# Patient Record
Sex: FEMALE | Race: BLACK OR AFRICAN AMERICAN | Employment: UNEMPLOYED | URBAN - METROPOLITAN AREA
[De-identification: names, ages, dates, MRNs, and addresses within clinical notes are randomized per-mention and may not be internally consistent; named-entity substitution may affect disease eponyms.]

---

## 2017-03-30 ENCOUNTER — PAT TELEPHONE (OUTPATIENT)
Dept: PREADMISSION TESTING | Age: 42
End: 2017-03-30

## 2017-03-30 VITALS — BODY MASS INDEX: 20.46 KG/M2 | WEIGHT: 135 LBS | HEIGHT: 68 IN

## 2017-03-30 RX ORDER — HYDRALAZINE HYDROCHLORIDE 25 MG/1
75 TABLET, FILM COATED ORAL 3 TIMES DAILY
Status: ON HOLD | COMMUNITY
End: 2018-01-20 | Stop reason: HOSPADM

## 2017-03-30 RX ORDER — LUBIPROSTONE 24 UG/1
24 CAPSULE, GELATIN COATED ORAL 2 TIMES DAILY WITH MEALS
Status: ON HOLD | COMMUNITY
End: 2022-05-17 | Stop reason: CLARIF

## 2017-03-30 ASSESSMENT — PAIN SCALES - GENERAL: PAINLEVEL_OUTOF10: 8

## 2017-03-30 ASSESSMENT — PAIN DESCRIPTION - LOCATION: LOCATION: ABDOMEN

## 2017-03-30 ASSESSMENT — PAIN DESCRIPTION - PAIN TYPE: TYPE: CHRONIC PAIN

## 2017-03-31 ENCOUNTER — HOSPITAL ENCOUNTER (OUTPATIENT)
Dept: ENDOSCOPY | Age: 42
Discharge: HOME | End: 2017-03-31
Attending: INTERNAL MEDICINE | Admitting: INTERNAL MEDICINE

## 2017-03-31 VITALS
BODY MASS INDEX: 21.48 KG/M2 | RESPIRATION RATE: 16 BRPM | SYSTOLIC BLOOD PRESSURE: 141 MMHG | TEMPERATURE: 98.5 F | HEART RATE: 91 BPM | WEIGHT: 139.22 LBS | DIASTOLIC BLOOD PRESSURE: 75 MMHG | OXYGEN SATURATION: 96 %

## 2017-03-31 LAB
GLUCOSE BLD-MCNC: 98 MG/DL (ref 70–99)
PERFORMED ON: NORMAL

## 2017-03-31 RX ORDER — MEPERIDINE HYDROCHLORIDE 25 MG/ML
12.5 INJECTION INTRAMUSCULAR; INTRAVENOUS; SUBCUTANEOUS EVERY 5 MIN PRN
Status: DISCONTINUED | OUTPATIENT
Start: 2017-03-31 | End: 2017-04-01 | Stop reason: HOSPADM

## 2017-03-31 RX ORDER — OXYCODONE HYDROCHLORIDE 5 MG/1
10 TABLET ORAL PRN
Status: ACTIVE | OUTPATIENT
Start: 2017-03-31 | End: 2017-03-31

## 2017-03-31 RX ORDER — HYDRALAZINE HYDROCHLORIDE 20 MG/ML
5 INJECTION INTRAMUSCULAR; INTRAVENOUS EVERY 10 MIN PRN
Status: DISCONTINUED | OUTPATIENT
Start: 2017-03-31 | End: 2017-04-01 | Stop reason: HOSPADM

## 2017-03-31 RX ORDER — LABETALOL HYDROCHLORIDE 5 MG/ML
5 INJECTION, SOLUTION INTRAVENOUS EVERY 10 MIN PRN
Status: DISCONTINUED | OUTPATIENT
Start: 2017-03-31 | End: 2017-04-01 | Stop reason: HOSPADM

## 2017-03-31 RX ORDER — SODIUM CHLORIDE 0.9 % (FLUSH) 0.9 %
10 SYRINGE (ML) INJECTION EVERY 12 HOURS SCHEDULED
Status: DISCONTINUED | OUTPATIENT
Start: 2017-03-31 | End: 2017-04-01 | Stop reason: HOSPADM

## 2017-03-31 RX ORDER — SODIUM CHLORIDE 9 MG/ML
INJECTION, SOLUTION INTRAVENOUS CONTINUOUS
Status: DISCONTINUED | OUTPATIENT
Start: 2017-03-31 | End: 2017-04-01 | Stop reason: HOSPADM

## 2017-03-31 RX ORDER — MORPHINE SULFATE 2 MG/ML
1 INJECTION, SOLUTION INTRAMUSCULAR; INTRAVENOUS EVERY 5 MIN PRN
Status: DISCONTINUED | OUTPATIENT
Start: 2017-03-31 | End: 2017-04-01 | Stop reason: HOSPADM

## 2017-03-31 RX ORDER — OXYCODONE HYDROCHLORIDE 5 MG/1
5 TABLET ORAL PRN
Status: ACTIVE | OUTPATIENT
Start: 2017-03-31 | End: 2017-03-31

## 2017-03-31 RX ORDER — SODIUM CHLORIDE 0.9 % (FLUSH) 0.9 %
10 SYRINGE (ML) INJECTION PRN
Status: DISCONTINUED | OUTPATIENT
Start: 2017-03-31 | End: 2017-04-01 | Stop reason: HOSPADM

## 2017-03-31 RX ORDER — METOCLOPRAMIDE HYDROCHLORIDE 5 MG/ML
10 INJECTION INTRAMUSCULAR; INTRAVENOUS
Status: ACTIVE | OUTPATIENT
Start: 2017-03-31 | End: 2017-03-31

## 2017-03-31 RX ORDER — DIPHENHYDRAMINE HYDROCHLORIDE 50 MG/ML
12.5 INJECTION INTRAMUSCULAR; INTRAVENOUS
Status: ACTIVE | OUTPATIENT
Start: 2017-03-31 | End: 2017-03-31

## 2017-03-31 RX ORDER — PROMETHAZINE HYDROCHLORIDE 25 MG/ML
6.25 INJECTION, SOLUTION INTRAMUSCULAR; INTRAVENOUS
Status: ACTIVE | OUTPATIENT
Start: 2017-03-31 | End: 2017-03-31

## 2017-03-31 ASSESSMENT — PAIN - FUNCTIONAL ASSESSMENT: PAIN_FUNCTIONAL_ASSESSMENT: 0-10

## 2017-03-31 ASSESSMENT — PAIN SCALES - GENERAL
PAINLEVEL_OUTOF10: 7
PAINLEVEL_OUTOF10: 0
PAINLEVEL_OUTOF10: 0

## 2017-07-04 PROBLEM — R62.7 FAILURE TO THRIVE IN ADULT: Status: ACTIVE | Noted: 2017-07-04

## 2017-07-04 PROBLEM — R07.9 CHEST PAIN: Status: ACTIVE | Noted: 2017-07-04

## 2017-07-12 ENCOUNTER — TELEPHONE (OUTPATIENT)
Dept: PSYCHIATRY | Age: 42
End: 2017-07-12

## 2017-07-12 LAB
COCAINE GC/MS CONF: >1000 NG/ML
GLUCOSE BLD-MCNC: 171 MG/DL (ref 70–99)
PERFORMED ON: ABNORMAL

## 2017-07-13 LAB
GLUCOSE BLD-MCNC: 122 MG/DL (ref 70–99)
GLUCOSE BLD-MCNC: 154 MG/DL (ref 70–99)
GLUCOSE BLD-MCNC: 162 MG/DL (ref 70–99)
GLUCOSE BLD-MCNC: 170 MG/DL (ref 70–99)
PERFORMED ON: ABNORMAL

## 2017-07-14 LAB
GLUCOSE BLD-MCNC: 107 MG/DL (ref 70–99)
GLUCOSE BLD-MCNC: 149 MG/DL (ref 70–99)
GLUCOSE BLD-MCNC: 164 MG/DL (ref 70–99)
GLUCOSE BLD-MCNC: 180 MG/DL (ref 70–99)
PERFORMED ON: ABNORMAL

## 2017-07-15 LAB
GLUCOSE BLD-MCNC: 102 MG/DL (ref 70–99)
GLUCOSE BLD-MCNC: 124 MG/DL (ref 70–99)
GLUCOSE BLD-MCNC: 148 MG/DL (ref 70–99)
GLUCOSE BLD-MCNC: 158 MG/DL (ref 70–99)
PERFORMED ON: ABNORMAL

## 2017-07-16 LAB
GLUCOSE BLD-MCNC: 132 MG/DL (ref 70–99)
GLUCOSE BLD-MCNC: 162 MG/DL (ref 70–99)
GLUCOSE BLD-MCNC: 187 MG/DL (ref 70–99)
GLUCOSE BLD-MCNC: 249 MG/DL (ref 70–99)
PERFORMED ON: ABNORMAL

## 2017-07-17 PROBLEM — B37.31 VAGINAL CANDIDIASIS: Status: ACTIVE | Noted: 2017-07-17

## 2017-07-17 LAB
GLUCOSE BLD-MCNC: 103 MG/DL (ref 70–99)
GLUCOSE BLD-MCNC: 122 MG/DL (ref 70–99)
GLUCOSE BLD-MCNC: 170 MG/DL (ref 70–99)
GLUCOSE BLD-MCNC: 271 MG/DL (ref 70–99)
GLUCOSE BLD-MCNC: 54 MG/DL (ref 70–99)
GLUCOSE BLD-MCNC: 89 MG/DL (ref 70–99)
PERFORMED ON: ABNORMAL
PERFORMED ON: NORMAL

## 2017-07-18 LAB
GLUCOSE BLD-MCNC: 125 MG/DL (ref 70–99)
GLUCOSE BLD-MCNC: 134 MG/DL (ref 70–99)
GLUCOSE BLD-MCNC: 137 MG/DL (ref 70–99)
GLUCOSE BLD-MCNC: 141 MG/DL (ref 70–99)
GLUCOSE BLD-MCNC: 172 MG/DL (ref 70–99)
GLUCOSE BLD-MCNC: 198 MG/DL (ref 70–99)
PERFORMED ON: ABNORMAL

## 2017-07-19 LAB
GLUCOSE BLD-MCNC: 108 MG/DL (ref 70–99)
GLUCOSE BLD-MCNC: 132 MG/DL (ref 70–99)
PERFORMED ON: ABNORMAL
PERFORMED ON: ABNORMAL

## 2017-07-27 PROBLEM — N93.9 VAGINAL BLEEDING: Status: ACTIVE | Noted: 2017-07-27

## 2017-07-31 ENCOUNTER — TELEPHONE (OUTPATIENT)
Dept: GYNECOLOGY | Age: 42
End: 2017-07-31

## 2017-07-31 DIAGNOSIS — B96.89 BV (BACTERIAL VAGINOSIS): Primary | ICD-10-CM

## 2017-07-31 DIAGNOSIS — N76.0 BV (BACTERIAL VAGINOSIS): Primary | ICD-10-CM

## 2017-07-31 RX ORDER — CLINDAMYCIN PHOSPHATE 20 MG/G
1 CREAM VAGINAL NIGHTLY
Qty: 1 TUBE | Refills: 0 | Status: SHIPPED | OUTPATIENT
Start: 2017-07-31 | End: 2017-08-07

## 2018-06-27 PROBLEM — F19.90 ILLICIT DRUG USE: Status: ACTIVE | Noted: 2018-06-27

## 2018-06-27 PROBLEM — D64.9 ANEMIA: Status: ACTIVE | Noted: 2018-06-27

## 2018-07-01 PROBLEM — E87.5 HYPERKALEMIA: Status: ACTIVE | Noted: 2018-07-01

## 2018-07-02 ENCOUNTER — TELEPHONE (OUTPATIENT)
Dept: INTERNAL MEDICINE CLINIC | Age: 43
End: 2018-07-02

## 2018-11-10 ENCOUNTER — HOSPITAL ENCOUNTER (EMERGENCY)
Age: 43
Discharge: HOME | End: 2018-11-10
Payer: MEDICAID

## 2018-11-10 VITALS
TEMPERATURE: 98.7 F | DIASTOLIC BLOOD PRESSURE: 75 MMHG | HEIGHT: 68 IN | OXYGEN SATURATION: 98 % | HEART RATE: 103 BPM | WEIGHT: 133.82 LBS | SYSTOLIC BLOOD PRESSURE: 149 MMHG | BODY MASS INDEX: 20.28 KG/M2 | RESPIRATION RATE: 22 BRPM

## 2018-11-10 DIAGNOSIS — D36.7 CYST, DERMOID, ARM, RIGHT: Primary | ICD-10-CM

## 2018-11-10 PROCEDURE — 99282 EMERGENCY DEPT VISIT SF MDM: CPT

## 2018-11-10 ASSESSMENT — ENCOUNTER SYMPTOMS
CHOKING: 0
SORE THROAT: 0
ABDOMINAL PAIN: 0
APNEA: 0
FACIAL SWELLING: 0
SHORTNESS OF BREATH: 0
BACK PAIN: 0
NAUSEA: 0
EYE DISCHARGE: 0
VOMITING: 0
EYE REDNESS: 0

## 2019-01-02 ENCOUNTER — HOSPITAL ENCOUNTER (INPATIENT)
Age: 44
LOS: 2 days | Discharge: HOME | DRG: 425 | End: 2019-01-04
Attending: EMERGENCY MEDICINE | Admitting: INTERNAL MEDICINE
Payer: MEDICAID

## 2019-01-02 ENCOUNTER — APPOINTMENT (OUTPATIENT)
Dept: CT IMAGING | Age: 44
DRG: 425 | End: 2019-01-02
Payer: MEDICAID

## 2019-01-02 DIAGNOSIS — Z99.2 ESRD ON PERITONEAL DIALYSIS (HCC): ICD-10-CM

## 2019-01-02 DIAGNOSIS — E87.5 HYPERKALEMIA: Primary | ICD-10-CM

## 2019-01-02 DIAGNOSIS — N18.6 ESRD ON PERITONEAL DIALYSIS (HCC): ICD-10-CM

## 2019-01-02 DIAGNOSIS — I63.9 CEREBROVASCULAR ACCIDENT (CVA), UNSPECIFIED MECHANISM (HCC): ICD-10-CM

## 2019-01-02 DIAGNOSIS — R29.898 RIGHT ARM WEAKNESS: ICD-10-CM

## 2019-01-02 LAB
A/G RATIO: 0.9 (ref 1.1–2.2)
ALBUMIN SERPL-MCNC: 3.1 G/DL (ref 3.4–5)
ALP BLD-CCNC: 324 U/L (ref 40–129)
ALT SERPL-CCNC: 6 U/L (ref 10–40)
ANION GAP SERPL CALCULATED.3IONS-SCNC: 19 MMOL/L (ref 3–16)
AST SERPL-CCNC: 14 U/L (ref 15–37)
BASOPHILS ABSOLUTE: 0.1 K/UL (ref 0–0.2)
BASOPHILS RELATIVE PERCENT: 0.9 %
BILIRUB SERPL-MCNC: 0.4 MG/DL (ref 0–1)
BUN BLDV-MCNC: 60 MG/DL (ref 7–20)
CALCIUM SERPL-MCNC: 9.6 MG/DL (ref 8.3–10.6)
CHLORIDE BLD-SCNC: 97 MMOL/L (ref 99–110)
CO2: 25 MMOL/L (ref 21–32)
CREAT SERPL-MCNC: 15.9 MG/DL (ref 0.6–1.1)
EOSINOPHILS ABSOLUTE: 0.4 K/UL (ref 0–0.6)
EOSINOPHILS RELATIVE PERCENT: 4.2 %
GFR AFRICAN AMERICAN: 3
GFR NON-AFRICAN AMERICAN: 2
GLOBULIN: 3.3 G/DL
GLUCOSE BLD-MCNC: 136 MG/DL (ref 70–99)
HCT VFR BLD CALC: 32.5 % (ref 36–48)
HEMOGLOBIN: 10.5 G/DL (ref 12–16)
LYMPHOCYTES ABSOLUTE: 1.8 K/UL (ref 1–5.1)
LYMPHOCYTES RELATIVE PERCENT: 18.9 %
MCH RBC QN AUTO: 28.3 PG (ref 26–34)
MCHC RBC AUTO-ENTMCNC: 32.2 G/DL (ref 31–36)
MCV RBC AUTO: 87.7 FL (ref 80–100)
MONOCYTES ABSOLUTE: 1 K/UL (ref 0–1.3)
MONOCYTES RELATIVE PERCENT: 10.3 %
NEUTROPHILS ABSOLUTE: 6.2 K/UL (ref 1.7–7.7)
NEUTROPHILS RELATIVE PERCENT: 65.7 %
PDW BLD-RTO: 14.6 % (ref 12.4–15.4)
PLATELET # BLD: 329 K/UL (ref 135–450)
PMV BLD AUTO: 8.9 FL (ref 5–10.5)
POTASSIUM REFLEX MAGNESIUM: 6.5 MMOL/L (ref 3.5–5.1)
RBC # BLD: 3.71 M/UL (ref 4–5.2)
SODIUM BLD-SCNC: 141 MMOL/L (ref 136–145)
TOTAL PROTEIN: 6.4 G/DL (ref 6.4–8.2)
WBC # BLD: 9.4 K/UL (ref 4–11)

## 2019-01-02 PROCEDURE — 2580000003 HC RX 258: Performed by: INTERNAL MEDICINE

## 2019-01-02 PROCEDURE — 6370000000 HC RX 637 (ALT 250 FOR IP): Performed by: EMERGENCY MEDICINE

## 2019-01-02 PROCEDURE — 85025 COMPLETE CBC W/AUTO DIFF WBC: CPT

## 2019-01-02 PROCEDURE — 36415 COLL VENOUS BLD VENIPUNCTURE: CPT

## 2019-01-02 PROCEDURE — 80053 COMPREHEN METABOLIC PANEL: CPT

## 2019-01-02 PROCEDURE — 72125 CT NECK SPINE W/O DYE: CPT

## 2019-01-02 PROCEDURE — 1200000000 HC SEMI PRIVATE

## 2019-01-02 PROCEDURE — 70450 CT HEAD/BRAIN W/O DYE: CPT

## 2019-01-02 PROCEDURE — 6360000002 HC RX W HCPCS: Performed by: INTERNAL MEDICINE

## 2019-01-02 PROCEDURE — 99285 EMERGENCY DEPT VISIT HI MDM: CPT

## 2019-01-02 PROCEDURE — 6370000000 HC RX 637 (ALT 250 FOR IP): Performed by: INTERNAL MEDICINE

## 2019-01-02 PROCEDURE — 93005 ELECTROCARDIOGRAM TRACING: CPT | Performed by: EMERGENCY MEDICINE

## 2019-01-02 RX ORDER — MIRTAZAPINE 30 MG/1
30 TABLET, FILM COATED ORAL NIGHTLY
Status: DISCONTINUED | OUTPATIENT
Start: 2019-01-02 | End: 2019-01-03

## 2019-01-02 RX ORDER — DICYCLOMINE HCL 20 MG
20 TABLET ORAL ONCE
Status: COMPLETED | OUTPATIENT
Start: 2019-01-02 | End: 2019-01-02

## 2019-01-02 RX ORDER — ATORVASTATIN CALCIUM 20 MG/1
20 TABLET, FILM COATED ORAL NIGHTLY
Status: DISCONTINUED | OUTPATIENT
Start: 2019-01-02 | End: 2019-01-03

## 2019-01-02 RX ORDER — PANTOPRAZOLE SODIUM 40 MG/1
40 TABLET, DELAYED RELEASE ORAL
Status: DISCONTINUED | OUTPATIENT
Start: 2019-01-03 | End: 2019-01-04

## 2019-01-02 RX ORDER — ISOSORBIDE MONONITRATE 60 MG/1
120 TABLET, EXTENDED RELEASE ORAL DAILY
Status: DISCONTINUED | OUTPATIENT
Start: 2019-01-03 | End: 2019-01-04 | Stop reason: HOSPADM

## 2019-01-02 RX ORDER — AMLODIPINE BESYLATE 10 MG/1
10 TABLET ORAL EVERY EVENING
Status: DISCONTINUED | OUTPATIENT
Start: 2019-01-02 | End: 2019-01-04 | Stop reason: HOSPADM

## 2019-01-02 RX ORDER — HEPARIN SODIUM 5000 [USP'U]/ML
5000 INJECTION, SOLUTION INTRAVENOUS; SUBCUTANEOUS EVERY 8 HOURS SCHEDULED
Status: DISCONTINUED | OUTPATIENT
Start: 2019-01-02 | End: 2019-01-04 | Stop reason: HOSPADM

## 2019-01-02 RX ORDER — CLONIDINE HYDROCHLORIDE 0.1 MG/1
0.3 TABLET ORAL ONCE
Status: COMPLETED | OUTPATIENT
Start: 2019-01-02 | End: 2019-01-02

## 2019-01-02 RX ORDER — HYDRALAZINE HYDROCHLORIDE 50 MG/1
50 TABLET, FILM COATED ORAL EVERY 8 HOURS
Status: DISCONTINUED | OUTPATIENT
Start: 2019-01-02 | End: 2019-01-04 | Stop reason: HOSPADM

## 2019-01-02 RX ORDER — SUCRALFATE 1 G/1
1 TABLET ORAL ONCE
Status: COMPLETED | OUTPATIENT
Start: 2019-01-02 | End: 2019-01-02

## 2019-01-02 RX ORDER — SODIUM CHLORIDE 0.9 % (FLUSH) 0.9 %
10 SYRINGE (ML) INJECTION EVERY 12 HOURS SCHEDULED
Status: DISCONTINUED | OUTPATIENT
Start: 2019-01-02 | End: 2019-01-04 | Stop reason: HOSPADM

## 2019-01-02 RX ORDER — SEVELAMER CARBONATE 800 MG/1
4800 TABLET, FILM COATED ORAL
Status: DISCONTINUED | OUTPATIENT
Start: 2019-01-03 | End: 2019-01-04 | Stop reason: HOSPADM

## 2019-01-02 RX ORDER — CLONIDINE HYDROCHLORIDE 0.1 MG/1
0.3 TABLET ORAL 3 TIMES DAILY
Status: DISCONTINUED | OUTPATIENT
Start: 2019-01-02 | End: 2019-01-04 | Stop reason: HOSPADM

## 2019-01-02 RX ORDER — SODIUM CHLORIDE 0.9 % (FLUSH) 0.9 %
10 SYRINGE (ML) INJECTION PRN
Status: DISCONTINUED | OUTPATIENT
Start: 2019-01-02 | End: 2019-01-04 | Stop reason: HOSPADM

## 2019-01-02 RX ORDER — DOCUSATE SODIUM 100 MG/1
100 CAPSULE, LIQUID FILLED ORAL 2 TIMES DAILY PRN
Status: DISCONTINUED | OUTPATIENT
Start: 2019-01-02 | End: 2019-01-04 | Stop reason: HOSPADM

## 2019-01-02 RX ORDER — LUBIPROSTONE 8 UG/1
8 CAPSULE, GELATIN COATED ORAL 2 TIMES DAILY WITH MEALS
Status: DISCONTINUED | OUTPATIENT
Start: 2019-01-03 | End: 2019-01-03

## 2019-01-02 RX ORDER — BISACODYL 10 MG
10 SUPPOSITORY, RECTAL RECTAL DAILY PRN
Status: DISCONTINUED | OUTPATIENT
Start: 2019-01-02 | End: 2019-01-04 | Stop reason: HOSPADM

## 2019-01-02 RX ORDER — HYDRALAZINE HYDROCHLORIDE 25 MG/1
75 TABLET, FILM COATED ORAL EVERY 8 HOURS SCHEDULED
Status: DISCONTINUED | OUTPATIENT
Start: 2019-01-02 | End: 2019-01-02

## 2019-01-02 RX ORDER — LOSARTAN POTASSIUM 50 MG/1
100 TABLET ORAL DAILY
Status: DISCONTINUED | OUTPATIENT
Start: 2019-01-03 | End: 2019-01-04 | Stop reason: HOSPADM

## 2019-01-02 RX ORDER — CINACALCET 30 MG/1
30 TABLET, FILM COATED ORAL DAILY
Status: DISCONTINUED | OUTPATIENT
Start: 2019-01-03 | End: 2019-01-03

## 2019-01-02 RX ORDER — CETIRIZINE HYDROCHLORIDE 10 MG/1
10 TABLET ORAL DAILY
Status: DISCONTINUED | OUTPATIENT
Start: 2019-01-03 | End: 2019-01-03

## 2019-01-02 RX ORDER — ONDANSETRON 2 MG/ML
4 INJECTION INTRAMUSCULAR; INTRAVENOUS EVERY 4 HOURS PRN
Status: DISCONTINUED | OUTPATIENT
Start: 2019-01-02 | End: 2019-01-04 | Stop reason: HOSPADM

## 2019-01-02 RX ORDER — ASPIRIN 81 MG/1
81 TABLET, CHEWABLE ORAL DAILY
Status: DISCONTINUED | OUTPATIENT
Start: 2019-01-03 | End: 2019-01-04 | Stop reason: HOSPADM

## 2019-01-02 RX ORDER — SODIUM POLYSTYRENE SULFONATE 15 G/60ML
45 SUSPENSION ORAL; RECTAL ONCE
Status: COMPLETED | OUTPATIENT
Start: 2019-01-02 | End: 2019-01-02

## 2019-01-02 RX ADMIN — Medication 10 ML: at 23:36

## 2019-01-02 RX ADMIN — AMLODIPINE BESYLATE 10 MG: 10 TABLET ORAL at 23:35

## 2019-01-02 RX ADMIN — CLONIDINE HYDROCHLORIDE 0.3 MG: 0.1 TABLET ORAL at 18:58

## 2019-01-02 RX ADMIN — SODIUM POLYSTYRENE SULFONATE 45 G: 15 SUSPENSION ORAL; RECTAL at 18:48

## 2019-01-02 RX ADMIN — SUCRALFATE 1 G: 1 TABLET ORAL at 19:38

## 2019-01-02 RX ADMIN — ATORVASTATIN CALCIUM 20 MG: 20 TABLET, FILM COATED ORAL at 23:35

## 2019-01-02 RX ADMIN — HYDRALAZINE HYDROCHLORIDE 50 MG: 50 TABLET, FILM COATED ORAL at 23:03

## 2019-01-02 RX ADMIN — MIRTAZAPINE 30 MG: 30 TABLET, FILM COATED ORAL at 23:33

## 2019-01-02 RX ADMIN — CLONIDINE HYDROCHLORIDE 0.3 MG: 0.1 TABLET ORAL at 23:34

## 2019-01-02 RX ADMIN — HYDRALAZINE HYDROCHLORIDE 75 MG: 25 TABLET, FILM COATED ORAL at 18:58

## 2019-01-02 RX ADMIN — ONDANSETRON 4 MG: 2 INJECTION INTRAMUSCULAR; INTRAVENOUS at 22:50

## 2019-01-02 RX ADMIN — DICYCLOMINE HYDROCHLORIDE 20 MG: 20 TABLET ORAL at 20:51

## 2019-01-02 ASSESSMENT — PAIN DESCRIPTION - PAIN TYPE
TYPE: ACUTE PAIN
TYPE: ACUTE PAIN

## 2019-01-02 ASSESSMENT — PAIN SCALES - GENERAL
PAINLEVEL_OUTOF10: 10
PAINLEVEL_OUTOF10: 10
PAINLEVEL_OUTOF10: 0

## 2019-01-02 ASSESSMENT — PAIN DESCRIPTION - DESCRIPTORS
DESCRIPTORS: HEADACHE
DESCRIPTORS: SHOOTING

## 2019-01-02 ASSESSMENT — PAIN DESCRIPTION - ORIENTATION: ORIENTATION: RIGHT

## 2019-01-02 ASSESSMENT — PAIN DESCRIPTION - LOCATION
LOCATION: HEAD
LOCATION: ARM

## 2019-01-03 ENCOUNTER — APPOINTMENT (OUTPATIENT)
Dept: MRI IMAGING | Age: 44
DRG: 425 | End: 2019-01-03
Payer: MEDICAID

## 2019-01-03 PROBLEM — R29.818: Status: ACTIVE | Noted: 2019-01-03

## 2019-01-03 PROBLEM — Z99.2 PERITONEAL DIALYSIS STATUS (HCC): Status: ACTIVE | Noted: 2019-01-03

## 2019-01-03 PROBLEM — E78.5 HYPERLIPIDEMIA: Status: ACTIVE | Noted: 2019-01-03

## 2019-01-03 LAB
A/G RATIO: 0.9 (ref 1.1–2.2)
ALBUMIN SERPL-MCNC: 2.9 G/DL (ref 3.4–5)
ALP BLD-CCNC: 327 U/L (ref 40–129)
ALT SERPL-CCNC: 10 U/L (ref 10–40)
ANION GAP SERPL CALCULATED.3IONS-SCNC: 23 MMOL/L (ref 3–16)
APPEARANCE FLUID: CLEAR
AST SERPL-CCNC: 47 U/L (ref 15–37)
BASOPHILS ABSOLUTE: 0.1 K/UL (ref 0–0.2)
BASOPHILS RELATIVE PERCENT: 1.2 %
BILIRUB SERPL-MCNC: 0.3 MG/DL (ref 0–1)
BUN BLDV-MCNC: 62 MG/DL (ref 7–20)
CALCIUM SERPL-MCNC: 8.7 MG/DL (ref 8.3–10.6)
CELL COUNT FLUID TYPE: NORMAL
CHLORIDE BLD-SCNC: 91 MMOL/L (ref 99–110)
CLOT EVALUATION: NORMAL
CO2: 24 MMOL/L (ref 21–32)
COLOR FLUID: COLORLESS
CREAT SERPL-MCNC: 14.9 MG/DL (ref 0.6–1.1)
EOSINOPHILS ABSOLUTE: 0.4 K/UL (ref 0–0.6)
EOSINOPHILS RELATIVE PERCENT: 4.2 %
FLUID DIFF COMMENT: NORMAL
GFR AFRICAN AMERICAN: 3
GFR NON-AFRICAN AMERICAN: 3
GLOBULIN: 3.2 G/DL
GLUCOSE BLD-MCNC: 143 MG/DL (ref 70–99)
HCT VFR BLD CALC: 31.2 % (ref 36–48)
HEMOGLOBIN: 10.1 G/DL (ref 12–16)
LYMPHOCYTES ABSOLUTE: 2.9 K/UL (ref 1–5.1)
LYMPHOCYTES RELATIVE PERCENT: 29.2 %
MCH RBC QN AUTO: 28.5 PG (ref 26–34)
MCHC RBC AUTO-ENTMCNC: 32.5 G/DL (ref 31–36)
MCV RBC AUTO: 87.7 FL (ref 80–100)
MONOCYTES ABSOLUTE: 0.9 K/UL (ref 0–1.3)
MONOCYTES RELATIVE PERCENT: 8.8 %
NEUTROPHILS ABSOLUTE: 5.6 K/UL (ref 1.7–7.7)
NEUTROPHILS RELATIVE PERCENT: 56.6 %
NUCLEATED CELLS FLUID: 10 /CUMM
NUMBER OF CELLS COUNTED FLUID: 100
PDW BLD-RTO: 14.5 % (ref 12.4–15.4)
PLATELET # BLD: 319 K/UL (ref 135–450)
PMV BLD AUTO: 8.7 FL (ref 5–10.5)
POTASSIUM REFLEX MAGNESIUM: 5 MMOL/L (ref 3.5–5.1)
POTASSIUM SERPL-SCNC: 5.4 MMOL/L (ref 3.5–5.1)
RBC # BLD: 3.56 M/UL (ref 4–5.2)
RBC FLUID: 8 /CUMM
SODIUM BLD-SCNC: 138 MMOL/L (ref 136–145)
TOTAL PROTEIN: 6.1 G/DL (ref 6.4–8.2)
WBC # BLD: 9.9 K/UL (ref 4–11)

## 2019-01-03 PROCEDURE — 6370000000 HC RX 637 (ALT 250 FOR IP): Performed by: INTERNAL MEDICINE

## 2019-01-03 PROCEDURE — A4722 DIALYS SOL FLD VOL > 1999CC: HCPCS | Performed by: INTERNAL MEDICINE

## 2019-01-03 PROCEDURE — 2580000003 HC RX 258: Performed by: INTERNAL MEDICINE

## 2019-01-03 PROCEDURE — 1200000000 HC SEMI PRIVATE

## 2019-01-03 PROCEDURE — 6360000002 HC RX W HCPCS: Performed by: INTERNAL MEDICINE

## 2019-01-03 PROCEDURE — 89051 BODY FLUID CELL COUNT: CPT

## 2019-01-03 PROCEDURE — 87015 SPECIMEN INFECT AGNT CONCNTJ: CPT

## 2019-01-03 PROCEDURE — 87070 CULTURE OTHR SPECIMN AEROBIC: CPT

## 2019-01-03 PROCEDURE — 6370000000 HC RX 637 (ALT 250 FOR IP): Performed by: NURSE PRACTITIONER

## 2019-01-03 PROCEDURE — 84132 ASSAY OF SERUM POTASSIUM: CPT

## 2019-01-03 PROCEDURE — 93010 ELECTROCARDIOGRAM REPORT: CPT | Performed by: INTERNAL MEDICINE

## 2019-01-03 PROCEDURE — 85025 COMPLETE CBC W/AUTO DIFF WBC: CPT

## 2019-01-03 PROCEDURE — 80053 COMPREHEN METABOLIC PANEL: CPT

## 2019-01-03 PROCEDURE — 36415 COLL VENOUS BLD VENIPUNCTURE: CPT

## 2019-01-03 PROCEDURE — 70551 MRI BRAIN STEM W/O DYE: CPT

## 2019-01-03 PROCEDURE — 90945 DIALYSIS ONE EVALUATION: CPT

## 2019-01-03 PROCEDURE — 87205 SMEAR GRAM STAIN: CPT

## 2019-01-03 RX ORDER — LABETALOL HYDROCHLORIDE 5 MG/ML
10 INJECTION, SOLUTION INTRAVENOUS EVERY 6 HOURS PRN
Status: DISCONTINUED | OUTPATIENT
Start: 2019-01-03 | End: 2019-01-04 | Stop reason: HOSPADM

## 2019-01-03 RX ORDER — SENNA PLUS 8.6 MG/1
2 TABLET ORAL NIGHTLY
Status: DISCONTINUED | OUTPATIENT
Start: 2019-01-03 | End: 2019-01-04 | Stop reason: HOSPADM

## 2019-01-03 RX ORDER — ATORVASTATIN CALCIUM 20 MG/1
20 TABLET, FILM COATED ORAL NIGHTLY
Status: DISCONTINUED | OUTPATIENT
Start: 2019-01-03 | End: 2019-01-04 | Stop reason: HOSPADM

## 2019-01-03 RX ORDER — POLYETHYLENE GLYCOL 3350 17 G/17G
17 POWDER, FOR SOLUTION ORAL DAILY PRN
Status: DISCONTINUED | OUTPATIENT
Start: 2019-01-03 | End: 2019-01-04 | Stop reason: HOSPADM

## 2019-01-03 RX ORDER — CINACALCET 30 MG/1
90 TABLET, FILM COATED ORAL EVERY EVENING
Status: DISCONTINUED | OUTPATIENT
Start: 2019-01-03 | End: 2019-01-04 | Stop reason: HOSPADM

## 2019-01-03 RX ORDER — SODIUM CHLORIDE, SODIUM LACTATE, CALCIUM CHLORIDE, MAGNESIUM CHLORIDE AND DEXTROSE 1.5; 538; 448; 18.3; 5.08 G/100ML; MG/100ML; MG/100ML; MG/100ML; MG/100ML
3000 INJECTION, SOLUTION INTRAPERITONEAL NIGHTLY
Status: DISCONTINUED | OUTPATIENT
Start: 2019-01-03 | End: 2019-01-03

## 2019-01-03 RX ORDER — SODIUM CHLORIDE, SODIUM LACTATE, CALCIUM CHLORIDE, MAGNESIUM CHLORIDE AND DEXTROSE 1.5; 538; 448; 18.3; 5.08 G/100ML; MG/100ML; MG/100ML; MG/100ML; MG/100ML
4000 INJECTION, SOLUTION INTRAPERITONEAL NIGHTLY
Status: DISCONTINUED | OUTPATIENT
Start: 2019-01-03 | End: 2019-01-04 | Stop reason: SDUPTHER

## 2019-01-03 RX ORDER — MIRTAZAPINE 15 MG/1
15 TABLET, FILM COATED ORAL NIGHTLY
Status: DISCONTINUED | OUTPATIENT
Start: 2019-01-03 | End: 2019-01-04 | Stop reason: HOSPADM

## 2019-01-03 RX ORDER — SODIUM CHLORIDE, SODIUM LACTATE, CALCIUM CHLORIDE, MAGNESIUM CHLORIDE AND DEXTROSE 1.5; 538; 448; 18.3; 5.08 G/100ML; MG/100ML; MG/100ML; MG/100ML; MG/100ML
2500 INJECTION, SOLUTION INTRAPERITONEAL
Status: DISCONTINUED | OUTPATIENT
Start: 2019-01-03 | End: 2019-01-03

## 2019-01-03 RX ORDER — SODIUM CHLORIDE, SODIUM LACTATE, CALCIUM CHLORIDE, MAGNESIUM CHLORIDE AND DEXTROSE 1.5; 538; 448; 18.3; 5.08 G/100ML; MG/100ML; MG/100ML; MG/100ML; MG/100ML
6000 INJECTION, SOLUTION INTRAPERITONEAL NIGHTLY
Status: DISCONTINUED | OUTPATIENT
Start: 2019-01-03 | End: 2019-01-04 | Stop reason: SDUPTHER

## 2019-01-03 RX ORDER — CALCITRIOL 0.5 UG/1
0.5 CAPSULE, LIQUID FILLED ORAL NIGHTLY
Status: ON HOLD | COMMUNITY
End: 2019-03-24 | Stop reason: ALTCHOICE

## 2019-01-03 RX ORDER — HYDRALAZINE HYDROCHLORIDE 50 MG/1
50 TABLET, FILM COATED ORAL 3 TIMES DAILY
Status: ON HOLD | COMMUNITY
End: 2019-03-24 | Stop reason: ALTCHOICE

## 2019-01-03 RX ORDER — SODIUM CHLORIDE, SODIUM LACTATE, CALCIUM CHLORIDE, MAGNESIUM CHLORIDE AND DEXTROSE 1.5; 538; 448; 18.3; 5.08 G/100ML; MG/100ML; MG/100ML; MG/100ML; MG/100ML
6000 INJECTION, SOLUTION INTRAPERITONEAL NIGHTLY
Status: DISCONTINUED | OUTPATIENT
Start: 2019-01-03 | End: 2019-01-03

## 2019-01-03 RX ORDER — ACETAMINOPHEN 500 MG
1000 TABLET ORAL EVERY 8 HOURS PRN
Status: DISCONTINUED | OUTPATIENT
Start: 2019-01-03 | End: 2019-01-04 | Stop reason: HOSPADM

## 2019-01-03 RX ORDER — GENTAMICIN SULFATE 1 MG/G
CREAM TOPICAL DAILY
Status: DISCONTINUED | OUTPATIENT
Start: 2019-01-03 | End: 2019-01-04 | Stop reason: HOSPADM

## 2019-01-03 RX ORDER — CLOTRIMAZOLE AND BETAMETHASONE DIPROPIONATE 10; .64 MG/G; MG/G
CREAM TOPICAL 2 TIMES DAILY
Status: ON HOLD | COMMUNITY
End: 2019-03-24 | Stop reason: ALTCHOICE

## 2019-01-03 RX ORDER — DOCUSATE SODIUM 100 MG/1
100 CAPSULE, LIQUID FILLED ORAL 2 TIMES DAILY
Status: DISCONTINUED | OUTPATIENT
Start: 2019-01-03 | End: 2019-01-04 | Stop reason: HOSPADM

## 2019-01-03 RX ORDER — MIRTAZAPINE 15 MG/1
15 TABLET, FILM COATED ORAL NIGHTLY
Status: ON HOLD | COMMUNITY
End: 2019-03-24 | Stop reason: ALTCHOICE

## 2019-01-03 RX ADMIN — SODIUM CHLORIDE, SODIUM LACTATE, CALCIUM CHLORIDE, MAGNESIUM CHLORIDE AND DEXTROSE 3000 ML: 1.5; 538; 448; 18.3; 5.08 INJECTION, SOLUTION INTRAPERITONEAL at 03:00

## 2019-01-03 RX ADMIN — ATORVASTATIN CALCIUM 20 MG: 20 TABLET, FILM COATED ORAL at 22:18

## 2019-01-03 RX ADMIN — SEVELAMER CARBONATE 4800 MG: 800 TABLET, FILM COATED ORAL at 08:04

## 2019-01-03 RX ADMIN — GENTAMICIN SULFATE: 1 CREAM TOPICAL at 18:21

## 2019-01-03 RX ADMIN — SENNOSIDES 17.2 MG: 8.6 TABLET, FILM COATED ORAL at 22:18

## 2019-01-03 RX ADMIN — DOCUSATE SODIUM 100 MG: 100 CAPSULE, LIQUID FILLED ORAL at 22:20

## 2019-01-03 RX ADMIN — DOCUSATE SODIUM 100 MG: 100 CAPSULE, LIQUID FILLED ORAL at 08:04

## 2019-01-03 RX ADMIN — HYDRALAZINE HYDROCHLORIDE 50 MG: 50 TABLET, FILM COATED ORAL at 22:18

## 2019-01-03 RX ADMIN — SODIUM CHLORIDE, SODIUM LACTATE, CALCIUM CHLORIDE, MAGNESIUM CHLORIDE AND DEXTROSE 6000 ML: 1.5; 538; 448; 18.3; 5.08 INJECTION, SOLUTION INTRAPERITONEAL at 22:57

## 2019-01-03 RX ADMIN — CLONIDINE HYDROCHLORIDE 0.3 MG: 0.1 TABLET ORAL at 08:04

## 2019-01-03 RX ADMIN — ACETAMINOPHEN 1000 MG: 500 TABLET ORAL at 09:10

## 2019-01-03 RX ADMIN — ACETAMINOPHEN 1000 MG: 500 TABLET ORAL at 18:23

## 2019-01-03 RX ADMIN — SEVELAMER CARBONATE 4800 MG: 800 TABLET, FILM COATED ORAL at 18:21

## 2019-01-03 RX ADMIN — LOSARTAN POTASSIUM 100 MG: 50 TABLET ORAL at 08:04

## 2019-01-03 RX ADMIN — SODIUM CHLORIDE, SODIUM LACTATE, CALCIUM CHLORIDE, MAGNESIUM CHLORIDE AND DEXTROSE 6000 ML: 1.5; 538; 448; 18.3; 5.08 INJECTION, SOLUTION INTRAPERITONEAL at 03:00

## 2019-01-03 RX ADMIN — SODIUM CHLORIDE, SODIUM LACTATE, CALCIUM CHLORIDE, MAGNESIUM CHLORIDE AND DEXTROSE 4000 ML: 1.5; 538; 448; 18.3; 5.08 INJECTION, SOLUTION INTRAPERITONEAL at 22:57

## 2019-01-03 RX ADMIN — PANTOPRAZOLE SODIUM 40 MG: 40 TABLET, DELAYED RELEASE ORAL at 06:28

## 2019-01-03 RX ADMIN — AMLODIPINE BESYLATE 10 MG: 10 TABLET ORAL at 18:21

## 2019-01-03 RX ADMIN — MIRTAZAPINE 15 MG: 15 TABLET, FILM COATED ORAL at 22:20

## 2019-01-03 RX ADMIN — DOCUSATE SODIUM 100 MG: 100 CAPSULE, LIQUID FILLED ORAL at 22:19

## 2019-01-03 RX ADMIN — CLONIDINE HYDROCHLORIDE 0.3 MG: 0.1 TABLET ORAL at 22:18

## 2019-01-03 RX ADMIN — HYDRALAZINE HYDROCHLORIDE 50 MG: 50 TABLET, FILM COATED ORAL at 16:10

## 2019-01-03 RX ADMIN — HYDRALAZINE HYDROCHLORIDE 50 MG: 50 TABLET, FILM COATED ORAL at 06:28

## 2019-01-03 RX ADMIN — CINACALCET HYDROCHLORIDE 90 MG: 30 TABLET, COATED ORAL at 22:20

## 2019-01-03 RX ADMIN — CINACALCET HYDROCHLORIDE 30 MG: 30 TABLET, COATED ORAL at 08:04

## 2019-01-03 RX ADMIN — HEPARIN SODIUM 5000 UNITS: 5000 INJECTION INTRAVENOUS; SUBCUTANEOUS at 16:10

## 2019-01-03 RX ADMIN — CETIRIZINE HYDROCHLORIDE 10 MG: 10 TABLET, FILM COATED ORAL at 08:04

## 2019-01-03 RX ADMIN — ASPIRIN 81 MG 81 MG: 81 TABLET ORAL at 08:04

## 2019-01-03 RX ADMIN — Medication 10 ML: at 22:21

## 2019-01-03 RX ADMIN — ISOSORBIDE MONONITRATE 120 MG: 60 TABLET, EXTENDED RELEASE ORAL at 08:04

## 2019-01-03 RX ADMIN — CLONIDINE HYDROCHLORIDE 0.3 MG: 0.1 TABLET ORAL at 16:10

## 2019-01-03 RX ADMIN — SEVELAMER CARBONATE 4800 MG: 800 TABLET, FILM COATED ORAL at 12:58

## 2019-01-03 RX ADMIN — HEPARIN SODIUM 5000 UNITS: 5000 INJECTION INTRAVENOUS; SUBCUTANEOUS at 22:21

## 2019-01-03 ASSESSMENT — PAIN DESCRIPTION - PAIN TYPE: TYPE: ACUTE PAIN

## 2019-01-03 ASSESSMENT — PAIN SCALES - GENERAL
PAINLEVEL_OUTOF10: 0
PAINLEVEL_OUTOF10: 5
PAINLEVEL_OUTOF10: 5
PAINLEVEL_OUTOF10: 0
PAINLEVEL_OUTOF10: 5

## 2019-01-03 ASSESSMENT — PAIN DESCRIPTION - ORIENTATION: ORIENTATION: RIGHT

## 2019-01-03 ASSESSMENT — PAIN DESCRIPTION - DESCRIPTORS: DESCRIPTORS: SORE

## 2019-01-03 ASSESSMENT — PAIN DESCRIPTION - LOCATION: LOCATION: HAND

## 2019-01-04 VITALS
BODY MASS INDEX: 20.72 KG/M2 | WEIGHT: 136.69 LBS | HEART RATE: 78 BPM | SYSTOLIC BLOOD PRESSURE: 138 MMHG | RESPIRATION RATE: 18 BRPM | HEIGHT: 68 IN | TEMPERATURE: 98.9 F | OXYGEN SATURATION: 99 % | DIASTOLIC BLOOD PRESSURE: 76 MMHG

## 2019-01-04 LAB
ALBUMIN SERPL-MCNC: 3 G/DL (ref 3.4–5)
ANION GAP SERPL CALCULATED.3IONS-SCNC: 22 MMOL/L (ref 3–16)
BASOPHILS ABSOLUTE: 0.1 K/UL (ref 0–0.2)
BASOPHILS RELATIVE PERCENT: 1.2 %
BUN BLDV-MCNC: 57 MG/DL (ref 7–20)
CALCIUM SERPL-MCNC: 8.6 MG/DL (ref 8.3–10.6)
CHLORIDE BLD-SCNC: 86 MMOL/L (ref 99–110)
CO2: 30 MMOL/L (ref 21–32)
CREAT SERPL-MCNC: 13.7 MG/DL (ref 0.6–1.1)
EOSINOPHILS ABSOLUTE: 0.3 K/UL (ref 0–0.6)
EOSINOPHILS RELATIVE PERCENT: 3.2 %
GFR AFRICAN AMERICAN: 4
GFR NON-AFRICAN AMERICAN: 3
GLUCOSE BLD-MCNC: 133 MG/DL (ref 70–99)
HCT VFR BLD CALC: 30.3 % (ref 36–48)
HEMOGLOBIN: 10 G/DL (ref 12–16)
LYMPHOCYTES ABSOLUTE: 2.6 K/UL (ref 1–5.1)
LYMPHOCYTES RELATIVE PERCENT: 27.4 %
MCH RBC QN AUTO: 28.3 PG (ref 26–34)
MCHC RBC AUTO-ENTMCNC: 32.8 G/DL (ref 31–36)
MCV RBC AUTO: 86.3 FL (ref 80–100)
MONOCYTES ABSOLUTE: 0.6 K/UL (ref 0–1.3)
MONOCYTES RELATIVE PERCENT: 6.5 %
NEUTROPHILS ABSOLUTE: 6 K/UL (ref 1.7–7.7)
NEUTROPHILS RELATIVE PERCENT: 61.7 %
PDW BLD-RTO: 14.5 % (ref 12.4–15.4)
PHOSPHORUS: 7.3 MG/DL (ref 2.5–4.9)
PLATELET # BLD: 296 K/UL (ref 135–450)
PMV BLD AUTO: 9.1 FL (ref 5–10.5)
POTASSIUM SERPL-SCNC: 4.5 MMOL/L (ref 3.5–5.1)
RBC # BLD: 3.51 M/UL (ref 4–5.2)
SODIUM BLD-SCNC: 138 MMOL/L (ref 136–145)
WBC # BLD: 9.7 K/UL (ref 4–11)

## 2019-01-04 PROCEDURE — 90945 DIALYSIS ONE EVALUATION: CPT

## 2019-01-04 PROCEDURE — 97161 PT EVAL LOW COMPLEX 20 MIN: CPT

## 2019-01-04 PROCEDURE — 2580000003 HC RX 258: Performed by: INTERNAL MEDICINE

## 2019-01-04 PROCEDURE — 6360000002 HC RX W HCPCS: Performed by: INTERNAL MEDICINE

## 2019-01-04 PROCEDURE — G8987 SELF CARE CURRENT STATUS: HCPCS

## 2019-01-04 PROCEDURE — 80069 RENAL FUNCTION PANEL: CPT

## 2019-01-04 PROCEDURE — 85025 COMPLETE CBC W/AUTO DIFF WBC: CPT

## 2019-01-04 PROCEDURE — 6370000000 HC RX 637 (ALT 250 FOR IP): Performed by: INTERNAL MEDICINE

## 2019-01-04 PROCEDURE — G8980 MOBILITY D/C STATUS: HCPCS

## 2019-01-04 PROCEDURE — G8988 SELF CARE GOAL STATUS: HCPCS

## 2019-01-04 PROCEDURE — G8989 SELF CARE D/C STATUS: HCPCS

## 2019-01-04 PROCEDURE — G8978 MOBILITY CURRENT STATUS: HCPCS

## 2019-01-04 PROCEDURE — G8979 MOBILITY GOAL STATUS: HCPCS

## 2019-01-04 PROCEDURE — 97165 OT EVAL LOW COMPLEX 30 MIN: CPT

## 2019-01-04 PROCEDURE — 6370000000 HC RX 637 (ALT 250 FOR IP): Performed by: NURSE PRACTITIONER

## 2019-01-04 RX ORDER — LACTULOSE 10 G/15ML
20 SOLUTION ORAL 3 TIMES DAILY PRN
Status: DISCONTINUED | OUTPATIENT
Start: 2019-01-04 | End: 2019-01-04 | Stop reason: HOSPADM

## 2019-01-04 RX ORDER — PANTOPRAZOLE SODIUM 40 MG/1
40 TABLET, DELAYED RELEASE ORAL
Status: DISCONTINUED | OUTPATIENT
Start: 2019-01-04 | End: 2019-01-04 | Stop reason: HOSPADM

## 2019-01-04 RX ORDER — FLUOXETINE 10 MG/1
10 CAPSULE ORAL DAILY
Qty: 30 CAPSULE | Refills: 0 | Status: ON HOLD | OUTPATIENT
Start: 2019-01-04 | End: 2019-03-24 | Stop reason: ALTCHOICE

## 2019-01-04 RX ORDER — FLUOXETINE 10 MG/1
10 CAPSULE ORAL DAILY
Status: DISCONTINUED | OUTPATIENT
Start: 2019-01-04 | End: 2019-01-04 | Stop reason: HOSPADM

## 2019-01-04 RX ORDER — ATORVASTATIN CALCIUM 20 MG/1
20 TABLET, FILM COATED ORAL NIGHTLY
Qty: 30 TABLET | Refills: 0 | Status: ON HOLD | OUTPATIENT
Start: 2019-01-04 | End: 2020-03-17 | Stop reason: HOSPADM

## 2019-01-04 RX ORDER — SODIUM CHLORIDE, SODIUM LACTATE, CALCIUM CHLORIDE, MAGNESIUM CHLORIDE AND DEXTROSE 1.5; 538; 448; 18.3; 5.08 G/100ML; MG/100ML; MG/100ML; MG/100ML; MG/100ML
3000 INJECTION, SOLUTION INTRAPERITONEAL NIGHTLY
Status: DISCONTINUED | OUTPATIENT
Start: 2019-01-04 | End: 2019-01-04 | Stop reason: HOSPADM

## 2019-01-04 RX ORDER — LACTULOSE 10 G/15ML
20 SOLUTION ORAL 3 TIMES DAILY PRN
Qty: 236 ML | Refills: 0 | Status: ON HOLD | OUTPATIENT
Start: 2019-01-04 | End: 2019-03-24 | Stop reason: ALTCHOICE

## 2019-01-04 RX ORDER — SODIUM CHLORIDE, SODIUM LACTATE, CALCIUM CHLORIDE, MAGNESIUM CHLORIDE AND DEXTROSE 1.5; 538; 448; 18.3; 5.08 G/100ML; MG/100ML; MG/100ML; MG/100ML; MG/100ML
4000 INJECTION, SOLUTION INTRAPERITONEAL NIGHTLY
Status: DISCONTINUED | OUTPATIENT
Start: 2019-01-04 | End: 2019-01-04 | Stop reason: HOSPADM

## 2019-01-04 RX ADMIN — SEVELAMER CARBONATE 4800 MG: 800 TABLET, FILM COATED ORAL at 09:58

## 2019-01-04 RX ADMIN — PANTOPRAZOLE SODIUM 40 MG: 40 TABLET, DELAYED RELEASE ORAL at 06:28

## 2019-01-04 RX ADMIN — ISOSORBIDE MONONITRATE 120 MG: 60 TABLET, EXTENDED RELEASE ORAL at 09:58

## 2019-01-04 RX ADMIN — HYDRALAZINE HYDROCHLORIDE 50 MG: 50 TABLET, FILM COATED ORAL at 06:28

## 2019-01-04 RX ADMIN — Medication 10 ML: at 10:00

## 2019-01-04 RX ADMIN — HYDRALAZINE HYDROCHLORIDE 50 MG: 50 TABLET, FILM COATED ORAL at 15:06

## 2019-01-04 RX ADMIN — SEVELAMER CARBONATE 4800 MG: 800 TABLET, FILM COATED ORAL at 17:49

## 2019-01-04 RX ADMIN — CLONIDINE HYDROCHLORIDE 0.3 MG: 0.1 TABLET ORAL at 09:58

## 2019-01-04 RX ADMIN — DOCUSATE SODIUM 100 MG: 100 CAPSULE, LIQUID FILLED ORAL at 09:58

## 2019-01-04 RX ADMIN — HEPARIN SODIUM 5000 UNITS: 5000 INJECTION INTRAVENOUS; SUBCUTANEOUS at 06:28

## 2019-01-04 RX ADMIN — LOSARTAN POTASSIUM 100 MG: 50 TABLET ORAL at 09:58

## 2019-01-04 RX ADMIN — ASPIRIN 81 MG 81 MG: 81 TABLET ORAL at 09:58

## 2019-01-04 RX ADMIN — AMLODIPINE BESYLATE 10 MG: 10 TABLET ORAL at 18:18

## 2019-01-04 RX ADMIN — POLYETHYLENE GLYCOL 3350 17 G: 17 POWDER, FOR SOLUTION ORAL at 02:58

## 2019-01-04 RX ADMIN — ONDANSETRON 4 MG: 2 INJECTION INTRAMUSCULAR; INTRAVENOUS at 10:39

## 2019-01-04 RX ADMIN — LACTULOSE 20 G: 20 SOLUTION ORAL at 12:48

## 2019-01-04 RX ADMIN — PANTOPRAZOLE SODIUM 40 MG: 40 TABLET, DELAYED RELEASE ORAL at 17:49

## 2019-01-04 RX ADMIN — SEVELAMER CARBONATE 4800 MG: 800 TABLET, FILM COATED ORAL at 11:47

## 2019-01-04 RX ADMIN — ONDANSETRON 4 MG: 2 INJECTION INTRAMUSCULAR; INTRAVENOUS at 02:45

## 2019-01-04 RX ADMIN — CLONIDINE HYDROCHLORIDE 0.3 MG: 0.1 TABLET ORAL at 15:06

## 2019-01-04 RX ADMIN — GENTAMICIN SULFATE: 1 CREAM TOPICAL at 09:59

## 2019-01-04 ASSESSMENT — PAIN SCALES - GENERAL: PAINLEVEL_OUTOF10: 0

## 2019-01-06 LAB
BODY FLUID CULTURE, STERILE: NORMAL
GRAM STAIN RESULT: NORMAL

## 2019-01-07 LAB
EKG ATRIAL RATE: 81 BPM
EKG DIAGNOSIS: NORMAL
EKG P AXIS: 21 DEGREES
EKG P-R INTERVAL: 162 MS
EKG Q-T INTERVAL: 380 MS
EKG QRS DURATION: 80 MS
EKG QTC CALCULATION (BAZETT): 441 MS
EKG R AXIS: 17 DEGREES
EKG T AXIS: 59 DEGREES
EKG VENTRICULAR RATE: 81 BPM

## 2019-01-21 ENCOUNTER — HOSPITAL ENCOUNTER (EMERGENCY)
Age: 44
Discharge: HOME | End: 2019-01-21
Attending: EMERGENCY MEDICINE
Payer: MEDICAID

## 2019-01-21 VITALS
HEART RATE: 90 BPM | RESPIRATION RATE: 18 BRPM | OXYGEN SATURATION: 96 % | SYSTOLIC BLOOD PRESSURE: 147 MMHG | WEIGHT: 141.8 LBS | BODY MASS INDEX: 21.88 KG/M2 | TEMPERATURE: 98.4 F | DIASTOLIC BLOOD PRESSURE: 72 MMHG

## 2019-01-21 DIAGNOSIS — I16.0 HYPERTENSIVE URGENCY: Primary | ICD-10-CM

## 2019-01-21 DIAGNOSIS — Z99.2 ESRD (END STAGE RENAL DISEASE) ON DIALYSIS (HCC): ICD-10-CM

## 2019-01-21 DIAGNOSIS — N18.6 ESRD (END STAGE RENAL DISEASE) ON DIALYSIS (HCC): ICD-10-CM

## 2019-01-21 DIAGNOSIS — E87.5 ACUTE HYPERKALEMIA: ICD-10-CM

## 2019-01-21 LAB
A/G RATIO: 0.8 (ref 1.1–2.2)
ALBUMIN SERPL-MCNC: 3.6 G/DL (ref 3.4–5)
ALP BLD-CCNC: 374 U/L (ref 40–129)
ALT SERPL-CCNC: 12 U/L (ref 10–40)
ANION GAP SERPL CALCULATED.3IONS-SCNC: 23 MMOL/L (ref 3–16)
AST SERPL-CCNC: 19 U/L (ref 15–37)
BASOPHILS ABSOLUTE: 0.1 K/UL (ref 0–0.2)
BASOPHILS RELATIVE PERCENT: 0.7 %
BILIRUB SERPL-MCNC: 0.4 MG/DL (ref 0–1)
BUN BLDV-MCNC: 58 MG/DL (ref 7–20)
CALCIUM SERPL-MCNC: 9.8 MG/DL (ref 8.3–10.6)
CHLORIDE BLD-SCNC: 92 MMOL/L (ref 99–110)
CO2: 24 MMOL/L (ref 21–32)
CREAT SERPL-MCNC: 15.3 MG/DL (ref 0.6–1.1)
EKG ATRIAL RATE: 113 BPM
EKG DIAGNOSIS: NORMAL
EKG P AXIS: 26 DEGREES
EKG P-R INTERVAL: 150 MS
EKG Q-T INTERVAL: 334 MS
EKG QRS DURATION: 68 MS
EKG QTC CALCULATION (BAZETT): 458 MS
EKG R AXIS: 37 DEGREES
EKG T AXIS: -4 DEGREES
EKG VENTRICULAR RATE: 113 BPM
EOSINOPHILS ABSOLUTE: 0.2 K/UL (ref 0–0.6)
EOSINOPHILS RELATIVE PERCENT: 1.5 %
GFR AFRICAN AMERICAN: 3
GFR NON-AFRICAN AMERICAN: 3
GLOBULIN: 4.5 G/DL
GLUCOSE BLD-MCNC: 211 MG/DL (ref 70–99)
HCT VFR BLD CALC: 33 % (ref 36–48)
HEMOGLOBIN: 10.5 G/DL (ref 12–16)
LYMPHOCYTES ABSOLUTE: 1.7 K/UL (ref 1–5.1)
LYMPHOCYTES RELATIVE PERCENT: 12.4 %
MCH RBC QN AUTO: 28 PG (ref 26–34)
MCHC RBC AUTO-ENTMCNC: 31.8 G/DL (ref 31–36)
MCV RBC AUTO: 87.8 FL (ref 80–100)
MONOCYTES ABSOLUTE: 0.8 K/UL (ref 0–1.3)
MONOCYTES RELATIVE PERCENT: 5.9 %
NEUTROPHILS ABSOLUTE: 10.7 K/UL (ref 1.7–7.7)
NEUTROPHILS RELATIVE PERCENT: 79.5 %
PDW BLD-RTO: 17.2 % (ref 12.4–15.4)
PLATELET # BLD: 337 K/UL (ref 135–450)
PMV BLD AUTO: 7.9 FL (ref 5–10.5)
POTASSIUM SERPL-SCNC: 5.7 MMOL/L (ref 3.5–5.1)
RBC # BLD: 3.76 M/UL (ref 4–5.2)
SODIUM BLD-SCNC: 139 MMOL/L (ref 136–145)
TOTAL PROTEIN: 8.1 G/DL (ref 6.4–8.2)
WBC # BLD: 13.4 K/UL (ref 4–11)

## 2019-01-21 PROCEDURE — 93005 ELECTROCARDIOGRAM TRACING: CPT | Performed by: EMERGENCY MEDICINE

## 2019-01-21 PROCEDURE — 80053 COMPREHEN METABOLIC PANEL: CPT

## 2019-01-21 PROCEDURE — 99284 EMERGENCY DEPT VISIT MOD MDM: CPT

## 2019-01-21 PROCEDURE — 6360000002 HC RX W HCPCS: Performed by: EMERGENCY MEDICINE

## 2019-01-21 PROCEDURE — 96374 THER/PROPH/DIAG INJ IV PUSH: CPT

## 2019-01-21 PROCEDURE — 85025 COMPLETE CBC W/AUTO DIFF WBC: CPT

## 2019-01-21 PROCEDURE — 93010 ELECTROCARDIOGRAM REPORT: CPT | Performed by: INTERNAL MEDICINE

## 2019-01-21 PROCEDURE — 6370000000 HC RX 637 (ALT 250 FOR IP): Performed by: EMERGENCY MEDICINE

## 2019-01-21 RX ORDER — CLONIDINE HYDROCHLORIDE 0.1 MG/1
0.3 TABLET ORAL ONCE
Status: COMPLETED | OUTPATIENT
Start: 2019-01-21 | End: 2019-01-21

## 2019-01-21 RX ORDER — SODIUM POLYSTYRENE SULFONATE 15 G/60ML
15 SUSPENSION ORAL; RECTAL ONCE
Status: COMPLETED | OUTPATIENT
Start: 2019-01-21 | End: 2019-01-21

## 2019-01-21 RX ORDER — ONDANSETRON 2 MG/ML
4 INJECTION INTRAMUSCULAR; INTRAVENOUS ONCE
Status: COMPLETED | OUTPATIENT
Start: 2019-01-21 | End: 2019-01-21

## 2019-01-21 RX ADMIN — ONDANSETRON 4 MG: 2 INJECTION INTRAMUSCULAR; INTRAVENOUS at 09:03

## 2019-01-21 RX ADMIN — Medication 15 G: at 12:56

## 2019-01-21 RX ADMIN — CLONIDINE HYDROCHLORIDE 0.3 MG: 0.1 TABLET ORAL at 09:03

## 2019-01-21 ASSESSMENT — PAIN SCALES - GENERAL
PAINLEVEL_OUTOF10: 6
PAINLEVEL_OUTOF10: 9

## 2019-01-21 ASSESSMENT — PAIN - FUNCTIONAL ASSESSMENT: PAIN_FUNCTIONAL_ASSESSMENT: 0-10

## 2019-01-21 ASSESSMENT — PAIN DESCRIPTION - LOCATION: LOCATION: HEAD

## 2019-01-21 ASSESSMENT — PAIN DESCRIPTION - ONSET: ONSET: GRADUAL

## 2019-01-21 ASSESSMENT — PAIN DESCRIPTION - FREQUENCY: FREQUENCY: CONTINUOUS

## 2019-01-21 ASSESSMENT — PAIN DESCRIPTION - DESCRIPTORS: DESCRIPTORS: POUNDING

## 2019-01-21 ASSESSMENT — PAIN DESCRIPTION - PAIN TYPE: TYPE: ACUTE PAIN

## 2019-01-21 ASSESSMENT — PAIN DESCRIPTION - PROGRESSION: CLINICAL_PROGRESSION: GRADUALLY WORSENING

## 2019-03-23 ENCOUNTER — HOSPITAL ENCOUNTER (INPATIENT)
Age: 44
LOS: 5 days | Discharge: NURSING, REHAB OR LONG-TERM CARE | DRG: 243 | End: 2019-03-29
Attending: FAMILY MEDICINE | Admitting: INTERNAL MEDICINE
Payer: MEDICAID

## 2019-03-23 DIAGNOSIS — R11.2 NAUSEA AND VOMITING IN ADULT: Primary | ICD-10-CM

## 2019-03-23 DIAGNOSIS — N18.6 ESRD (END STAGE RENAL DISEASE) (HCC): ICD-10-CM

## 2019-03-23 DIAGNOSIS — Z98.890 HISTORY OF PERITONEAL DIALYSIS: ICD-10-CM

## 2019-03-23 PROCEDURE — 99285 EMERGENCY DEPT VISIT HI MDM: CPT

## 2019-03-23 ASSESSMENT — PAIN DESCRIPTION - FREQUENCY: FREQUENCY: CONTINUOUS

## 2019-03-23 ASSESSMENT — PAIN DESCRIPTION - PAIN TYPE: TYPE: ACUTE PAIN

## 2019-03-23 ASSESSMENT — PAIN SCALES - GENERAL: PAINLEVEL_OUTOF10: 8

## 2019-03-23 ASSESSMENT — PAIN DESCRIPTION - LOCATION: LOCATION: ABDOMEN

## 2019-03-23 ASSESSMENT — PAIN DESCRIPTION - DESCRIPTORS: DESCRIPTORS: ACHING

## 2019-03-24 ENCOUNTER — APPOINTMENT (OUTPATIENT)
Dept: CT IMAGING | Age: 44
DRG: 243 | End: 2019-03-24
Payer: MEDICAID

## 2019-03-24 PROBLEM — R11.2 NAUSEA AND VOMITING: Status: ACTIVE | Noted: 2019-03-24

## 2019-03-24 LAB
A/G RATIO: 0.8 (ref 1.1–2.2)
ALBUMIN SERPL-MCNC: 3.8 G/DL (ref 3.4–5)
ALP BLD-CCNC: 512 U/L (ref 40–129)
ALT SERPL-CCNC: 11 U/L (ref 10–40)
AMMONIA: 40 UMOL/L (ref 11–51)
ANION GAP SERPL CALCULATED.3IONS-SCNC: 25 MMOL/L (ref 3–16)
APPEARANCE FLUID: CLEAR
AST SERPL-CCNC: 20 U/L (ref 15–37)
BASOPHILS ABSOLUTE: 0.1 K/UL (ref 0–0.2)
BASOPHILS RELATIVE PERCENT: 0.6 %
BILIRUB SERPL-MCNC: 0.4 MG/DL (ref 0–1)
BUN BLDV-MCNC: 51 MG/DL (ref 7–20)
CALCIUM SERPL-MCNC: 9.3 MG/DL (ref 8.3–10.6)
CELL COUNT FLUID TYPE: NORMAL
CHLORIDE BLD-SCNC: 77 MMOL/L (ref 99–110)
CLOT EVALUATION: NORMAL
CO2: 37 MMOL/L (ref 21–32)
COLOR FLUID: NORMAL
CREAT SERPL-MCNC: 11.3 MG/DL (ref 0.6–1.1)
EOSINOPHILS ABSOLUTE: 0.4 K/UL (ref 0–0.6)
EOSINOPHILS RELATIVE PERCENT: 2.6 %
GFR AFRICAN AMERICAN: 4
GFR NON-AFRICAN AMERICAN: 4
GLOBULIN: 4.5 G/DL
GLUCOSE BLD-MCNC: 146 MG/DL (ref 70–99)
HCT VFR BLD CALC: 40.1 % (ref 36–48)
HEMOGLOBIN: 13 G/DL (ref 12–16)
LACTIC ACID: 2.1 MMOL/L (ref 0.4–2)
LIPASE: 18 U/L (ref 13–60)
LYMPHOCYTES ABSOLUTE: 2.1 K/UL (ref 1–5.1)
LYMPHOCYTES RELATIVE PERCENT: 15 %
LYMPHOCYTES, BODY FLUID: 29 %
MCH RBC QN AUTO: 28.9 PG (ref 26–34)
MCHC RBC AUTO-ENTMCNC: 32.4 G/DL (ref 31–36)
MCV RBC AUTO: 89.1 FL (ref 80–100)
MONOCYTE, FLUID: 68 %
MONOCYTES ABSOLUTE: 1 K/UL (ref 0–1.3)
MONOCYTES RELATIVE PERCENT: 7.3 %
NEUTROPHIL, FLUID: 3 %
NEUTROPHILS ABSOLUTE: 10.4 K/UL (ref 1.7–7.7)
NEUTROPHILS RELATIVE PERCENT: 74.5 %
NUCLEATED CELLS FLUID: 12 /CUMM
NUMBER OF CELLS COUNTED FLUID: 100
PDW BLD-RTO: 15.9 % (ref 12.4–15.4)
PHOSPHORUS: 5.5 MG/DL (ref 2.5–4.9)
PLATELET # BLD: 572 K/UL (ref 135–450)
PMV BLD AUTO: 8.5 FL (ref 5–10.5)
POTASSIUM SERPL-SCNC: 3.1 MMOL/L (ref 3.5–5.1)
RAPID INFLUENZA  B AGN: NEGATIVE
RAPID INFLUENZA A AGN: NEGATIVE
RBC # BLD: 4.5 M/UL (ref 4–5.2)
RBC FLUID: 1 /CUMM
S PYO AG THROAT QL: NEGATIVE
SODIUM BLD-SCNC: 139 MMOL/L (ref 136–145)
TOTAL PROTEIN: 8.3 G/DL (ref 6.4–8.2)
TROPONIN: 0.49 NG/ML
VOLUME: 100 ML
WBC # BLD: 14 K/UL (ref 4–11)

## 2019-03-24 PROCEDURE — 80053 COMPREHEN METABOLIC PANEL: CPT

## 2019-03-24 PROCEDURE — 87880 STREP A ASSAY W/OPTIC: CPT

## 2019-03-24 PROCEDURE — 6360000002 HC RX W HCPCS: Performed by: INTERNAL MEDICINE

## 2019-03-24 PROCEDURE — 1200000000 HC SEMI PRIVATE

## 2019-03-24 PROCEDURE — 96376 TX/PRO/DX INJ SAME DRUG ADON: CPT

## 2019-03-24 PROCEDURE — 6370000000 HC RX 637 (ALT 250 FOR IP): Performed by: INTERNAL MEDICINE

## 2019-03-24 PROCEDURE — 87205 SMEAR GRAM STAIN: CPT

## 2019-03-24 PROCEDURE — 2580000003 HC RX 258: Performed by: INTERNAL MEDICINE

## 2019-03-24 PROCEDURE — 74177 CT ABD & PELVIS W/CONTRAST: CPT

## 2019-03-24 PROCEDURE — 2060000000 HC ICU INTERMEDIATE R&B

## 2019-03-24 PROCEDURE — 87070 CULTURE OTHR SPECIMN AEROBIC: CPT

## 2019-03-24 PROCEDURE — A4722 DIALYS SOL FLD VOL > 1999CC: HCPCS | Performed by: INTERNAL MEDICINE

## 2019-03-24 PROCEDURE — 89051 BODY FLUID CELL COUNT: CPT

## 2019-03-24 PROCEDURE — 85025 COMPLETE CBC W/AUTO DIFF WBC: CPT

## 2019-03-24 PROCEDURE — 84484 ASSAY OF TROPONIN QUANT: CPT

## 2019-03-24 PROCEDURE — 6370000000 HC RX 637 (ALT 250 FOR IP): Performed by: FAMILY MEDICINE

## 2019-03-24 PROCEDURE — 83690 ASSAY OF LIPASE: CPT

## 2019-03-24 PROCEDURE — 96375 TX/PRO/DX INJ NEW DRUG ADDON: CPT

## 2019-03-24 PROCEDURE — 93005 ELECTROCARDIOGRAM TRACING: CPT | Performed by: FAMILY MEDICINE

## 2019-03-24 PROCEDURE — 96361 HYDRATE IV INFUSION ADD-ON: CPT

## 2019-03-24 PROCEDURE — 87804 INFLUENZA ASSAY W/OPTIC: CPT

## 2019-03-24 PROCEDURE — 90945 DIALYSIS ONE EVALUATION: CPT

## 2019-03-24 PROCEDURE — 2580000003 HC RX 258: Performed by: FAMILY MEDICINE

## 2019-03-24 PROCEDURE — 87081 CULTURE SCREEN ONLY: CPT

## 2019-03-24 PROCEDURE — 6360000004 HC RX CONTRAST MEDICATION: Performed by: FAMILY MEDICINE

## 2019-03-24 PROCEDURE — 6370000000 HC RX 637 (ALT 250 FOR IP): Performed by: HOSPITALIST

## 2019-03-24 PROCEDURE — 82140 ASSAY OF AMMONIA: CPT

## 2019-03-24 PROCEDURE — 84100 ASSAY OF PHOSPHORUS: CPT

## 2019-03-24 PROCEDURE — G0378 HOSPITAL OBSERVATION PER HR: HCPCS

## 2019-03-24 PROCEDURE — 83605 ASSAY OF LACTIC ACID: CPT

## 2019-03-24 PROCEDURE — 87040 BLOOD CULTURE FOR BACTERIA: CPT

## 2019-03-24 PROCEDURE — 6360000002 HC RX W HCPCS: Performed by: FAMILY MEDICINE

## 2019-03-24 PROCEDURE — 96374 THER/PROPH/DIAG INJ IV PUSH: CPT

## 2019-03-24 RX ORDER — PANTOPRAZOLE SODIUM 40 MG/1
40 TABLET, DELAYED RELEASE ORAL
Status: DISCONTINUED | OUTPATIENT
Start: 2019-03-24 | End: 2019-03-25

## 2019-03-24 RX ORDER — SODIUM CHLORIDE, SODIUM LACTATE, CALCIUM CHLORIDE, MAGNESIUM CHLORIDE AND DEXTROSE 1.5; 538; 448; 18.3; 5.08 G/100ML; MG/100ML; MG/100ML; MG/100ML; MG/100ML
5000 INJECTION, SOLUTION INTRAPERITONEAL CONTINUOUS
Status: DISCONTINUED | OUTPATIENT
Start: 2019-03-24 | End: 2019-03-24 | Stop reason: DRUGHIGH

## 2019-03-24 RX ORDER — CARVEDILOL 3.12 MG/1
3.12 TABLET ORAL 2 TIMES DAILY WITH MEALS
Status: DISCONTINUED | OUTPATIENT
Start: 2019-03-24 | End: 2019-03-29 | Stop reason: HOSPADM

## 2019-03-24 RX ORDER — OXYCODONE HYDROCHLORIDE AND ACETAMINOPHEN 5; 325 MG/1; MG/1
2 TABLET ORAL EVERY 4 HOURS PRN
Status: DISCONTINUED | OUTPATIENT
Start: 2019-03-24 | End: 2019-03-29

## 2019-03-24 RX ORDER — CARVEDILOL 3.12 MG/1
3.12 TABLET ORAL DAILY
Status: ON HOLD | COMMUNITY
End: 2019-03-29 | Stop reason: SDUPTHER

## 2019-03-24 RX ORDER — LOSARTAN POTASSIUM 50 MG/1
100 TABLET ORAL DAILY
Status: DISCONTINUED | OUTPATIENT
Start: 2019-03-24 | End: 2019-03-29 | Stop reason: HOSPADM

## 2019-03-24 RX ORDER — MIRTAZAPINE 15 MG/1
15 TABLET, FILM COATED ORAL NIGHTLY
Status: DISCONTINUED | OUTPATIENT
Start: 2019-03-24 | End: 2019-03-24

## 2019-03-24 RX ORDER — ISOSORBIDE MONONITRATE 60 MG/1
120 TABLET, EXTENDED RELEASE ORAL DAILY
Status: ON HOLD | COMMUNITY
End: 2020-03-17 | Stop reason: HOSPADM

## 2019-03-24 RX ORDER — GENTAMICIN SULFATE 1 MG/G
CREAM TOPICAL DAILY
Status: DISCONTINUED | OUTPATIENT
Start: 2019-03-24 | End: 2019-03-29 | Stop reason: HOSPADM

## 2019-03-24 RX ORDER — LUBIPROSTONE 24 UG/1
24 CAPSULE, GELATIN COATED ORAL 2 TIMES DAILY
Status: DISCONTINUED | OUTPATIENT
Start: 2019-03-24 | End: 2019-03-29 | Stop reason: HOSPADM

## 2019-03-24 RX ORDER — POTASSIUM CHLORIDE 20 MEQ/1
40 TABLET, EXTENDED RELEASE ORAL ONCE
Status: COMPLETED | OUTPATIENT
Start: 2019-03-24 | End: 2019-03-24

## 2019-03-24 RX ORDER — CHOLECALCIFEROL (VITAMIN D3) 125 MCG
5 CAPSULE ORAL NIGHTLY PRN
COMMUNITY
End: 2020-09-07

## 2019-03-24 RX ORDER — METOCLOPRAMIDE HYDROCHLORIDE 5 MG/ML
10 INJECTION INTRAMUSCULAR; INTRAVENOUS ONCE
Status: COMPLETED | OUTPATIENT
Start: 2019-03-24 | End: 2019-03-24

## 2019-03-24 RX ORDER — SODIUM CHLORIDE, SODIUM LACTATE, CALCIUM CHLORIDE, MAGNESIUM CHLORIDE AND DEXTROSE 1.5; 538; 448; 18.3; 5.08 G/100ML; MG/100ML; MG/100ML; MG/100ML; MG/100ML
5000 INJECTION, SOLUTION INTRAPERITONEAL NIGHTLY
Status: DISCONTINUED | OUTPATIENT
Start: 2019-03-24 | End: 2019-03-25

## 2019-03-24 RX ORDER — SEVELAMER CARBONATE 800 MG/1
2400 TABLET, FILM COATED ORAL
Status: DISCONTINUED | OUTPATIENT
Start: 2019-03-24 | End: 2019-03-27

## 2019-03-24 RX ORDER — PROMETHAZINE HYDROCHLORIDE 25 MG/1
25 TABLET ORAL EVERY 6 HOURS PRN
Qty: 10 TABLET | Refills: 0 | Status: SHIPPED | OUTPATIENT
Start: 2019-03-24 | End: 2019-03-31

## 2019-03-24 RX ORDER — CINACALCET 30 MG/1
120 TABLET, FILM COATED ORAL DAILY
Status: DISCONTINUED | OUTPATIENT
Start: 2019-03-24 | End: 2019-03-29 | Stop reason: HOSPADM

## 2019-03-24 RX ORDER — HEPARIN SODIUM 5000 [USP'U]/ML
5000 INJECTION, SOLUTION INTRAVENOUS; SUBCUTANEOUS EVERY 8 HOURS SCHEDULED
Status: DISCONTINUED | OUTPATIENT
Start: 2019-03-24 | End: 2019-03-29 | Stop reason: HOSPADM

## 2019-03-24 RX ORDER — CINACALCET 30 MG/1
120 TABLET, FILM COATED ORAL DAILY
COMMUNITY
End: 2020-09-07

## 2019-03-24 RX ORDER — FENTANYL CITRATE 50 UG/ML
25 INJECTION, SOLUTION INTRAMUSCULAR; INTRAVENOUS ONCE
Status: COMPLETED | OUTPATIENT
Start: 2019-03-24 | End: 2019-03-24

## 2019-03-24 RX ORDER — ONDANSETRON 2 MG/ML
4 INJECTION INTRAMUSCULAR; INTRAVENOUS EVERY 4 HOURS PRN
Status: DISCONTINUED | OUTPATIENT
Start: 2019-03-24 | End: 2019-03-29 | Stop reason: HOSPADM

## 2019-03-24 RX ORDER — NIFEDIPINE 90 MG/1
90 TABLET, EXTENDED RELEASE ORAL DAILY
Status: DISCONTINUED | OUTPATIENT
Start: 2019-03-24 | End: 2019-03-29 | Stop reason: HOSPADM

## 2019-03-24 RX ORDER — PROMETHAZINE HYDROCHLORIDE 25 MG/1
25 SUPPOSITORY RECTAL EVERY 6 HOURS PRN
Qty: 10 SUPPOSITORY | Refills: 0 | Status: SHIPPED | OUTPATIENT
Start: 2019-03-24 | End: 2019-03-31

## 2019-03-24 RX ORDER — SODIUM CHLORIDE, SODIUM LACTATE, CALCIUM CHLORIDE, MAGNESIUM CHLORIDE AND DEXTROSE 2.5; 538; 448; 18.3; 5.08 G/100ML; MG/100ML; MG/100ML; MG/100ML; MG/100ML
5000 INJECTION, SOLUTION INTRAPERITONEAL CONTINUOUS
Status: DISCONTINUED | OUTPATIENT
Start: 2019-03-24 | End: 2019-03-24 | Stop reason: ALTCHOICE

## 2019-03-24 RX ORDER — PANTOPRAZOLE SODIUM 40 MG/1
40 TABLET, DELAYED RELEASE ORAL DAILY
Status: ON HOLD | COMMUNITY
End: 2019-03-29 | Stop reason: SDUPTHER

## 2019-03-24 RX ORDER — DIPHENHYDRAMINE HYDROCHLORIDE 50 MG/ML
25 INJECTION INTRAMUSCULAR; INTRAVENOUS ONCE
Status: COMPLETED | OUTPATIENT
Start: 2019-03-24 | End: 2019-03-24

## 2019-03-24 RX ORDER — PROMETHAZINE HYDROCHLORIDE 25 MG/1
25 TABLET ORAL ONCE
Status: COMPLETED | OUTPATIENT
Start: 2019-03-24 | End: 2019-03-24

## 2019-03-24 RX ORDER — AMOXICILLIN 250 MG
1 CAPSULE ORAL 2 TIMES DAILY PRN
COMMUNITY
End: 2020-09-07

## 2019-03-24 RX ORDER — OXYCODONE HYDROCHLORIDE AND ACETAMINOPHEN 5; 325 MG/1; MG/1
1 TABLET ORAL EVERY 4 HOURS PRN
Status: DISCONTINUED | OUTPATIENT
Start: 2019-03-24 | End: 2019-03-29

## 2019-03-24 RX ORDER — LORATADINE 10 MG/1
10 TABLET ORAL DAILY PRN
COMMUNITY
End: 2020-09-07

## 2019-03-24 RX ORDER — ASPIRIN 81 MG/1
81 TABLET, CHEWABLE ORAL DAILY
Status: DISCONTINUED | OUTPATIENT
Start: 2019-03-24 | End: 2019-03-26

## 2019-03-24 RX ORDER — CALCITRIOL 0.25 UG/1
0.5 CAPSULE, LIQUID FILLED ORAL NIGHTLY
Status: DISCONTINUED | OUTPATIENT
Start: 2019-03-24 | End: 2019-03-24

## 2019-03-24 RX ORDER — ATORVASTATIN CALCIUM 20 MG/1
20 TABLET, FILM COATED ORAL NIGHTLY
Status: DISCONTINUED | OUTPATIENT
Start: 2019-03-24 | End: 2019-03-29 | Stop reason: HOSPADM

## 2019-03-24 RX ORDER — SODIUM CHLORIDE 0.9 % (FLUSH) 0.9 %
10 SYRINGE (ML) INJECTION EVERY 12 HOURS SCHEDULED
Status: DISCONTINUED | OUTPATIENT
Start: 2019-03-24 | End: 2019-03-29 | Stop reason: HOSPADM

## 2019-03-24 RX ORDER — HYDRALAZINE HYDROCHLORIDE 50 MG/1
50 TABLET, FILM COATED ORAL 3 TIMES DAILY
Status: DISCONTINUED | OUTPATIENT
Start: 2019-03-24 | End: 2019-03-24

## 2019-03-24 RX ORDER — NIFEDIPINE 90 MG/1
90 TABLET, FILM COATED, EXTENDED RELEASE ORAL DAILY
Status: ON HOLD | COMMUNITY
End: 2020-03-17 | Stop reason: HOSPADM

## 2019-03-24 RX ORDER — FLUOXETINE 10 MG/1
10 CAPSULE ORAL DAILY
Status: DISCONTINUED | OUTPATIENT
Start: 2019-03-24 | End: 2019-03-24

## 2019-03-24 RX ORDER — SODIUM CHLORIDE, SODIUM LACTATE, CALCIUM CHLORIDE, MAGNESIUM CHLORIDE AND DEXTROSE 2.5; 538; 448; 18.3; 5.08 G/100ML; MG/100ML; MG/100ML; MG/100ML; MG/100ML
5000 INJECTION, SOLUTION INTRAPERITONEAL NIGHTLY
Status: DISCONTINUED | OUTPATIENT
Start: 2019-03-24 | End: 2019-03-25

## 2019-03-24 RX ORDER — CLONIDINE HYDROCHLORIDE 0.1 MG/1
0.3 TABLET ORAL 3 TIMES DAILY
Status: DISCONTINUED | OUTPATIENT
Start: 2019-03-24 | End: 2019-03-24

## 2019-03-24 RX ORDER — SODIUM CHLORIDE 0.9 % (FLUSH) 0.9 %
10 SYRINGE (ML) INJECTION PRN
Status: DISCONTINUED | OUTPATIENT
Start: 2019-03-24 | End: 2019-03-29 | Stop reason: HOSPADM

## 2019-03-24 RX ORDER — SODIUM CHLORIDE, SODIUM LACTATE, POTASSIUM CHLORIDE, CALCIUM CHLORIDE 600; 310; 30; 20 MG/100ML; MG/100ML; MG/100ML; MG/100ML
INJECTION, SOLUTION INTRAVENOUS ONCE
Status: COMPLETED | OUTPATIENT
Start: 2019-03-24 | End: 2019-03-24

## 2019-03-24 RX ORDER — ACETAMINOPHEN 650 MG/1
650 SUPPOSITORY RECTAL EVERY 4 HOURS PRN
Status: DISCONTINUED | OUTPATIENT
Start: 2019-03-24 | End: 2019-03-28

## 2019-03-24 RX ORDER — ISOSORBIDE MONONITRATE 60 MG/1
120 TABLET, EXTENDED RELEASE ORAL DAILY
Status: DISCONTINUED | OUTPATIENT
Start: 2019-03-24 | End: 2019-03-29 | Stop reason: HOSPADM

## 2019-03-24 RX ORDER — DOCUSATE SODIUM 100 MG/1
100 CAPSULE, LIQUID FILLED ORAL 2 TIMES DAILY
Status: DISCONTINUED | OUTPATIENT
Start: 2019-03-24 | End: 2019-03-29 | Stop reason: HOSPADM

## 2019-03-24 RX ORDER — ONDANSETRON 2 MG/ML
8 INJECTION INTRAMUSCULAR; INTRAVENOUS ONCE
Status: COMPLETED | OUTPATIENT
Start: 2019-03-24 | End: 2019-03-24

## 2019-03-24 RX ORDER — SEVELAMER CARBONATE 800 MG/1
3 TABLET, FILM COATED ORAL
COMMUNITY
End: 2020-09-07

## 2019-03-24 RX ORDER — CETIRIZINE HYDROCHLORIDE 10 MG/1
10 TABLET ORAL DAILY PRN
Status: DISCONTINUED | OUTPATIENT
Start: 2019-03-24 | End: 2019-03-29 | Stop reason: HOSPADM

## 2019-03-24 RX ORDER — AMLODIPINE BESYLATE 10 MG/1
10 TABLET ORAL EVERY EVENING
Status: DISCONTINUED | OUTPATIENT
Start: 2019-03-24 | End: 2019-03-24

## 2019-03-24 RX ADMIN — DIPHENHYDRAMINE HYDROCHLORIDE 25 MG: 50 INJECTION, SOLUTION INTRAMUSCULAR; INTRAVENOUS at 06:27

## 2019-03-24 RX ADMIN — ASPIRIN 81 MG 81 MG: 81 TABLET ORAL at 15:30

## 2019-03-24 RX ADMIN — Medication 10 ML: at 22:00

## 2019-03-24 RX ADMIN — ISOSORBIDE MONONITRATE 120 MG: 60 TABLET, EXTENDED RELEASE ORAL at 13:53

## 2019-03-24 RX ADMIN — PANTOPRAZOLE SODIUM 40 MG: 40 TABLET, DELAYED RELEASE ORAL at 12:56

## 2019-03-24 RX ADMIN — ONDANSETRON 8 MG: 2 INJECTION INTRAMUSCULAR; INTRAVENOUS at 00:48

## 2019-03-24 RX ADMIN — SODIUM CHLORIDE, SODIUM LACTATE, CALCIUM CHLORIDE, MAGNESIUM CHLORIDE AND DEXTROSE 5000 ML: 2.5; 538; 448; 18.3; 5.08 INJECTION, SOLUTION INTRAPERITONEAL at 22:33

## 2019-03-24 RX ADMIN — IOPAMIDOL 75 ML: 755 INJECTION, SOLUTION INTRAVENOUS at 02:52

## 2019-03-24 RX ADMIN — HEPARIN SODIUM 5000 UNITS: 5000 INJECTION INTRAVENOUS; SUBCUTANEOUS at 21:00

## 2019-03-24 RX ADMIN — FENTANYL CITRATE 25 MCG: 50 INJECTION INTRAMUSCULAR; INTRAVENOUS at 03:45

## 2019-03-24 RX ADMIN — CINACALCET HYDROCHLORIDE 120 MG: 30 TABLET, COATED ORAL at 13:52

## 2019-03-24 RX ADMIN — FENTANYL CITRATE 25 MCG: 50 INJECTION INTRAMUSCULAR; INTRAVENOUS at 00:49

## 2019-03-24 RX ADMIN — LIDOCAINE HYDROCHLORIDE: 20 SOLUTION ORAL; TOPICAL at 00:49

## 2019-03-24 RX ADMIN — NIFEDIPINE 90 MG: 90 TABLET, FILM COATED, EXTENDED RELEASE ORAL at 13:53

## 2019-03-24 RX ADMIN — PROMETHAZINE HYDROCHLORIDE 25 MG: 25 TABLET ORAL at 04:36

## 2019-03-24 RX ADMIN — POTASSIUM CHLORIDE 40 MEQ: 20 TABLET, EXTENDED RELEASE ORAL at 04:35

## 2019-03-24 RX ADMIN — SEVELAMER CARBONATE 2400 MG: 800 TABLET, FILM COATED ORAL at 17:39

## 2019-03-24 RX ADMIN — Medication 10 ML: at 12:30

## 2019-03-24 RX ADMIN — OXYCODONE HYDROCHLORIDE AND ACETAMINOPHEN 1 TABLET: 5; 325 TABLET ORAL at 17:39

## 2019-03-24 RX ADMIN — SODIUM CHLORIDE, SODIUM LACTATE, POTASSIUM CHLORIDE, AND CALCIUM CHLORIDE: .6; .31; .03; .02 INJECTION, SOLUTION INTRAVENOUS at 00:48

## 2019-03-24 RX ADMIN — SEVELAMER CARBONATE 2400 MG: 800 TABLET, FILM COATED ORAL at 13:53

## 2019-03-24 RX ADMIN — SODIUM CHLORIDE, SODIUM LACTATE, CALCIUM CHLORIDE, MAGNESIUM CHLORIDE AND DEXTROSE 5000 ML: 1.5; 538; 448; 18.3; 5.08 INJECTION, SOLUTION INTRAPERITONEAL at 22:33

## 2019-03-24 RX ADMIN — CARVEDILOL 3.12 MG: 3.12 TABLET, FILM COATED ORAL at 17:39

## 2019-03-24 RX ADMIN — LOSARTAN POTASSIUM 100 MG: 50 TABLET ORAL at 13:53

## 2019-03-24 RX ADMIN — GENTAMICIN SULFATE: 1 CREAM TOPICAL at 17:53

## 2019-03-24 RX ADMIN — DOCUSATE SODIUM 100 MG: 100 CAPSULE, LIQUID FILLED ORAL at 21:00

## 2019-03-24 RX ADMIN — METOCLOPRAMIDE 10 MG: 5 INJECTION, SOLUTION INTRAMUSCULAR; INTRAVENOUS at 06:27

## 2019-03-24 RX ADMIN — ATORVASTATIN CALCIUM 20 MG: 20 TABLET, FILM COATED ORAL at 21:00

## 2019-03-24 ASSESSMENT — PAIN DESCRIPTION - LOCATION
LOCATION: HEAD
LOCATION: ABDOMEN
LOCATION: ABDOMEN
LOCATION: LEG
LOCATION: LEG
LOCATION: HEAD

## 2019-03-24 ASSESSMENT — PAIN SCALES - GENERAL
PAINLEVEL_OUTOF10: 8
PAINLEVEL_OUTOF10: 0
PAINLEVEL_OUTOF10: 1
PAINLEVEL_OUTOF10: 4
PAINLEVEL_OUTOF10: 6
PAINLEVEL_OUTOF10: 6
PAINLEVEL_OUTOF10: 7
PAINLEVEL_OUTOF10: 8
PAINLEVEL_OUTOF10: 8
PAINLEVEL_OUTOF10: 4
PAINLEVEL_OUTOF10: 0
PAINLEVEL_OUTOF10: 4
PAINLEVEL_OUTOF10: 0

## 2019-03-24 ASSESSMENT — PAIN DESCRIPTION - PAIN TYPE
TYPE: ACUTE PAIN
TYPE: CHRONIC PAIN
TYPE: CHRONIC PAIN

## 2019-03-24 ASSESSMENT — PAIN DESCRIPTION - DESCRIPTORS
DESCRIPTORS: CRAMPING
DESCRIPTORS: SHARP
DESCRIPTORS: SHARP
DESCRIPTORS: CRAMPING

## 2019-03-24 ASSESSMENT — PAIN DESCRIPTION - FREQUENCY
FREQUENCY: CONTINUOUS
FREQUENCY: INTERMITTENT
FREQUENCY: INTERMITTENT
FREQUENCY: CONTINUOUS

## 2019-03-24 ASSESSMENT — PAIN DESCRIPTION - ORIENTATION
ORIENTATION: RIGHT;LEFT
ORIENTATION: RIGHT;LEFT

## 2019-03-24 NOTE — ED NOTES
Handoff from [facility]. Pt resting easy in bed, pain 4/10, VSS. Informed pt that we will be getting her transferred to a room shortly. Pt verbalized understanding.       Lazarus Srinivasan RN  03/24/19 6975

## 2019-03-24 NOTE — H&P
Hospitalist  History and Physical    Patient:  Oscar Galaviz  MRN: [id number]  PCP: Ilana Gunderson MD    CHIEF COMPLAINT:   Nausea and Emesis          HISTORY OF PRESENT ILLNESS:   The patient Oscar Galaviz is a 53 y. o.female with medical history significant for end-stage renal disease patient is on peritoneal dialysis, Crohn's disease, history of cerebral artery occlusion with cerebral infarction  Patient presented from the nursing home with nausea vomiting. Vomiting is unknown by less and nonbloody  Patient denies diarrhea but complained of diffuse abdominal pain. No fever or chills no urinary symptoms  Patient does not make any urine        Past Medical History:        Diagnosis Date    Cerebral artery occlusion with cerebral infarction [facility]) 2010    Chronic kidney disease     [facility]     Crohn's disease ([address]     Depression     Diabetes mellitus ([address]     ESRD (end stage renal disease) ([address]     GERD (gastroesophageal reflux disease)     Hemodialysis patient ([address] 2016    peritoneal     Hyperlipidemia     Hypertension     MI, old     Mood disorder ([address]     Pericarditis     Peritoneal dialysis catheter in place ([facility]     Peritonitis ([address]     Pneumonia     Thyroid disease        Past Surgical History:        Procedure Laterality Date    BACK SURGERY      Tumor removal    CHOLECYSTECTOMY      KIDNEY TRANSPLANT  1990    KIDNEY TRANSPLANT      LAPAROSCOPY      right ovary removed and left ovarian cyst removed for endometriosis    OTHER SURGICAL HISTORY      peritoneal dialysis catheter    PARACENTESIS      TUBAL LIGATION      UPPER GASTROINTESTINAL ENDOSCOPY  03/31/2017       Medications Prior to Admission:    Prior to Admission medications    Medication Sig Start Date End Date Taking?  Authorizing Provider   promethazine (PHENERGAN) 25 MG tablet Take 1 tablet by mouth every 6 hours as needed for Nausea 3/24/19 3/31/19 Yes Susannah Skinner MD   promethazine (PROMETHEGAN) 25 MG suppository Place 1 suppository rectally every 6 hours as needed for Nausea 3/24/19 3/31/19 Yes Manisha Cottrell MD   FLUoxetine (PROZAC) 10 MG capsule Take 1 capsule by mouth daily 1/4/19   TREY Chino NP   atorvastatin (LIPITOR) 20 MG tablet Take 1 tablet by mouth nightly 1/4/19   TREY Chino NP   lactulose (CHRONULAC) 10 GM/15ML solution Take 30 mLs by mouth 3 times daily as needed (Constipation) 1/4/19   TREY Chino NP   hydrALAZINE (APRESOLINE) 50 MG tablet Take 50 mg by mouth 3 times daily    Historical Provider, MD   clotrimazole-betamethasone (LOTRISONE) 1-0.05 % cream Apply topically 2 times daily Apply topically 2 times daily.     Historical Provider, MD   calcitRIOL (ROCALTROL) 0.5 MCG capsule Take 0.5 mcg by mouth nightly    Historical Provider, MD   cloNIDine (CATAPRES) 0.3 MG tablet Take 1 tablet by mouth 3 times daily 1/23/18   [facility] Center[facility] zay   cinacalcet (SENSIPAR) 90 MG tablet Take 90 mg by mouth nightly     Historical Provider, MD   lubiprostone (AMITIZA) 24 MCG capsule Take 24 mcg by mouth daily (with breakfast)     Historical Provider, MD   darbepoetin hina-polysorbate (ARANESP, ALBUMIN FREE,) 100 MCG/0.5ML SOSY injection Inject 200 mcg into the skin once a week Las t dose was 1 week ago    Historical Provider, MD   polyethylene glycol (GLYCOLAX) packet Take 17 g by mouth daily as needed for Constipation    Historical Provider, MD   losartan (COZAAR) 100 MG tablet Take 100 mg by mouth daily    Historical Provider, MD   amLODIPine (NORVASC) 10 MG tablet Take 10 mg by mouth every evening     Historical Provider, MD   senna (SENOKOT) 8.6 MG tablet Take 1 tablet by mouth daily    Historical Provider, MD   sevelamer (RENVELA) 800 MG tablet Take 4 tablets by mouth 3 times daily (with meals)  Patient taking differently: Take 6,400 mg by mouth 3 times daily (with meals)  4/25/16   Lora Herrera MD   isosorbide mononitrate (IMDUR) 60 MG CR tablet Take 120 mg by mouth daily    Historical Provider, MD   omeprazole (PRILOSEC) 40 MG delayed release capsule Take 40 mg by mouth 2 times daily     Historical Provider, MD   aspirin 81 MG tablet Take 81 mg by mouth daily    Historical Provider, MD       Allergies:  Ferrous sulfate; Cephalexin; Gabapentin; Dicloxacillin; Pcn [penicillins]; and Sulfa antibiotics      Social History:   TOBACCO:   reports that she has never smoked. She has never used smokeless tobacco.  ETOH:   reports that she drinks alcohol. Family History:       Problem Relation Age of Onset    Heart Attack Mother     Diabetes Mother     Hypertension Mother     Stroke Father     Diabetes Father     Hypertension Father            REVIEW OF SYSTEMS:     patients reported symptoms are in BOLD all other symptoms are negative. CONSTITUTIONAL:      fatigue, fever, chills or night sweats, recent weight gain, recent wt loss, insomnia,  General weakness, poor appetite, muscle aches and pains    HEAD: headache, dizziness    EYES:      blurriness,  double vision, dryness,  discharge, irritation,diplopia    EARS:      hearing loss, vertigo, ear discharge,  Earache. Ringing in the ears. NOSE:      Rhinorrhea, sneezing, epistaxis. Discharge, sinusitis,     MOUTH/THROAT:         sore throat, mouth ulcers, Hoarseness    RESPIRATORY:        Shortness of breath, wheezing,  cough, sputum, hemoptysis, obstructive sleep apnea,    CARDIOVASCULAR :      chest pain, palpitations, dyspnea on exercise, Lower extrimity edema (swelling),     GASTROINTESTINAL:       Dysphagia, Poor appetite,  Nausea, Vomiting, diarrhea, heartburn, abdominal pain. Blood in the stools, hematemesis. Pain with swallowing, constipation    GENITOURINARY:       Urinary frequency, hesitancy,  urgency, Dysuria, hematuria,  Urinary Incontinence. Urinary Retention.   GYNECOLOGICAL: vaginal bleeding , vaginal discharge, menopause    MUSCULOSKELETAL:       joint swelling or stiffness,  Right lower  quadrant transplant kidney also atrophic without evidence of hydronephrosis. Peritoneal dialysis catheter.  Moderate to large volume ascites.  This is  more than expected for physiologic fluid from peritoneal dialysis. Assessment / Plan     Intractable nausea vomiting  Etiology is not clear  GI is consulted  Continue with symptomatic treatment    Pain management  R52  Continue with the IV and oral pain medication    End-stage renal disease  Patient on peritoneal dialysis  Nephrology is consulted    Essential primary hypertension I10  Continue patient on home medication    DVT and GI prophylaxis      Full Sourav Chisholm M.D    This note was transcribed using [id number] [employer]. Please disregard any translational errors.

## 2019-03-24 NOTE — ED NOTES
Pt requesting IV Phenegran.  [facility].   [facility]     [facility], PRISCILLA  03/24/19 [id number]

## 2019-03-24 NOTE — ED PROVIDER NOTES
Triage Chief Complaint:   Nausea and Emesis    [place]:  Amparo Washington is a 53 y.o. female that presents chronically ill, from nursing home, end-stage renal disease, on peritoneal dialysis, history of cocaine abuse and chronic pain, history of prior CVA who presents complaining of nausea and vomiting. Nonbloody and nonbilious. No diarrhea. Diffuse abdominal pain. No fevers chills. Denies missing peritoneal dialysis. Denies peritoneal dialysis machine not working. No longer makes urine.     ROS:  General:  No fevers, no chills, no weakness  Eyes:  No recent vison changes, no discharge  ENT:  No sore throat, no nasal congestion, no hearing changes  Cardiovascular:  No chest pain, no palpitations  Respiratory:  No shortness of breath, no cough, no wheezing  Gastrointestinal:  As above  Musculoskeletal:  No muscle pain, no joint pain  Skin:  No rash, no pruritis, no easy bruising  Neurologic:  No speech problems, no headache, no extremity numbness, no extremity tingling, no extremity weakness  Psychiatric:  No anxiety, no hallucinations or delusions, no suicidal or homicidal ideation  Genitourinary:  No dysuria, no hematuria  Endocrine:  No unexpected weight gain, no unexpected weight loss  Extremities:  no edema, no pain    Past Medical History:   Diagnosis Date    Cerebral artery occlusion with cerebral infarction ([address] 2010    Chronic kidney disease     Cocaine abuse ([address]     Crohn's disease ([address]     Depression     Diabetes mellitus ([address]     ESRD (end stage renal disease) ([address]     GERD (gastroesophageal reflux disease)     Hemodialysis patient ([address] 2016    peritoneal     Hyperlipidemia     Hypertension     MI, old     Mood disorder ([address]     Pericarditis     Peritoneal dialysis catheter in place ([address]     Peritonitis ([address]     Pneumonia     Thyroid disease      Past Surgical History:   Procedure Laterality Date    BACK SURGERY      Tumor removal    CHOLECYSTECTOMY      KIDNEY TRANSPLANT 1990    KIDNEY TRANSPLANT      LAPAROSCOPY      right ovary removed and left ovarian cyst removed for endometriosis    OTHER SURGICAL HISTORY      peritoneal dialysis catheter    PARACENTESIS      TUBAL LIGATION      UPPER GASTROINTESTINAL ENDOSCOPY  03/31/2017     Family History   Problem Relation Age of Onset    Heart Attack Mother     Diabetes Mother     Hypertension Mother     Stroke Father     Diabetes Father     Hypertension Father      Social History     Socioeconomic History    Marital status:      Spouse name: Not on file    Number of children: 2    Years of education: Not on file    Highest education level: Not on file   Occupational History    Occupation: volunteer   Social Needs    Financial resource strain: Not on file    Food insecurity:     Worry: Not on file     Inability: Not on file    Transportation needs:     Medical: Not on file     Non-medical: Not on file   Tobacco Use    Smoking status: Never Smoker    Smokeless tobacco: Never Used   Substance and Sexual Activity    Alcohol use: Yes     Alcohol/week: 0.0 oz     Comment: monthly    Drug use: No    Sexual activity: Yes     Partners: Male   Lifestyle    Physical activity:     Days per week: Not on file     Minutes per session: Not on file    Stress: Not on file   Relationships    Social connections:     Talks on phone: Not on file     Gets together: Not on file     Attends Nondenominational service: Not on file     Active member of club or organization: Not on file     Attends meetings of clubs or organizations: Not on file     Relationship status: Not on file    Intimate partner violence:     Fear of current or ex partner: Not on file     Emotionally abused: Not on file     Physically abused: Not on file     Forced sexual activity: Not on file   Other Topics Concern    Not on file   Social History Narrative    Not on file     No current facility-administered medications for this encounter.       Current Outpatient Medications   Medication Sig Dispense Refill    promethazine (PHENERGAN) 25 MG tablet Take 1 tablet by mouth every 6 hours as needed for Nausea 10 tablet 0    promethazine (PROMETHEGAN) 25 MG suppository Place 1 suppository rectally every 6 hours as needed for Nausea 10 suppository 0    FLUoxetine (PROZAC) 10 MG capsule Take 1 capsule by mouth daily 30 capsule 0    atorvastatin (LIPITOR) 20 MG tablet Take 1 tablet by mouth nightly 30 tablet 0    lactulose (CHRONULAC) 10 GM/15ML solution Take 30 mLs by mouth 3 times daily as needed (Constipation) 236 mL 0    mirtazapine (REMERON) 15 MG tablet Take 15 mg by mouth nightly      hydrALAZINE (APRESOLINE) 50 MG tablet Take 50 mg by mouth 3 times daily      clotrimazole-betamethasone (LOTRISONE) 1-0.05 % cream Apply topically 2 times daily Apply topically 2 times daily.       calcitRIOL (ROCALTROL) 0.5 MCG capsule Take 0.5 mcg by mouth nightly      cloNIDine (CATAPRES) 0.3 MG tablet Take 1 tablet by mouth 3 times daily 90 tablet 3    loratadine (CLARITIN) 10 MG tablet Take 10 mg by mouth daily as needed       cinacalcet (SENSIPAR) 90 MG tablet Take 90 mg by mouth nightly       lubiprostone (AMITIZA) 24 MCG capsule Take 24 mcg by mouth daily (with breakfast)       darbepoetin hina-polysorbate (ARANESP, ALBUMIN FREE,) 100 MCG/0.5ML SOSY injection Inject 200 mcg into the skin once a week Las t dose was 1 week ago      polyethylene glycol (GLYCOLAX) packet Take 17 g by mouth daily as needed for Constipation      losartan (COZAAR) 100 MG tablet Take 100 mg by mouth daily      amLODIPine (NORVASC) 10 MG tablet Take 10 mg by mouth every evening       senna (SENOKOT) 8.6 MG tablet Take 1 tablet by mouth daily      sevelamer (RENVELA) 800 MG tablet Take 4 tablets by mouth 3 times daily (with meals) (Patient taking differently: Take 6,400 mg by mouth 3 times daily (with meals) ) 90 tablet 3    isosorbide mononitrate (IMDUR) 60 MG CR tablet Take 120 mg by mouth daily      omeprazole (PRILOSEC) 40 MG delayed release capsule Take 40 mg by mouth 2 times daily       aspirin 81 MG tablet Take 81 mg by mouth daily       Allergies   Allergen Reactions    Ferrous Sulfate Shortness Of Breath    Cephalexin Itching and Swelling    Gabapentin Other (See Comments)     Various adverse reaction    Dicloxacillin Rash    Pcn [Penicillins] Rash    Sulfa Antibiotics Rash       Nursing Notes Reviewed    Physical Exam:  ED Triage Vitals   Enc Vitals Group      BP 03/23/19 2344 (!) 103/52      Pulse 03/23/19 2344 91      Resp 03/23/19 2344 15      Temp 03/23/19 2344 97.4 °F (36.3 °C)      Temp Source 03/24/19 0627 Oral      SpO2 03/23/19 2344 95 %      Weight 03/23/19 2344 119 lb (54 kg)      Height 03/23/19 2344 5' 7\" (1.702 m)      Head Circumference --       Peak Flow --       Pain Score --       Pain Loc --       Pain Edu? --       Excl. in [address]? --        My pulse ox interpretation is - normal    General appearance:  No acute distress. Frequently asking for more narcotic pain medication  Skin:  Warm. Dry. No petechiae or purpura. Eye:  Extraocular movements intact. PERRLA  Ears, nose, mouth and throat:  Oral mucosa moist, no trismus. Tympanic membranes bilaterally normal.  Oropharynx with no exudate or erythema. Neck:  Trachea midline. Supple. No cervical lymphadenopathy  Extremity:  No swelling. Normal ROM. No calf pain or asymmetric swelling. No lower extremity edema  Heart:  Regular rate and rhythm, normal S1 & S2, no extra heart sounds. Perfusion:  Intact, capillary refill less than 2 seconds  Respiratory:  Lungs clear to auscultation bilaterally. Respirations nonlabored. Abdominal:  Normal bowel sounds. Soft. No peritoneal signs. No hepatosplenomegaly. Nondistended. Positive fluid wave. Back:  No CVA tenderness to palpation. No bruising. No CTL tenderness to palpation or step-off  Neurological:  Alert and oriented times 3. No focal neuro deficits. Cranial nerves II through XII are grossly intact.           Psychiatric:  Appropriate    I have reviewed and interpreted all of the currently available lab results from this visit (if applicable):  Results for orders placed or performed during the hospital encounter of 03/23/19   Strep Screen Group A Throat   Result Value Ref Range    Rapid Strep A Screen Negative Negative   Rapid influenza A/B antigens   Result Value Ref Range    Rapid Influenza A Ag Negative Negative    Rapid Influenza B Ag Negative Negative   CBC Auto Differential   Result Value Ref Range    WBC 14.0 (H) 4.0 - 11.0 K/uL    RBC 4.50 4.00 - 5.20 M/uL    Hemoglobin 13.0 12.0 - 16.0 g/dL    Hematocrit 40.1 36.0 - 48.0 %    MCV 89.1 80.0 - 100.0 fL    MCH 28.9 26.0 - 34.0 pg    MCHC 32.4 31.0 - 36.0 g/dL    RDW 15.9 (H) 12.4 - 15.4 %    Platelets 931 (H) 264 - 450 K/uL    MPV 8.5 5.0 - 10.5 fL    Neutrophils % 74.5 %    Lymphocytes % 15.0 %    Monocytes % 7.3 %    Eosinophils % 2.6 %    Basophils % 0.6 %    Neutrophils # 10.4 (H) 1.7 - 7.7 K/uL    Lymphocytes # 2.1 1.0 - 5.1 K/uL    Monocytes # 1.0 0.0 - 1.3 K/uL    Eosinophils # 0.4 0.0 - 0.6 K/uL    Basophils # 0.1 0.0 - 0.2 K/uL   Comprehensive Metabolic Panel   Result Value Ref Range    Sodium 139 136 - 145 mmol/L    Potassium 3.1 (L) 3.5 - 5.1 mmol/L    Chloride 77 (L) 99 - 110 mmol/L    CO2 37 (H) 21 - 32 mmol/L    Anion Gap 25 (H) 3 - 16    Glucose 146 (H) 70 - 99 mg/dL    BUN 51 (H) 7 - 20 mg/dL    CREATININE 11.3 (HH) 0.6 - 1.1 mg/dL    GFR Non-African American 4 (A) >60    GFR  4 (A) >60    Calcium 9.3 8.3 - 10.6 mg/dL    Total Protein 8.3 (H) 6.4 - 8.2 g/dL    Alb 3.8 3.4 - 5.0 g/dL    Albumin/Globulin Ratio 0.8 (L) 1.1 - 2.2    Total Bilirubin 0.4 0.0 - 1.0 mg/dL    Alkaline Phosphatase 512 (H) 40 - 129 U/L    ALT 11 10 - 40 U/L    AST 20 15 - 37 U/L    Globulin 4.5 g/dL   Lipase   Result Value Ref Range    Lipase 18.0 13.0 - 60.0 U/L   Lactic Acid, Plasma   Result Value Ref Range    Lactic Acid 2.1 (H) 0.4 - 2.0 mmol/L   Ammonia   Result Value Ref Range    Ammonia 40 11 - 51 umol/L   Phosphorus   Result Value Ref Range    Phosphorus 5.5 (H) 2.5 - 4.9 mg/dL   Body Fluid Cell Count with Differential   Result Value Ref Range    Cell Count Fluid Type Peritoneal dial     Color, Fluid Pale Yellow     Appearance, Fluid Clear     Clot Eval. see below     Nucl Cell, Fluid 12 /cumm    RBC, Fluid 1 /cumm    Neutrophil Count, Fluid 3 %    Lymphocytes, Body Fluid 29 %    Monocyte Count, Fluid 68 %    Number of Cells Counted Fluid 100     Volume 100.0 mL   Troponin   Result Value Ref Range    Troponin 0.49 (H) <0.01 ng/mL      Radiographs (if obtained):  [] The following radiograph was interpreted by myself in the absence of a radiologist:   [] Radiologist's Report Reviewed:  CT ABDOMEN PELVIS W IV CONTRAST   Preliminary Result   Atrophic bilateral native kidneys without hydronephrosis. Right lower   quadrant transplant kidney also atrophic without evidence of hydronephrosis. Peritoneal dialysis catheter. Moderate to large volume ascites. This is   more than expected for physiologic fluid from peritoneal dialysis. No bowel obstruction. Severe atherosclerotic disease. EKG (if obtained): (All EKG's are interpreted by myself in the absence of a cardiologist) EKG deficits normal sinus rhythm. Rate 83. Normal axis. Normal R-wave progression. . QRS 94. . Nonspecific T wave changes laterally. No evidence of acute ischemia or infarct. Chart review shows recent radiographs:  Ct Abdomen Pelvis W Iv Contrast    Result Date: 3/24/2019  Atrophic bilateral native kidneys without hydronephrosis. Right lower quadrant transplant kidney also atrophic without evidence of hydronephrosis. Peritoneal dialysis catheter. Moderate to large volume ascites. This is more than expected for physiologic fluid from peritoneal dialysis. No bowel obstruction.  Severe atherosclerotic disease. MDM:  68-year-old female with nausea and vomiting. She denies chest pain or shortness of breath. She states her peritoneal dialysis has been working normally. She has had no fevers chills cries or runny nose. She denies any blood or coffee-ground emesis in her vomit. Peritoneal cell count normal.  No evidence of spontaneous bacterial peritonitis. CT abdomen pelvis with no acute process. Atrophic native kidneys. Peritoneal dialysis catheter in place. Ascites consistent with peritoneal dialysis. No bowel obstruction. No colitis. No aortic dissection or aneurysm. Influenza A and B are negative. Strep is negative. Troponin is 0.49. On chart review this is where she normally lives. She is not currently complaining of chest pain. Her EKG does not show ST elevation MI. Ammonia 40. Lactic 2.1. No evidence of liver dysfunction. BUN 51 which is normal for her. Creatinine 11.3 which is normal for her. Potassium 3.1 was corrected with 40 mg of p.o. Potassium. Patient treated symptomatically with fentanyl, GI cocktail, and 500 mL of LR as well as 8 mg IV Zofran. She had no episodes of vomiting during the 4-1/2 hours she was here. Additional dose of fentanyl was given for patient's complaint of headache as well as p.o. Phenergan. Patient had been arranged to go back to the nursing facility however she was very unhappy about this and as the squad arrived she began vomiting again. This was treated with Benadryl and Reglan. Patient specifically asking for IV Phenergan as well as IV dye lot at however I can find no indication for this. Patient be admitted for intractable nausea and vomiting. Likely will require EKG and troponin trending as well. I can find no evidence of acute intra-abdominal process, or other emergent infection that requires antibiotics. She was hemodynamically stable during her entire time in the emergency department.     In total critical care time of 47 minutes with cardiac, GI, endocrine, renal, infectious disease systems all at acute risk of decompensation class are quite frequent bedside repeat evaluation. Clinical Impression:  1. Nausea and vomiting in adult    2. ESRD (end stage renal disease) ([facility]    3. History of peritoneal dialysis      Disposition referral (if applicable):  Faby Peña MD  [address] [facility]  [facility]  [address] [facility] [place]    Schedule an appointment as soon as possible for a visit       Disposition medications (if applicable):  New Prescriptions    PROMETHAZINE (PHENERGAN) 25 MG TABLET    Take 1 tablet by mouth every 6 hours as needed for Nausea    PROMETHAZINE (PROMETHEGAN) 25 MG SUPPOSITORY    Place 1 suppository rectally every 6 hours as needed for Nausea       Comment: Please note this report has been produced using speech recognition software and may contain errors related to that system including errors in grammar, punctuation, and spelling, as well as words and phrases that may be inappropriate. If there are any questions or concerns please feel free to contact the dictating provider for clarification.       Abdullahi Shipley MD  03/24/19 6483

## 2019-03-24 NOTE — PROGRESS NOTES
[facility]    Patient known to me  She has a history of recurrent N/V as well as chronic constipation  Amitiza works well for the latter problem  Unfortunately, the patient has a history of non-compliance and poor follow-up  I have attempted to get her to f/u in the office for over a year  Here, her evaluation has been unremarkable, including a CT scan  Her abdomen is soft and nontender; she appears to be in no distress    REC:  I will schedule the patient for an EGD tomorrow PM  Despite the complaints of N/V, the patient wants to eat - I will order a solid diet as tolerated

## 2019-03-24 NOTE — ED NOTES
Pt vomited. Dr Eugene Branch aware.   [facility] transport cancelled     Andrew Rajan RN  03/24/19 2597

## 2019-03-25 ENCOUNTER — ANESTHESIA (OUTPATIENT)
Dept: ENDOSCOPY | Age: 44
DRG: 243 | End: 2019-03-25
Payer: MEDICAID

## 2019-03-25 ENCOUNTER — ANESTHESIA EVENT (OUTPATIENT)
Dept: ENDOSCOPY | Age: 44
DRG: 243 | End: 2019-03-25
Payer: MEDICAID

## 2019-03-25 VITALS
OXYGEN SATURATION: 73 % | SYSTOLIC BLOOD PRESSURE: 84 MMHG | RESPIRATION RATE: 25 BRPM | DIASTOLIC BLOOD PRESSURE: 49 MMHG

## 2019-03-25 LAB
A/G RATIO: 0.9 (ref 1.1–2.2)
A/G RATIO: 1 (ref 1.1–2.2)
ALBUMIN SERPL-MCNC: 2.9 G/DL (ref 3.4–5)
ALBUMIN SERPL-MCNC: 3.2 G/DL (ref 3.4–5)
ALP BLD-CCNC: 424 U/L (ref 40–129)
ALP BLD-CCNC: 446 U/L (ref 40–129)
ALT SERPL-CCNC: 7 U/L (ref 10–40)
ALT SERPL-CCNC: 7 U/L (ref 10–40)
ANION GAP SERPL CALCULATED.3IONS-SCNC: 20 MMOL/L (ref 3–16)
ANION GAP SERPL CALCULATED.3IONS-SCNC: 22 MMOL/L (ref 3–16)
APPEARANCE FLUID: CLEAR
AST SERPL-CCNC: 12 U/L (ref 15–37)
AST SERPL-CCNC: 13 U/L (ref 15–37)
BASOPHILS ABSOLUTE: 0.1 K/UL (ref 0–0.2)
BASOPHILS RELATIVE PERCENT: 0.4 %
BILIRUB SERPL-MCNC: 0.4 MG/DL (ref 0–1)
BILIRUB SERPL-MCNC: 0.4 MG/DL (ref 0–1)
BUN BLDV-MCNC: 47 MG/DL (ref 7–20)
BUN BLDV-MCNC: 52 MG/DL (ref 7–20)
CALCIUM SERPL-MCNC: 7.8 MG/DL (ref 8.3–10.6)
CALCIUM SERPL-MCNC: 7.8 MG/DL (ref 8.3–10.6)
CELL COUNT FLUID TYPE: NORMAL
CHLORIDE BLD-SCNC: 80 MMOL/L (ref 99–110)
CHLORIDE BLD-SCNC: 82 MMOL/L (ref 99–110)
CLOT EVALUATION: NORMAL
CO2: 33 MMOL/L (ref 21–32)
CO2: 34 MMOL/L (ref 21–32)
COLOR FLUID: NORMAL
CREAT SERPL-MCNC: 10.8 MG/DL (ref 0.6–1.1)
CREAT SERPL-MCNC: 9.6 MG/DL (ref 0.6–1.1)
EKG ATRIAL RATE: 83 BPM
EKG DIAGNOSIS: NORMAL
EKG P AXIS: 59 DEGREES
EKG P-R INTERVAL: 154 MS
EKG Q-T INTERVAL: 410 MS
EKG QRS DURATION: 94 MS
EKG QTC CALCULATION (BAZETT): 481 MS
EKG R AXIS: 70 DEGREES
EKG T AXIS: -49 DEGREES
EKG VENTRICULAR RATE: 83 BPM
EOSINOPHILS ABSOLUTE: 0.8 K/UL (ref 0–0.6)
EOSINOPHILS RELATIVE PERCENT: 5.8 %
FERRITIN: 2131 NG/ML (ref 15–150)
GFR AFRICAN AMERICAN: 5
GFR AFRICAN AMERICAN: 5
GFR NON-AFRICAN AMERICAN: 4
GFR NON-AFRICAN AMERICAN: 4
GLOBULIN: 2.8 G/DL
GLOBULIN: 3.7 G/DL
GLUCOSE BLD-MCNC: 123 MG/DL (ref 70–99)
GLUCOSE BLD-MCNC: 131 MG/DL (ref 70–99)
GLUCOSE BLD-MCNC: 151 MG/DL (ref 70–99)
GLUCOSE BLD-MCNC: 169 MG/DL (ref 70–99)
GLUCOSE BLD-MCNC: 185 MG/DL (ref 70–99)
GLUCOSE BLD-MCNC: 202 MG/DL (ref 70–99)
GLUCOSE BLD-MCNC: 235 MG/DL (ref 70–99)
HCT VFR BLD CALC: 34.2 % (ref 36–48)
HEMOGLOBIN: 10.9 G/DL (ref 12–16)
IRON SATURATION: 69 % (ref 15–50)
IRON: 118 UG/DL (ref 37–145)
LYMPHOCYTES ABSOLUTE: 2.1 K/UL (ref 1–5.1)
LYMPHOCYTES RELATIVE PERCENT: 16.3 %
LYMPHOCYTES, BODY FLUID: 6 %
MAGNESIUM: 5.1 MG/DL (ref 1.8–2.4)
MAGNESIUM: 5.1 MG/DL (ref 1.8–2.4)
MCH RBC QN AUTO: 28.7 PG (ref 26–34)
MCHC RBC AUTO-ENTMCNC: 32 G/DL (ref 31–36)
MCV RBC AUTO: 89.8 FL (ref 80–100)
MONOCYTE, FLUID: 92 %
MONOCYTES ABSOLUTE: 0.9 K/UL (ref 0–1.3)
MONOCYTES RELATIVE PERCENT: 7 %
NEUTROPHIL, FLUID: 2 %
NEUTROPHILS ABSOLUTE: 9.2 K/UL (ref 1.7–7.7)
NEUTROPHILS RELATIVE PERCENT: 70.5 %
NUCLEATED CELLS FLUID: 60 /CUMM
NUMBER OF CELLS COUNTED FLUID: 100
PDW BLD-RTO: 16.2 % (ref 12.4–15.4)
PERFORMED ON: ABNORMAL
PLATELET # BLD: 450 K/UL (ref 135–450)
PMV BLD AUTO: 8.4 FL (ref 5–10.5)
POTASSIUM REFLEX MAGNESIUM: 3.1 MMOL/L (ref 3.5–5.1)
POTASSIUM REFLEX MAGNESIUM: 3.1 MMOL/L (ref 3.5–5.1)
RBC # BLD: 3.8 M/UL (ref 4–5.2)
RBC FLUID: 2 /CUMM
SODIUM BLD-SCNC: 135 MMOL/L (ref 136–145)
SODIUM BLD-SCNC: 136 MMOL/L (ref 136–145)
TOTAL IRON BINDING CAPACITY: 170 UG/DL (ref 260–445)
TOTAL PROTEIN: 5.7 G/DL (ref 6.4–8.2)
TOTAL PROTEIN: 6.9 G/DL (ref 6.4–8.2)
WBC # BLD: 13.1 K/UL (ref 4–11)

## 2019-03-25 PROCEDURE — 3700000000 HC ANESTHESIA ATTENDED CARE: Performed by: INTERNAL MEDICINE

## 2019-03-25 PROCEDURE — 83735 ASSAY OF MAGNESIUM: CPT

## 2019-03-25 PROCEDURE — 83540 ASSAY OF IRON: CPT

## 2019-03-25 PROCEDURE — 6370000000 HC RX 637 (ALT 250 FOR IP): Performed by: INTERNAL MEDICINE

## 2019-03-25 PROCEDURE — 6370000000 HC RX 637 (ALT 250 FOR IP): Performed by: HOSPITALIST

## 2019-03-25 PROCEDURE — 2580000003 HC RX 258: Performed by: INTERNAL MEDICINE

## 2019-03-25 PROCEDURE — A4722 DIALYS SOL FLD VOL > 1999CC: HCPCS | Performed by: INTERNAL MEDICINE

## 2019-03-25 PROCEDURE — 7100000000 HC PACU RECOVERY - FIRST 15 MIN: Performed by: INTERNAL MEDICINE

## 2019-03-25 PROCEDURE — 3609012400 HC EGD TRANSORAL BIOPSY SINGLE/MULTIPLE: Performed by: INTERNAL MEDICINE

## 2019-03-25 PROCEDURE — 1200000000 HC SEMI PRIVATE

## 2019-03-25 PROCEDURE — 89051 BODY FLUID CELL COUNT: CPT

## 2019-03-25 PROCEDURE — 88305 TISSUE EXAM BY PATHOLOGIST: CPT

## 2019-03-25 PROCEDURE — 36415 COLL VENOUS BLD VENIPUNCTURE: CPT

## 2019-03-25 PROCEDURE — 6360000002 HC RX W HCPCS: Performed by: INTERNAL MEDICINE

## 2019-03-25 PROCEDURE — 80053 COMPREHEN METABOLIC PANEL: CPT

## 2019-03-25 PROCEDURE — 2709999900 HC NON-CHARGEABLE SUPPLY: Performed by: INTERNAL MEDICINE

## 2019-03-25 PROCEDURE — 2500000003 HC RX 250 WO HCPCS: Performed by: NURSE ANESTHETIST, CERTIFIED REGISTERED

## 2019-03-25 PROCEDURE — 2580000003 HC RX 258: Performed by: NURSE ANESTHETIST, CERTIFIED REGISTERED

## 2019-03-25 PROCEDURE — 82728 ASSAY OF FERRITIN: CPT

## 2019-03-25 PROCEDURE — 6360000002 HC RX W HCPCS: Performed by: NURSE ANESTHETIST, CERTIFIED REGISTERED

## 2019-03-25 PROCEDURE — 83550 IRON BINDING TEST: CPT

## 2019-03-25 PROCEDURE — G0378 HOSPITAL OBSERVATION PER HR: HCPCS

## 2019-03-25 PROCEDURE — 85025 COMPLETE CBC W/AUTO DIFF WBC: CPT

## 2019-03-25 PROCEDURE — 0DB78ZX EXCISION OF STOMACH, PYLORUS, VIA NATURAL OR ARTIFICIAL OPENING ENDOSCOPIC, DIAGNOSTIC: ICD-10-PCS | Performed by: INTERNAL MEDICINE

## 2019-03-25 PROCEDURE — 7100000001 HC PACU RECOVERY - ADDTL 15 MIN: Performed by: INTERNAL MEDICINE

## 2019-03-25 PROCEDURE — 93010 ELECTROCARDIOGRAM REPORT: CPT | Performed by: INTERNAL MEDICINE

## 2019-03-25 PROCEDURE — 6370000000 HC RX 637 (ALT 250 FOR IP): Performed by: STUDENT IN AN ORGANIZED HEALTH CARE EDUCATION/TRAINING PROGRAM

## 2019-03-25 RX ORDER — POTASSIUM CHLORIDE 20 MEQ/1
40 TABLET, EXTENDED RELEASE ORAL ONCE
Status: COMPLETED | OUTPATIENT
Start: 2019-03-25 | End: 2019-03-25

## 2019-03-25 RX ORDER — SODIUM CHLORIDE, SODIUM LACTATE, CALCIUM CHLORIDE, MAGNESIUM CHLORIDE AND DEXTROSE 1.5; 538; 448; 18.3; 5.08 G/100ML; MG/100ML; MG/100ML; MG/100ML; MG/100ML
6000 INJECTION, SOLUTION INTRAPERITONEAL NIGHTLY
Status: DISCONTINUED | OUTPATIENT
Start: 2019-03-25 | End: 2019-03-26

## 2019-03-25 RX ORDER — ONDANSETRON 2 MG/ML
4 INJECTION INTRAMUSCULAR; INTRAVENOUS
Status: DISCONTINUED | OUTPATIENT
Start: 2019-03-25 | End: 2019-03-25

## 2019-03-25 RX ORDER — PROMETHAZINE HYDROCHLORIDE 25 MG/ML
6.25 INJECTION, SOLUTION INTRAMUSCULAR; INTRAVENOUS
Status: DISCONTINUED | OUTPATIENT
Start: 2019-03-25 | End: 2019-03-25

## 2019-03-25 RX ORDER — PANTOPRAZOLE SODIUM 40 MG/1
40 TABLET, DELAYED RELEASE ORAL
Status: DISCONTINUED | OUTPATIENT
Start: 2019-03-25 | End: 2019-03-29 | Stop reason: HOSPADM

## 2019-03-25 RX ORDER — LABETALOL HYDROCHLORIDE 5 MG/ML
5 INJECTION, SOLUTION INTRAVENOUS EVERY 10 MIN PRN
Status: DISCONTINUED | OUTPATIENT
Start: 2019-03-25 | End: 2019-03-25

## 2019-03-25 RX ORDER — PROPOFOL 10 MG/ML
INJECTION, EMULSION INTRAVENOUS PRN
Status: DISCONTINUED | OUTPATIENT
Start: 2019-03-25 | End: 2019-03-25 | Stop reason: SDUPTHER

## 2019-03-25 RX ORDER — DEXTROSE MONOHYDRATE 25 G/50ML
12.5 INJECTION, SOLUTION INTRAVENOUS PRN
Status: DISCONTINUED | OUTPATIENT
Start: 2019-03-25 | End: 2019-03-29 | Stop reason: HOSPADM

## 2019-03-25 RX ORDER — PROPOFOL 10 MG/ML
INJECTION, EMULSION INTRAVENOUS CONTINUOUS PRN
Status: DISCONTINUED | OUTPATIENT
Start: 2019-03-25 | End: 2019-03-25 | Stop reason: SDUPTHER

## 2019-03-25 RX ORDER — DEXTROSE MONOHYDRATE 50 MG/ML
100 INJECTION, SOLUTION INTRAVENOUS PRN
Status: DISCONTINUED | OUTPATIENT
Start: 2019-03-25 | End: 2019-03-29 | Stop reason: HOSPADM

## 2019-03-25 RX ORDER — SODIUM CHLORIDE 9 MG/ML
INJECTION, SOLUTION INTRAVENOUS CONTINUOUS PRN
Status: DISCONTINUED | OUTPATIENT
Start: 2019-03-25 | End: 2019-03-25 | Stop reason: SDUPTHER

## 2019-03-25 RX ORDER — NICOTINE POLACRILEX 4 MG
15 LOZENGE BUCCAL PRN
Status: DISCONTINUED | OUTPATIENT
Start: 2019-03-25 | End: 2019-03-29 | Stop reason: HOSPADM

## 2019-03-25 RX ORDER — SODIUM CHLORIDE, SODIUM LACTATE, CALCIUM CHLORIDE, MAGNESIUM CHLORIDE AND DEXTROSE 1.5; 538; 448; 18.3; 5.08 G/100ML; MG/100ML; MG/100ML; MG/100ML; MG/100ML
12000 INJECTION, SOLUTION INTRAPERITONEAL NIGHTLY
Status: DISCONTINUED | OUTPATIENT
Start: 2019-03-25 | End: 2019-03-25 | Stop reason: CLARIF

## 2019-03-25 RX ORDER — LIDOCAINE HYDROCHLORIDE 20 MG/ML
INJECTION, SOLUTION INFILTRATION; PERINEURAL PRN
Status: DISCONTINUED | OUTPATIENT
Start: 2019-03-25 | End: 2019-03-25 | Stop reason: SDUPTHER

## 2019-03-25 RX ADMIN — LIDOCAINE HYDROCHLORIDE 50 MG: 20 INJECTION, SOLUTION INFILTRATION; PERINEURAL at 14:59

## 2019-03-25 RX ADMIN — GENTAMICIN SULFATE: 1 CREAM TOPICAL at 08:42

## 2019-03-25 RX ADMIN — POTASSIUM CHLORIDE 40 MEQ: 1500 TABLET, EXTENDED RELEASE ORAL at 16:57

## 2019-03-25 RX ADMIN — HEPARIN SODIUM 5000 UNITS: 5000 INJECTION INTRAVENOUS; SUBCUTANEOUS at 05:28

## 2019-03-25 RX ADMIN — CINACALCET HYDROCHLORIDE 120 MG: 30 TABLET, COATED ORAL at 08:42

## 2019-03-25 RX ADMIN — SODIUM CHLORIDE: 9 INJECTION, SOLUTION INTRAVENOUS at 14:58

## 2019-03-25 RX ADMIN — ASPIRIN 81 MG 81 MG: 81 TABLET ORAL at 08:41

## 2019-03-25 RX ADMIN — OXYCODONE HYDROCHLORIDE AND ACETAMINOPHEN 1 TABLET: 5; 325 TABLET ORAL at 19:51

## 2019-03-25 RX ADMIN — NIFEDIPINE 90 MG: 90 TABLET, FILM COATED, EXTENDED RELEASE ORAL at 08:41

## 2019-03-25 RX ADMIN — PANTOPRAZOLE SODIUM 40 MG: 40 TABLET, DELAYED RELEASE ORAL at 16:57

## 2019-03-25 RX ADMIN — SEVELAMER CARBONATE 2400 MG: 800 TABLET, FILM COATED ORAL at 16:57

## 2019-03-25 RX ADMIN — PROPOFOL 160 MCG/KG/MIN: 10 INJECTION, EMULSION INTRAVENOUS at 15:01

## 2019-03-25 RX ADMIN — OXYCODONE HYDROCHLORIDE AND ACETAMINOPHEN 1 TABLET: 5; 325 TABLET ORAL at 23:44

## 2019-03-25 RX ADMIN — Medication 10 ML: at 08:42

## 2019-03-25 RX ADMIN — DOCUSATE SODIUM 100 MG: 100 CAPSULE, LIQUID FILLED ORAL at 19:51

## 2019-03-25 RX ADMIN — CARVEDILOL 3.12 MG: 3.12 TABLET, FILM COATED ORAL at 08:42

## 2019-03-25 RX ADMIN — SODIUM CHLORIDE, SODIUM LACTATE, CALCIUM CHLORIDE, MAGNESIUM CHLORIDE AND DEXTROSE 6000 ML: 1.5; 538; 448; 18.3; 5.08 INJECTION, SOLUTION INTRAPERITONEAL at 22:29

## 2019-03-25 RX ADMIN — ONDANSETRON 4 MG: 2 INJECTION INTRAMUSCULAR; INTRAVENOUS at 13:18

## 2019-03-25 RX ADMIN — DOCUSATE SODIUM 100 MG: 100 CAPSULE, LIQUID FILLED ORAL at 08:41

## 2019-03-25 RX ADMIN — PROPOFOL 125 MG: 10 INJECTION, EMULSION INTRAVENOUS at 15:01

## 2019-03-25 RX ADMIN — ISOSORBIDE MONONITRATE 120 MG: 60 TABLET, EXTENDED RELEASE ORAL at 08:42

## 2019-03-25 RX ADMIN — CARVEDILOL 3.12 MG: 3.12 TABLET, FILM COATED ORAL at 16:57

## 2019-03-25 RX ADMIN — OXYCODONE HYDROCHLORIDE AND ACETAMINOPHEN 1 TABLET: 5; 325 TABLET ORAL at 08:41

## 2019-03-25 RX ADMIN — HEPARIN SODIUM 5000 UNITS: 5000 INJECTION INTRAVENOUS; SUBCUTANEOUS at 22:15

## 2019-03-25 RX ADMIN — Medication 10 ML: at 22:32

## 2019-03-25 RX ADMIN — INSULIN LISPRO 1 UNITS: 100 INJECTION, SOLUTION INTRAVENOUS; SUBCUTANEOUS at 13:25

## 2019-03-25 RX ADMIN — LOSARTAN POTASSIUM 100 MG: 50 TABLET ORAL at 08:41

## 2019-03-25 RX ADMIN — ATORVASTATIN CALCIUM 20 MG: 20 TABLET, FILM COATED ORAL at 19:51

## 2019-03-25 RX ADMIN — INSULIN LISPRO 1 UNITS: 100 INJECTION, SOLUTION INTRAVENOUS; SUBCUTANEOUS at 22:16

## 2019-03-25 RX ADMIN — PANTOPRAZOLE SODIUM 40 MG: 40 TABLET, DELAYED RELEASE ORAL at 05:28

## 2019-03-25 ASSESSMENT — PAIN DESCRIPTION - ORIENTATION
ORIENTATION: MID
ORIENTATION: MID
ORIENTATION: RIGHT;LEFT
ORIENTATION: MID
ORIENTATION: RIGHT;LEFT

## 2019-03-25 ASSESSMENT — PULMONARY FUNCTION TESTS
PIF_VALUE: 0

## 2019-03-25 ASSESSMENT — PAIN SCALES - GENERAL
PAINLEVEL_OUTOF10: 8
PAINLEVEL_OUTOF10: 0
PAINLEVEL_OUTOF10: 6
PAINLEVEL_OUTOF10: 0
PAINLEVEL_OUTOF10: 0
PAINLEVEL_OUTOF10: 8
PAINLEVEL_OUTOF10: 0
PAINLEVEL_OUTOF10: 6
PAINLEVEL_OUTOF10: 4

## 2019-03-25 ASSESSMENT — PAIN DESCRIPTION - LOCATION
LOCATION: ABDOMEN
LOCATION: ABDOMEN
LOCATION: LEG
LOCATION: ABDOMEN
LOCATION: LEG

## 2019-03-25 ASSESSMENT — PAIN DESCRIPTION - PAIN TYPE
TYPE: ACUTE PAIN
TYPE: CHRONIC PAIN
TYPE: ACUTE PAIN
TYPE: CHRONIC PAIN
TYPE: ACUTE PAIN

## 2019-03-25 ASSESSMENT — PAIN DESCRIPTION - DESCRIPTORS
DESCRIPTORS: ACHING;DISCOMFORT
DESCRIPTORS: CRAMPING
DESCRIPTORS: ACHING;DISCOMFORT
DESCRIPTORS: ACHING;CONSTANT;SORE;DISCOMFORT
DESCRIPTORS: ACHING;DISCOMFORT
DESCRIPTORS: CRAMPING

## 2019-03-25 ASSESSMENT — PAIN DESCRIPTION - FREQUENCY
FREQUENCY: INTERMITTENT
FREQUENCY: INTERMITTENT
FREQUENCY: CONTINUOUS

## 2019-03-25 ASSESSMENT — PAIN - FUNCTIONAL ASSESSMENT
PAIN_FUNCTIONAL_ASSESSMENT: PREVENTS OR INTERFERES SOME ACTIVE ACTIVITIES AND ADLS
PAIN_FUNCTIONAL_ASSESSMENT: 0-10
PAIN_FUNCTIONAL_ASSESSMENT: PREVENTS OR INTERFERES SOME ACTIVE ACTIVITIES AND ADLS

## 2019-03-25 ASSESSMENT — PAIN DESCRIPTION - PROGRESSION
CLINICAL_PROGRESSION: NOT CHANGED

## 2019-03-25 ASSESSMENT — PAIN DESCRIPTION - ONSET
ONSET: ON-GOING

## 2019-03-25 NOTE — H&P
[facility]   Pre-operative History and Physical    Patient: Marco العراقي  : 1975  Acct#:         HISTORY OF PRESENT ILLNESS:    The patient is a 53 y.o. female  who presents with nausea, vomiting  Past Medical History:        Diagnosis Date    [facility]erebral [facility] occlusion [facility] infarction [facility]     Chronic kidney disease     Cocaine abuse ([facility]     Crohn's disease ([facility]     Depression     Diabetes mellitus ([facility]     ESRD (end stage renal disease) ([facility]     GERD (gastroesophageal reflux disease)     Hemodialysis patient ([facility] 2016    peritoneal     Hyperlipidemia     Hypertension     MI, old     Mood disorder ([facility]     Pericarditis     Peritoneal dialysis catheter in place ([facility]     Peritonitis ([facility]     Pneumonia     Thyroid disease      Past Surgical History:        Procedure Laterality Date    BACK SURGERY      Tumor removal    CHOLECYSTECTOMY      KIDNEY TRANSPLANT      KIDNEY TRANSPLANT      LAPAROSCOPY      right ovary removed and left ovarian cyst removed for endometriosis    OTHER SURGICAL HISTORY      peritoneal dialysis catheter    PARACENTESIS      TUBAL LIGATION      UPPER GASTROINTESTINAL ENDOSCOPY  2017     Medications prior to admission:   Prior to Admission medications    Medication Sig Start Date End Date Taking?  Authorizing Provider   promethazine (PHENERGAN) 25 MG tablet Take 1 tablet by mouth every 6 hours as needed for Nausea 3/24/19 3/31/19 Yes Venancio Allan MD   promethazine (PROMETHEGAN) 25 MG suppository Place 1 suppository rectally every 6 hours as needed for Nausea 3/24/19 3/31/19 Yes Venancio Allan MD   carvedilol (COREG) 3.125 MG tablet Take 3.125 mg by mouth daily   Yes Historical Provider, MD   isosorbide mononitrate (IMDUR) 60 MG extended release tablet Take 120 mg by mouth daily   Yes Historical Provider, MD   loratadine (CLARITIN) 10 MG tablet Take 10 mg by mouth daily as needed (Allergies)   Yes Historical Provider, MD   melatonin 5 MG TABS tablet Take 5 mg by mouth nightly as needed (Sleep)   Yes Historical Provider, MD   NIFEdipine (ADALAT CC) 90 MG extended release tablet Take 90 mg by mouth daily   Yes Historical Provider, MD   pantoprazole (PROTONIX) 40 MG tablet Take 40 mg by mouth daily   Yes Historical Provider, MD   sevelamer (RENVELA) 800 MG tablet Take 3 tablets by mouth 3 times daily (with meals)   Yes Historical Provider, MD   senna-docusate ([place]) 8.6-50 MG per tablet Take 1 tablet by mouth 2 times daily as needed for Constipation   Yes Historical Provider, MD   cinacalcet (SENSIPAR) 30 MG tablet Take 120 mg by mouth daily   Yes Historical Provider, MD   insulin aspart (NOVOLOG FLEXPEN) 100 UNIT/ML injection pen Inject into the skin 2 times daily (with meals) 151-200 = Give 1 unit  201-250 = Give 2 units  251-300 = Give 3 units  301-350 = Give 4 units  351-400 = Give 5 units   Yes Historical Provider, MD   atorvastatin (LIPITOR) 20 MG tablet Take 1 tablet by mouth nightly 1/4/19  Yes Bao Sol APRN - NP   lubiprostone (AMITIZA) 24 MCG capsule Take 24 mcg by mouth 2 times daily (with meals)    Yes Historical Provider, MD   losartan (COZAAR) 100 MG tablet Take 100 mg by mouth daily   Yes Historical Provider, MD   aspirin 81 MG tablet Take 81 mg by mouth daily   Yes Historical Provider, MD   darbepoetin hina-polysorbate (ARANESP, ALBUMIN FREE,) 100 MCG/0.5ML SOSY injection Inject 200 mcg into the skin once a week Las t dose was 1 week ago    Historical Provider, MD     Allergies:    Ferrous sulfate; Cephalexin; Gabapentin; Dicloxacillin; Pcn [penicillins]; and Sulfa antibiotics  Social History:   Social History     Socioeconomic History    Marital status:      Spouse name: Not on file    Number of children: 2    Years of education: Not on file    Highest education level: Not on file   Occupational History    Occupation: volunteer   Social Needs    Financial resource strain: Not on file    Food insecurity:     Worry: Not on file     Inability: Not on file    Transportation needs:     Medical: Not on file     Non-medical: Not on file   Tobacco Use    Smoking status: Never Smoker    Smokeless tobacco: Never Used   Substance and Sexual Activity    Alcohol use: Yes     Alcohol/week: 0.0 oz     Comment: monthly    Drug use: No    Sexual activity: Yes     Partners: Male   Lifestyle    Physical activity:     Days per week: Not on file     Minutes per session: Not on file    Stress: Not on file   Relationships    Social connections:     Talks on phone: Not on file     Gets together: Not on file     Attends Oriental orthodox service: Not on file     Active member of club or organization: Not on file     Attends meetings of clubs or organizations: Not on file     Relationship status: Not on file    Intimate partner violence:     Fear of current or ex partner: Not on file     Emotionally abused: Not on file     Physically abused: Not on file     Forced sexual activity: Not on file   Other Topics Concern    Not on file   Social History Narrative    Not on file           Family History:   Family History   Problem Relation Age of Onset    Heart Attack Mother     Diabetes Mother     Hypertension Mother     Stroke Father     Diabetes Father     Hypertension Father          PHYSICAL EXAM:      BP (!) 100/57   Pulse 72   Temp 96.9 °F (36.1 °C) (Temporal)   Resp 16   Ht 5' 7\" (1.702 m)   Wt 122 lb 2.2 oz (55.4 kg)   SpO2 95%   BMI 19.13 kg/m²  I        Heart: Normal    Lungs: Normal    Abdomen: Normal      ASA Grade: ASA 3 - Patient with moderate systemic disease with functional limitations    III (soft palate, base of uvula visible)  ASSESSMENT AND PLAN:    1. Patient is a 53 y.o. female here for EGD  2. Procedure options, risks and benefits reviewed with patient who expresses understanding.

## 2019-03-25 NOTE — PROGRESS NOTES
1512--pt admitted to PACU from [facility]. Monitors placed. O2 on at 3l/nc/OPA. Pt unresponsive. Abd soft. IV patent.

## 2019-03-25 NOTE — PROGRESS NOTES
Call received from Dr. Jame Guerin with Nephrology - Ordered to repeat the chem with reflex to mag. Order placed.

## 2019-03-25 NOTE — CARE COORDINATION
Discharge Planning:  INDRA met with pt to discuss d/c plans. Pt  Had just recently admitted to [facility] as a long term resident and plans to return to this facility upon d/c.    INDRA contacted Enoc at [facility] who stated that pt has [facility] which DOES NOT require a pre cert to return and pts bed will be held. Pt is on peritoneal dialysis. Plan: Return to [facility] long term care upon d/c. [phone].    Electronically signed by Mo Maza on 3/25/2019 at 9:37 AM

## 2019-03-25 NOTE — BRIEF OP NOTE
Brief Postoperative Note    Bryan Dawn  YOB: 1975  [id number]    Pre-operative Diagnosis: Nausea, vomiting    Post-operative Diagnosis: Same    Procedure: EGD    Anesthesia: MAC    Surgeons/Assistants: Miguel Cerda MD    Estimated Blood Loss: None    Complications: None    Specimens: Was Obtained: Gastric antrum    Findings: See dictated report    Electronically signed by Miguel Cerda MD on 3/25/2019 at 3:13 PM

## 2019-03-25 NOTE — CONSULTS
[address]                                  CONSULTATION    PATIENT NAME: Jeanie Wooten                    :        1975  MED REC NO:   [id number]                          ROOM:       [id number]  ACCOUNT NO:   [de-identified]                           ADMIT DATE: 2019  PROVIDER:     Trip Dozier MD    CONSULT DATE:  2019    REASON FOR ADMISSION/CHIEF COMPLAINT:  Nausea, vomiting, and  constipation which appears to be a chronic problem but has been lost to  followup with GI.    REASON FOR CONSULTATION:  Evaluation and management of end-stage renal  disease during current hospitalization. HISTORY OF PRESENT ILLNESS:  The patient is a 39-year-old female with a  past medical history significant for diabetes, hypertension, end-stage  renal disease, currently on peritoneal dialysis, follows with   Nephrology, history of FSGS which ultimately led to her end-stage renal  disease status at the age of 10 status post failed kidney transplant  after 23 years, GERD, depression, Crohn's disease, remote history of  cocaine abuse and CVA, who presented to the Emergency Department with  nausea, vomiting, and constipation which appears to have somewhat  improved. She was evaluated by GI earlier today and notes stated that  she has used Amitiza in the past and that seems to work well for her  chronic constipation. However, the patient has been lost to followup,  has had poor compliance and so the patient's symptoms do recur. She is  scheduled for EGD tomorrow morning. At the time of my evaluation, the patient was resting comfortably in  bed. She does not appear to be in any distress. Her electrolytes from  blood work drawn this morning revealed a potassium of 3.1 mEq/L which  has been replaced with 40 mEq of KCl. All other electrolytes were  within the desired range. Her hemoglobin is above 11.     REVIEW OF SYSTEMS:  Positive for nausea, vomiting, and constipation  which appears to have somewhat improved. She denies dizziness, syncope,  dysarthria, dysphagia, ear ache, sore throat, nasal discharge,  hemoptysis, abdominal pain. Her PE fluid is clear. She denies  worsening lower extremity edema, hematochezia, melena. All other review  of systems are negative. PAST MEDICAL HISTORY:  1. End-stage renal disease. 2.  FSGS. 3.  Anemia of chronic kidney disease. 4.  Renal osteodystrophy. 5.  Pneumonia. 6.  Hypertension. 7.  Diabetes. 8.  Hyperlipidemia. 9.  GERD. 10.  Depression. 11.  Crohn's disease. 12.  Cocaine abuse. 13. CVA. PAST SURGICAL HISTORY:  1. Tubal ligation. 2.  PD catheter placement. 3.  Kidney transplant in 1990.  4.  Cholecystectomy. 5.  EGD. FAMILY HISTORY:  No family history of kidney disease. SOCIAL HISTORY:  The patient never smoked. ALLERGIES:  1. IRON. 2.  CELEXA. 3.  GABAPENTIN. 4.  OXACILLIN. 5.  PENICILLIN. 6.  SULFA. CURRENT MEDICATIONS:  1. Renvela. 2.  Phenergan. 3.  Protonix. 4.  Zofran. 5.  Procardia. 6.  Reglan. 7.  Amitiza. 8.  Cozaar. 9.  Imdur. 10.  Colace. 11.  Benadryl. 12.  Sensipar. 13.  Coreg. 14.  Lipitor. 15.  Aspirin. PHYSICAL EXAMINATION:  VITAL SIGNS:  Blood pressure is 122/68 with a pulse of 91, temperature  97.7, oxygen saturation is 94% on room air. GENERAL:  In no apparent distress, conversant, clinically appears to be  euvolemic. HEENT:  Ears and nose appear externally without deformity. Mucous  membranes are moist.  Normocephalic, atraumatic. Eyes:  Anicteric  sclera. Moist conjunctiva. No lid lag. Pupils are equal and reactive  to light. NECK:  Free range of motion. Supple. No thyromegaly. CHEST:  Clear to auscultation bilaterally with no intercostal  retractions. CARDIOVASCULAR:  Regular rate and rhythm. No rubs. ABDOMEN:  Soft, nontender. Bowel sounds present. No organomegaly.   EXTREMITIES: Lower extremities:  No lower extremity edema. No  extremity lymphadenopathy. PSYCHIATRIC:  Appropriate affect. Alert and oriented to time, place,  and person. NEUROLOGIC:  No asterixis. No focal motor neurological deficits. SKIN:  Normal texture and turgor. No rash. LABORATORY DATA:  Sodium 139, potassium 3.1, chloride 77, bicarb 37, BUN  51, creatinine 11.3. Hemoglobin 13, hematocrit 40. IMPRESSION:  1. End-stage renal disease. We will continue peritoneal dialysis using  her home PD prescription which includes 11 hours on the cycler, five  exchanges, 2.5% Dianeal alternating with 1.5% Dianeal with no last bag  fill. Total exchange volume is 10 L. Exchange volume is 2 L.  2.  Anemia of chronic kidney disease. The patient's hemoglobin is above  11.  Epogen is not indicated at this point in time. 3.  Hypokalemia was replaced. 4.  Renal osteodystrophy. Continue to monitor phosphorus which is  currently at target. 5.  Hypertension, appears adequately controlled. Continue current  antihypertensive medication. RECOMMENDATIONS:  1. To resume PD at night using her home PD prescription. 2.  No indication for Epogen. 3.  Continue phosphate binder. Continue to monitor phosphorus. 4.  Potassium was appropriately replaced. Thank you for the consultation.         Taty Selby MD    D: 03/24/2019 15:24:26       T: 03/24/2019 21:29:20     [email or web address]  Job#: [id number]     Doc#: [id number]    CC:  <>

## 2019-03-25 NOTE — PLAN OF CARE
No new evidence of skin breakdown. Will continue to monitor closely. Patient has been free from falls. Room is free from clutter. Non-skid footwear on while out of bed. Call light within reach. Calls for assistance appropriately. Pain is being well-controlled by ordered pain medication. Instructed on repositioning and stretching for leg cramps.

## 2019-03-25 NOTE — PROGRESS NOTES
[facility]  Nephrology Progress Note      SUBJECTIVE:   Reason for consultation: ESRD mgmt  Interval Hx: Planned for endoscopy later today. Mg noted to be critically high ~5. PD in progress. ROS: Notes upper abdominal pain. No fever. SH: no family present updated pt on nephrology plan of care  Medications:  Reviewed    OBJECTIVE:    Physical Exam:  TEMPERATURE:  Current - Temp: 98.5 °F (36.9 °C); Max - Temp  Av °F (36.7 °C)  Min: 97.4 °F (36.3 °C)  Max: 98.5 °F (36.9 °C)  RESPIRATIONS RANGE: Resp  Av  Min: 16  Max: 18  PULSE RANGE: Pulse  Av.6  Min: 74  Max: 85  BLOOD PRESSURE RANGE:  Systolic (00DTF), COA:546 , Min:96 , PNC:251   ; Diastolic (81MPZ), XJZ:79, Min:55, Max:72    PULSE OXIMETRY RANGE: SpO2  Av.4 %  Min: 93 %  Max: 100 %  24HR INTAKE/OUTPUT:      Intake/Output Summary (Last 24 hours) at 3/25/2019 1431  Last data filed at 3/25/2019 0600  Gross per 24 hour   Intake 240 ml   Output 1646 ml   Net -1406 ml       GENERAL:  In no apparent distress, conversant, clinically appears to be euvolemic. HEENT:  Ears and nose appear externally without deformity. Mucous membranes are moist.  Normocephalic, atraumatic. Eyes:  Anicteric sclera. Moist conjunctiva. NECK:  Free range of motion. Supple. CHEST:  Clear to auscultation bilaterally with no intercostal retractions. CARDIOVASCULAR:  Regular rate and rhythm. No rubs. ABDOMEN:  Soft, nontender. Bowel sounds present. No organomegaly. +PD catheter in RLQ. Multiple painless SQ nodules below umbilicus. EXTREMITIES:  Lower extremities:  No lower extremity edema. Painless nodule noted on lateral aspect of L thigh. Multiple raised dark lesions on b/l shins.          Data:    Lab Results   Component Value Date    CREATININE 9.6 (HH) 2019    BUN 47 (H) 2019     2019    K 3.1 (L) 2019    CL 80 (L) 2019    CO2 34 (H) 2019     Albumin:    Lab Results   Component Value Date LABALBU 3.2 03/25/2019     Phosphorus:    Lab Results   Component Value Date    PHOS 5.5 03/24/2019     Lab Results   Component Value Date    WBC 13.1 (H) 03/25/2019    HGB 10.9 (L) 03/25/2019    HCT 34.2 (L) 03/25/2019    MCV 89.8 03/25/2019     03/25/2019       ASSESSMENT/PLAN:    #ESRD   - cont APD  - given mg level will increase APD prescription  - increase TT to 12H from 11H exchanges from 5 to 6 cont 2L FVs   - reduce all 1.5% solution appears well compensated and npo today  - no prophylaxis for egd would need gent and po flagyl periprocedurally if colonoscopy planned    #Hypermagnesemia  - pt seems relatively asymptomatic   - should symptoms arise may need short-term HD  - K actually low  - increase APD prescription as above  - avoid mg containing supplements    #Hypokalemia  - give kcl 40 meq po x 1  - trend     #Anemia of CKD  - at goal 10.9   - trend   - monitor need for aranesp and iron   - check iron stores - pending     #Renal OD  - planned for parathyroidectomy  - on sensipar   - monitor ca; cCa okay   - on non ca binder renvenla   - trend po4  - nodules on thigh and abd concerning for early calcyphyalxis; however they are painless    - discussed adherence to strict low po4 diet     #Malignant htn  - on losartan, nifedipine, imdur, coreg   - at IP goal   - monitor for low bp     #Leukocytosis  - source unclear   - nucleated cells ~50 in PD fluid only 2%  - no clinical evidence of peritonitis     Nadir Arrieta MD  3/25/2019  2:31 PM  [phone]

## 2019-03-25 NOTE — PROGRESS NOTES
PGY-3 Resident Progress Note    Admit Date: 3/23/2019         Overnight Events: No acute events overnight    Hospital Course: Pt with no complaints this morning. She was able to eat two meals yesterday and had no episodes of emesis. Denies fever/chills, nausea, vomiting, chest pain, abdominal pain. No acute events overnight. Scheduled Medications:    sevelamer  2,400 mg Oral TID WC    pantoprazole  40 mg Oral QAM AC    lubiprostone  24 mcg Oral BID    losartan  100 mg Oral Daily    isosorbide mononitrate  120 mg Oral Daily    cinacalcet  120 mg Oral Daily    atorvastatin  20 mg Oral Nightly    aspirin  81 mg Oral Daily    sodium chloride flush  10 mL Intravenous 2 times per day    heparin (porcine)  5,000 Units Subcutaneous 3 times per day    NIFEdipine  90 mg Oral Daily    carvedilol  3.125 mg Oral BID WC    docusate sodium  100 mg Oral BID    gentamicin   Topical Daily    dianeal lo-felicita 1.5%  5,000 mL Intraperitoneal Nightly    And    dianeal lo-felicita 2.5%  5,000 mL Intraperitoneal Nightly      PRN Medications: cetirizine, sodium chloride flush, ondansetron, acetaminophen, oxyCODONE-acetaminophen **OR** oxyCODONE-acetaminophen, lidocaine viscous  Diet: Diet NPO Time Specified    Continuous Infusions:    PHYSICAL EXAM:  /70   Pulse 74   Temp 98.5 °F (36.9 °C) (Oral)   Resp 16   Ht 5' 7\" (1.702 m)   Wt 122 lb 2.2 oz (55.4 kg)   SpO2 97%   BMI 19.13 kg/m²   No results for input(s): POCGLU in the last 72 hours. Intake/Output Summary (Last 24 hours) at 3/25/2019 0857  Last data filed at 3/25/2019 0600  Gross per 24 hour   Intake 240 ml   Output --   Net 240 ml       Physical Exam   Constitutional: She is oriented to person, place, and time. She appears well-developed and well-nourished. No distress. HENT:   Head: Normocephalic and atraumatic. Eyes: Right eye exhibits no discharge. Left eye exhibits no discharge. No scleral icterus. Neck: Neck supple.    Cardiovascular: Normal rate, regular rhythm and normal heart sounds. Exam reveals no gallop and no friction rub. No murmur heard. Pulmonary/Chest: Effort normal and breath sounds normal. No stridor. No respiratory distress. She has no wheezes. She has no rales. She exhibits no tenderness. Abdominal: Soft. Bowel sounds are normal. She exhibits no distension. There is no tenderness. PD catheter in place   Musculoskeletal: Normal range of motion. She exhibits no edema. Neurological: She is oriented to person, place, and time. Skin: Skin is warm and dry. She is not diaphoretic. Psychiatric: She has a normal mood and affect. LABS:  Recent Labs     03/24/19 0048   WBC 14.0*   HGB 13.0   HCT 40.1   *                                                                    Recent Labs     03/24/19 0048      K 3.1*   CL 77*   CO2 37*   BUN 51*   CREATININE 11.3*   GLUCOSE 146*     Recent Labs     03/24/19 0048   AST 20   ALT 11   BILITOT 0.4   ALKPHOS 512*     Recent Labs     03/24/19 0048   TROPONINI 0.49*     No results for input(s): BNP in the last 72 hours. No results for input(s): CHOL, HDL in the last 72 hours. Invalid input(s): LDLCALCU  No results for input(s): INR in the last 72 hours. Assessment & Plan:    The patient Bob Gallagher is a 53 y. o.female with medical history significant for end-stage renal disease patient is on peritoneal dialysis, Crohn's disease, history of cerebral artery occlusion with cerebral infarction  Patient presented from the nursing home with nausea vomiting. Nausea/Vomiting - pt endorses severe nausea/vomiting for past week. Unable to say if there was blood in emesis. - GI consulted - scheduled for EGD today  - no episodes of emesis yesterday, able to eat two meals    Constipation - patient had started eating yesterday, abdominal exam benign, will monitor to see if she has BM soon.    - cont Colace 100mg PO BID  - cont Amitiza 24mcg PO BID    ESRD on PD 2/2 FSGS  - Nephrology consulted    Essential HTN  - cont Coreg 3.125mg PO BID  - cont Procardia XL 90mg PO qd  - cont Cozaar 100mg PO qd  - cont Imdur 120mg PO qd    HLD  - cont Lipitor 20mg PO QHS    FEN: NPO for EGD today    VTE Prophylaxis: Heparin    Disposition: Floors, patient with no episodes of nausea/vomiting today and able to tolerate PO yesterday. She is NPO today for EGD by GI in afternoon. Full Code    I will discuss the patient with Dr. Pearl Rojas M.D. Internal Medicine Resident, PGY-3  3/25/2019, 8:57 AM         Addendum to Resident H&P/Progress note:  Pt seen,examined and evaluated. I have reviewed the current history, physical findings, labs and assessment and plan and agree with note as documented by resident MD      ESRD on PD. Presented with nausea and vomiting. Planned for EGD today. ?compliance with dialysis. May need to consider HD moving forward. Peritoneal fluid without signs of infection.        Sal Rosario

## 2019-03-25 NOTE — DISCHARGE INSTR - COC
Continuity of Care Form    Patient Name: Jose Elias Paula   :  1975  MRN:  [id number]    Admit date:  3/23/2019  Discharge date:  3/29/29    Code Status Order: Full Code   Advance Directives:   Advance Care Flowsheet Documentation     Date/Time Healthcare Directive Type of Healthcare Directive Copy in [address] Agent's Name Healthcare Agent's Phone Number    [phone]  No, patient does not have an advance directive for healthcare treatment -- -- -- -- --          Admitting Physician:  Solomon Cronin MD  PCP: Hosea Rojas MD    Discharging Nurse: Vivi Kwan RN  [address] Unit/Room#: O9I-1682/5764-02  Discharging Unit Phone Number: [phone]    Emergency Contact:   Extended Emergency Contact Information  Primary Emergency Contact: Ronny West [address] Phone: [phone]  Relation: Brother/Sister  Secondary Emergency Contact: [address] [facility], [address] [facility] Phone: [phone]  Relation: Brother/Sister    Past Surgical History:  Past Surgical History:   Procedure Laterality Date    BACK SURGERY      Tumor removal    CHOLECYSTECTOMY      KIDNEY TRANSPLANT      KIDNEY TRANSPLANT      LAPAROSCOPY      right ovary removed and left ovarian cyst removed for endometriosis    OTHER SURGICAL HISTORY      peritoneal dialysis catheter    PARACENTESIS      TUBAL LIGATION      UPPER GASTROINTESTINAL ENDOSCOPY  2017       Immunization History: There is no immunization history on file for this patient.     Active Problems:  Patient Active Problem List   Diagnosis Code    Depression F32.9    ESRD on hemodialysis ([facility] [facility] N18.6, Z99.2    Hyperkalemia L12.6    Metabolic acidosis Y18.8    Essential hypertension I10    DMII (diabetes mellitus, type 2) ([facility]) E11.9    Cellulitis L03.90    Cellulitis of lower extremity L03.119    Erythema nodosum L52    Chest pain R07.9    Failure to thrive in adult R62.7    Abnormal stress test R94.39    ESRD (end stage renal disease) on dialysis ([facility] N18.6, Z99.2    Suicidal ideation R45.851    NSTEMI (non-ST elevated myocardial infarction) ([facility]) I21.4    Vaginal candidiasis B37.3    Vaginal bleeding N93.9    Vaginal discharge N89.8    Atypical chest pain R07.89    Severe episode of recurrent major depressive disorder, without psychotic features ([facility] F33.2    Anxiety disorder F41.9    Cocaine abuse ([facility] F14.10    Anemia V32.3    Illicit drug use M94.53    Hyperkalemia E87.5    Peritoneal dialysis status ([facility]) Z99.2    Hyperlipidemia E78.5    Chronic focal neurological deficit R29.818    Nausea and vomiting R11.2       Isolation/Infection:   Isolation          No Isolation            Nurse Assessment:  Last Vital Signs: /70   Pulse 74   Temp 98.5 °F (36.9 °C) (Oral)   Resp 16   Ht 5' 7\" (1.702 m)   Wt 122 lb 2.2 oz (55.4 kg)   SpO2 97%   BMI 19.13 kg/m²     Last documented pain score (0-10 scale): Pain Level: 0  Last Weight:   Wt Readings from Last 1 Encounters:   03/24/19 122 lb 2.2 oz (55.4 kg)     Mental Status:  oriented, alert and coherent    IV Access:  - None    Nursing Mobility/ADLs:  Walking   Assisted  Transfer  Assisted  Bathing  Assisted  Dressing  Assisted  Toileting  Assisted  Feeding  Assisted  Med Admin  Assisted  Med Delivery   whole    Wound Care Documentation and Therapy:        Elimination:  Continence:   · Bowel: Yes  · Bladder: No  Urinary Catheter: None   Colostomy/Ileostomy/Ileal Conduit: No       Date of Last BM: 3/29/19    Intake/Output Summary (Last 24 hours) at 3/25/2019 0937  Last data filed at 3/25/2019 0600  Gross per 24 hour   Intake 240 ml   Output --   Net 240 ml     I/O last 3 completed shifts:   In: 240 [P.O.:240]  Out: -     Safety Concerns:     None    Impairments/Disabilities:      None    Nutrition Therapy:  Current Nutrition Therapy:   - Oral Diet:  [address] LY Oral SXNW:[id number]}    Routes of Feeding: Oral  Liquids: No Restrictions  Daily Fluid Restriction: no  Last Modified Barium Swallow with Video (Video Swallowing Test): not done    Treatments at the Time of Hospital Discharge:   Respiratory Treatments: ***  Oxygen Therapy:  is not on home oxygen therapy. Ventilator:    - No ventilator support    Rehab Therapies: Physical Therapy and Occupational Therapy  Weight Bearing Status/Restrictions: No weight bearing restirctions  Other Medical Equipment (for information only, NOT a DME order):  {EQUIPMENT:[id number]}  Other Treatments: ***    Patient's personal belongings (please select all that are sent with patient):  None    RN SIGNATURE:  Electronically signed by Antolin Villegas RN on 3/29/19 at 3:53 PM    CASE MANAGEMENT/SOCIAL WORK SECTION    Inpatient Status Date: 3/24/2019    Readmission Risk Assessment Score:  Readmission Risk              Risk of Unplanned Readmission:        33           Discharging to Facility/ Agency   · Name: [facility]  · Address: [address] [place] [address]  · Phone:[phone]  · RDC:[phone]    Dialysis Facility (if applicable)   Peritoneal Dialysis completed at the facility  Orders as of 3/28/19  Total Volume: 10 Liters  Therapy Time (Duration): 11 hours  Exchange Volume: 2 Liters  Number of Exchanges: 5  Last Fill Volume: No LBF    / signature:Electronically signed by Estelita Catalan on 3/25/2019 at 9:37 AM      PHYSICIAN SECTION    Prognosis: Fair    Condition at Discharge: Stable    Rehab Potential (if transferring to Rehab): Guarded    Recommended Labs or Other Treatments After Discharge: Resume long term care    Physician Certification: I certify the above information and transfer of Alfred Joyce  is necessary for the continuing treatment of the diagnosis listed and that she requires [facility] for greater 30 days.      Update Admission H&P: No change in H&P    PHYSICIAN SIGNATURE:  Electronically signed by Bindu Tadeo Angie Green MD on 3/29/19 at 1:53 PM

## 2019-03-26 LAB
A/G RATIO: 0.8 (ref 1.1–2.2)
ALBUMIN SERPL-MCNC: 3 G/DL (ref 3.4–5)
ALP BLD-CCNC: 406 U/L (ref 40–129)
ALT SERPL-CCNC: 6 U/L (ref 10–40)
ANION GAP SERPL CALCULATED.3IONS-SCNC: 20 MMOL/L (ref 3–16)
APPEARANCE FLUID: CLEAR
AST SERPL-CCNC: 11 U/L (ref 15–37)
BASOPHILS ABSOLUTE: 0.1 K/UL (ref 0–0.2)
BASOPHILS RELATIVE PERCENT: 0.7 %
BILIRUB SERPL-MCNC: 0.4 MG/DL (ref 0–1)
BUN BLDV-MCNC: 33 MG/DL (ref 7–20)
CALCIUM SERPL-MCNC: 7.2 MG/DL (ref 8.3–10.6)
CELL COUNT FLUID TYPE: NORMAL
CHLORIDE BLD-SCNC: 85 MMOL/L (ref 99–110)
CLOT EVALUATION: NORMAL
CO2: 32 MMOL/L (ref 21–32)
COLOR FLUID: COLORLESS
CREAT SERPL-MCNC: 10.7 MG/DL (ref 0.6–1.1)
EOSINOPHILS ABSOLUTE: 0.7 K/UL (ref 0–0.6)
EOSINOPHILS RELATIVE PERCENT: 5.6 %
FLUID DIFF COMMENT: NORMAL
GFR AFRICAN AMERICAN: 5
GFR NON-AFRICAN AMERICAN: 4
GLOBULIN: 3.6 G/DL
GLUCOSE BLD-MCNC: 182 MG/DL (ref 70–99)
GLUCOSE BLD-MCNC: 182 MG/DL (ref 70–99)
HCT VFR BLD CALC: 32.3 % (ref 36–48)
HEMOGLOBIN: 10.4 G/DL (ref 12–16)
LYMPHOCYTES ABSOLUTE: 2.1 K/UL (ref 1–5.1)
LYMPHOCYTES RELATIVE PERCENT: 17.8 %
MAGNESIUM: 5 MG/DL (ref 1.8–2.4)
MCH RBC QN AUTO: 28.5 PG (ref 26–34)
MCHC RBC AUTO-ENTMCNC: 32.2 G/DL (ref 31–36)
MCV RBC AUTO: 88.6 FL (ref 80–100)
MONOCYTES ABSOLUTE: 0.8 K/UL (ref 0–1.3)
MONOCYTES RELATIVE PERCENT: 6.4 %
NEUTROPHILS ABSOLUTE: 8.3 K/UL (ref 1.7–7.7)
NEUTROPHILS RELATIVE PERCENT: 69.5 %
NUCLEATED CELLS FLUID: 4 /CUMM
NUMBER OF CELLS COUNTED FLUID: 100
PDW BLD-RTO: 15.8 % (ref 12.4–15.4)
PERFORMED ON: ABNORMAL
PHOSPHORUS: 4.1 MG/DL (ref 2.5–4.9)
PLATELET # BLD: 393 K/UL (ref 135–450)
PMV BLD AUTO: 8.1 FL (ref 5–10.5)
POTASSIUM REFLEX MAGNESIUM: 3.2 MMOL/L (ref 3.5–5.1)
RBC # BLD: 3.64 M/UL (ref 4–5.2)
RBC FLUID: 0 /CUMM
S PYO THROAT QL CULT: NORMAL
SODIUM BLD-SCNC: 137 MMOL/L (ref 136–145)
TOTAL PROTEIN: 6.6 G/DL (ref 6.4–8.2)
WBC # BLD: 11.9 K/UL (ref 4–11)

## 2019-03-26 PROCEDURE — A4722 DIALYS SOL FLD VOL > 1999CC: HCPCS | Performed by: INTERNAL MEDICINE

## 2019-03-26 PROCEDURE — 6370000000 HC RX 637 (ALT 250 FOR IP): Performed by: INTERNAL MEDICINE

## 2019-03-26 PROCEDURE — 6360000002 HC RX W HCPCS: Performed by: INTERNAL MEDICINE

## 2019-03-26 PROCEDURE — 1200000000 HC SEMI PRIVATE

## 2019-03-26 PROCEDURE — 85025 COMPLETE CBC W/AUTO DIFF WBC: CPT

## 2019-03-26 PROCEDURE — 36415 COLL VENOUS BLD VENIPUNCTURE: CPT

## 2019-03-26 PROCEDURE — 2580000003 HC RX 258: Performed by: INTERNAL MEDICINE

## 2019-03-26 PROCEDURE — 84100 ASSAY OF PHOSPHORUS: CPT

## 2019-03-26 PROCEDURE — 83735 ASSAY OF MAGNESIUM: CPT

## 2019-03-26 PROCEDURE — 94760 N-INVAS EAR/PLS OXIMETRY 1: CPT

## 2019-03-26 PROCEDURE — G0378 HOSPITAL OBSERVATION PER HR: HCPCS

## 2019-03-26 PROCEDURE — 90945 DIALYSIS ONE EVALUATION: CPT

## 2019-03-26 PROCEDURE — 80053 COMPREHEN METABOLIC PANEL: CPT

## 2019-03-26 RX ORDER — SODIUM CHLORIDE, SODIUM LACTATE, CALCIUM CHLORIDE, MAGNESIUM CHLORIDE AND DEXTROSE 1.5; 538; 448; 18.3; 5.08 G/100ML; MG/100ML; MG/100ML; MG/100ML; MG/100ML
3000 INJECTION, SOLUTION INTRAPERITONEAL NIGHTLY
Status: DISCONTINUED | OUTPATIENT
Start: 2019-03-26 | End: 2019-03-26

## 2019-03-26 RX ORDER — SODIUM CHLORIDE, SODIUM LACTATE, CALCIUM CHLORIDE, MAGNESIUM CHLORIDE AND DEXTROSE 1.5; 538; 448; 18.3; 5.08 G/100ML; MG/100ML; MG/100ML; MG/100ML; MG/100ML
3000 INJECTION, SOLUTION INTRAPERITONEAL NIGHTLY
Status: DISCONTINUED | OUTPATIENT
Start: 2019-03-26 | End: 2019-03-27

## 2019-03-26 RX ORDER — SODIUM CHLORIDE, SODIUM LACTATE, CALCIUM CHLORIDE, MAGNESIUM CHLORIDE AND DEXTROSE 1.5; 538; 448; 18.3; 5.08 G/100ML; MG/100ML; MG/100ML; MG/100ML; MG/100ML
8000 INJECTION, SOLUTION INTRAPERITONEAL NIGHTLY
Status: DISCONTINUED | OUTPATIENT
Start: 2019-03-26 | End: 2019-03-26 | Stop reason: SDUPTHER

## 2019-03-26 RX ORDER — POTASSIUM CHLORIDE 20 MEQ/1
40 TABLET, EXTENDED RELEASE ORAL ONCE
Status: COMPLETED | OUTPATIENT
Start: 2019-03-26 | End: 2019-03-26

## 2019-03-26 RX ORDER — CALCIUM POLYCARBOPHIL 625 MG 625 MG/1
625 TABLET ORAL DAILY
Status: DISCONTINUED | OUTPATIENT
Start: 2019-03-26 | End: 2019-03-28

## 2019-03-26 RX ORDER — SODIUM CHLORIDE, SODIUM LACTATE, CALCIUM CHLORIDE, MAGNESIUM CHLORIDE AND DEXTROSE 1.5; 538; 448; 18.3; 5.08 G/100ML; MG/100ML; MG/100ML; MG/100ML; MG/100ML
6000 INJECTION, SOLUTION INTRAPERITONEAL NIGHTLY
Status: DISCONTINUED | OUTPATIENT
Start: 2019-03-26 | End: 2019-03-26 | Stop reason: CLARIF

## 2019-03-26 RX ORDER — SODIUM CHLORIDE, SODIUM LACTATE, CALCIUM CHLORIDE, MAGNESIUM CHLORIDE AND DEXTROSE 1.5; 538; 448; 18.3; 5.08 G/100ML; MG/100ML; MG/100ML; MG/100ML; MG/100ML
6000 INJECTION, SOLUTION INTRAPERITONEAL NIGHTLY
Status: DISCONTINUED | OUTPATIENT
Start: 2019-03-26 | End: 2019-03-26 | Stop reason: SDUPTHER

## 2019-03-26 RX ORDER — SUCRALFATE 1 G/1
1 TABLET ORAL EVERY 6 HOURS SCHEDULED
Status: DISCONTINUED | OUTPATIENT
Start: 2019-03-26 | End: 2019-03-29 | Stop reason: HOSPADM

## 2019-03-26 RX ORDER — SODIUM CHLORIDE, SODIUM LACTATE, CALCIUM CHLORIDE, MAGNESIUM CHLORIDE AND DEXTROSE 1.5; 538; 448; 18.3; 5.08 G/100ML; MG/100ML; MG/100ML; MG/100ML; MG/100ML
2000 INJECTION, SOLUTION INTRAPERITONEAL NIGHTLY
Status: DISCONTINUED | OUTPATIENT
Start: 2019-03-26 | End: 2019-03-26

## 2019-03-26 RX ORDER — SODIUM CHLORIDE, SODIUM LACTATE, CALCIUM CHLORIDE, MAGNESIUM CHLORIDE AND DEXTROSE 1.5; 538; 448; 18.3; 5.08 G/100ML; MG/100ML; MG/100ML; MG/100ML; MG/100ML
2000 INJECTION, SOLUTION INTRAPERITONEAL NIGHTLY
Status: DISCONTINUED | OUTPATIENT
Start: 2019-03-26 | End: 2019-03-26 | Stop reason: CLARIF

## 2019-03-26 RX ADMIN — PANTOPRAZOLE SODIUM 40 MG: 40 TABLET, DELAYED RELEASE ORAL at 05:53

## 2019-03-26 RX ADMIN — INSULIN LISPRO 1 UNITS: 100 INJECTION, SOLUTION INTRAVENOUS; SUBCUTANEOUS at 21:10

## 2019-03-26 RX ADMIN — OXYCODONE HYDROCHLORIDE AND ACETAMINOPHEN 2 TABLET: 5; 325 TABLET ORAL at 14:47

## 2019-03-26 RX ADMIN — NIFEDIPINE 90 MG: 90 TABLET, FILM COATED, EXTENDED RELEASE ORAL at 09:14

## 2019-03-26 RX ADMIN — SUCRALFATE 1 G: 1 TABLET ORAL at 17:35

## 2019-03-26 RX ADMIN — HEPARIN SODIUM 5000 UNITS: 5000 INJECTION INTRAVENOUS; SUBCUTANEOUS at 21:10

## 2019-03-26 RX ADMIN — DOCUSATE SODIUM 100 MG: 100 CAPSULE, LIQUID FILLED ORAL at 09:14

## 2019-03-26 RX ADMIN — GENTAMICIN SULFATE: 1 CREAM TOPICAL at 11:00

## 2019-03-26 RX ADMIN — PANTOPRAZOLE SODIUM 40 MG: 40 TABLET, DELAYED RELEASE ORAL at 17:35

## 2019-03-26 RX ADMIN — ISOSORBIDE MONONITRATE 120 MG: 60 TABLET, EXTENDED RELEASE ORAL at 09:13

## 2019-03-26 RX ADMIN — POTASSIUM CHLORIDE 40 MEQ: 1500 TABLET, EXTENDED RELEASE ORAL at 12:22

## 2019-03-26 RX ADMIN — OXYCODONE HYDROCHLORIDE AND ACETAMINOPHEN 1 TABLET: 5; 325 TABLET ORAL at 05:53

## 2019-03-26 RX ADMIN — INSULIN LISPRO 1 UNITS: 100 INJECTION, SOLUTION INTRAVENOUS; SUBCUTANEOUS at 17:34

## 2019-03-26 RX ADMIN — SUCRALFATE 1 G: 1 TABLET ORAL at 22:52

## 2019-03-26 RX ADMIN — SUCRALFATE 1 G: 1 TABLET ORAL at 12:22

## 2019-03-26 RX ADMIN — SODIUM CHLORIDE, SODIUM LACTATE, CALCIUM CHLORIDE, MAGNESIUM CHLORIDE AND DEXTROSE 3000 ML: 1.5; 538; 448; 18.3; 5.08 INJECTION, SOLUTION INTRAPERITONEAL at 21:13

## 2019-03-26 RX ADMIN — SEVELAMER CARBONATE 2400 MG: 800 TABLET, FILM COATED ORAL at 17:35

## 2019-03-26 RX ADMIN — ATORVASTATIN CALCIUM 20 MG: 20 TABLET, FILM COATED ORAL at 21:10

## 2019-03-26 RX ADMIN — HEPARIN SODIUM 5000 UNITS: 5000 INJECTION INTRAVENOUS; SUBCUTANEOUS at 05:53

## 2019-03-26 RX ADMIN — DOCUSATE SODIUM 100 MG: 100 CAPSULE, LIQUID FILLED ORAL at 21:10

## 2019-03-26 RX ADMIN — CALCIUM POLYCARBOPHIL 625 MG: 625 TABLET, FILM COATED ORAL at 10:44

## 2019-03-26 RX ADMIN — CARVEDILOL 3.12 MG: 3.12 TABLET, FILM COATED ORAL at 17:35

## 2019-03-26 RX ADMIN — BISACODYL 10 MG: 5 TABLET, COATED ORAL at 17:35

## 2019-03-26 RX ADMIN — SEVELAMER CARBONATE 2400 MG: 800 TABLET, FILM COATED ORAL at 09:13

## 2019-03-26 RX ADMIN — HEPARIN SODIUM 5000 UNITS: 5000 INJECTION INTRAVENOUS; SUBCUTANEOUS at 14:47

## 2019-03-26 RX ADMIN — Medication 10 ML: at 22:20

## 2019-03-26 RX ADMIN — CINACALCET HYDROCHLORIDE 120 MG: 30 TABLET, COATED ORAL at 09:13

## 2019-03-26 RX ADMIN — OXYCODONE HYDROCHLORIDE AND ACETAMINOPHEN 2 TABLET: 5; 325 TABLET ORAL at 22:52

## 2019-03-26 RX ADMIN — LOSARTAN POTASSIUM 100 MG: 50 TABLET ORAL at 09:13

## 2019-03-26 RX ADMIN — CARVEDILOL 3.12 MG: 3.12 TABLET, FILM COATED ORAL at 09:13

## 2019-03-26 RX ADMIN — INSULIN LISPRO 1 UNITS: 100 INJECTION, SOLUTION INTRAVENOUS; SUBCUTANEOUS at 09:12

## 2019-03-26 RX ADMIN — ASPIRIN 81 MG 81 MG: 81 TABLET ORAL at 09:13

## 2019-03-26 RX ADMIN — Medication 10 ML: at 09:14

## 2019-03-26 RX ADMIN — INSULIN LISPRO 2 UNITS: 100 INJECTION, SOLUTION INTRAVENOUS; SUBCUTANEOUS at 12:22

## 2019-03-26 RX ADMIN — SEVELAMER CARBONATE 2400 MG: 800 TABLET, FILM COATED ORAL at 12:22

## 2019-03-26 ASSESSMENT — PAIN DESCRIPTION - ONSET
ONSET: ON-GOING
ONSET: GRADUAL
ONSET: ON-GOING
ONSET: ON-GOING

## 2019-03-26 ASSESSMENT — PAIN DESCRIPTION - PAIN TYPE
TYPE: ACUTE PAIN

## 2019-03-26 ASSESSMENT — PAIN DESCRIPTION - DESCRIPTORS
DESCRIPTORS: ACHING;DISCOMFORT
DESCRIPTORS: ACHING
DESCRIPTORS: ACHING;DISCOMFORT
DESCRIPTORS: ACHING;DISCOMFORT

## 2019-03-26 ASSESSMENT — PAIN DESCRIPTION - FREQUENCY
FREQUENCY: INTERMITTENT
FREQUENCY: CONTINUOUS
FREQUENCY: INTERMITTENT
FREQUENCY: INTERMITTENT

## 2019-03-26 ASSESSMENT — PAIN DESCRIPTION - ORIENTATION
ORIENTATION: MID

## 2019-03-26 ASSESSMENT — PAIN DESCRIPTION - PROGRESSION
CLINICAL_PROGRESSION: NOT CHANGED
CLINICAL_PROGRESSION: GRADUALLY IMPROVING
CLINICAL_PROGRESSION: GRADUALLY IMPROVING
CLINICAL_PROGRESSION: NOT CHANGED
CLINICAL_PROGRESSION: GRADUALLY IMPROVING

## 2019-03-26 ASSESSMENT — PAIN SCALES - GENERAL
PAINLEVEL_OUTOF10: 0
PAINLEVEL_OUTOF10: 0
PAINLEVEL_OUTOF10: 4
PAINLEVEL_OUTOF10: 0
PAINLEVEL_OUTOF10: 8
PAINLEVEL_OUTOF10: 8
PAINLEVEL_OUTOF10: 5
PAINLEVEL_OUTOF10: 2
PAINLEVEL_OUTOF10: 4
PAINLEVEL_OUTOF10: 7

## 2019-03-26 ASSESSMENT — PAIN DESCRIPTION - LOCATION
LOCATION: ABDOMEN
LOCATION: HEAD;ABDOMEN
LOCATION: ABDOMEN

## 2019-03-26 NOTE — PROGRESS NOTES
PGY-3 Resident Progress Note    Admit Date: 3/23/2019         Overnight Events: No acute events overnight     Hospital Course:   Pt with no complaints this morning. She had some nausea yesterday after eating, but able to tolerate PO. Denies any fever/chills, chest pain, SOB, abdominal pain. She has still not had a BM. Scheduled Medications:    insulin lispro  0-6 Units Subcutaneous TID WC    insulin lispro  0-3 Units Subcutaneous Nightly    dianeal lo-felicita 1.5%  6,000 mL Intraperitoneal Nightly    And    dianeal lo-felicita 1.5%  6,000 mL Intraperitoneal Nightly    pantoprazole  40 mg Oral BID AC    sevelamer  2,400 mg Oral TID WC    lubiprostone  24 mcg Oral BID    losartan  100 mg Oral Daily    isosorbide mononitrate  120 mg Oral Daily    cinacalcet  120 mg Oral Daily    atorvastatin  20 mg Oral Nightly    aspirin  81 mg Oral Daily    sodium chloride flush  10 mL Intravenous 2 times per day    heparin (porcine)  5,000 Units Subcutaneous 3 times per day    NIFEdipine  90 mg Oral Daily    carvedilol  3.125 mg Oral BID WC    docusate sodium  100 mg Oral BID    gentamicin   Topical Daily      PRN Medications: glucose, dextrose, glucagon (rDNA), dextrose, cetirizine, sodium chloride flush, ondansetron, acetaminophen, oxyCODONE-acetaminophen **OR** oxyCODONE-acetaminophen, lidocaine viscous  Diet: DIET RENAL;    Continuous Infusions:   dextrose         PHYSICAL EXAM:  BP (!) 125/54   Pulse 71   Temp 97.6 °F (36.4 °C) (Oral)   Resp 18   Ht 5' 7\" (1.702 m)   Wt 125 lb 14.1 oz (57.1 kg)   SpO2 91%   BMI 19.72 kg/m²   Recent Labs     03/25/19  1446 03/25/19  1528 03/25/19  1642 03/25/19  2205 03/26/19  0733   POCGLU 151* 131* 123* 235* 182*       Intake/Output Summary (Last 24 hours) at 3/26/2019 0857  Last data filed at 3/26/2019 0600  Gross per 24 hour   Intake 1220 ml   Output 0 ml   Net 1220 ml       Physical Exam   Constitutional: She is oriented to person, place, and time.  She appears from the nursing home with nausea vomiting.      Nausea/Vomiting - pt endorses severe nausea/vomiting for past week. Unable to say if there was blood in emesis. - GI consulted - EGD showed multiple superficial duodenal ulcers, gastric antral biopsies were done, and ulcerative reflux esophagitis  - cont Protonix BID, advance diet      Constipation - patient had started eating yesterday, abdominal exam benign, will monitor to see if she has BM soon. - cont Colace 100mg PO BID  - cont Amitiza 24mcg PO BID  - started on Fiber and sucralfate     ESRD on PD 2/2 FSGS  - Nephrology consulted    Hypermagnesemia - pt with Mg2+ of 5.00   - Nephrology consulted - will get input on dialysis      Essential HTN  - cont Coreg 3.125mg PO BID  - cont Procardia XL 90mg PO qd  - cont Cozaar 100mg PO qd  - cont Imdur 120mg PO qd     HLD  - cont Lipitor 20mg PO QHS    FEN: Renal Diet    VTE Prophylaxis: Heparin    Disposition: Floors, patient stable from GI standpoint for discharge. Magnesium has been elevated since admission, will discuss with Nephrology. Needs to have BM which will help with Mg2+, added more to BM regimen. Full Code    I will discuss the patient with Dr. Oliver Johnson M.D. Internal Medicine Resident, PGY-3  3/26/2019, 8:57 AM       Addendum to Resident H&P/Progress note:  Pt seen,examined and evaluated. I have reviewed the current history, physical findings, labs and assessment and plan and agree with note as documented by resident MD      Feels better today. EGD revealed multiple superficial duodenal ulcers. H pylori negative. ?ASA. Holding ASA. Cont PPI. Add carafate. Check gastrin level. Ongoing hypermagnesemia - add fiber and give dulcolax 10mg once tonight. Pending further nephrology recs.          Sal Rosario

## 2019-03-26 NOTE — PROGRESS NOTES
Pt's magnesium level came back at 5.0, will notify physician when morning hospitalist list comes out. This is not a new finding, pt's magnesium level was 5.1 on admit.

## 2019-03-26 NOTE — PROGRESS NOTES
We did not have any 6 liter bags of 1.5% on hand. Could only send the 3 liter bags. Total volume was 14 liters, so this required 5 x 3000 ml bags. PRISCILLA Solano informed pharmacy that they could not do this, because the system only contains 4 ports. I called to all the [facility], and no one carries the 6 liter bags. Paged nephrology and spoke to Dr. Bud Brady. I suggested decreasing volume to 12 liters or substituting 1 x 6000 ml bag of 2.5%. Dr. Bud Brady did not want to use the 2.5% as he is not familiar with the patient. He said to decrease volume to the 12 liters. The 6 liter bags are scheduled to be received at the pharmacy on Wednesday 3/27/19. Thank you.    Andria Arrieta, [facility]

## 2019-03-26 NOTE — PROGRESS NOTES
[facility]  Nephrology Progress Note      SUBJECTIVE:   Reason for consultation: ESRD mgmt  Interval Hx: PD in progress. Mg still high. EGD with PUD and esophagitis. ROS: Notes upper abdominal pain. No fever. No hyperreflexia or imbalance issues. SH: no family present updated pt on nephrology plan of care  Medications:  Reviewed    OBJECTIVE:    Physical Exam:  TEMPERATURE:  Current - Temp: 97.6 °F (36.4 °C); Max - Temp  Av.4 °F (36.3 °C)  Min: 96.9 °F (36.1 °C)  Max: 98 °F (36.7 °C)  RESPIRATIONS RANGE: Resp  Avg: 15.7  Min: 11  Max: 25  PULSE RANGE: Pulse  Av.4  Min: 67  Max: 81  BLOOD PRESSURE RANGE:  Systolic (89BXO), MUR:22 , Min:68 , PPH:291   ; Diastolic (21BCC), BERTA:63, Min:38, Max:76    PULSE OXIMETRY RANGE: SpO2  Av.4 %  Min: 60 %  Max: 100 %  24HR INTAKE/OUTPUT:      Intake/Output Summary (Last 24 hours) at 3/26/2019 1208  Last data filed at 3/26/2019 3784  Gross per 24 hour   Intake 1540 ml   Output 142 ml   Net 1398 ml       GENERAL:  In no apparent distress, conversant, clinically appears to be euvolemic. HEENT:  Ears and nose appear externally without deformity. Mucous membranes are moist.  Normocephalic, atraumatic. Eyes:  Anicteric sclera. Moist conjunctiva. NECK:  Free range of motion. Supple. CHEST:  Clear to auscultation bilaterally with no intercostal retractions. CARDIOVASCULAR:  Regular rate and rhythm. No rubs. ABDOMEN:  Soft, nontender. Bowel sounds present. No organomegaly. +PD catheter in RLQ. Multiple painless SQ nodules below umbilicus. EXTREMITIES:  Lower extremities:  No lower extremity edema. Painless nodule noted on lateral aspect of L thigh. Multiple raised dark lesions on b/l shins.          Data:    Lab Results   Component Value Date    CREATININE 10.7 (HH) 2019    BUN 33 (H) 2019     2019    K 3.2 (L) 2019    CL 85 (L) 2019    CO2 32 2019     Albumin:    Lab Results   Component Value Date    LABALBU 3.0 03/26/2019     Phosphorus:    Lab Results   Component Value Date    PHOS 4.1 03/26/2019     Lab Results   Component Value Date    WBC 11.9 (H) 03/26/2019    HGB 10.4 (L) 03/26/2019    HCT 32.3 (L) 03/26/2019    MCV 88.6 03/26/2019     03/26/2019       ASSESSMENT/PLAN:    #ESRD   - cont APD  - given mg level will increase APD prescription  - increase TT to 12H from 11H exchanges from 5 to 6 cont 2L FVs with last bag fill of 2L   - cont all 1.5% solution appears well compensated and npo today    #Hypermagnesemia  - pt seems relatively asymptomatic   - should symptoms arise may need short-term HD  - K actually low  - increase APD prescription as above  - avoid mg containing supplements    #Hypokalemia  - give kcl 40 meq po x 1  - trend     #Anemia of CKD  - at goal   - trend   - monitor need for aranesp and iron   -tsat 69% ferritin 2131   - no iron     #Renal OD  - planned for parathyroidectomy  - on sensipar   - monitor ca; cCa okay    - check pth given cCa trend  - on non ca binder renvenla   - trend po4  - nodules on thigh and abd concerning for early calcyphyalxis; however they are painless    - discussed adherence to strict low po4 diet @ goal today     #Malignant htn  - on losartan, nifedipine, imdur, coreg   - at IP goal   - monitor for low bp     #Leukocytosis  - source unclear   - nucleated cells ~50 in PD fluid only 2%  - no clinical evidence of peritonitis     Kilo Garland MD  3/26/2019  12:08 PM  [phone]

## 2019-03-27 PROBLEM — E44.0 MODERATE MALNUTRITION (HCC): Status: ACTIVE | Noted: 2019-03-27

## 2019-03-27 LAB
A/G RATIO: 0.7 (ref 1.1–2.2)
ALBUMIN SERPL-MCNC: 2.8 G/DL (ref 3.4–5)
ALP BLD-CCNC: 407 U/L (ref 40–129)
ALT SERPL-CCNC: 6 U/L (ref 10–40)
ANION GAP SERPL CALCULATED.3IONS-SCNC: 21 MMOL/L (ref 3–16)
APPEARANCE FLUID: CLEAR
AST SERPL-CCNC: 12 U/L (ref 15–37)
BASOPHILS ABSOLUTE: 0.1 K/UL (ref 0–0.2)
BASOPHILS RELATIVE PERCENT: 0.8 %
BILIRUB SERPL-MCNC: 0.4 MG/DL (ref 0–1)
BUN BLDV-MCNC: 31 MG/DL (ref 7–20)
CALCIUM SERPL-MCNC: 7.5 MG/DL (ref 8.3–10.6)
CELL COUNT FLUID TYPE: NORMAL
CHLORIDE BLD-SCNC: 83 MMOL/L (ref 99–110)
CLOT EVALUATION: NORMAL
CO2: 29 MMOL/L (ref 21–32)
COLOR FLUID: COLORLESS
CREAT SERPL-MCNC: 11.1 MG/DL (ref 0.6–1.1)
EOSINOPHILS ABSOLUTE: 0.5 K/UL (ref 0–0.6)
EOSINOPHILS RELATIVE PERCENT: 5.5 %
FLUID DIFF COMMENT: NORMAL
GFR AFRICAN AMERICAN: 4
GFR NON-AFRICAN AMERICAN: 4
GLOBULIN: 3.8 G/DL
GLUCOSE BLD-MCNC: 119 MG/DL (ref 70–99)
GLUCOSE BLD-MCNC: 149 MG/DL (ref 70–99)
GLUCOSE BLD-MCNC: 164 MG/DL (ref 70–99)
GLUCOSE BLD-MCNC: 183 MG/DL (ref 70–99)
GLUCOSE BLD-MCNC: 210 MG/DL (ref 70–99)
GLUCOSE BLD-MCNC: 218 MG/DL (ref 70–99)
GLUCOSE BLD-MCNC: 228 MG/DL (ref 70–99)
GLUCOSE BLD-MCNC: 239 MG/DL (ref 70–99)
HCT VFR BLD CALC: 33.1 % (ref 36–48)
HEMOGLOBIN: 10.6 G/DL (ref 12–16)
LYMPHOCYTES ABSOLUTE: 2 K/UL (ref 1–5.1)
LYMPHOCYTES RELATIVE PERCENT: 20.2 %
MAGNESIUM: 5.3 MG/DL (ref 1.8–2.4)
MCH RBC QN AUTO: 28.7 PG (ref 26–34)
MCHC RBC AUTO-ENTMCNC: 32 G/DL (ref 31–36)
MCV RBC AUTO: 89.7 FL (ref 80–100)
MONOCYTES ABSOLUTE: 0.7 K/UL (ref 0–1.3)
MONOCYTES RELATIVE PERCENT: 7.3 %
NEUTROPHILS ABSOLUTE: 6.4 K/UL (ref 1.7–7.7)
NEUTROPHILS RELATIVE PERCENT: 66.2 %
NUCLEATED CELLS FLUID: 4 /CUMM
NUMBER OF CELLS COUNTED FLUID: 100
PARATHYROID HORMONE INTACT: 1068 PG/ML (ref 14–72)
PDW BLD-RTO: 16.2 % (ref 12.4–15.4)
PERFORMED ON: ABNORMAL
PHOSPHORUS: 3.5 MG/DL (ref 2.5–4.9)
PLATELET # BLD: 374 K/UL (ref 135–450)
PMV BLD AUTO: 8.4 FL (ref 5–10.5)
POTASSIUM REFLEX MAGNESIUM: 3.9 MMOL/L (ref 3.5–5.1)
RBC # BLD: 3.69 M/UL (ref 4–5.2)
RBC FLUID: 1 /CUMM
SODIUM BLD-SCNC: 133 MMOL/L (ref 136–145)
TOTAL PROTEIN: 6.6 G/DL (ref 6.4–8.2)
WBC # BLD: 9.7 K/UL (ref 4–11)

## 2019-03-27 PROCEDURE — G0378 HOSPITAL OBSERVATION PER HR: HCPCS

## 2019-03-27 PROCEDURE — 80053 COMPREHEN METABOLIC PANEL: CPT

## 2019-03-27 PROCEDURE — 82941 ASSAY OF GASTRIN: CPT

## 2019-03-27 PROCEDURE — 83735 ASSAY OF MAGNESIUM: CPT

## 2019-03-27 PROCEDURE — 6370000000 HC RX 637 (ALT 250 FOR IP): Performed by: INTERNAL MEDICINE

## 2019-03-27 PROCEDURE — 6360000002 HC RX W HCPCS: Performed by: INTERNAL MEDICINE

## 2019-03-27 PROCEDURE — 1200000000 HC SEMI PRIVATE

## 2019-03-27 PROCEDURE — 2580000003 HC RX 258: Performed by: INTERNAL MEDICINE

## 2019-03-27 PROCEDURE — 6370000000 HC RX 637 (ALT 250 FOR IP): Performed by: PHYSICIAN ASSISTANT

## 2019-03-27 PROCEDURE — 36415 COLL VENOUS BLD VENIPUNCTURE: CPT

## 2019-03-27 PROCEDURE — 83970 ASSAY OF PARATHORMONE: CPT

## 2019-03-27 PROCEDURE — 90945 DIALYSIS ONE EVALUATION: CPT

## 2019-03-27 PROCEDURE — 89051 BODY FLUID CELL COUNT: CPT

## 2019-03-27 PROCEDURE — 84100 ASSAY OF PHOSPHORUS: CPT

## 2019-03-27 PROCEDURE — 85025 COMPLETE CBC W/AUTO DIFF WBC: CPT

## 2019-03-27 PROCEDURE — A4722 DIALYS SOL FLD VOL > 1999CC: HCPCS | Performed by: INTERNAL MEDICINE

## 2019-03-27 RX ORDER — SODIUM CHLORIDE, SODIUM LACTATE, CALCIUM CHLORIDE, MAGNESIUM CHLORIDE AND DEXTROSE 2.5; 538; 448; 18.3; 5.08 G/100ML; MG/100ML; MG/100ML; MG/100ML; MG/100ML
6000 INJECTION, SOLUTION INTRAPERITONEAL CONTINUOUS
Status: DISCONTINUED | OUTPATIENT
Start: 2019-03-27 | End: 2019-03-27

## 2019-03-27 RX ORDER — SODIUM CHLORIDE, SODIUM LACTATE, CALCIUM CHLORIDE, MAGNESIUM CHLORIDE AND DEXTROSE 2.5; 538; 448; 18.3; 5.08 G/100ML; MG/100ML; MG/100ML; MG/100ML; MG/100ML
6000 INJECTION, SOLUTION INTRAPERITONEAL NIGHTLY
Status: DISCONTINUED | OUTPATIENT
Start: 2019-03-27 | End: 2019-03-27

## 2019-03-27 RX ORDER — BISACODYL 10 MG
10 SUPPOSITORY, RECTAL RECTAL ONCE
Status: COMPLETED | OUTPATIENT
Start: 2019-03-27 | End: 2019-03-27

## 2019-03-27 RX ORDER — SODIUM CHLORIDE, SODIUM LACTATE, CALCIUM CHLORIDE, MAGNESIUM CHLORIDE AND DEXTROSE 1.5; 538; 448; 18.3; 5.08 G/100ML; MG/100ML; MG/100ML; MG/100ML; MG/100ML
2000 INJECTION, SOLUTION INTRAPERITONEAL EVERY 4 HOURS
Status: COMPLETED | OUTPATIENT
Start: 2019-03-27 | End: 2019-03-27

## 2019-03-27 RX ORDER — SODIUM CHLORIDE, SODIUM LACTATE, CALCIUM CHLORIDE, MAGNESIUM CHLORIDE AND DEXTROSE 1.5; 538; 448; 18.3; 5.08 G/100ML; MG/100ML; MG/100ML; MG/100ML; MG/100ML
6000 INJECTION, SOLUTION INTRAPERITONEAL NIGHTLY
Status: DISCONTINUED | OUTPATIENT
Start: 2019-03-27 | End: 2019-03-29 | Stop reason: HOSPADM

## 2019-03-27 RX ORDER — SODIUM CHLORIDE, SODIUM LACTATE, CALCIUM CHLORIDE, MAGNESIUM CHLORIDE AND DEXTROSE 1.5; 538; 448; 18.3; 5.08 G/100ML; MG/100ML; MG/100ML; MG/100ML; MG/100ML
6000 INJECTION, SOLUTION INTRAPERITONEAL NIGHTLY
Status: DISCONTINUED | OUTPATIENT
Start: 2019-03-27 | End: 2019-03-27

## 2019-03-27 RX ORDER — SODIUM CHLORIDE, SODIUM LACTATE, CALCIUM CHLORIDE, MAGNESIUM CHLORIDE AND DEXTROSE 1.5; 538; 448; 18.3; 5.08 G/100ML; MG/100ML; MG/100ML; MG/100ML; MG/100ML
1000 INJECTION, SOLUTION INTRAPERITONEAL NIGHTLY
Status: DISCONTINUED | OUTPATIENT
Start: 2019-03-27 | End: 2019-03-28

## 2019-03-27 RX ORDER — POTASSIUM CHLORIDE 20 MEQ/1
20 TABLET, EXTENDED RELEASE ORAL ONCE
Status: COMPLETED | OUTPATIENT
Start: 2019-03-27 | End: 2019-03-27

## 2019-03-27 RX ORDER — SODIUM CHLORIDE, SODIUM LACTATE, CALCIUM CHLORIDE, MAGNESIUM CHLORIDE AND DEXTROSE 1.5; 538; 448; 18.3; 5.08 G/100ML; MG/100ML; MG/100ML; MG/100ML; MG/100ML
6000 INJECTION, SOLUTION INTRAPERITONEAL CONTINUOUS
Status: DISCONTINUED | OUTPATIENT
Start: 2019-03-27 | End: 2019-03-27

## 2019-03-27 RX ORDER — DIPHENHYDRAMINE HCL 25 MG
25 TABLET ORAL EVERY 6 HOURS PRN
Status: DISCONTINUED | OUTPATIENT
Start: 2019-03-27 | End: 2019-03-29 | Stop reason: HOSPADM

## 2019-03-27 RX ORDER — SEVELAMER CARBONATE 800 MG/1
2400 TABLET, FILM COATED ORAL
Status: DISCONTINUED | OUTPATIENT
Start: 2019-03-28 | End: 2019-03-29 | Stop reason: HOSPADM

## 2019-03-27 RX ORDER — SODIUM CHLORIDE, SODIUM LACTATE, CALCIUM CHLORIDE, MAGNESIUM CHLORIDE AND DEXTROSE 2.5; 538; 448; 18.3; 5.08 G/100ML; MG/100ML; MG/100ML; MG/100ML; MG/100ML
6000 INJECTION, SOLUTION INTRAPERITONEAL NIGHTLY
Status: DISCONTINUED | OUTPATIENT
Start: 2019-03-27 | End: 2019-03-29 | Stop reason: HOSPADM

## 2019-03-27 RX ADMIN — DOCUSATE SODIUM 100 MG: 100 CAPSULE, LIQUID FILLED ORAL at 09:19

## 2019-03-27 RX ADMIN — INSULIN LISPRO 2 UNITS: 100 INJECTION, SOLUTION INTRAVENOUS; SUBCUTANEOUS at 12:44

## 2019-03-27 RX ADMIN — HEPARIN SODIUM 5000 UNITS: 5000 INJECTION INTRAVENOUS; SUBCUTANEOUS at 05:53

## 2019-03-27 RX ADMIN — LOSARTAN POTASSIUM 100 MG: 50 TABLET ORAL at 09:20

## 2019-03-27 RX ADMIN — SUCRALFATE 1 G: 1 TABLET ORAL at 23:20

## 2019-03-27 RX ADMIN — PANTOPRAZOLE SODIUM 40 MG: 40 TABLET, DELAYED RELEASE ORAL at 05:45

## 2019-03-27 RX ADMIN — CARVEDILOL 3.12 MG: 3.12 TABLET, FILM COATED ORAL at 17:50

## 2019-03-27 RX ADMIN — GENTAMICIN SULFATE: 1 CREAM TOPICAL at 09:20

## 2019-03-27 RX ADMIN — CALCIUM POLYCARBOPHIL 625 MG: 625 TABLET, FILM COATED ORAL at 09:19

## 2019-03-27 RX ADMIN — ONDANSETRON 4 MG: 2 INJECTION INTRAMUSCULAR; INTRAVENOUS at 14:07

## 2019-03-27 RX ADMIN — OXYCODONE HYDROCHLORIDE AND ACETAMINOPHEN 2 TABLET: 5; 325 TABLET ORAL at 19:44

## 2019-03-27 RX ADMIN — INSULIN LISPRO 1 UNITS: 100 INJECTION, SOLUTION INTRAVENOUS; SUBCUTANEOUS at 09:22

## 2019-03-27 RX ADMIN — PANTOPRAZOLE SODIUM 40 MG: 40 TABLET, DELAYED RELEASE ORAL at 17:50

## 2019-03-27 RX ADMIN — BISACODYL 10 MG: 10 SUPPOSITORY RECTAL at 05:46

## 2019-03-27 RX ADMIN — SODIUM CHLORIDE, SODIUM LACTATE, CALCIUM CHLORIDE, MAGNESIUM CHLORIDE AND DEXTROSE 1000 ML: 1.5; 538; 448; 18.3; 5.08 INJECTION, SOLUTION INTRAPERITONEAL at 22:08

## 2019-03-27 RX ADMIN — SODIUM CHLORIDE, SODIUM LACTATE, CALCIUM CHLORIDE, MAGNESIUM CHLORIDE AND DEXTROSE 2000 ML: 1.5; 538; 448; 18.3; 5.08 INJECTION, SOLUTION INTRAPERITONEAL at 15:13

## 2019-03-27 RX ADMIN — POTASSIUM CHLORIDE 20 MEQ: 20 TABLET, EXTENDED RELEASE ORAL at 12:44

## 2019-03-27 RX ADMIN — SEVELAMER CARBONATE 2400 MG: 800 TABLET, FILM COATED ORAL at 09:19

## 2019-03-27 RX ADMIN — DIPHENHYDRAMINE HCL 25 MG: 25 TABLET ORAL at 23:20

## 2019-03-27 RX ADMIN — SODIUM CHLORIDE, SODIUM LACTATE, CALCIUM CHLORIDE, MAGNESIUM CHLORIDE AND DEXTROSE 6000 ML: 2.5; 538; 448; 18.3; 5.08 INJECTION, SOLUTION INTRAPERITONEAL at 22:08

## 2019-03-27 RX ADMIN — CINACALCET HYDROCHLORIDE 120 MG: 30 TABLET, COATED ORAL at 09:20

## 2019-03-27 RX ADMIN — ISOSORBIDE MONONITRATE 120 MG: 60 TABLET, EXTENDED RELEASE ORAL at 09:20

## 2019-03-27 RX ADMIN — Medication 10 ML: at 21:09

## 2019-03-27 RX ADMIN — HEPARIN SODIUM 5000 UNITS: 5000 INJECTION INTRAVENOUS; SUBCUTANEOUS at 15:21

## 2019-03-27 RX ADMIN — SUCRALFATE 1 G: 1 TABLET ORAL at 17:50

## 2019-03-27 RX ADMIN — HEPARIN SODIUM 5000 UNITS: 5000 INJECTION INTRAVENOUS; SUBCUTANEOUS at 21:57

## 2019-03-27 RX ADMIN — ATORVASTATIN CALCIUM 20 MG: 20 TABLET, FILM COATED ORAL at 21:07

## 2019-03-27 RX ADMIN — SUCRALFATE 1 G: 1 TABLET ORAL at 05:45

## 2019-03-27 RX ADMIN — Medication 10 ML: at 09:21

## 2019-03-27 RX ADMIN — SODIUM CHLORIDE, SODIUM LACTATE, CALCIUM CHLORIDE, MAGNESIUM CHLORIDE AND DEXTROSE 6000 ML: 1.5; 538; 448; 18.3; 5.08 INJECTION, SOLUTION INTRAPERITONEAL at 22:07

## 2019-03-27 RX ADMIN — CARVEDILOL 3.12 MG: 3.12 TABLET, FILM COATED ORAL at 09:19

## 2019-03-27 RX ADMIN — INSULIN LISPRO 1 UNITS: 100 INJECTION, SOLUTION INTRAVENOUS; SUBCUTANEOUS at 21:57

## 2019-03-27 RX ADMIN — SODIUM CHLORIDE, SODIUM LACTATE, CALCIUM CHLORIDE, MAGNESIUM CHLORIDE AND DEXTROSE 2000 ML: 1.5; 538; 448; 18.3; 5.08 INJECTION, SOLUTION INTRAPERITONEAL at 19:34

## 2019-03-27 RX ADMIN — NIFEDIPINE 90 MG: 90 TABLET, FILM COATED, EXTENDED RELEASE ORAL at 09:19

## 2019-03-27 RX ADMIN — SUCRALFATE 1 G: 1 TABLET ORAL at 12:44

## 2019-03-27 RX ADMIN — DOCUSATE SODIUM 100 MG: 100 CAPSULE, LIQUID FILLED ORAL at 21:07

## 2019-03-27 ASSESSMENT — PAIN SCALES - GENERAL
PAINLEVEL_OUTOF10: 3
PAINLEVEL_OUTOF10: 0
PAINLEVEL_OUTOF10: 0
PAINLEVEL_OUTOF10: 8
PAINLEVEL_OUTOF10: 5

## 2019-03-27 ASSESSMENT — PAIN DESCRIPTION - PAIN TYPE
TYPE: ACUTE PAIN
TYPE: ACUTE PAIN

## 2019-03-27 ASSESSMENT — PAIN - FUNCTIONAL ASSESSMENT
PAIN_FUNCTIONAL_ASSESSMENT: PREVENTS OR INTERFERES SOME ACTIVE ACTIVITIES AND ADLS
PAIN_FUNCTIONAL_ASSESSMENT: ACTIVITIES ARE NOT PREVENTED

## 2019-03-27 ASSESSMENT — PAIN DESCRIPTION - LOCATION
LOCATION: ABDOMEN
LOCATION: SHOULDER

## 2019-03-27 ASSESSMENT — PAIN DESCRIPTION - DESCRIPTORS
DESCRIPTORS: ACHING;PRESSURE
DESCRIPTORS: PRESSURE

## 2019-03-27 ASSESSMENT — PAIN DESCRIPTION - ONSET
ONSET: OTHER (COMMENT)
ONSET: GRADUAL

## 2019-03-27 ASSESSMENT — PAIN DESCRIPTION - FREQUENCY
FREQUENCY: INTERMITTENT
FREQUENCY: INTERMITTENT

## 2019-03-27 ASSESSMENT — PAIN DESCRIPTION - ORIENTATION
ORIENTATION: LEFT
ORIENTATION: MID

## 2019-03-27 ASSESSMENT — PAIN DESCRIPTION - PROGRESSION: CLINICAL_PROGRESSION: GRADUALLY IMPROVING

## 2019-03-27 NOTE — PLAN OF CARE
Problem: Pain:  Description  Pain management should include both nonpharmacologic and pharmacologic interventions. Goal: Pain level will decrease  Description  Pain level will decrease  Outcome: Ongoing  Pain improving as patient continues to have have BMs     Problem: Falls - Risk of:  Goal: Will remain free from falls  Description  Will remain free from falls  3/27/2019 1110 by Frances Mesa RN  Outcome: Ongoing  Patient is wearing nonskid socks. Patient is up ad nguyễn. Bed is locked, and in lowest position. Room is free of clutter. Call light is within reach and used appropriately. Fall risk assessed per protocol. Will continue to monitor. Problem: SAFETY  Goal: Free from accidental physical injury  3/27/2019 1110 by Frances Mesa RN  Outcome: Ongoing       Problem: DAILY CARE  Goal: Daily care needs are met  3/27/2019 1110 by Frances Mesa RN  Outcome: Ongoing  Assistance provided as needed     Problem: SKIN INTEGRITY  Goal: Skin integrity is maintained or improved  3/27/2019 1110 by Frances Mesa RN  Outcome: Ongoing  Skin assessment completed every shift per protocol. Pt assessed for incontinence, appropriate barrier cream applied as needed. Pt encouraged to turn/rotate every 2 hours. Assistance provided if pt unable to do so themselves. Will continue to monitor.

## 2019-03-27 NOTE — PROGRESS NOTES
03/27/2019    CL 83 (L) 03/27/2019    CO2 29 03/27/2019     Albumin:    Lab Results   Component Value Date    LABALBU 2.8 03/27/2019     Phosphorus:    Lab Results   Component Value Date    PHOS 3.5 03/27/2019     Lab Results   Component Value Date    WBC 9.7 03/27/2019    HGB 10.6 (L) 03/27/2019    HCT 33.1 (L) 03/27/2019    MCV 89.7 03/27/2019     03/27/2019       ASSESSMENT/PLAN:    #ESRD   - cont APD  - given mg level will increase PD prescription  - reduce APD TT to 11H from 11H exchanges from 5 cont 2L FVs with last bag fill of 2L   - change solution to 1.5%/2.5% alternating   - add 2 midday manual exchanges today 1.5% 2L FVs    #Hypermagnesemia  - chronic > 3 week duration related to anatacid use at beginning of month earlier in month mg was 5.9 at [facility]  - pt seems relatively asymptomatic except for some imbalance issues    - should symptoms (headache, reduced DT reflexes, drowsiness) arise may need short-term HD  - increase APD prescription as above  - avoid mg containing supplements ie NO antacids  - asked pharmacy to see if Mg contained in any of her meds     #Hypokalemia  - improved give 20 meq today given increased pd script  - trend     #Anemia of CKD  - at goal   - trend   - monitor need for aranesp and iron   -tsat 69% ferritin 2131   - no iron needed    #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

## 2019-03-27 NOTE — PROGRESS NOTES
Patient had a large emesis episode. Anxious, stating that I need to call the doctor, worried this attributed to her high magnesium. Call out to Dr. Rozina Smith.

## 2019-03-27 NOTE — PROGRESS NOTES
Eleni filed at 3/27/2019 0700  Gross per 24 hour   Intake 600 ml   Output 10 ml   Net 590 ml       Physical Exam   Constitutional: She is oriented to person, place, and time. She appears well-developed and well-nourished. No distress. HENT:   Head: Normocephalic and atraumatic. Eyes: Right eye exhibits no discharge. Left eye exhibits no discharge. No scleral icterus. Neck: Neck supple. Cardiovascular: Normal rate, regular rhythm and normal heart sounds. Exam reveals no gallop and no friction rub. No murmur heard. Pulmonary/Chest: Effort normal and breath sounds normal. No stridor. No respiratory distress. She has no wheezes. She has no rales. She exhibits no tenderness. Abdominal: Soft. Bowel sounds are normal. She exhibits no distension. There is no tenderness. PD catheter in place   Musculoskeletal: Normal range of motion. She exhibits no edema. Neurological: She is alert and oriented to person, place, and time. Skin: Skin is warm and dry. She is not diaphoretic. Psychiatric: She has a normal mood and affect. LABS:  Recent Labs     03/25/19  0917 03/26/19  0601 03/27/19  0514   WBC 13.1* 11.9* 9.7   HGB 10.9* 10.4* 10.6*   HCT 34.2* 32.3* 33.1*    393 374                                                                    Recent Labs     03/25/19  1209 03/26/19  0601 03/27/19  0514    137 133*   K 3.1* 3.2* 3.9   CL 80* 85* 83*   CO2 34* 32 29   BUN 47* 33* 31*   CREATININE 9.6* 10.7* 11.1*   GLUCOSE 169* 182* 218*     Recent Labs     03/25/19  1209 03/26/19  0601 03/27/19  0514   AST 12* 11* 12*   ALT 7* 6* 6*   BILITOT 0.4 0.4 0.4   ALKPHOS 424* 406* 407*     No results for input(s): TROPONINI in the last 72 hours. No results for input(s): BNP in the last 72 hours. No results for input(s): CHOL, HDL in the last 72 hours. Invalid input(s): LDLCALCU  No results for input(s): INR in the last 72 hours.       Assessment & Plan:    The patient Miryam Dumont is a 53 y.o.female with medical history significant for end-stage renal disease patient is on peritoneal dialysis, Crohn's disease, history of cerebral artery occlusion with cerebral infarction  Patient presented from the nursing home with nausea vomiting.      PUD & Esophagitis - pt endorses severe nausea/vomiting for past week. Blood cultures and PD fluid cultures NGTD.    - GI consulted - EGD showed multiple superficial duodenal ulcers, gastric antral biopsies were done, and ulcerative reflux esophagitis  - cont Protonix BID, carafate, advance diet      Constipation, improved - patient received suppository, two bowel movements this morning.   - cont Colace 100mg PO BID  - cont Amitiza 24mcg PO BID  - started on Fiber and sucralfate     ESRD on PD 2/2 FSGS  - Nephrology consulted    Hypermagnesemia - pt with Mg2+ of 5.30, asymptomatic.  - Increased PD as per Nephrology.   - Ordered telemetry, will monitor.     Essential HTN  - cont Coreg 3.125mg PO BID  - cont Procardia XL 90mg PO qd  - cont Cozaar 100mg PO qd  - cont Imdur 120mg PO qd     HLD  - cont Lipitor 20mg PO QHS    FEN: Renal Diet    VTE Prophylaxis: Heparin    Disposition: Floors. Magnesium has been elevated since admission, increasing PD prescription as per Nephrology. Received suppository and bowel regimen, regular bowel movements should help with magnesium level. Full Code    I will discuss the patient with Dr. Remi Becker M.D. Internal Medicine Resident, PGY-2  3/27/2019, 10:06 AM           Addendum to Resident H&P/Progress note:  Pt seen,examined and evaluated. I have reviewed the current history, physical findings, labs and assessment and plan and agree with note as documented by resident MD         Feels better today - N and V resolved. EGD revealed multiple superficial duodenal ulcers. H pylori negative. ?ASA. Holding ASA. Cont PPI. carafate.    Check gastrin level.         Ongoing hypermagnesemia - add fiber and give dulcolax 10mg once tonight - responded well with 2 BM today. Check Mg level tomorrow. Add tele monitoring. Appears asymptomatic thus far.               Sal Rosario

## 2019-03-27 NOTE — PLAN OF CARE
Nutrition Problem: Inadequate oral intake  Intervention: Food and/or Nutrient Delivery: Continue current diet, Start ONS  Nutritional Goals: tolerate most appropriate form of nutrition

## 2019-03-28 LAB
A/G RATIO: 0.7 (ref 1.1–2.2)
ALBUMIN SERPL-MCNC: 2.7 G/DL (ref 3.4–5)
ALP BLD-CCNC: 412 U/L (ref 40–129)
ALT SERPL-CCNC: 7 U/L (ref 10–40)
ANION GAP SERPL CALCULATED.3IONS-SCNC: 19 MMOL/L (ref 3–16)
AST SERPL-CCNC: 20 U/L (ref 15–37)
BASOPHILS ABSOLUTE: 0.1 K/UL (ref 0–0.2)
BASOPHILS RELATIVE PERCENT: 0.9 %
BILIRUB SERPL-MCNC: 0.4 MG/DL (ref 0–1)
BODY FLUID CULTURE, STERILE: NORMAL
BUN BLDV-MCNC: 42 MG/DL (ref 7–20)
CALCIUM SERPL-MCNC: 7.5 MG/DL (ref 8.3–10.6)
CHLORIDE BLD-SCNC: 91 MMOL/L (ref 99–110)
CO2: 23 MMOL/L (ref 21–32)
CREAT SERPL-MCNC: 10 MG/DL (ref 0.6–1.1)
EOSINOPHILS ABSOLUTE: 0.5 K/UL (ref 0–0.6)
EOSINOPHILS RELATIVE PERCENT: 5.3 %
GFR AFRICAN AMERICAN: 5
GFR NON-AFRICAN AMERICAN: 4
GLOBULIN: 4 G/DL
GLUCOSE BLD-MCNC: 140 MG/DL (ref 70–99)
GLUCOSE BLD-MCNC: 148 MG/DL (ref 70–99)
GLUCOSE BLD-MCNC: 221 MG/DL (ref 70–99)
GLUCOSE BLD-MCNC: 223 MG/DL (ref 70–99)
GLUCOSE BLD-MCNC: 229 MG/DL (ref 70–99)
GRAM STAIN RESULT: NORMAL
HCT VFR BLD CALC: 37.7 % (ref 36–48)
HEMOGLOBIN: 12.1 G/DL (ref 12–16)
LYMPHOCYTES ABSOLUTE: 2.4 K/UL (ref 1–5.1)
LYMPHOCYTES RELATIVE PERCENT: 23.2 %
MAGNESIUM: 4.6 MG/DL (ref 1.8–2.4)
MCH RBC QN AUTO: 29.4 PG (ref 26–34)
MCHC RBC AUTO-ENTMCNC: 32.3 G/DL (ref 31–36)
MCV RBC AUTO: 91 FL (ref 80–100)
MONOCYTES ABSOLUTE: 0.9 K/UL (ref 0–1.3)
MONOCYTES RELATIVE PERCENT: 8.4 %
NEUTROPHILS ABSOLUTE: 6.4 K/UL (ref 1.7–7.7)
NEUTROPHILS RELATIVE PERCENT: 62.2 %
PDW BLD-RTO: 17.1 % (ref 12.4–15.4)
PERFORMED ON: ABNORMAL
PHOSPHORUS: 3.7 MG/DL (ref 2.5–4.9)
PLATELET # BLD: 395 K/UL (ref 135–450)
PMV BLD AUTO: 8.4 FL (ref 5–10.5)
POTASSIUM REFLEX MAGNESIUM: 3.5 MMOL/L (ref 3.5–5.1)
RBC # BLD: 4.14 M/UL (ref 4–5.2)
REASON FOR REJECTION: NORMAL
REJECTED TEST: NORMAL
SODIUM BLD-SCNC: 133 MMOL/L (ref 136–145)
TOTAL PROTEIN: 6.7 G/DL (ref 6.4–8.2)
WBC # BLD: 10.3 K/UL (ref 4–11)

## 2019-03-28 PROCEDURE — 85025 COMPLETE CBC W/AUTO DIFF WBC: CPT

## 2019-03-28 PROCEDURE — 6370000000 HC RX 637 (ALT 250 FOR IP): Performed by: INTERNAL MEDICINE

## 2019-03-28 PROCEDURE — 80053 COMPREHEN METABOLIC PANEL: CPT

## 2019-03-28 PROCEDURE — 36415 COLL VENOUS BLD VENIPUNCTURE: CPT

## 2019-03-28 PROCEDURE — 90945 DIALYSIS ONE EVALUATION: CPT

## 2019-03-28 PROCEDURE — 83735 ASSAY OF MAGNESIUM: CPT

## 2019-03-28 PROCEDURE — G0378 HOSPITAL OBSERVATION PER HR: HCPCS

## 2019-03-28 PROCEDURE — 1200000000 HC SEMI PRIVATE

## 2019-03-28 PROCEDURE — 2580000003 HC RX 258: Performed by: INTERNAL MEDICINE

## 2019-03-28 PROCEDURE — 6370000000 HC RX 637 (ALT 250 FOR IP): Performed by: STUDENT IN AN ORGANIZED HEALTH CARE EDUCATION/TRAINING PROGRAM

## 2019-03-28 PROCEDURE — 6360000002 HC RX W HCPCS: Performed by: INTERNAL MEDICINE

## 2019-03-28 PROCEDURE — 84100 ASSAY OF PHOSPHORUS: CPT

## 2019-03-28 PROCEDURE — A4722 DIALYS SOL FLD VOL > 1999CC: HCPCS | Performed by: INTERNAL MEDICINE

## 2019-03-28 RX ORDER — LACTULOSE 10 G/15ML
30 SOLUTION ORAL 3 TIMES DAILY
Status: DISCONTINUED | OUTPATIENT
Start: 2019-03-28 | End: 2019-03-29 | Stop reason: HOSPADM

## 2019-03-28 RX ORDER — SODIUM CHLORIDE, SODIUM LACTATE, CALCIUM CHLORIDE, MAGNESIUM CHLORIDE AND DEXTROSE 1.5; 538; 448; 18.3; 5.08 G/100ML; MG/100ML; MG/100ML; MG/100ML; MG/100ML
2000 INJECTION, SOLUTION INTRAPERITONEAL EVERY 4 HOURS
Status: DISCONTINUED | OUTPATIENT
Start: 2019-03-28 | End: 2019-03-28

## 2019-03-28 RX ORDER — ACETAMINOPHEN 325 MG/1
650 TABLET ORAL EVERY 4 HOURS PRN
Status: DISCONTINUED | OUTPATIENT
Start: 2019-03-28 | End: 2019-03-29 | Stop reason: HOSPADM

## 2019-03-28 RX ORDER — POTASSIUM CHLORIDE 20 MEQ/1
20 TABLET, EXTENDED RELEASE ORAL ONCE
Status: COMPLETED | OUTPATIENT
Start: 2019-03-28 | End: 2019-03-28

## 2019-03-28 RX ORDER — SENNA PLUS 8.6 MG/1
1 TABLET ORAL NIGHTLY
Status: DISCONTINUED | OUTPATIENT
Start: 2019-03-28 | End: 2019-03-29 | Stop reason: HOSPADM

## 2019-03-28 RX ADMIN — Medication 10 ML: at 20:43

## 2019-03-28 RX ADMIN — LACTULOSE 30 G: 20 SOLUTION ORAL at 20:42

## 2019-03-28 RX ADMIN — OXYCODONE HYDROCHLORIDE AND ACETAMINOPHEN 2 TABLET: 5; 325 TABLET ORAL at 09:54

## 2019-03-28 RX ADMIN — LACTULOSE 30 G: 20 SOLUTION ORAL at 09:54

## 2019-03-28 RX ADMIN — ACETAMINOPHEN 650 MG: 325 TABLET, FILM COATED ORAL at 04:52

## 2019-03-28 RX ADMIN — SUCRALFATE 1 G: 1 TABLET ORAL at 06:48

## 2019-03-28 RX ADMIN — PANTOPRAZOLE SODIUM 40 MG: 40 TABLET, DELAYED RELEASE ORAL at 06:48

## 2019-03-28 RX ADMIN — SODIUM CHLORIDE, SODIUM LACTATE, CALCIUM CHLORIDE, MAGNESIUM CHLORIDE AND DEXTROSE 6000 ML: 2.5; 538; 448; 18.3; 5.08 INJECTION, SOLUTION INTRAPERITONEAL at 22:17

## 2019-03-28 RX ADMIN — LOSARTAN POTASSIUM 100 MG: 50 TABLET ORAL at 09:34

## 2019-03-28 RX ADMIN — CINACALCET HYDROCHLORIDE 120 MG: 30 TABLET, COATED ORAL at 09:34

## 2019-03-28 RX ADMIN — SENNOSIDES 8.6 MG: 8.6 TABLET ORAL at 20:42

## 2019-03-28 RX ADMIN — DOCUSATE SODIUM 100 MG: 100 CAPSULE, LIQUID FILLED ORAL at 09:34

## 2019-03-28 RX ADMIN — CALCIUM POLYCARBOPHIL 625 MG: 625 TABLET, FILM COATED ORAL at 09:34

## 2019-03-28 RX ADMIN — NIFEDIPINE 90 MG: 90 TABLET, FILM COATED, EXTENDED RELEASE ORAL at 09:34

## 2019-03-28 RX ADMIN — GENTAMICIN SULFATE: 1 CREAM TOPICAL at 09:35

## 2019-03-28 RX ADMIN — ONDANSETRON 4 MG: 2 INJECTION INTRAMUSCULAR; INTRAVENOUS at 13:48

## 2019-03-28 RX ADMIN — INSULIN LISPRO 2 UNITS: 100 INJECTION, SOLUTION INTRAVENOUS; SUBCUTANEOUS at 09:37

## 2019-03-28 RX ADMIN — SUCRALFATE 1 G: 1 TABLET ORAL at 18:17

## 2019-03-28 RX ADMIN — INSULIN LISPRO 1 UNITS: 100 INJECTION, SOLUTION INTRAVENOUS; SUBCUTANEOUS at 12:53

## 2019-03-28 RX ADMIN — INSULIN LISPRO 1 UNITS: 100 INJECTION, SOLUTION INTRAVENOUS; SUBCUTANEOUS at 21:41

## 2019-03-28 RX ADMIN — POTASSIUM CHLORIDE 20 MEQ: 20 TABLET, EXTENDED RELEASE ORAL at 15:05

## 2019-03-28 RX ADMIN — LACTULOSE 30 G: 20 SOLUTION ORAL at 15:05

## 2019-03-28 RX ADMIN — CARVEDILOL 3.12 MG: 3.12 TABLET, FILM COATED ORAL at 18:17

## 2019-03-28 RX ADMIN — SEVELAMER CARBONATE 2400 MG: 800 TABLET, FILM COATED ORAL at 18:17

## 2019-03-28 RX ADMIN — ATORVASTATIN CALCIUM 20 MG: 20 TABLET, FILM COATED ORAL at 20:42

## 2019-03-28 RX ADMIN — CARVEDILOL 3.12 MG: 3.12 TABLET, FILM COATED ORAL at 09:34

## 2019-03-28 RX ADMIN — OXYCODONE HYDROCHLORIDE AND ACETAMINOPHEN 2 TABLET: 5; 325 TABLET ORAL at 18:46

## 2019-03-28 RX ADMIN — Medication 10 ML: at 09:35

## 2019-03-28 RX ADMIN — PANTOPRAZOLE SODIUM 40 MG: 40 TABLET, DELAYED RELEASE ORAL at 15:05

## 2019-03-28 RX ADMIN — SEVELAMER CARBONATE 2400 MG: 800 TABLET, FILM COATED ORAL at 09:34

## 2019-03-28 RX ADMIN — DOCUSATE SODIUM 100 MG: 100 CAPSULE, LIQUID FILLED ORAL at 20:42

## 2019-03-28 RX ADMIN — HEPARIN SODIUM 5000 UNITS: 5000 INJECTION INTRAVENOUS; SUBCUTANEOUS at 06:47

## 2019-03-28 RX ADMIN — ISOSORBIDE MONONITRATE 120 MG: 60 TABLET, EXTENDED RELEASE ORAL at 09:34

## 2019-03-28 RX ADMIN — SODIUM CHLORIDE, SODIUM LACTATE, CALCIUM CHLORIDE, MAGNESIUM CHLORIDE AND DEXTROSE 6000 ML: 1.5; 538; 448; 18.3; 5.08 INJECTION, SOLUTION INTRAPERITONEAL at 22:17

## 2019-03-28 ASSESSMENT — PAIN DESCRIPTION - DESCRIPTORS
DESCRIPTORS: DULL;SHARP;ACHING
DESCRIPTORS: HEADACHE
DESCRIPTORS: ACHING;PRESSURE

## 2019-03-28 ASSESSMENT — PAIN DESCRIPTION - ONSET
ONSET: AWAKENED FROM SLEEP
ONSET: OTHER (COMMENT)
ONSET: SUDDEN

## 2019-03-28 ASSESSMENT — PAIN SCALES - GENERAL
PAINLEVEL_OUTOF10: 0
PAINLEVEL_OUTOF10: 0
PAINLEVEL_OUTOF10: 6
PAINLEVEL_OUTOF10: 8
PAINLEVEL_OUTOF10: 7
PAINLEVEL_OUTOF10: 0

## 2019-03-28 ASSESSMENT — PAIN DESCRIPTION - ORIENTATION
ORIENTATION: RIGHT
ORIENTATION: RIGHT;LEFT

## 2019-03-28 ASSESSMENT — PAIN DESCRIPTION - PAIN TYPE
TYPE: ACUTE PAIN

## 2019-03-28 ASSESSMENT — PAIN DESCRIPTION - LOCATION
LOCATION: SHOULDER
LOCATION: SHOULDER
LOCATION: HEAD

## 2019-03-28 ASSESSMENT — PAIN DESCRIPTION - PROGRESSION
CLINICAL_PROGRESSION: GRADUALLY IMPROVING
CLINICAL_PROGRESSION: GRADUALLY WORSENING
CLINICAL_PROGRESSION: GRADUALLY IMPROVING

## 2019-03-28 ASSESSMENT — PAIN DESCRIPTION - FREQUENCY
FREQUENCY: CONTINUOUS

## 2019-03-28 ASSESSMENT — PAIN - FUNCTIONAL ASSESSMENT
PAIN_FUNCTIONAL_ASSESSMENT: PREVENTS OR INTERFERES SOME ACTIVE ACTIVITIES AND ADLS

## 2019-03-28 NOTE — PROGRESS NOTES
Patient asked if she was going to have to do the additional fills today. I replied it looked like she would. Patient immediately stared sobbing stating that she cant do it, her stomach hurts, she is miserable. Will talk to Dr. Ronna Landeros when he rounds. Also encouraged patient to express her concerns.

## 2019-03-28 NOTE — PLAN OF CARE
Acute pain in shoulder this shift resolved with PRN pain medication and rest. Patient hooked up to cycler PD overnight. Up ad nguyễn, no falls. Performed own personal hygiene independently. Patient expresses no further needs.      Electronically signed by Monica Cotto RN on 3/28/19 at 2:28 AM

## 2019-03-28 NOTE — PROGRESS NOTES
(L) 03/28/2019    CO2 23 03/28/2019     Albumin:    Lab Results   Component Value Date    LABALBU 2.7 03/28/2019     Phosphorus:    Lab Results   Component Value Date    PHOS 3.7 03/28/2019     Lab Results   Component Value Date    WBC 10.3 03/28/2019    HGB 12.1 03/28/2019    HCT 37.7 03/28/2019    MCV 91.0 03/28/2019     03/28/2019       ASSESSMENT/PLAN:    #ESRD   - cont APD  - given mg level favor cont increased PD prescription  - reduce APD TT to 11H exchanges x5 cont 2L FVs STOP last bag fill of 2L   - changed solution to 1.5%/2.5% alternating   - no midday exchange dc lbf per pt preference     #Hypermagnesemia  - chronic > 3 week duration related to anatacid use at beginning of month earlier in month mg was 5.9 at [facility]  - pt seems relatively asymptomatic except for some imbalance issues (chronic)   - should symptoms (headache, reduced DT reflexes, drowsiness) arise may need short-term HD  - increase bowel regimen as pt unwilling to do increased CAPD  - avoid mg containing supplements ie NO antacids  - not on any mg containing meds  - add lactulose tid daily to ensure regular bm    #Hypokalemia  - give extra 20 meq today x1  - trend     #Anemia of CKD  - at goal   - trend   - monitor need for aranesp and iron   -tsat 69% ferritin 2131   - no iron needed    #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

## 2019-03-28 NOTE — PROGRESS NOTES
Patient also refused to do her last fill on the cycler. I asked her to wait till I spoke to Dr. Nayana Guzman. Continuing to cry uncontrollably, kept repeating no no no, I cant do this. Patient turned the machine off herself. Call out to Dr. Nayana Guzman.

## 2019-03-28 NOTE — PROGRESS NOTES
PGY-2 Resident Progress Note    Admit Date: 3/23/2019         Interval History:  Magnesium improved from 5.3 to 4.60. Episode of emesis yesterday throwing up her lunch; improved with zofran. Last bowel movement yesterday. Hospital Course:   Pt refusing mid-day PD exchange today, says it causes abdominal discomfort. Denies fever, chills, nausea. Tolerating PO and no further episodes of emesis since lunch yesterday. Last bowel movement was yesterday.     Scheduled Medications:    lactulose  30 g Oral TID    sevelamer  2,400 mg Oral TID WC    dianeal lo-felicita 1.5%  6,000 mL Intraperitoneal Nightly    And    dianeal lo-felicita 2.5%  6,000 mL Intraperitoneal Nightly    polycarbophil  625 mg Oral Daily    sucralfate  1 g Oral 4 times per day    insulin lispro  0-6 Units Subcutaneous TID WC    insulin lispro  0-3 Units Subcutaneous Nightly    pantoprazole  40 mg Oral BID AC    lubiprostone  24 mcg Oral BID    losartan  100 mg Oral Daily    isosorbide mononitrate  120 mg Oral Daily    cinacalcet  120 mg Oral Daily    atorvastatin  20 mg Oral Nightly    sodium chloride flush  10 mL Intravenous 2 times per day    heparin (porcine)  5,000 Units Subcutaneous 3 times per day    NIFEdipine  90 mg Oral Daily    carvedilol  3.125 mg Oral BID WC    docusate sodium  100 mg Oral BID    gentamicin   Topical Daily      PRN Medications: acetaminophen, diphenhydrAMINE, glucose, dextrose, glucagon (rDNA), dextrose, cetirizine, sodium chloride flush, ondansetron, oxyCODONE-acetaminophen **OR** oxyCODONE-acetaminophen, lidocaine viscous  Diet: DIET RENAL;  Dietary Nutrition Supplements: Renal Oral Supplement    Continuous Infusions:   dextrose         PHYSICAL EXAM:  /68   Pulse 74   Temp 98.4 °F (36.9 °C) (Oral)   Resp 18   Ht 5' 7\" (1.702 m)   Wt 124 lb 1.9 oz (56.3 kg)   SpO2 96%   BMI 19.44 kg/m²   Recent Labs     03/27/19  0730 03/27/19  1127 03/27/19  1651 03/27/19  2120 03/28/19  0734   POCGLU 183* 210* 119* 228* 221*       Intake/Output Summary (Last 24 hours) at 3/28/2019 1012  Last data filed at 3/27/2019 2330  Gross per 24 hour   Intake 4720 ml   Output 1802 ml   Net 2918 ml       Physical Exam   Constitutional: She is oriented to person, place, and time. She appears well-developed and well-nourished. No distress. HENT:   Head: Normocephalic and atraumatic. Eyes: Right eye exhibits no discharge. Left eye exhibits no discharge. No scleral icterus. Neck: Neck supple. Cardiovascular: Normal rate, regular rhythm and normal heart sounds. Exam reveals no gallop and no friction rub. No murmur heard. Pulmonary/Chest: Effort normal and breath sounds normal. No stridor. No respiratory distress. She has no wheezes. She has no rales. She exhibits no tenderness. Abdominal: Soft. Bowel sounds are normal. She exhibits no distension. There is no tenderness. PD catheter in place   Musculoskeletal: Normal range of motion. She exhibits no edema. Neurological: She is alert and oriented to person, place, and time. Skin: Skin is warm and dry. She is not diaphoretic. Psychiatric: She has a normal mood and affect. LABS:  Recent Labs     03/26/19  0601 03/27/19  0514   WBC 11.9* 9.7   HGB 10.4* 10.6*   HCT 32.3* 33.1*    374                                                                    Recent Labs     03/26/19  0601 03/27/19  0514 03/28/19  0536    133* 133*   K 3.2* 3.9 3.5   CL 85* 83* 91*   CO2 32 29 23   BUN 33* 31* 42*   CREATININE 10.7* 11.1* 10.0*   GLUCOSE 182* 218* 223*     Recent Labs     03/26/19  0601 03/27/19  0514 03/28/19  0536   AST 11* 12* 20   ALT 6* 6* 7*   BILITOT 0.4 0.4 0.4   ALKPHOS 406* 407* 412*     No results for input(s): TROPONINI in the last 72 hours. No results for input(s): BNP in the last 72 hours. No results for input(s): CHOL, HDL in the last 72 hours. Invalid input(s): LDLCALCU  No results for input(s): INR in the last 72 hours.       Assessment & Plan:    The patient Miryam Dumont is a 53 y. o.female with medical history significant for end-stage renal disease patient is on peritoneal dialysis, Crohn's disease, history of cerebral artery occlusion with cerebral infarction  Patient presented from the nursing home with nausea vomiting.      PUD & Esophagitis - pt endorses severe nausea/vomiting for past week. Blood cultures and PD fluid cultures NGTD.    - GI consulted - EGD showed multiple superficial duodenal ulcers, gastric antral biopsies were done, and ulcerative reflux esophagitis  - cont Protonix BID, carafate, advance diet      Constipation - patient had two bowel movements on 3/27, suppository helped. - cont Colace 100mg PO BID  - started on Fiber and sucralfate  - pt resumed on home senna, lactulose     Hypermagnesemia - asymptomatic, Mg level improving.   - Increased PD as per Nephrology. - Bowel regimen   - Avoid Mg containing supplements. 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 Magnesium improving. Will continue with PD as per Nephrology and optimize bowel regimen. Full Code    I will discuss the patient with Dr. Susu Christiansen M.D. Internal Medicine Resident, PGY-2  3/28/2019, 10:12 AM         Addendum to Resident H&P/Progress note:  Pt seen,examined and evaluated. I have reviewed the current history, physical findings, labs and assessment and plan and agree with note as documented by resident MD     Recurrent nausea vomiting. Pt unhappy about need to increase PD frequency.      Mg better today. Cont bowel regimen. Attempt increased frequency PD. If stable, can discharge tomorrow. If refuses PD can also discharge tomorrow.  Will need to see primary nephrology at [facility] to reevaluate if HD will be a better option long term due to repeat reports of compliance issues.         [address]  Hospitalist Service

## 2019-03-28 NOTE — PLAN OF CARE
Problem: Pain:  Description  Pain management should include both nonpharmacologic and pharmacologic interventions. Goal: Pain level will decrease  Description  Pain level will decrease  3/28/2019 1001 by Rhonda Rojas RN  Outcome: Ongoing   Acute pain being managed with PO medication and change to PD treatment as ordered by MD     Problem: Falls - Risk of:  Goal: Will remain free from falls  Description  Will remain free from falls  3/28/2019 1001 by Rhonda Rojas RN  Outcome: Ongoing   Patient is wearing nonskid socks. Patient is up ad nguyễn. Bed is locked, and in lowest position. Room is free of clutter. Call light is within reach and used appropriately. Fall risk assessed per protocol. Will continue to monitor. Problem: SAFETY  Goal: Free from accidental physical injury  3/28/2019 1001 by Rhonda Rojas RN  Outcome: Ongoing   Patient is alert and oriented. ID band in place. Up ad nguyễn. Problem: DAILY CARE  Goal: Daily care needs are met  3/28/2019 1001 by Rhonda Rojas RN  Outcome: Ongoing  Assistance provided as needed.

## 2019-03-29 VITALS
RESPIRATION RATE: 16 BRPM | DIASTOLIC BLOOD PRESSURE: 81 MMHG | OXYGEN SATURATION: 96 % | SYSTOLIC BLOOD PRESSURE: 142 MMHG | TEMPERATURE: 97.3 F | WEIGHT: 123.46 LBS | HEART RATE: 73 BPM | BODY MASS INDEX: 19.38 KG/M2 | HEIGHT: 67 IN

## 2019-03-29 LAB
A/G RATIO: 0.7 (ref 1.1–2.2)
ALBUMIN SERPL-MCNC: 2.8 G/DL (ref 3.4–5)
ALP BLD-CCNC: 415 U/L (ref 40–129)
ALT SERPL-CCNC: 9 U/L (ref 10–40)
ANION GAP SERPL CALCULATED.3IONS-SCNC: 18 MMOL/L (ref 3–16)
AST SERPL-CCNC: 18 U/L (ref 15–37)
BASOPHILS ABSOLUTE: 0.1 K/UL (ref 0–0.2)
BASOPHILS RELATIVE PERCENT: 0.8 %
BILIRUB SERPL-MCNC: 0.3 MG/DL (ref 0–1)
BLOOD CULTURE, ROUTINE: NORMAL
BUN BLDV-MCNC: 40 MG/DL (ref 7–20)
CALCIUM SERPL-MCNC: 7.8 MG/DL (ref 8.3–10.6)
CHLORIDE BLD-SCNC: 86 MMOL/L (ref 99–110)
CO2: 31 MMOL/L (ref 21–32)
CREAT SERPL-MCNC: 9.6 MG/DL (ref 0.6–1.1)
CULTURE, BLOOD 2: NORMAL
EOSINOPHILS ABSOLUTE: 0.5 K/UL (ref 0–0.6)
EOSINOPHILS RELATIVE PERCENT: 5 %
GASTRIN: 68 PG/ML (ref 0–100)
GFR AFRICAN AMERICAN: 5
GFR NON-AFRICAN AMERICAN: 4
GLOBULIN: 4.1 G/DL
GLUCOSE BLD-MCNC: 184 MG/DL (ref 70–99)
GLUCOSE BLD-MCNC: 184 MG/DL (ref 70–99)
GLUCOSE BLD-MCNC: 191 MG/DL (ref 70–99)
GLUCOSE BLD-MCNC: 245 MG/DL (ref 70–99)
HCT VFR BLD CALC: 35.3 % (ref 36–48)
HEMOGLOBIN: 11.4 G/DL (ref 12–16)
LYMPHOCYTES ABSOLUTE: 2.5 K/UL (ref 1–5.1)
LYMPHOCYTES RELATIVE PERCENT: 24.6 %
MAGNESIUM: 4.3 MG/DL (ref 1.8–2.4)
MCH RBC QN AUTO: 29.3 PG (ref 26–34)
MCHC RBC AUTO-ENTMCNC: 32.3 G/DL (ref 31–36)
MCV RBC AUTO: 90.6 FL (ref 80–100)
MONOCYTES ABSOLUTE: 0.9 K/UL (ref 0–1.3)
MONOCYTES RELATIVE PERCENT: 9.3 %
NEUTROPHILS ABSOLUTE: 6 K/UL (ref 1.7–7.7)
NEUTROPHILS RELATIVE PERCENT: 60.3 %
PDW BLD-RTO: 18 % (ref 12.4–15.4)
PERFORMED ON: ABNORMAL
PHOSPHORUS: 3.9 MG/DL (ref 2.5–4.9)
PLATELET # BLD: 370 K/UL (ref 135–450)
PMV BLD AUTO: 8.3 FL (ref 5–10.5)
POTASSIUM REFLEX MAGNESIUM: 3.7 MMOL/L (ref 3.5–5.1)
RBC # BLD: 3.9 M/UL (ref 4–5.2)
SODIUM BLD-SCNC: 135 MMOL/L (ref 136–145)
TOTAL PROTEIN: 6.9 G/DL (ref 6.4–8.2)
WBC # BLD: 10 K/UL (ref 4–11)

## 2019-03-29 PROCEDURE — 90945 DIALYSIS ONE EVALUATION: CPT

## 2019-03-29 PROCEDURE — 6360000002 HC RX W HCPCS: Performed by: INTERNAL MEDICINE

## 2019-03-29 PROCEDURE — 36415 COLL VENOUS BLD VENIPUNCTURE: CPT

## 2019-03-29 PROCEDURE — 6370000000 HC RX 637 (ALT 250 FOR IP): Performed by: INTERNAL MEDICINE

## 2019-03-29 PROCEDURE — 80053 COMPREHEN METABOLIC PANEL: CPT

## 2019-03-29 PROCEDURE — 85025 COMPLETE CBC W/AUTO DIFF WBC: CPT

## 2019-03-29 PROCEDURE — 83735 ASSAY OF MAGNESIUM: CPT

## 2019-03-29 PROCEDURE — 84100 ASSAY OF PHOSPHORUS: CPT

## 2019-03-29 PROCEDURE — G0378 HOSPITAL OBSERVATION PER HR: HCPCS

## 2019-03-29 RX ORDER — PANTOPRAZOLE SODIUM 40 MG/1
40 TABLET, DELAYED RELEASE ORAL
Qty: 90 TABLET | Refills: 0 | OUTPATIENT
Start: 2019-03-29 | End: 2019-05-13

## 2019-03-29 RX ORDER — GENTAMICIN SULFATE 1 MG/G
CREAM TOPICAL
Qty: 1 TUBE | Refills: 0 | DISCHARGE
Start: 2019-03-30 | End: 2020-09-07

## 2019-03-29 RX ORDER — CARVEDILOL 3.12 MG/1
3.12 TABLET ORAL 2 TIMES DAILY WITH MEALS
Qty: 90 TABLET | Refills: 0 | OUTPATIENT
Start: 2019-03-29

## 2019-03-29 RX ORDER — POTASSIUM CHLORIDE 20 MEQ/1
20 TABLET, EXTENDED RELEASE ORAL ONCE
Status: DISCONTINUED | OUTPATIENT
Start: 2019-03-29 | End: 2019-03-29 | Stop reason: HOSPADM

## 2019-03-29 RX ORDER — SUCRALFATE ORAL 1 G/10ML
1 SUSPENSION ORAL 2 TIMES DAILY
Qty: 50 ML | Refills: 1 | DISCHARGE
Start: 2019-03-29 | End: 2020-09-07

## 2019-03-29 RX ORDER — LACTULOSE 10 G/15ML
30 SOLUTION ORAL 2 TIMES DAILY PRN
Qty: 1 BOTTLE | Refills: 1 | DISCHARGE
Start: 2019-03-29 | End: 2020-09-07

## 2019-03-29 RX ORDER — CARVEDILOL 3.12 MG/1
3.12 TABLET ORAL 2 TIMES DAILY WITH MEALS
Qty: 60 TABLET | Refills: 3 | Status: ON HOLD | DISCHARGE
Start: 2019-03-29 | End: 2021-01-11

## 2019-03-29 RX ORDER — SUCRALFATE 1 G/1
1 TABLET ORAL 4 TIMES DAILY
Qty: 240 TABLET | Refills: 0 | OUTPATIENT
Start: 2019-03-29 | End: 2019-05-28

## 2019-03-29 RX ORDER — PANTOPRAZOLE SODIUM 40 MG/1
40 TABLET, DELAYED RELEASE ORAL 2 TIMES DAILY
Qty: 60 TABLET | Refills: 1 | Status: ON HOLD | DISCHARGE
Start: 2019-03-29 | End: 2021-01-11

## 2019-03-29 RX ADMIN — SUCRALFATE 1 G: 1 TABLET ORAL at 12:42

## 2019-03-29 RX ADMIN — LACTULOSE 30 G: 20 SOLUTION ORAL at 10:23

## 2019-03-29 RX ADMIN — ONDANSETRON 4 MG: 2 INJECTION INTRAMUSCULAR; INTRAVENOUS at 10:23

## 2019-03-29 RX ADMIN — LOSARTAN POTASSIUM 100 MG: 50 TABLET ORAL at 10:22

## 2019-03-29 RX ADMIN — ONDANSETRON 4 MG: 2 INJECTION INTRAMUSCULAR; INTRAVENOUS at 05:02

## 2019-03-29 RX ADMIN — GENTAMICIN SULFATE: 1 CREAM TOPICAL at 10:33

## 2019-03-29 RX ADMIN — INSULIN LISPRO 1 UNITS: 100 INJECTION, SOLUTION INTRAVENOUS; SUBCUTANEOUS at 13:50

## 2019-03-29 RX ADMIN — OXYCODONE HYDROCHLORIDE AND ACETAMINOPHEN 2 TABLET: 5; 325 TABLET ORAL at 05:02

## 2019-03-29 RX ADMIN — OXYCODONE HYDROCHLORIDE AND ACETAMINOPHEN 2 TABLET: 5; 325 TABLET ORAL at 00:03

## 2019-03-29 RX ADMIN — DOCUSATE SODIUM 100 MG: 100 CAPSULE, LIQUID FILLED ORAL at 10:23

## 2019-03-29 RX ADMIN — SEVELAMER CARBONATE 2400 MG: 800 TABLET, FILM COATED ORAL at 10:21

## 2019-03-29 RX ADMIN — ISOSORBIDE MONONITRATE 120 MG: 60 TABLET, EXTENDED RELEASE ORAL at 10:21

## 2019-03-29 RX ADMIN — ACETAMINOPHEN 650 MG: 325 TABLET, FILM COATED ORAL at 12:42

## 2019-03-29 RX ADMIN — SUCRALFATE 1 G: 1 TABLET ORAL at 00:03

## 2019-03-29 RX ADMIN — CARVEDILOL 3.12 MG: 3.12 TABLET, FILM COATED ORAL at 10:22

## 2019-03-29 RX ADMIN — CINACALCET HYDROCHLORIDE 120 MG: 30 TABLET, COATED ORAL at 10:22

## 2019-03-29 RX ADMIN — SUCRALFATE 1 G: 1 TABLET ORAL at 05:01

## 2019-03-29 RX ADMIN — PANTOPRAZOLE SODIUM 40 MG: 40 TABLET, DELAYED RELEASE ORAL at 05:02

## 2019-03-29 RX ADMIN — NIFEDIPINE 90 MG: 90 TABLET, FILM COATED, EXTENDED RELEASE ORAL at 10:22

## 2019-03-29 RX ADMIN — INSULIN LISPRO 1 UNITS: 100 INJECTION, SOLUTION INTRAVENOUS; SUBCUTANEOUS at 11:14

## 2019-03-29 ASSESSMENT — PAIN DESCRIPTION - PROGRESSION
CLINICAL_PROGRESSION: GRADUALLY WORSENING
CLINICAL_PROGRESSION: GRADUALLY WORSENING

## 2019-03-29 ASSESSMENT — PAIN DESCRIPTION - ORIENTATION
ORIENTATION: RIGHT;LEFT
ORIENTATION: OTHER (COMMENT)

## 2019-03-29 ASSESSMENT — PAIN DESCRIPTION - FREQUENCY
FREQUENCY: CONTINUOUS
FREQUENCY: CONTINUOUS

## 2019-03-29 ASSESSMENT — PAIN SCALES - GENERAL
PAINLEVEL_OUTOF10: 8
PAINLEVEL_OUTOF10: 8
PAINLEVEL_OUTOF10: 5
PAINLEVEL_OUTOF10: 2
PAINLEVEL_OUTOF10: 5

## 2019-03-29 ASSESSMENT — PAIN DESCRIPTION - ONSET
ONSET: GRADUAL
ONSET: GRADUAL

## 2019-03-29 ASSESSMENT — PAIN DESCRIPTION - DESCRIPTORS
DESCRIPTORS: ACHING;CONSTANT
DESCRIPTORS: ACHING;CONSTANT

## 2019-03-29 ASSESSMENT — PAIN DESCRIPTION - LOCATION
LOCATION: SHOULDER
LOCATION: ABDOMEN;GENERALIZED

## 2019-03-29 ASSESSMENT — PAIN DESCRIPTION - PAIN TYPE
TYPE: ACUTE PAIN
TYPE: ACUTE PAIN;CHRONIC PAIN

## 2019-03-29 ASSESSMENT — PAIN - FUNCTIONAL ASSESSMENT
PAIN_FUNCTIONAL_ASSESSMENT: PREVENTS OR INTERFERES SOME ACTIVE ACTIVITIES AND ADLS
PAIN_FUNCTIONAL_ASSESSMENT: PREVENTS OR INTERFERES WITH MANY ACTIVE NOT PASSIVE ACTIVITIES

## 2019-03-29 NOTE — PROGRESS NOTES
[facility]  Nephrology Progress Note      SUBJECTIVE:   Reason for consultation: ESRD mgmt  Interval Hx: PD in progress. Mg improving near 4. Notes vomiting everyday at noon. Notes she was taking copious antacids at beginning of month where she was noted to have an elevated mg level. ROS: Notes ongoing imbalance issues. No drowsiness, current nausea, flushing, or palpitations. SH: no family present updated pt on nephrology plan of care  Medications:  Reviewed    OBJECTIVE:    Physical Exam:  TEMPERATURE:  Current - Temp: 97.3 °F (36.3 °C); Max - Temp  Av °F (36.7 °C)  Min: 97.3 °F (36.3 °C)  Max: 98.8 °F (37.1 °C)  RESPIRATIONS RANGE: Resp  Av.5  Min: 15  Max: 18  PULSE RANGE: Pulse  Av.8  Min: 71  Max: 83  BLOOD PRESSURE RANGE:  Systolic (35RTE), GSI:017 , Min:95 , YWK:327   ; Diastolic (41KYB), ANL:59, Min:57, Max:81    PULSE OXIMETRY RANGE: SpO2  Av.5 %  Min: 94 %  Max: 98 %  24HR INTAKE/OUTPUT:      Intake/Output Summary (Last 24 hours) at 3/29/2019 1218  Last data filed at 3/29/2019 1005  Gross per 24 hour   Intake 480 ml   Output 2357 ml   Net -1877 ml       GENERAL:  In no apparent distress, conversant, clinically appears to be euvolemic. HEENT:  Ears and nose appear externally without deformity. Mucous membranes are moist.  Normocephalic, atraumatic. Eyes:  Anicteric sclera. Moist conjunctiva. NECK:  Free range of motion. Supple. CHEST:  Clear to auscultation bilaterally with no intercostal retractions. CARDIOVASCULAR:  Regular rate and rhythm. No rubs. ABDOMEN:  Soft, nontender. Bowel sounds present. No organomegaly. +PD catheter in RLQ. Multiple painless SQ nodules below umbilicus. EXTREMITIES:  Lower extremities:  No lower extremity edema. Painless nodule noted on lateral aspect of L thigh. Multiple raised dark lesions on b/l shins.          Data:    Lab Results   Component Value Date    CREATININE 9.6 (HH) 2019    BUN 40 (H) 2019  (L) 03/29/2019    K 3.7 03/29/2019    CL 86 (L) 03/29/2019    CO2 31 03/29/2019     Albumin:    Lab Results   Component Value Date    LABALBU 2.8 03/29/2019     Phosphorus:    Lab Results   Component Value Date    PHOS 3.9 03/29/2019     Lab Results   Component Value Date    WBC 10.0 03/29/2019    HGB 11.4 (L) 03/29/2019    HCT 35.3 (L) 03/29/2019    MCV 90.6 03/29/2019     03/29/2019       ASSESSMENT/PLAN:    #ESRD   - cont APD outpt prescription TT 11H exchanges from 5 cont 2L FVs    - changed solution to 1.5%/2.5% alternating    #Hypermagnesemia  - chronic > 3 week duration related to anatacid use at beginning of month earlier in month mg was 5.9 at [facility]  - improving with PD and BMs  - pt seems relatively asymptomatic except for some imbalance issues and midday vomiting   - should symptoms (headache, reduced DT reflexes, drowsiness, worsening nausea) arise may need short-term HD  - s/p intensified pd regimen as above  - avoid mg containing supplements ie NO antacids  - treat constipation aggressively having bms with lactulose     #Hypokalemia  - give additional 20 meq x1  - trend no need for k replacement as outpt  - no k restricted diet needed    #Anemia of CKD  - at goal   - trend   - monitor need for aranesp and iron   -tsat 69% ferritin 2131   - no iron needed    #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

## 2019-03-29 NOTE — DISCHARGE SUMMARY
INTERNAL MEDICINE DEPARTMENT  DISCHARGE SUMMARY    Patient ID: Cary Dyer                                             Discharge Date: 3/29/2019   Patient's PCP: Clint Rizvi MD                                          Discharge Physician: Myranda Desai MD  Admit Date: 3/23/2019   Admitting Physician: Kati Olmos MD    PROBLEMS DURING HOSPITALIZATION:  Present on Admission:   Nausea and vomiting   Moderate malnutrition (HCC)  Chronic constipation  Hypermagnesemia  ESRD on PD    DISCHARGE DIAGNOSES:  Nausea and vomiting, stable/improved  Chronic constipation, resolved  Superficial duodenal ulcers, stable/treating   Reflux esophagitis, stable/treating  Hypermagnesemia, improved  ESRD on PD    HPI: (as per admitting physician)  \"The patient Cary Dyer is a 53 y. o.female with medical history significant for end-stage renal disease patient is on peritoneal dialysis, Crohn's disease, history of cerebral artery occlusion with cerebral infarction  Patient presented from the nursing home with nausea vomiting. Vomiting is unknown by less and nonbloody  Patient denies diarrhea but complained of diffuse abdominal pain. No fever or chills no urinary symptoms  Patient does not make any urine. \"      The following issues were addressed during hospitalization:  Recurrent nausea & vomiting  Blood cultures and PD fluid cultures NGTD. Peritoneal fluid without signs of infection. GI performed an EGD showing superficial duodenal ulcers and ulcerative reflux esophagitis. NSAID's possibly contributing. Patient started on protonix PO BID and carafate. On discharge continue oral protonix 40 BID, hold NSAID's including ASA, and follow up with GI outpatient. Carafate will be continued for 5 days.     Chronic constipation   Pt had bowel movement with suppository. Also had multiple bowel movements once on lactulose which seems to work well for patient.  On discharge will continue senna-docusate, fiber, and lactulose, and resume amitza.       Hypermagnesemia   Elevated magnesium level going back more than three weeks. Possibly secondary to heavy use of magnesium-containing antacids. Pt appeared asymptomatic and telemetry was monitored. Patient received increased PD exchanges as per Nephrology. Also added to bowel regimen to help lower Mg levels with bowel movements. Magnesium improved to 4.3 at time of discharge. ESRD on PD 2/2 FSGS  Patient received PD as per Nephrology, with increased exchanges in order to help lower magnesium levels. Physical Exam:  BP (!) 142/81   Pulse 73   Temp 97.3 °F (36.3 °C) (Oral)   Resp 16   Ht 5' 7\" (1.702 m)   Wt 123 lb 7.3 oz (56 kg)   SpO2 96%   BMI 19.34 kg/m²   Constitutional: She is oriented to person, place, and time. She appears well-developed and well-nourished. No distress. HENT:   Head: Normocephalic and atraumatic. Eyes: Right eye exhibits no discharge. Left eye exhibits no discharge. No scleral icterus. Neck: Neck supple. Cardiovascular: Normal rate, regular rhythm and normal heart sounds. Exam reveals no gallop and no friction rub. No murmur heard. Pulmonary/Chest: Effort normal and breath sounds normal. No stridor. No respiratory distress. She has no wheezes. She has no rales. She exhibits no tenderness. Abdominal: Soft. Bowel sounds are normal. She exhibits no distension. There is no tenderness. PD catheter in place   Musculoskeletal: Normal range of motion. She exhibits no edema. Neurological: She is alert and oriented to person, place, and time. Skin: Skin is warm and dry. She is not diaphoretic. Psychiatric: She has a normal mood and affect.      Consults: nephrology, GI  Significant Diagnostic Studies:    Paracentesis - peritoneal fluid without signs of infection  EGD - PUD, esophagitis  Treatments: protonix BID, carafate; hold NSAIDs including ASA; zofran; senna, colace, lactulose  Disposition: SNF  Discharged Condition: Stable  Follow Up: Primary Care Physician in one week, Nephrology in 1-2 weeks, GI in 1-2 weeks. DISCHARGE MEDICATION:     Medication List      START taking these medications    gentamicin 0.1 % cream  Commonly known as:  GARAMYCIN  Apply to PD dialysis catheter when accessing the site. Apply topically 3 times daily. Start taking on:  3/30/2019     * promethazine 25 MG tablet  Commonly known as:  PHENERGAN  Take 1 tablet by mouth every 6 hours as needed for Nausea     * promethazine 25 MG suppository  Commonly known as:  PROMETHEGAN  Place 1 suppository rectally every 6 hours as needed for Nausea     sucralfate 1 GM/10ML suspension  Commonly known as:  CARAFATE  Take 10 mLs by mouth 2 times daily for 5 days         * This list has 2 medication(s) that are the same as other medications prescribed for you. Read the directions carefully, and ask your doctor or other care provider to review them with you. CHANGE how you take these medications    carvedilol 3.125 MG tablet  Commonly known as:  COREG  Take 1 tablet by mouth 2 times daily (with meals)  What changed:  when to take this     cinacalcet 30 MG tablet  Commonly known as:  SENSIPAR  What changed:  Another medication with the same name was removed. Continue taking this medication, and follow the directions you see here. isosorbide mononitrate 60 MG extended release tablet  Commonly known as:  IMDUR  What changed:  Another medication with the same name was removed. Continue taking this medication, and follow the directions you see here. lactulose 10 GM/15ML solution  Commonly known as:  CHRONULAC  Take 45 mLs by mouth 2 times daily as needed (Constipation resistant to other methods.)  What changed:    · how much to take  · when to take this  · reasons to take this     loratadine 10 MG tablet  Commonly known as:  CLARITIN  What changed:  Another medication with the same name was removed. Continue taking this medication, and follow the directions you see here. pantoprazole 40 MG tablet  Commonly known as:  PROTONIX  Take 1 tablet by mouth 2 times daily  What changed:  when to take this     sevelamer 800 MG tablet  Commonly known as:  RENVELA  What changed:  Another medication with the same name was removed. Continue taking this medication, and follow the directions you see here.         CONTINUE taking these medications    AMITIZA 24 MCG capsule  Generic drug:  lubiprostone     ARANESP (ALBUMIN FREE) 100 MCG/0.5ML Sosy injection  Generic drug:  darbepoetin hina-polysorbate     atorvastatin 20 MG tablet  Commonly known as:  LIPITOR  Take 1 tablet by mouth nightly     losartan 100 MG tablet  Commonly known as:  COZAAR     melatonin 5 MG Tabs tablet     NIFEdipine 90 MG extended release tablet  Commonly known as:  ADALAT CC     NOVOLOG FLEXPEN 100 UNIT/ML injection pen  Generic drug:  insulin aspart     senna-docusate 8.6-50 MG per tablet  Commonly known as:  PERICOLACE        STOP taking these medications    amLODIPine 10 MG tablet  Commonly known as:  NORVASC     aspirin 81 MG tablet     calcitRIOL 0.5 MCG capsule  Commonly known as:  ROCALTROL     cloNIDine 0.3 MG tablet  Commonly known as:  CATAPRES     clotrimazole-betamethasone 1-0.05 % cream  Commonly known as:  LOTRISONE     FLUoxetine 10 MG capsule  Commonly known as:  PROZAC     hydrALAZINE 50 MG tablet  Commonly known as:  APRESOLINE     mirtazapine 15 MG tablet  Commonly known as:  REMERON     omeprazole 40 MG delayed release capsule  Commonly known as:  PRILOSEC     ondansetron 4 MG disintegrating tablet  Commonly known as:  ZOFRAN ODT     polyethylene glycol packet  Commonly known as:  GLYCOLAX     senna 8.6 MG tablet  Commonly known as:  SENOKOT           Where to Get Your Medications      You can get these medications from any pharmacy    Bring a paper prescription for each of these medications  · promethazine 25 MG suppository  · promethazine 25 MG tablet     Information about where to get these medications

## 2019-03-29 NOTE — PROGRESS NOTES
PGY-2 Resident Progress Note    Admit Date: 3/23/2019         Interval History:    Magnesium level improved to 4.30. Refused mid-day PD exchange yesterday but did do her overnight exchange. Hospital Course:   Patient only complaint is nausea which improved with zofran. Tolerating PO. Bowel movements overnight. Denies fever, chills, chest pain, SOB, abdominal pain.     Scheduled Medications:    lactulose  30 g Oral TID    senna  1 tablet Oral Nightly    sevelamer  2,400 mg Oral TID WC    dianeal lo-felicita 1.5%  6,000 mL Intraperitoneal Nightly    And    dianeal lo-felicita 2.5%  6,000 mL Intraperitoneal Nightly    sucralfate  1 g Oral 4 times per day    insulin lispro  0-6 Units Subcutaneous TID WC    insulin lispro  0-3 Units Subcutaneous Nightly    pantoprazole  40 mg Oral BID AC    lubiprostone  24 mcg Oral BID    losartan  100 mg Oral Daily    isosorbide mononitrate  120 mg Oral Daily    cinacalcet  120 mg Oral Daily    atorvastatin  20 mg Oral Nightly    sodium chloride flush  10 mL Intravenous 2 times per day    heparin (porcine)  5,000 Units Subcutaneous 3 times per day    NIFEdipine  90 mg Oral Daily    carvedilol  3.125 mg Oral BID WC    docusate sodium  100 mg Oral BID    gentamicin   Topical Daily      PRN Medications: acetaminophen, diphenhydrAMINE, glucose, dextrose, glucagon (rDNA), dextrose, cetirizine, sodium chloride flush, ondansetron, oxyCODONE-acetaminophen **OR** oxyCODONE-acetaminophen, lidocaine viscous  Diet: DIET RENAL;  Dietary Nutrition Supplements: Renal Oral Supplement    Continuous Infusions:   dextrose         PHYSICAL EXAM:  /64   Pulse 71   Temp 98 °F (36.7 °C) (Axillary)   Resp 18   Ht 5' 7\" (1.702 m)   Wt 123 lb 7.3 oz (56 kg)   SpO2 97%   BMI 19.34 kg/m²   Recent Labs     03/28/19  0734 03/28/19  1139 03/28/19  1644 03/28/19  2049 03/29/19  0731   POCGLU 221* 148* 140* 229* 245*       Intake/Output Summary (Last 24 hours) at 3/29/2019 0920  Last data medical history significant for end-stage renal disease patient is on peritoneal dialysis, Crohn's disease, history of cerebral artery occlusion with cerebral infarction  Patient presented from the nursing home with nausea vomiting. Recurrent nausea & vomiting  GI c/s, EGD showed superficial duodenal ulcers and ulcerative reflux esophagitis. Blood cultures and PD fluid cultures NGTD. Peritonitis unlikely. - cont protonix PO BID  - cont carafate  - advance diet      Constipation - patient had two bowel movements on 3/27, suppository helped. - cont Colace 100mg PO BID  - started on Fiber and sucralfate  - cont home senna  - on lactulose.      Hypermagnesemia - asymptomatic, Mg level improving.   - Increased PD as per Nephrology. - Bowel regimen   - Avoid Mg containing supplements. 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 Magnesium improving, hopefully will discharge today if OK from Nephrology standpoint. Full Code    I will discuss the patient with Dr. Farideh Christopher M.D.    Internal Medicine Resident, PGY-2  3/29/2019, 9:20 AM

## 2019-03-29 NOTE — CARE COORDINATION
Discharge Plan: return long term care  Patient discharging to: [facility]   Emma will get d/c paperwork from [employer]. RN can call report to: [address] transportation arranged for 1-3 hours from now. Reference number, [id number]  . Thank You.    Electronically signed by Jacki Harada, MSW, LSW on 3/29/2019 at 2:32 PM

## 2019-04-20 NOTE — OP NOTE
[address] [place] [address]                                OPERATIVE REPORT    PATIENT NAME: Kenneth Moura                    :        1975  MED REC NO:   [id number]                          ROOM:       9666  ACCOUNT NO:   [de-identified]                           ADMIT DATE: 2019  PROVIDER:     Anshu Serrano MD      EGD    DATE OF PROCEDURE:  2019    REFERRING PROVIDERS:  Soha Martell MD and Dr. Madelaine Aguilar. INSTRUMENT USED:  Olympus GIF-Q180. ANESTHESIA:  The patient was premedicated with Diprivan intravenously as  administered by the anesthesiology service. INDICATIONS:  The patient has presented with recurrent nausea and  vomiting. PROCEDURE:  The endoscope was inserted into the esophagus without  difficulty. There were changes of distal ulcerative reflux esophagitis. The stomach was normal.  There were multiple superficial ulcers  scattered throughout the duodenal bulb and second portion of the  duodenum. Upon withdrawal of the endoscope, gastric antral biopsies  were obtained to evaluate for Helicobacter. IMPRESSION:  1. Distal ulcerative reflux esophagitis. 2.  Multiple superficial duodenal ulcers. 3.  Gastric antral biopsies obtained. PLAN:  The patient will be placed on pantoprazole, 40 b.i.d. The  question is whether the patient has been compliant with acid suppression  at home. Her diet will be advanced, and she is cleared for discharge  from my standpoint. The plan would be to contact the patient with  biopsy results if positive for Helicobacter and then have her follow up  in the office.         Yelitza Combs MD    D: 2019 12:02:17       T: 2019 12:50:49     [id number]  Job#: [id number]     Doc#: [id number]    CC:  MD Anshu Mahoney MD Donnia Him

## 2019-05-03 ENCOUNTER — APPOINTMENT (OUTPATIENT)
Dept: GENERAL RADIOLOGY | Age: 44
DRG: 254 | End: 2019-05-03
Payer: MEDICAID

## 2019-05-03 ENCOUNTER — HOSPITAL ENCOUNTER (INPATIENT)
Age: 44
LOS: 4 days | Discharge: NURSING, REHAB OR LONG-TERM CARE | DRG: 254 | End: 2019-05-07
Attending: EMERGENCY MEDICINE | Admitting: HOSPITALIST
Payer: MEDICAID

## 2019-05-03 DIAGNOSIS — N18.6 END STAGE RENAL DISEASE (HCC): ICD-10-CM

## 2019-05-03 DIAGNOSIS — K62.5 RECTAL BLEEDING: ICD-10-CM

## 2019-05-03 DIAGNOSIS — G62.9 NEUROPATHY: ICD-10-CM

## 2019-05-03 DIAGNOSIS — R11.2 NON-INTRACTABLE VOMITING WITH NAUSEA, UNSPECIFIED VOMITING TYPE: Primary | ICD-10-CM

## 2019-05-03 DIAGNOSIS — R45.851 DEPRESSION WITH SUICIDAL IDEATION: ICD-10-CM

## 2019-05-03 DIAGNOSIS — F32.A DEPRESSION WITH SUICIDAL IDEATION: ICD-10-CM

## 2019-05-03 PROBLEM — K92.2 GI BLEED: Status: ACTIVE | Noted: 2019-05-03

## 2019-05-03 LAB
A/G RATIO: 0.8 (ref 1.1–2.2)
ALBUMIN SERPL-MCNC: 3.6 G/DL (ref 3.4–5)
ALP BLD-CCNC: 505 U/L (ref 40–129)
ALT SERPL-CCNC: 39 U/L (ref 10–40)
ANION GAP SERPL CALCULATED.3IONS-SCNC: 27 MMOL/L (ref 3–16)
AST SERPL-CCNC: 40 U/L (ref 15–37)
BASOPHILS ABSOLUTE: 0 K/UL (ref 0–0.2)
BASOPHILS RELATIVE PERCENT: 0 %
BILIRUB SERPL-MCNC: 0.4 MG/DL (ref 0–1)
BUN BLDV-MCNC: 70 MG/DL (ref 7–20)
CALCIUM SERPL-MCNC: 9.7 MG/DL (ref 8.3–10.6)
CHLORIDE BLD-SCNC: 94 MMOL/L (ref 99–110)
CO2: 18 MMOL/L (ref 21–32)
CREAT SERPL-MCNC: 14.7 MG/DL (ref 0.6–1.1)
EKG ATRIAL RATE: 92 BPM
EKG DIAGNOSIS: NORMAL
EKG P AXIS: 46 DEGREES
EKG P-R INTERVAL: 156 MS
EKG Q-T INTERVAL: 396 MS
EKG QRS DURATION: 86 MS
EKG QTC CALCULATION (BAZETT): 489 MS
EKG R AXIS: 5 DEGREES
EKG T AXIS: 79 DEGREES
EKG VENTRICULAR RATE: 92 BPM
EOSINOPHILS ABSOLUTE: 0 K/UL (ref 0–0.6)
EOSINOPHILS RELATIVE PERCENT: 0 %
GFR AFRICAN AMERICAN: 3
GFR NON-AFRICAN AMERICAN: 3
GLOBULIN: 4.3 G/DL
GLUCOSE BLD-MCNC: 140 MG/DL (ref 70–99)
GLUCOSE BLD-MCNC: 159 MG/DL (ref 70–99)
GLUCOSE BLD-MCNC: 235 MG/DL (ref 70–99)
GLUCOSE BLD-MCNC: 260 MG/DL (ref 70–99)
HCT VFR BLD CALC: 37.9 % (ref 36–48)
HEMOGLOBIN: 11.2 G/DL (ref 12–16)
HEMOGLOBIN: 12.1 G/DL (ref 12–16)
LIPASE: 30 U/L (ref 13–60)
LYMPHOCYTES ABSOLUTE: 5 K/UL (ref 1–5.1)
LYMPHOCYTES RELATIVE PERCENT: 33 %
MAGNESIUM: 3.4 MG/DL (ref 1.8–2.4)
MCH RBC QN AUTO: 30.1 PG (ref 26–34)
MCHC RBC AUTO-ENTMCNC: 31.8 G/DL (ref 31–36)
MCV RBC AUTO: 94.6 FL (ref 80–100)
MONOCYTES ABSOLUTE: 0.8 K/UL (ref 0–1.3)
MONOCYTES RELATIVE PERCENT: 5 %
NEUTROPHILS ABSOLUTE: 9.4 K/UL (ref 1.7–7.7)
NEUTROPHILS RELATIVE PERCENT: 62 %
OCCULT BLOOD DIAGNOSTIC: NORMAL
PDW BLD-RTO: 20.2 % (ref 12.4–15.4)
PERFORMED ON: ABNORMAL
PLATELET # BLD: 345 K/UL (ref 135–450)
PLATELET SLIDE REVIEW: ADEQUATE
PMV BLD AUTO: 8.6 FL (ref 5–10.5)
POTASSIUM REFLEX MAGNESIUM: 4.7 MMOL/L (ref 3.5–5.1)
RBC # BLD: 4.01 M/UL (ref 4–5.2)
SLIDE REVIEW: ABNORMAL
SODIUM BLD-SCNC: 139 MMOL/L (ref 136–145)
TOTAL PROTEIN: 7.9 G/DL (ref 6.4–8.2)
WBC # BLD: 15.2 K/UL (ref 4–11)

## 2019-05-03 PROCEDURE — 83690 ASSAY OF LIPASE: CPT

## 2019-05-03 PROCEDURE — 94760 N-INVAS EAR/PLS OXIMETRY 1: CPT

## 2019-05-03 PROCEDURE — 6370000000 HC RX 637 (ALT 250 FOR IP)

## 2019-05-03 PROCEDURE — 85018 HEMOGLOBIN: CPT

## 2019-05-03 PROCEDURE — 90945 DIALYSIS ONE EVALUATION: CPT

## 2019-05-03 PROCEDURE — G0328 FECAL BLOOD SCRN IMMUNOASSAY: HCPCS

## 2019-05-03 PROCEDURE — 93010 ELECTROCARDIOGRAM REPORT: CPT | Performed by: INTERNAL MEDICINE

## 2019-05-03 PROCEDURE — 1200000000 HC SEMI PRIVATE

## 2019-05-03 PROCEDURE — G0378 HOSPITAL OBSERVATION PER HR: HCPCS

## 2019-05-03 PROCEDURE — 36415 COLL VENOUS BLD VENIPUNCTURE: CPT

## 2019-05-03 PROCEDURE — 2060000000 HC ICU INTERMEDIATE R&B

## 2019-05-03 PROCEDURE — 99284 EMERGENCY DEPT VISIT MOD MDM: CPT

## 2019-05-03 PROCEDURE — 93005 ELECTROCARDIOGRAM TRACING: CPT | Performed by: EMERGENCY MEDICINE

## 2019-05-03 PROCEDURE — 6360000002 HC RX W HCPCS: Performed by: HOSPITALIST

## 2019-05-03 PROCEDURE — 2580000003 HC RX 258: Performed by: HOSPITALIST

## 2019-05-03 PROCEDURE — 71045 X-RAY EXAM CHEST 1 VIEW: CPT

## 2019-05-03 PROCEDURE — 80053 COMPREHEN METABOLIC PANEL: CPT

## 2019-05-03 PROCEDURE — 85025 COMPLETE CBC W/AUTO DIFF WBC: CPT

## 2019-05-03 PROCEDURE — 6370000000 HC RX 637 (ALT 250 FOR IP): Performed by: INTERNAL MEDICINE

## 2019-05-03 PROCEDURE — 6370000000 HC RX 637 (ALT 250 FOR IP): Performed by: HOSPITALIST

## 2019-05-03 PROCEDURE — 83735 ASSAY OF MAGNESIUM: CPT

## 2019-05-03 PROCEDURE — 6370000000 HC RX 637 (ALT 250 FOR IP): Performed by: EMERGENCY MEDICINE

## 2019-05-03 RX ORDER — CARVEDILOL 3.12 MG/1
3.12 TABLET ORAL 2 TIMES DAILY WITH MEALS
Status: DISCONTINUED | OUTPATIENT
Start: 2019-05-03 | End: 2019-05-07 | Stop reason: HOSPADM

## 2019-05-03 RX ORDER — PANTOPRAZOLE SODIUM 40 MG/1
40 TABLET, DELAYED RELEASE ORAL ONCE
Status: COMPLETED | OUTPATIENT
Start: 2019-05-03 | End: 2019-05-03

## 2019-05-03 RX ORDER — SEVELAMER CARBONATE 800 MG/1
2400 TABLET, FILM COATED ORAL
Status: DISCONTINUED | OUTPATIENT
Start: 2019-05-03 | End: 2019-05-07 | Stop reason: HOSPADM

## 2019-05-03 RX ORDER — SODIUM CHLORIDE 0.9 % (FLUSH) 0.9 %
10 SYRINGE (ML) INJECTION EVERY 12 HOURS SCHEDULED
Status: DISCONTINUED | OUTPATIENT
Start: 2019-05-03 | End: 2019-05-07 | Stop reason: HOSPADM

## 2019-05-03 RX ORDER — LORAZEPAM 2 MG/ML
1 INJECTION INTRAMUSCULAR ONCE
Status: DISCONTINUED | OUTPATIENT
Start: 2019-05-03 | End: 2019-05-03

## 2019-05-03 RX ORDER — LUBIPROSTONE 24 UG/1
24 CAPSULE, GELATIN COATED ORAL 2 TIMES DAILY WITH MEALS
Status: DISCONTINUED | OUTPATIENT
Start: 2019-05-03 | End: 2019-05-03 | Stop reason: RX

## 2019-05-03 RX ORDER — ACETAMINOPHEN 500 MG
TABLET ORAL
Status: COMPLETED
Start: 2019-05-03 | End: 2019-05-03

## 2019-05-03 RX ORDER — IBUPROFEN 400 MG/1
TABLET ORAL
Status: DISPENSED
Start: 2019-05-03 | End: 2019-05-04

## 2019-05-03 RX ORDER — NICOTINE POLACRILEX 4 MG
15 LOZENGE BUCCAL PRN
Status: DISCONTINUED | OUTPATIENT
Start: 2019-05-03 | End: 2019-05-07 | Stop reason: HOSPADM

## 2019-05-03 RX ORDER — DOCUSATE SODIUM 100 MG/1
100 CAPSULE, LIQUID FILLED ORAL 2 TIMES DAILY
Status: DISCONTINUED | OUTPATIENT
Start: 2019-05-03 | End: 2019-05-07 | Stop reason: HOSPADM

## 2019-05-03 RX ORDER — ISOSORBIDE MONONITRATE 60 MG/1
120 TABLET, EXTENDED RELEASE ORAL DAILY
Status: DISCONTINUED | OUTPATIENT
Start: 2019-05-03 | End: 2019-05-07 | Stop reason: HOSPADM

## 2019-05-03 RX ORDER — ATORVASTATIN CALCIUM 20 MG/1
20 TABLET, FILM COATED ORAL NIGHTLY
Status: DISCONTINUED | OUTPATIENT
Start: 2019-05-03 | End: 2019-05-07 | Stop reason: HOSPADM

## 2019-05-03 RX ORDER — ACETAMINOPHEN 500 MG
1000 TABLET ORAL ONCE
Status: COMPLETED | OUTPATIENT
Start: 2019-05-03 | End: 2019-05-03

## 2019-05-03 RX ORDER — CETIRIZINE HYDROCHLORIDE 10 MG/1
5 TABLET ORAL DAILY
Status: DISCONTINUED | OUTPATIENT
Start: 2019-05-03 | End: 2019-05-07 | Stop reason: HOSPADM

## 2019-05-03 RX ORDER — SODIUM CHLORIDE, SODIUM LACTATE, CALCIUM CHLORIDE, MAGNESIUM CHLORIDE AND DEXTROSE 1.5; 538; 448; 18.3; 5.08 G/100ML; MG/100ML; MG/100ML; MG/100ML; MG/100ML
5000 INJECTION, SOLUTION INTRAPERITONEAL NIGHTLY
Status: DISCONTINUED | OUTPATIENT
Start: 2019-05-03 | End: 2019-05-05

## 2019-05-03 RX ORDER — SODIUM CHLORIDE, SODIUM LACTATE, CALCIUM CHLORIDE, MAGNESIUM CHLORIDE AND DEXTROSE 2.5; 538; 448; 18.3; 5.08 G/100ML; MG/100ML; MG/100ML; MG/100ML; MG/100ML
5000 INJECTION, SOLUTION INTRAPERITONEAL NIGHTLY
Status: DISCONTINUED | OUTPATIENT
Start: 2019-05-03 | End: 2019-05-05

## 2019-05-03 RX ORDER — SODIUM CHLORIDE, SODIUM LACTATE, CALCIUM CHLORIDE, MAGNESIUM CHLORIDE AND DEXTROSE 1.5; 538; 448; 18.3; 5.08 G/100ML; MG/100ML; MG/100ML; MG/100ML; MG/100ML
6000 INJECTION, SOLUTION INTRAPERITONEAL NIGHTLY
Status: DISCONTINUED | OUTPATIENT
Start: 2019-05-03 | End: 2019-05-03 | Stop reason: CLARIF

## 2019-05-03 RX ORDER — DEXTROSE MONOHYDRATE 25 G/50ML
12.5 INJECTION, SOLUTION INTRAVENOUS PRN
Status: DISCONTINUED | OUTPATIENT
Start: 2019-05-03 | End: 2019-05-07 | Stop reason: HOSPADM

## 2019-05-03 RX ORDER — SODIUM CHLORIDE 9 MG/ML
INJECTION, SOLUTION INTRAVENOUS CONTINUOUS
Status: DISCONTINUED | OUTPATIENT
Start: 2019-05-03 | End: 2019-05-03

## 2019-05-03 RX ORDER — LACTULOSE 10 G/15ML
30 SOLUTION ORAL 2 TIMES DAILY PRN
Status: DISCONTINUED | OUTPATIENT
Start: 2019-05-03 | End: 2019-05-06

## 2019-05-03 RX ORDER — PANTOPRAZOLE SODIUM 40 MG/1
40 TABLET, DELAYED RELEASE ORAL
Status: DISCONTINUED | OUTPATIENT
Start: 2019-05-03 | End: 2019-05-07 | Stop reason: HOSPADM

## 2019-05-03 RX ORDER — ONDANSETRON 2 MG/ML
4 INJECTION INTRAMUSCULAR; INTRAVENOUS EVERY 6 HOURS PRN
Status: DISCONTINUED | OUTPATIENT
Start: 2019-05-03 | End: 2019-05-07 | Stop reason: HOSPADM

## 2019-05-03 RX ORDER — LANOLIN ALCOHOL/MO/W.PET/CERES
4.5 CREAM (GRAM) TOPICAL NIGHTLY PRN
Status: DISCONTINUED | OUTPATIENT
Start: 2019-05-03 | End: 2019-05-07 | Stop reason: HOSPADM

## 2019-05-03 RX ORDER — ONDANSETRON 2 MG/ML
4 INJECTION INTRAMUSCULAR; INTRAVENOUS ONCE
Status: DISCONTINUED | OUTPATIENT
Start: 2019-05-03 | End: 2019-05-03 | Stop reason: SDUPTHER

## 2019-05-03 RX ORDER — CINACALCET 30 MG/1
120 TABLET, FILM COATED ORAL DAILY
Status: DISCONTINUED | OUTPATIENT
Start: 2019-05-03 | End: 2019-05-07 | Stop reason: HOSPADM

## 2019-05-03 RX ORDER — LOPERAMIDE HYDROCHLORIDE 2 MG/1
2 CAPSULE ORAL 4 TIMES DAILY PRN
Status: DISCONTINUED | OUTPATIENT
Start: 2019-05-03 | End: 2019-05-07 | Stop reason: HOSPADM

## 2019-05-03 RX ORDER — SODIUM CHLORIDE, SODIUM LACTATE, CALCIUM CHLORIDE, MAGNESIUM CHLORIDE AND DEXTROSE 2.5; 538; 448; 18.3; 5.08 G/100ML; MG/100ML; MG/100ML; MG/100ML; MG/100ML
6000 INJECTION, SOLUTION INTRAPERITONEAL NIGHTLY
Status: DISCONTINUED | OUTPATIENT
Start: 2019-05-03 | End: 2019-05-03 | Stop reason: CLARIF

## 2019-05-03 RX ORDER — SODIUM CHLORIDE 0.9 % (FLUSH) 0.9 %
10 SYRINGE (ML) INJECTION PRN
Status: DISCONTINUED | OUTPATIENT
Start: 2019-05-03 | End: 2019-05-07 | Stop reason: HOSPADM

## 2019-05-03 RX ORDER — LORAZEPAM 1 MG/1
1 TABLET ORAL ONCE
Status: COMPLETED | OUTPATIENT
Start: 2019-05-03 | End: 2019-05-03

## 2019-05-03 RX ORDER — PANTOPRAZOLE SODIUM 40 MG/10ML
80 INJECTION, POWDER, LYOPHILIZED, FOR SOLUTION INTRAVENOUS ONCE
Status: DISCONTINUED | OUTPATIENT
Start: 2019-05-03 | End: 2019-05-03

## 2019-05-03 RX ORDER — ACETAMINOPHEN 325 MG/1
650 TABLET ORAL EVERY 4 HOURS PRN
Status: DISCONTINUED | OUTPATIENT
Start: 2019-05-03 | End: 2019-05-07 | Stop reason: HOSPADM

## 2019-05-03 RX ORDER — DEXTROSE MONOHYDRATE 50 MG/ML
100 INJECTION, SOLUTION INTRAVENOUS PRN
Status: DISCONTINUED | OUTPATIENT
Start: 2019-05-03 | End: 2019-05-07 | Stop reason: HOSPADM

## 2019-05-03 RX ORDER — CHOLECALCIFEROL (VITAMIN D3) 125 MCG
5 CAPSULE ORAL NIGHTLY PRN
Status: DISCONTINUED | OUTPATIENT
Start: 2019-05-03 | End: 2019-05-03

## 2019-05-03 RX ADMIN — CARVEDILOL 3.12 MG: 3.12 TABLET, FILM COATED ORAL at 17:19

## 2019-05-03 RX ADMIN — Medication 1000 MG: at 10:24

## 2019-05-03 RX ADMIN — Medication 10 ML: at 20:45

## 2019-05-03 RX ADMIN — ACETAMINOPHEN 650 MG: 325 TABLET ORAL at 21:10

## 2019-05-03 RX ADMIN — ISOSORBIDE MONONITRATE 120 MG: 60 TABLET, EXTENDED RELEASE ORAL at 15:35

## 2019-05-03 RX ADMIN — LORAZEPAM 1 MG: 1 TABLET ORAL at 11:31

## 2019-05-03 RX ADMIN — ATORVASTATIN CALCIUM 20 MG: 20 TABLET, FILM COATED ORAL at 20:45

## 2019-05-03 RX ADMIN — ONDANSETRON 4 MG: 2 INJECTION INTRAMUSCULAR; INTRAVENOUS at 20:45

## 2019-05-03 RX ADMIN — LOPERAMIDE HYDROCHLORIDE 2 MG: 2 CAPSULE ORAL at 20:09

## 2019-05-03 RX ADMIN — CINACALCET HYDROCHLORIDE 120 MG: 30 TABLET, COATED ORAL at 15:29

## 2019-05-03 RX ADMIN — SEVELAMER CARBONATE 2400 MG: 800 TABLET, FILM COATED ORAL at 15:29

## 2019-05-03 RX ADMIN — PANTOPRAZOLE SODIUM 40 MG: 40 TABLET, DELAYED RELEASE ORAL at 11:30

## 2019-05-03 RX ADMIN — ACETAMINOPHEN 1000 MG: 500 TABLET ORAL at 10:24

## 2019-05-03 RX ADMIN — CETIRIZINE HYDROCHLORIDE 5 MG: 10 TABLET, FILM COATED ORAL at 15:29

## 2019-05-03 RX ADMIN — PANTOPRAZOLE SODIUM 40 MG: 40 TABLET, DELAYED RELEASE ORAL at 15:29

## 2019-05-03 ASSESSMENT — PAIN SCALES - GENERAL
PAINLEVEL_OUTOF10: 7
PAINLEVEL_OUTOF10: 7
PAINLEVEL_OUTOF10: 6
PAINLEVEL_OUTOF10: 7
PAINLEVEL_OUTOF10: 3
PAINLEVEL_OUTOF10: 7

## 2019-05-03 ASSESSMENT — PAIN DESCRIPTION - DESCRIPTORS
DESCRIPTORS: THROBBING
DESCRIPTORS: HEADACHE
DESCRIPTORS: HEADACHE

## 2019-05-03 ASSESSMENT — PAIN DESCRIPTION - PROGRESSION: CLINICAL_PROGRESSION: NOT CHANGED

## 2019-05-03 ASSESSMENT — PAIN DESCRIPTION - LOCATION
LOCATION: HEAD

## 2019-05-03 ASSESSMENT — PAIN DESCRIPTION - ORIENTATION: ORIENTATION: LEFT

## 2019-05-03 ASSESSMENT — PAIN DESCRIPTION - ONSET
ONSET: PROGRESSIVE
ONSET: ON-GOING

## 2019-05-03 ASSESSMENT — PAIN DESCRIPTION - PAIN TYPE
TYPE: ACUTE PAIN
TYPE: ACUTE PAIN

## 2019-05-03 ASSESSMENT — PAIN DESCRIPTION - FREQUENCY
FREQUENCY: CONTINUOUS
FREQUENCY: CONTINUOUS

## 2019-05-03 ASSESSMENT — PAIN - FUNCTIONAL ASSESSMENT: PAIN_FUNCTIONAL_ASSESSMENT: PREVENTS OR INTERFERES WITH MANY ACTIVE NOT PASSIVE ACTIVITIES

## 2019-05-03 NOTE — ED NOTES
Pt lying in bed awake. Pt calm and talking to RN about things that have happened in her life.       Oleg Antoine RN  05/03/19 8264

## 2019-05-03 NOTE — ED NOTES
Pt asking for medication tylenol for headache. Dr. Javy Rock notified and gave verbal order to give 1 gram tylenol.      Xiang Contreras RN  05/03/19 8809

## 2019-05-03 NOTE — ED NOTES
RN present in room while Dr. Jona Winters perform rectum examine.       Johnny Byrd RN  05/03/19 9773
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
## 2019-05-03 NOTE — CARE COORDINATION
Chart reviewed. Patient is in suicide precautions as she informed ED RN that she has thought about taking a lot of pills. She states that she is depressed and has not been getting her depression medications at [place] [address]. She signed out of [place] [address] Wednesday and has been staying at her apt. She informed ED RN that she keeps falling down stairs and states that she can not live alone. Sw met with patient briefly to complete assessment. Patient did confirm that she has been living at [place] [address] and went [facility] a few days ago. She stated that she is not sure what her d/c plan will be(return back to [place] [address] or find another place to stay). She verbalized to ED RN that she wanted to stay with her sister and couldn't live alone. She stated that her PD supplies are at [facility], could not tell this SW if she was completing PD at home that past 2 days. Patient was very drowsy and kept dosing off during conversation. Will follow up at later time. Psych consult pending, will follow recommendations. Per chart, patient had to stay at [facility] in the past when she needed inpt psych as no inpt psych was able to accept d/t her being on PD. Zenobia spoke to SHIRLEY([phone]) ANTHONY at [place] [address]. She stated that patient signed out without her knowing on Wednesday. She stated that she is not sure if patient has any PD supplies at home. She reviewed patient's medication list and patient was never on any depression medications when she was first admitted there so she confirmed that they had not been providing her with any. She stated that since patient has traditional Sam Silver, she may need pre-cert to return if she agrees on that. Hernandez Barraza will reach out to Sam Cuenca to determine if patient can return directly back or not.      Teddy Yang, [address] [facility]  [phone]  5/3/19 at 3:40 PM

## 2019-05-03 NOTE — ED NOTES
Dr. Rogelio Tang was in room and spoke with pt. Pt agreeable to put on gown and her belongings being placed into personal belongings bags. Helped pt put on gown and belongings into bags. Non skid socks applied.        Dorita Garcia RN  05/03/19 [address] Macias RN  05/03/19 5886

## 2019-05-03 NOTE — ED NOTES
Pt says she has thought about taking a lot of pills. Pt says about a year ago she gathered up all of her pill bottle and was going to take a lot of pills and just go to sleep. Pt says she thinks she is just depressed. Pt stays in a nursing home and says that since being there she has not been getting her depression medications for the last 2 months. Pt says she has an apartment and has been there the past 2 days. Pt says she has had 2 strokes and can not live there anymore because of the stairs. Pt says she keeps falling down the stairs and actually fell down the stairs last night and hit her lip. Pt says she wants to live with her sister and does not want to live in a nursing home but says she can not live on her own. Dr. Garcia Been notified.       Heber Serrano, RN  05/03/19 [address] [facility], RN  05/03/19 6486

## 2019-05-03 NOTE — PROCEDURES
Patient seen on PD.   Using outpatient orders with cycler 10 hours with 5 exchanges of 2 liters with half 1.5% and half 2.5% dianeal.

## 2019-05-03 NOTE — ED PROVIDER NOTES
[address] [facility]  eMERGENCY dEPARTMENT eNCOUnter      Pt Name: Ioana Martell  MRN: [id number]  [place] 1975  Date of evaluation: 5/3/2019  Provider: Demetrius Macias, [address] [place] [address]       Chief Complaint   Patient presents with    Nausea     pt says she does not feel good and thinks it is because she has not had peritoneal dialysis in 2 days.  Rectal Bleeding     2 episodes of bright red blood with bowel movement this morning.  Headache         HISTORY OF PRESENT ILLNESS   (Location/Symptom, Timing/Onset, Context/Setting, Quality, Duration, Modifying Factors, Severity)  Note limiting factors. Ioana Martell is a 53 y.o. female who presents to the emergency department with a complaint of waking today at 0500 hrs. with dry heaves and a small amount of emesis. She reports that she vomited 5 times. She reports loose stool but no xander diarrhea. She also reports that today she noticed some bright red blood described as \"stringy\"  mixed in with the stool. She denies any melena. No hematemesis. She denies any associated abdominal pain dizziness or lightheadedness. No syncope. She denies any use of nonsteroidal anti-inflammatory medications. She does not take any anticoagulants. She denies any chest pain or shortness of breath. The patient has a history of end-stage renal disease. She was on hemodialysis for 6 years before being switched to peritoneal dialysis for years ago. She also has a failed kidney transplant from [facility]. She states that she has been unable to take care of herself at home and required significant assistance because of prior stroke and other medical problems as well as peritoneal dialysis. As result of this she has been living at a nursing home. She has been under a lot of stress recently. She has a son who is graduating from college. She states \"I don't want to live in a nursing home, I just don't want to be like this\".  She reports that she left the nursing home 2 days ago to go and check on her apartment and check on her family. As a result that she has not had her peritoneal dialysis in 2 days and has not taken her medications. She does have a history of depression and anxiety. She states that they have not been giving her the depression medication for the last 2 months. She does admit to suicidal thoughts and has thought of taking pills in a suicide attempt but denies ingestion of any medications, calls were substances. She denies any actual suicide attempt. She denies any auditory or visual hallucinations. She denies any homicidal ideations. The patient was admitted to the hospital on March 24, 2019 and discharged on March 29. She was admitted for nausea and vomiting and underwent EGD which revealed multiple superficial dog no ulcers but no active bleeding. She was also noted to have ulcerative reflux esophagitis. She was advised to take Protonix twice daily. HPI    Nursing Notes were reviewed. REVIEW OF SYSTEMS    (2-9 systems for level 4, 10 or more for level 5)       Constitutional: Negative for fever or chills. HENT: Negative for rhinorrhea and sore throat. Eyes: Negative for redness or drainage. Respiratory: Negative for shortness of breath or dyspnea on exertion. Cardiovascular: Negative for chest pain. Gastrointestinal: Negative for abdominal pain. Genitourinary: Negative for flank pain. Negative for dysuria. Negative for hematuria. Neurological: Negative for headache. Musculoskeletal:  Negative edema. Psychiatric/Behavioral: Negative for confusion. All systems are reviewed and are negative except for those listed above in the history of present illness and ROS.         PAST MEDICAL HISTORY     Past Medical History:   Diagnosis Date    Cerebral artery occlusion with cerebral infarction [facility]) 2010    Chronic kidney disease     Cocaine abuse ([facility]     Crohn's disease ([facility]     Depression     [facility]     ESRD (end Refills: 1      sucralfate (CARAFATE) 1 GM/10ML suspension Take 10 mLs by mouth 2 times daily for 5 days  Qty: 50 mL, Refills: 1      isosorbide mononitrate (IMDUR) 60 MG extended release tablet Take 120 mg by mouth daily      melatonin 5 MG TABS tablet Take 5 mg by mouth nightly as needed (Sleep)      NIFEdipine (ADALAT CC) 90 MG extended release tablet Take 90 mg by mouth daily      sevelamer (RENVELA) 800 MG tablet Take 3 tablets by mouth 3 times daily (with meals)      senna-docusate (PERICOLACE) 8.6-50 MG per tablet Take 1 tablet by mouth 2 times daily as needed for Constipation      cinacalcet (SENSIPAR) 30 MG tablet Take 120 mg by mouth daily      lubiprostone (AMITIZA) 24 MCG capsule Take 24 mcg by mouth 2 times daily (with meals)       darbepoetin hina-polysorbate (ARANESP, ALBUMIN FREE,) 100 MCG/0.5ML SOSY injection Inject 200 mcg into the skin once a week Las t dose was 1 week ago      losartan (COZAAR) 100 MG tablet Take 100 mg by mouth daily             ALLERGIES     Ferrous sulfate; Cephalexin; Gabapentin; Dicloxacillin; Pcn [penicillins]; and Sulfa antibiotics    FAMILY HISTORY       Family History   Problem Relation Age of Onset    Heart Attack Mother     Diabetes Mother     Hypertension Mother     Stroke Father     Diabetes Father     Hypertension Father           SOCIAL HISTORY       Social History     Socioeconomic History    Marital status:      Spouse name: None    Number of children: 2    Years of education: None    Highest education level: None   Occupational History    Occupation: volunteer   Social Needs    Financial resource strain: None    Food insecurity:     Worry: None     Inability: None    Transportation needs:     Medical: None     Non-medical: None   Tobacco Use    Smoking status: Never Smoker    Smokeless tobacco: Never Used   Substance and Sexual Activity    Alcohol use: Not Currently     Alcohol/week: 0.0 oz     Comment: monthly    Drug use: No    Sexual activity: Yes     Partners: Male   Lifestyle    Physical activity:     Days per week: None     Minutes per session: None    Stress: None   Relationships    Social connections:     Talks on phone: None     Gets together: None     Attends Oriental orthodox service: None     Active member of club or organization: None     Attends meetings of clubs or organizations: None     Relationship status: None    Intimate partner violence:     Fear of current or ex partner: None     Emotionally abused: None     Physically abused: None     Forced sexual activity: None   Other Topics Concern    None   Social History Narrative    None       SCREENINGS             PHYSICAL EXAM    (up to 7 for level 4, 8 or more for level 5)     ED Triage Vitals [05/03/19 0811]   BP Temp Temp Source Pulse Resp SpO2 Height Weight   138/63 98.6 °F (37 °C) Oral 96 11 99 % -- 121 lb 14.6 oz (55.3 kg)       Physical Exam   Constitutional: Awake and alert and oriented to person place and time. No apparent distress. Head: No visible evidence of trauma. Normocephalic. Eyes: Pupils equal and reactive. No photophobia. Conjunctiva normal.    HENT: Oral mucosa moist.  Airway patent. Neck:  Soft and supple. No JVD. Heart:  Regular rate and rhythm. No murmur. Lungs:  Clear to auscultation. No wheezes, rales, or ronchi. No conversational dyspnea or accessory muscle use. Abdomen:  Soft, nondistended, bowel sounds present. Nontender. No guarding rigidity or rebound. No masses. Dialysis catheter was in place. No erythema or induration. Nontender. Musculoskeletal: Extremities non-tender with full range of motion. Radial and dorsalis pedis pulses were equal bilaterally. No calf tenderness erythema or edema. Neurological: Alert and oriented x 3. Speech clear. Cranial nerves II-XII intact. No facial droop. No acute focal motor or sensory deficits. Skin: Skin is warm and dry. No rash. Lymphatic:  No lympadenopathy.   Psychiatric: Mood was depressed. Affect is flat. The patient's speech was soft spoken. She was tearful when discussing her circumstances. DIAGNOSTIC RESULTS     EKG: All EKG's are interpreted by the Emergency Department Physician who either signs or Co-signs this chart in the absence of a cardiologist.    Normal sinus rhythm. Rate 92. FL interval 156 ms. QRS duration 86 ms.  ms. R axis 5°. No ST elevation. No acute change. No visible evidence of hyperkalemia. RADIOLOGY:   Non-plain film images such as CT, Ultrasound and MRI are read by the radiologist. Plain radiographic images are visualized and preliminarily interpreted by the emergency physician with the below findings:        Interpretation per the Radiologist below, if available at the time of this note:    XR CHEST PORTABLE   Final Result   No acute process.                ED BEDSIDE ULTRASOUND:   Performed by ED Physician - none    LABS:  Labs Reviewed   CBC WITH AUTO DIFFERENTIAL - Abnormal; Notable for the following components:       Result Value    WBC 15.2 (*)     RDW 20.2 (*)     Neutrophils # 9.4 (*)     All other components within normal limits    Narrative:     Performed at:  [facility]  [address] [facility]   Phone [phone]   COMPREHENSIVE METABOLIC PANEL W/ REFLEX TO MG FOR LOW K - Abnormal; Notable for the following components:    Chloride 94 (*)     CO2 18 (*)     Anion Gap 27 (*)     Glucose 159 (*)     BUN 70 (*)     CREATININE 14.7 (*)     GFR Non- 3 (*)     GFR African American 3 (*)     Albumin/Globulin Ratio 0.8 (*)     Alkaline Phosphatase 505 (*)     AST 40 (*)     All other components within normal limits    Narrative:     CALL  [facility] [phone],  Chemistry results called to and read back by Tin Shaw, 05/03/2019 10:20,  by JANE  Performed at:  [address] [place] [address] Laboratory  [address] [facility] [address]   BP:  (!) 141/83 132/70 138/80   Pulse: 93 87 93 81   Resp:  12 18 18   Temp:  98.3 °F (36.8 °C) 98.3 °F (36.8 °C) 97.8 °F (36.6 °C)   TempSrc:   Oral Oral   SpO2: 98% 100% 96% 96%   Weight:           The patient presented with multiple episodes of dry heaves and loose stool this morning as well as a report of some bright red blood in her stool. Her abdomen is nontender. She is hemodynamically stable. Records were reviewed as well as previous EGD report from March 25. The patient has been noncompliant with her medications and peritoneal dialysis for the last 48 hours. She was given Zofran 4 mg IV as well as Protonix 80 mg IV. MDM      REASSESSMENT          At approximately 8:39 AM the patient became agitated, yelling, screaming, and throwing items in the room at the nurse. She became upset because she was asked to get undressed after reporting that she was suicidal.  She was given Ativan 1 mg by mouth. Laboratory studies were reviewed. Hemoglobin is stable at 12.1. White blood cell count is mildly elevated at 15.2 but she is afebrile. No clinical evidence of infection at this time. Bicarb is 18. Chloride is 94. BUN 70. Creatinine 4.7. Potassium is 4.7. Rectal exam was completed with the nurse present. Rectal exam was nontender. No gross blood. Stool was brown in appearance. No melena. No hemorrhoids. The patient does have a history of recurrent nausea and vomiting. There is concern that she may have had some bleeding from her recent documented to walk no ulcers. No current abdominal pain or tenderness. In addition, she may be feeling poorly because she has not had dialysis for 2 days. Given the patient's report of suicidal thoughts and ideations with a definitive plan, she will require psychiatric evaluation. However, I am unable to medically clear the patient at this time because she is in need of her peritoneal dialysis.  She will also need a serial hemoglobin to document no evidence of any [address] [facility] [place] [address]            DISCHARGE MEDICATIONS:  Current Discharge Medication List        Controlled Substances Monitoring:     No flowsheet data found. (Please note that portions of this note were completed with a voice recognition program.  Efforts were made to edit the dictations but occasionally words are mis-transcribed. )    [address] Bryan , DO (electronically signed)  Attending Emergency Physician          Brianna Gregory, DO  05/03/19 [address] RUSTY Drake, DO  05/03/19 Lanette Delcid

## 2019-05-03 NOTE — PROGRESS NOTES
Pharmacy does not stock Amitiza. Please resume upon discharge.  Thank you  Sis Forman RPH,5/3/2019,2:10 PM

## 2019-05-03 NOTE — ED NOTES
Pt agreeable to having EKG. Pt now says that we can place IV and draw blood. Dr. Ramón Amaral aware.      Ervin Starkey, RN  05/03/19 [address], RN  05/03/19 0978

## 2019-05-03 NOTE — PROGRESS NOTES
[facility]    I was just called for a consult for rectal bleeding  I reviewed the ER notes  The patient stated that she had two episodes of BRBPR earlier; rectal exam revealed brown, guaiac negative stool  The Hgb was 12.1, increased from 11.4 at her discharge in late 03/19  She does have documented reflux esophagitis and duodenal ulcers; biopsies negative for H pylori  She was to be taking pantoprazole, 40 mg bid but she has a long history of non-compliance and poor follow-up  This episode sounds most like perianal bleeding  At some point, she should have an elective colonoscopy (if she consents), but she has several other issues that must be addressed first  I will see her tomorrow

## 2019-05-03 NOTE — ED NOTES
Pt says she does not make urine.  [facility] aware.       Deirdre Mukherjee, RN  05/03/19 [address] Best RN  05/03/19 1919

## 2019-05-03 NOTE — H&P
Hospitalist  History and Physical    Patient:  Osborne Galeazzi  MRN: [id number]  PCP: David Kilpatrick MD    CHIEF COMPLAINT: Nausea, Rectal Bleeding, Headache      HISTORY OF PRESENT ILLNESS:   The patient Osborne Galeazzi is a 53 y. o.female with medical history significant for Crohn's disease, cocaine abuse, end-stage renal disease and GERD hypertension and mood disorder  Patient presented to the emergency room complaining of blood in stools patient reports bright red blood. Patient also reports frequent loose stools. Patient reports that she woke up this morning around 5 AM had some nausea and vomiting. Patient then noticed loose stools with diarrhea and bright red blood. Patient has history of end-stage renal disease. Patient is currently on peritoneal dialysis. Patient is a nursing home resident 2 days ago she went home and has not been able to continue her hemodialysis  Patient has history of anxiety depression and mood disorder and patient expressed suicidal ideation in the emergency room.       Past Medical History:        Diagnosis Date    Cerebral artery occlusion with cerebral infarction [facility]) 2010    Chronic kidney disease     Cocaine abuse ([address]     Crohn's disease ([address]     Depression     Diabetes mellitus ([address]     ESRD (end stage renal disease) ([address]     GERD (gastroesophageal reflux disease)     Hemodialysis patient ([address] 2016    peritoneal     Hyperlipidemia     Hypertension     MI, old     Mood disorder ([address]     Pericarditis     Peritoneal dialysis catheter in place ([address]     Peritonitis ([address]     Pneumonia     Thyroid disease        Past Surgical History:        Procedure Laterality Date    BACK SURGERY      Tumor removal    CHOLECYSTECTOMY      KIDNEY TRANSPLANT  1990    KIDNEY TRANSPLANT      LAPAROSCOPY      right ovary removed and left ovarian cyst removed for endometriosis    OTHER SURGICAL HISTORY      peritoneal dialysis catheter    PARACENTESIS      TUBAL LIGATION      UPPER GASTROINTESTINAL ENDOSCOPY  03/31/2017    UPPER GASTROINTESTINAL ENDOSCOPY N/A 3/25/2019    EGD BIOPSY performed by Cassandra Plata MD at [facility]       Medications Prior to Admission:    Prior to Admission medications    Medication Sig Start Date End Date Taking? Authorizing Provider   carvedilol (COREG) 3.125 MG tablet Take 1 tablet by mouth 2 times daily (with meals) 3/29/19   Allen Gonsales MD   gentamicin (GARAMYCIN) 0.1 % cream Apply to PD dialysis catheter when accessing the site. Apply topically 3 times daily.  3/30/19   Allen Gonsales MD   lactulose (CHRONULAC) 10 GM/15ML solution Take 45 mLs by mouth 2 times daily as needed (Constipation resistant to other methods.) 3/29/19   Allen Gonsales MD   pantoprazole (PROTONIX) 40 MG tablet Take 1 tablet by mouth 2 times daily 3/29/19   Allen Gonsales MD   sucralfate (CARAFATE) 1 GM/10ML suspension Take 10 mLs by mouth 2 times daily for 5 days 3/29/19 4/3/19  Allen Gonsales MD   isosorbide mononitrate (IMDUR) 60 MG extended release tablet Take 120 mg by mouth daily    Historical Provider, MD   loratadine (CLARITIN) 10 MG tablet Take 10 mg by mouth daily as needed (Allergies)    Historical Provider, MD   melatonin 5 MG TABS tablet Take 5 mg by mouth nightly as needed (Sleep)    Historical Provider, MD   NIFEdipine (ADALAT CC) 90 MG extended release tablet Take 90 mg by mouth daily    Historical Provider, MD   sevelamer (RENVELA) 800 MG tablet Take 3 tablets by mouth 3 times daily (with meals)    Historical Provider, MD   senna-docusate (PERICOLACE) 8.6-50 MG per tablet Take 1 tablet by mouth 2 times daily as needed for Constipation    Historical Provider, MD   cinacalcet (SENSIPAR) 30 MG tablet Take 120 mg by mouth daily    Historical Provider, MD   insulin aspart (NOVOLOG FLEXPEN) 100 UNIT/ML injection pen Inject into the skin 2 times daily (with meals) 151-200 = Give 1 unit  201-250 = Give 2 units  251-300 = Give 3 units  301-350 = Give 4 units  351-400 = Give 5 units    Historical Provider, MD   atorvastatin (LIPITOR) 20 MG tablet Take 1 tablet by mouth nightly 1/4/19   Ilia Davis, APRN - NP   lubiprostone (AMITIZA) 24 MCG capsule Take 24 mcg by mouth 2 times daily (with meals)     Historical Provider, MD   darbepoetin hina-polysorbate (ARANESP, ALBUMIN FREE,) 100 MCG/0.5ML SOSY injection Inject 200 mcg into the skin once a week Las t dose was 1 week ago    Historical Provider, MD   losartan (COZAAR) 100 MG tablet Take 100 mg by mouth daily    Historical Provider, MD       Allergies:  Ferrous sulfate; Cephalexin; Gabapentin; Dicloxacillin; Pcn [penicillins]; and Sulfa antibiotics      Social History:   TOBACCO:   reports that she has never smoked. She has never used smokeless tobacco.  ETOH:   reports that she drank alcohol. Family History:       Problem Relation Age of Onset    Heart Attack Mother     Diabetes Mother     Hypertension Mother     Stroke Father     Diabetes Father     Hypertension Father            REVIEW OF SYSTEMS:     patients reported symptoms are in BOLD all other symptoms are negative. CONSTITUTIONAL:      fatigue, fever, chills or night sweats, recent weight gain, recent wt loss, insomnia,  General weakness, poor appetite, muscle aches and pains    HEAD: headache, dizziness    EYES:      blurriness,  double vision, dryness,  discharge, irritation,diplopia    EARS:      hearing loss, vertigo, ear discharge,  Earache. Ringing in the ears. NOSE:      Rhinorrhea, sneezing, epistaxis.  Discharge, sinusitis,     MOUTH/THROAT:         sore throat, mouth ulcers, Hoarseness    RESPIRATORY:        Shortness of breath, wheezing,  cough, sputum, hemoptysis, obstructive sleep apnea,    CARDIOVASCULAR :      chest pain, palpitations, dyspnea on exercise, Lower extrimity edema (swelling),     GASTROINTESTINAL:       Dysphagia, Poor appetite,  Nausea, Vomiting, diarrhea, heartburn, abdominal pain. Blood in the stools, hematemesis. Pain with swallowing, constipation    GENITOURINARY:       Urinary frequency, hesitancy,  urgency, Dysuria, hematuria,  Urinary Incontinence. Urinary Retention. GYNECOLOGICAL: vaginal bleeding , vaginal discharge, menopause    MUSCULOSKELETAL:       joint swelling or stiffness, joint pain, muscle pain, balance problems, low back pain. NEUROLOGICAL:      Gait problems. Tremor. Dizziness. Pain and paresthesias, weakness in extremities. Seizures, memory loss    PSYCHLOGICAL:        Anxiety, depression    SKIN :      Rashes ulcers, skin color changes, easy bruisability, lymphadenopathy      Physical Exam:      Vitals: /63   Pulse 96   Temp 98.6 °F (37 °C) (Oral)   Resp 11   Wt 121 lb 14.6 oz (55.3 kg)   SpO2 99%   BMI 19.09 kg/m²     Gen:          Alert and oriented x 3  Eyes: PERRL. No sclera icterus. No conjunctival injection. ENT: No discharge. Pharynx clear. External appearance of ears and nose normal.  Neck: Trachea midline. No obvious mass. Resp: No accessory muscle use. No crackles. No wheezes. No rhonchi. CV: Regular rate. Regular rhythm. No murmur or rub. No edema. GI: Non-tender. Non-distended. No hernia. Skin: Warm, dry, normal texture and turgor. Lymph: No cervical LAD. No supraclavicular LAD. M/S: / Ext. No cyanosis. No clubbing. No joint deformity. Neuro: Moves all four extremities. CN 2-12 tested, no deficits noted. Peripheral pulses and capillary refill is intact. CBC:   Recent Labs     05/03/19  0935   WBC 15.2*   HGB 12.1        BMP:    Recent Labs     05/03/19  0935      K 4.7   CL 94*   CO2 18*   BUN 70*   CREATININE 14.7*   GLUCOSE 159*     Hepatic:   Recent Labs     05/03/19  0935   AST 40*   ALT 39   BILITOT 0.4   ALKPHOS 505*     Troponin: No results for input(s): TROPONINI in the last 72 hours. BNP: No results for input(s): BNP in the last 72 hours.   INR: No results for input(s): INR in the last 72 hours. Lab Results   Component Value Date    LABA1C 6.4 07/05/2017           No results for input(s): CKTOTAL in the last 72 hours. -----------------------------------------------------------------    XR CHEST PORTABLE   No acute process. EKG  Normal sinus rhythm  Prolonged QT      Assessment / Plan     GI bleed  Hemoglobin is stable  Protonix twice a day  Check hemoglobin every 8 hours    Suicidal ideation  Patient has been put on hold  Consult psychiatry    End-stage renal disease  Patient on peritoneal dialysis  Consults nephrology    Type 2 diabetes mellitus E11.9  Insulin sliding scale            DVT and GI prophylaxis      Prior      Briseida Robles M.D    This note was transcribed using [id number]. Please disregard any translational errors.

## 2019-05-03 NOTE — ED NOTES
Dr. Marcos Kaur stated she could eat some soup, crackers with peanut butter and have a drink     Chiqui Browning, RN  05/03/19 7927

## 2019-05-03 NOTE — ED NOTES
Pt refusing to have IV placed and blood drawn. Pt refusing to change into gown. Dr. Askew Manual aware.       Trey Jon, RN  05/03/19 [address] [place] [address], RN  05/03/19 1335

## 2019-05-03 NOTE — PROGRESS NOTES
4 Eyes Skin Assessment     The patient is being assess for  Admission    I agree that 2 RN's have performed a thorough Head to Toe Skin Assessment on the patient. ALL assessment sites listed below have been assessed. Areas assessed by both nurses: Luis M Lorenza /   [x]   Head, Face, and Ears   [x]   Shoulders, Back, and Chest  [x]   Arms, Elbows, and Hands   [x]   Coccyx, Sacrum, and IschIum  [x]   Legs, Feet, and Heels        Does the Patient have Skin Breakdown?   No         [place] Prevention initiated:  Yes   Wound Care Orders initiated:  NA      [facility] nurse consulted for Pressure Injury (Stage 3,4, Unstageable, DTI, NWPT, and Complex wounds), New and Established Ostomies:  NA      Nurse 1 eSignature: Electronically signed by Hughes Ahumada, RN on 5/3/19 at 2:45 PM    **SHARE this note so that the co-signing nurse is able to place an eSignature**    Nurse 2 eSignature: Electronically signed by Franchesca Ignacio RN on 5/6/19 at 6:43 AM

## 2019-05-04 LAB
ALBUMIN SERPL-MCNC: 3.1 G/DL (ref 3.4–5)
ALP BLD-CCNC: 409 U/L (ref 40–129)
ALT SERPL-CCNC: 24 U/L (ref 10–40)
AST SERPL-CCNC: 19 U/L (ref 15–37)
BILIRUB SERPL-MCNC: <0.2 MG/DL (ref 0–1)
BILIRUBIN DIRECT: <0.2 MG/DL (ref 0–0.3)
BILIRUBIN, INDIRECT: ABNORMAL MG/DL (ref 0–1)
GLUCOSE BLD-MCNC: 123 MG/DL (ref 70–99)
GLUCOSE BLD-MCNC: 165 MG/DL (ref 70–99)
GLUCOSE BLD-MCNC: 203 MG/DL (ref 70–99)
GLUCOSE BLD-MCNC: 208 MG/DL (ref 70–99)
HCT VFR BLD CALC: 30.5 % (ref 36–48)
HEMOGLOBIN: 10 G/DL (ref 12–16)
HEMOGLOBIN: 10.1 G/DL (ref 12–16)
MCH RBC QN AUTO: 31.4 PG (ref 26–34)
MCHC RBC AUTO-ENTMCNC: 33 G/DL (ref 31–36)
MCV RBC AUTO: 95.1 FL (ref 80–100)
PDW BLD-RTO: 19.8 % (ref 12.4–15.4)
PERFORMED ON: ABNORMAL
PLATELET # BLD: 285 K/UL (ref 135–450)
PMV BLD AUTO: 8 FL (ref 5–10.5)
RBC # BLD: 3.21 M/UL (ref 4–5.2)
TOTAL PROTEIN: 6.3 G/DL (ref 6.4–8.2)
WBC # BLD: 7.7 K/UL (ref 4–11)

## 2019-05-04 PROCEDURE — 1200000000 HC SEMI PRIVATE

## 2019-05-04 PROCEDURE — 2580000003 HC RX 258: Performed by: HOSPITALIST

## 2019-05-04 PROCEDURE — 36415 COLL VENOUS BLD VENIPUNCTURE: CPT

## 2019-05-04 PROCEDURE — 6370000000 HC RX 637 (ALT 250 FOR IP): Performed by: HOSPITALIST

## 2019-05-04 PROCEDURE — 90945 DIALYSIS ONE EVALUATION: CPT

## 2019-05-04 PROCEDURE — 99253 IP/OBS CNSLTJ NEW/EST LOW 45: CPT | Performed by: PSYCHIATRY & NEUROLOGY

## 2019-05-04 PROCEDURE — 6370000000 HC RX 637 (ALT 250 FOR IP): Performed by: INTERNAL MEDICINE

## 2019-05-04 PROCEDURE — G0378 HOSPITAL OBSERVATION PER HR: HCPCS

## 2019-05-04 PROCEDURE — 85018 HEMOGLOBIN: CPT

## 2019-05-04 PROCEDURE — 3E1M39Z IRRIGATION OF PERITONEAL CAVITY USING DIALYSATE, PERCUTANEOUS APPROACH: ICD-10-PCS | Performed by: INTERNAL MEDICINE

## 2019-05-04 PROCEDURE — 6370000000 HC RX 637 (ALT 250 FOR IP): Performed by: PSYCHIATRY & NEUROLOGY

## 2019-05-04 PROCEDURE — 94760 N-INVAS EAR/PLS OXIMETRY 1: CPT

## 2019-05-04 PROCEDURE — A4722 DIALYS SOL FLD VOL > 1999CC: HCPCS | Performed by: INTERNAL MEDICINE

## 2019-05-04 PROCEDURE — 2060000000 HC ICU INTERMEDIATE R&B

## 2019-05-04 PROCEDURE — 85027 COMPLETE CBC AUTOMATED: CPT

## 2019-05-04 PROCEDURE — 80076 HEPATIC FUNCTION PANEL: CPT

## 2019-05-04 PROCEDURE — 6360000002 HC RX W HCPCS: Performed by: NURSE PRACTITIONER

## 2019-05-04 PROCEDURE — 2580000003 HC RX 258: Performed by: INTERNAL MEDICINE

## 2019-05-04 RX ORDER — PREGABALIN 75 MG/1
75 CAPSULE ORAL 2 TIMES DAILY
Status: DISCONTINUED | OUTPATIENT
Start: 2019-05-04 | End: 2019-05-05

## 2019-05-04 RX ORDER — DICYCLOMINE HYDROCHLORIDE 10 MG/ML
20 INJECTION INTRAMUSCULAR 4 TIMES DAILY PRN
Status: DISCONTINUED | OUTPATIENT
Start: 2019-05-04 | End: 2019-05-07 | Stop reason: HOSPADM

## 2019-05-04 RX ORDER — LUBIPROSTONE 8 UG/1
24 CAPSULE, GELATIN COATED ORAL 2 TIMES DAILY WITH MEALS
Status: DISCONTINUED | OUTPATIENT
Start: 2019-05-04 | End: 2019-05-07 | Stop reason: HOSPADM

## 2019-05-04 RX ORDER — HYDROCORTISONE 0.5 %
CREAM (GRAM) TOPICAL 4 TIMES DAILY PRN
Status: DISCONTINUED | OUTPATIENT
Start: 2019-05-04 | End: 2019-05-07 | Stop reason: HOSPADM

## 2019-05-04 RX ORDER — MIRTAZAPINE 15 MG/1
15 TABLET, FILM COATED ORAL NIGHTLY
Status: DISCONTINUED | OUTPATIENT
Start: 2019-05-04 | End: 2019-05-07 | Stop reason: HOSPADM

## 2019-05-04 RX ADMIN — CARVEDILOL 3.12 MG: 3.12 TABLET, FILM COATED ORAL at 18:35

## 2019-05-04 RX ADMIN — CARVEDILOL 3.12 MG: 3.12 TABLET, FILM COATED ORAL at 09:46

## 2019-05-04 RX ADMIN — MIRTAZAPINE 15 MG: 15 TABLET, FILM COATED ORAL at 20:32

## 2019-05-04 RX ADMIN — DICYCLOMINE HYDROCHLORIDE 20 MG: 20 INJECTION, SOLUTION INTRAMUSCULAR at 02:45

## 2019-05-04 RX ADMIN — PREGABALIN 75 MG: 75 CAPSULE ORAL at 11:37

## 2019-05-04 RX ADMIN — SODIUM CHLORIDE, SODIUM LACTATE, CALCIUM CHLORIDE, MAGNESIUM CHLORIDE AND DEXTROSE 5000 ML: 1.5; 538; 448; 18.3; 5.08 INJECTION, SOLUTION INTRAPERITONEAL at 00:16

## 2019-05-04 RX ADMIN — Medication 10 ML: at 20:32

## 2019-05-04 RX ADMIN — CETIRIZINE HYDROCHLORIDE 5 MG: 10 TABLET, FILM COATED ORAL at 09:46

## 2019-05-04 RX ADMIN — INSULIN LISPRO 2 UNITS: 100 INJECTION, SOLUTION INTRAVENOUS; SUBCUTANEOUS at 20:51

## 2019-05-04 RX ADMIN — ISOSORBIDE MONONITRATE 120 MG: 60 TABLET, EXTENDED RELEASE ORAL at 09:46

## 2019-05-04 RX ADMIN — INSULIN LISPRO 2 UNITS: 100 INJECTION, SOLUTION INTRAVENOUS; SUBCUTANEOUS at 13:17

## 2019-05-04 RX ADMIN — SODIUM CHLORIDE, SODIUM LACTATE, CALCIUM CHLORIDE, MAGNESIUM CHLORIDE AND DEXTROSE 5000 ML: 1.5; 538; 448; 18.3; 5.08 INJECTION, SOLUTION INTRAPERITONEAL at 20:37

## 2019-05-04 RX ADMIN — SODIUM CHLORIDE, SODIUM LACTATE, CALCIUM CHLORIDE, MAGNESIUM CHLORIDE AND DEXTROSE 5000 ML: 2.5; 538; 448; 18.3; 5.08 INJECTION, SOLUTION INTRAPERITONEAL at 20:37

## 2019-05-04 RX ADMIN — PANTOPRAZOLE SODIUM 40 MG: 40 TABLET, DELAYED RELEASE ORAL at 09:45

## 2019-05-04 RX ADMIN — SEVELAMER CARBONATE 2400 MG: 800 TABLET, FILM COATED ORAL at 13:17

## 2019-05-04 RX ADMIN — INSULIN LISPRO 4 UNITS: 100 INJECTION, SOLUTION INTRAVENOUS; SUBCUTANEOUS at 09:47

## 2019-05-04 RX ADMIN — ATORVASTATIN CALCIUM 20 MG: 20 TABLET, FILM COATED ORAL at 20:31

## 2019-05-04 RX ADMIN — Medication 10 ML: at 09:45

## 2019-05-04 RX ADMIN — CINACALCET HYDROCHLORIDE 120 MG: 30 TABLET, COATED ORAL at 09:45

## 2019-05-04 RX ADMIN — ACETAMINOPHEN 650 MG: 325 TABLET ORAL at 09:46

## 2019-05-04 RX ADMIN — SODIUM CHLORIDE, SODIUM LACTATE, CALCIUM CHLORIDE, MAGNESIUM CHLORIDE AND DEXTROSE 5000 ML: 2.5; 538; 448; 18.3; 5.08 INJECTION, SOLUTION INTRAPERITONEAL at 00:15

## 2019-05-04 RX ADMIN — ACETAMINOPHEN 650 MG: 325 TABLET ORAL at 05:08

## 2019-05-04 RX ADMIN — PANTOPRAZOLE SODIUM 40 MG: 40 TABLET, DELAYED RELEASE ORAL at 18:37

## 2019-05-04 RX ADMIN — LUBIPROSTONE 24 MCG: 8 CAPSULE, GELATIN COATED ORAL at 18:35

## 2019-05-04 RX ADMIN — DOCUSATE SODIUM 100 MG: 100 CAPSULE, LIQUID FILLED ORAL at 09:46

## 2019-05-04 RX ADMIN — PREGABALIN 75 MG: 75 CAPSULE ORAL at 20:32

## 2019-05-04 RX ADMIN — SEVELAMER CARBONATE 2400 MG: 800 TABLET, FILM COATED ORAL at 09:46

## 2019-05-04 RX ADMIN — SEVELAMER CARBONATE 2400 MG: 800 TABLET, FILM COATED ORAL at 18:35

## 2019-05-04 ASSESSMENT — PAIN DESCRIPTION - ORIENTATION
ORIENTATION: RIGHT
ORIENTATION: RIGHT

## 2019-05-04 ASSESSMENT — PAIN SCALES - GENERAL
PAINLEVEL_OUTOF10: 0
PAINLEVEL_OUTOF10: 3
PAINLEVEL_OUTOF10: 8
PAINLEVEL_OUTOF10: 0

## 2019-05-04 ASSESSMENT — PAIN DESCRIPTION - LOCATION
LOCATION: FOOT
LOCATION: FOOT

## 2019-05-04 ASSESSMENT — PAIN DESCRIPTION - DESCRIPTORS
DESCRIPTORS: TINGLING;NUMBNESS
DESCRIPTORS: TINGLING;NUMBNESS;BURNING

## 2019-05-04 ASSESSMENT — PAIN - FUNCTIONAL ASSESSMENT: PAIN_FUNCTIONAL_ASSESSMENT: ACTIVITIES ARE NOT PREVENTED

## 2019-05-04 ASSESSMENT — PAIN DESCRIPTION - PAIN TYPE
TYPE: NEUROPATHIC PAIN
TYPE: NEUROPATHIC PAIN

## 2019-05-04 ASSESSMENT — PAIN DESCRIPTION - ONSET
ONSET: PROGRESSIVE
ONSET: PROGRESSIVE

## 2019-05-04 ASSESSMENT — PAIN DESCRIPTION - FREQUENCY
FREQUENCY: CONTINUOUS
FREQUENCY: CONTINUOUS

## 2019-05-04 NOTE — PLAN OF CARE
Problem: Nutrition  Goal: Optimal nutrition therapy  Outcome: Ongoing     Nutrition Problem: Unintended weight loss  Intervention: Food and/or Nutrient Delivery: Continue current diet, Start ONS  Nutritional Goals:  Tolerate diet and consume greater than 50% of meals and supplements

## 2019-05-04 NOTE — PROGRESS NOTES
[facility]    Patient resting comfortably  Her rectal bleeding is blood dripping into the commode in the absence of a BM; by definition, this is perianal bleeding, likely hemorrhoidal  She has baseline constipation and takes 24 mcg Amitiza bid; if she does not take this, she does not have BMs  She believes her last colonoscopy was in 2010  She will need a repeat colonoscopy at some point, but this would be purely elective

## 2019-05-04 NOTE — CONSULTS
PSYCHIATRY CONSULT, INITIAL EVALUATION    Referring Provider:  Gilmar Ewing MD    CC/Reason for Consult: suicidal ideation      ASSESSMENT:   54 yo F with depression, hx of cocaine abuse. She denies any SI at this time, future oriented. Gets the thoughts at times but talks herself out of them. Seems she was much more stable when regularly taking mirtazapine and has overall done well with this medication since it was initiated 2 yrs ago, although she needs to maintain regular use of it or she will be a risk for relapse. Certainly we can't rule out substance induced mood issues as this has been an issue in the past, but denies and cocaine use and does not look clinically in intox/withdrawal state. 1. Major depressive disorder, recurrent, moderate  2. Hx of cocaine abuse    RECOMMENDATIONS:   1. Will resume mirtazapine 15mg qhs  2. Counseled on risk of getting off antidepressants and need for maintenance therapy and possible dose adjustments or augmenting agents if not improved after 4-6 weeks. 3. Recommended continued f/u with  at [facility] and getting connected with a psychiatrist through [facility]. 4. D/c 1:1 sitter  5. Continue to engage with [employer] support system. Dispo: Does not require inpatient psych admission  Safety: RF include mood disorder, hx of substance abuse, hx of self harm, chronic medical issues, single. Pt is moderate risk of future dangerousness to self, however at this time she is clinically stable, sober, with reasonable follow up plan, denies SI and is future oriented. Thank you for this consult, please call the psychiatry consult line for further questions. I will sign off at this time. ____________________________________________________________________________    HPI:   context: 54 yo F with ESRD on PD, depression, hx of cocaine abuse, presented here with n/v. She had left her ECF facility for 2 days and subsequently was not getting her regular PD treatments.  She reported having suicidal ideation in the ED so psychiatry was asked to see the patient to assess safety. associated symptoms: depressed mood, anhedonia, fatigue, reduced appetite. Reports occasional thoughts about overdosing on pills when she is under stress, thoughts aren't constant, she denies intent to act on these. She wants to be around for her two sons and looks forward to continuing to be a part of her life. modifying factors: overwhelmed with medical issues - difficult in current living situation away from her home. wasn't getting remeron for the last 2 months at the [facility]. She didn't think it was a big deal, but then noticed getting progressively more depressed during that time. She feels the medication was a \"miracle drug\" - really helped her mood, appetite, sleep and found herself doing quite well on it. States she left the ECF b/c she wanted to check on her home. Hadn't been there for 2 months. Missed being there. [place] a sense of comfort there and ended up staying longer than intended. [facility] She understands she can't live at her home by herself, ok with going back to [facility] or other supportive living environment. Has good support from [employer] family. Denies any cocaine use. Sees her  at [facility] every month. Timing: acute on chronic  duration: 1-2 months  severity: moderate    ROS:   Gen: no fevers or chills  HEENT: no vision or hearing problems  CV: no cp, no palpitations  Resp: no dyspnea  : no dysuria  MSK: no muscle/joint pain  GI: +n, no v/d  Skin: no rashes  Neuro: no numbness/weakness  Endo: no tremors. +appetite loss    Past Psychiatric History:   Recommended for inpt psych in 2017 by Dr. Evan Mohan, couldn't be placed d/t being on PD. She did start to improve on the medical unit and was d/c'd home. Was started on mirtazapine and fluoxetine at that time. Has been seen a couple times on the consult service for SI 2018.    Diagnoses: depression, cocaine abuse, r/o substance induced mood disorder  Med trials: paxil, fluoxetine, duloxetine, mirtazapine, zoloft, celexa  Outpt: [facility], has not been set up with a psychiatrist there  Suicide Attempts: twice as a child    Substance Use History:  Illicits: hx of cocaine use. Previously reported roughly on a monthly basis. 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 She denies use prior to this encounter.      Past Medical History:   Past Medical History:   Diagnosis Date    Cerebral artery occlusion with cerebral infarction [facility]) 2010    Chronic kidney disease     [facility]aine abuse ([facility]     Crohn's disease ([facility]     Depression     Diabetes mellitus ([facility]     ESRD (end stage renal disease) ([facility]     GERD (gastroesophageal reflux disease)     Hemodialysis patient ([facility] 2016    peritoneal     Hyperlipidemia     Hypertension     MI, old     Mood disorder ([facility]     Pericarditis     Peritoneal dialysis catheter in place ([facility]     Peritonitis ([facility]     Pneumonia     Thyroid disease      Past Surgical History:   Procedure Laterality Date    BACK SURGERY      Tumor removal    CHOLECYSTECTOMY      KIDNEY TRANSPLANT  1990    KIDNEY TRANSPLANT      LAPAROSCOPY      right ovary removed and left ovarian cyst removed for endometriosis    OTHER SURGICAL HISTORY      peritoneal dialysis catheter    PARACENTESIS      TUBAL LIGATION      UPPER GASTROINTESTINAL ENDOSCOPY  03/31/2017    UPPER GASTROINTESTINAL ENDOSCOPY N/A 3/25/2019    EGD BIOPSY performed by Josefina Calvo MD at [address] History:   Social History     Socioeconomic History    Marital status:      Spouse name: None    Number of children: 2    Years of education: None    Highest education level: None   Occupational History    Occupation: volunteer   Social Needs    Financial resource strain: None    Food insecurity:     Worry: None     Inability: None    Transportation needs: (CLARITIN) 10 MG tablet Take 10 mg by mouth daily as needed (Allergies)      insulin aspart (NOVOLOG FLEXPEN) 100 UNIT/ML injection pen Inject into the skin 2 times daily (with meals) 151-200 = Give 1 unit  201-250 = Give 2 units  251-300 = Give 3 units  301-350 = Give 4 units  351-400 = Give 5 units      atorvastatin (LIPITOR) 20 MG tablet Take 1 tablet by mouth nightly 30 tablet 0    carvedilol (COREG) 3.125 MG tablet Take 1 tablet by mouth 2 times daily (with meals) 60 tablet 3    gentamicin (GARAMYCIN) 0.1 % cream Apply to PD dialysis catheter when accessing the site. Apply topically 3 times daily.  1 Tube 0    lactulose (CHRONULAC) 10 GM/15ML solution Take 45 mLs by mouth 2 times daily as needed (Constipation resistant to other methods.) 1 Bottle 1    pantoprazole (PROTONIX) 40 MG tablet Take 1 tablet by mouth 2 times daily 60 tablet 1    sucralfate (CARAFATE) 1 GM/10ML suspension Take 10 mLs by mouth 2 times daily for 5 days 50 mL 1    isosorbide mononitrate (IMDUR) 60 MG extended release tablet Take 120 mg by mouth daily      melatonin 5 MG TABS tablet Take 5 mg by mouth nightly as needed (Sleep)      NIFEdipine (ADALAT CC) 90 MG extended release tablet Take 90 mg by mouth daily      sevelamer (RENVELA) 800 MG tablet Take 3 tablets by mouth 3 times daily (with meals)      senna-docusate (PERICOLACE) 8.6-50 MG per tablet Take 1 tablet by mouth 2 times daily as needed for Constipation      cinacalcet (SENSIPAR) 30 MG tablet Take 120 mg by mouth daily      lubiprostone (AMITIZA) 24 MCG capsule Take 24 mcg by mouth 2 times daily (with meals)       darbepoetin hina-polysorbate (ARANESP, ALBUMIN FREE,) 100 MCG/0.5ML SOSY injection Inject 200 mcg into the skin once a week Las t dose was 1 week ago      losartan (COZAAR) 100 MG tablet Take 100 mg by mouth daily         Medications:  Scheduled Meds:   pregabalin  75 mg Oral BID    atorvastatin  20 mg Oral Nightly    carvedilol  3.125 mg Oral BID   cinacalcet  120 mg Oral Daily    insulin lispro  0-12 Units Subcutaneous TID WC    insulin lispro  0-6 Units Subcutaneous Nightly    isosorbide mononitrate  120 mg Oral Daily    cetirizine  5 mg Oral Daily    sevelamer  2,400 mg Oral TID WC    sodium chloride flush  10 mL Intravenous 2 times per day    pantoprazole  40 mg Oral BID AC    docusate sodium  100 mg Oral BID    dianeal lo-felicita 1.5%  5,000 mL Intraperitoneal Nightly    And    dianeal lo-felicita 2.5%  5,000 mL Intraperitoneal Nightly     PRN Meds:.dicyclomine, lactulose, sodium chloride flush, acetaminophen, ondansetron, melatonin, glucose, dextrose, glucagon (rDNA), dextrose, loperamide, menthol-zinc oxide    OBJECTIVE:  .  Vitals:    05/04/19 0406 05/04/19 0724 05/04/19 1035 05/04/19 1158   BP: (!) 97/54 125/66     Pulse: 88 82     Resp: 16 16  16   Temp: 98.2 °F (36.8 °C) 98 °F (36.7 °C)     TempSrc: Oral Oral     SpO2: 96% 97%  96%   Weight: 121 lb 14.6 oz (55.3 kg)      Height:   5' 7\" (1.702 m)        MSE:   Appearance    alert, cooperative  Motor: Normal strength and tone, No abnormal movements, tics or mannerisms.   Speech    spontaneous, normal rate and normal volume  Language    0 - no aphasia, normal  Mood/Affect    Depressed / full quality, good motility and range  Thought Process    linear, goal directed and coherent  Thought Content    intact , denies SI currently, future oriented, no intent or plan for self harm at this time  Associations    logical connections  Attention/Concentration    intact  Orientation    oriented to person, place, time, and general circumstances  Memory    recent and remote memory intact  Fund of Knowledge    intact  Insight/Judgement    Good / fair    Labs:   Recent Labs     05/03/19  0935 05/03/19  1634 05/04/19  0247 05/04/19  0731   WBC 15.2*  --   --  7.7   HGB 12.1 11.2* 10.0* 10.1*   HCT 37.9  --   --  30.5*   MCV 94.6  --   --  95.1     --   --  285     Recent Labs     05/03/19  0935      K

## 2019-05-04 NOTE — PLAN OF CARE
No new evidence of skin breakdown. Will continue to monitor closely. Patient has been free from falls. Room is free from clutter. Non-skid footwear on while out of bed. Call light within reach. Calls for assistance appropriately. Patient is not complaining of any pain at this time. Patient is in suicide precautions with a sitter at the bedside. The patient states she's \"just got a lot going on. \" Psychiatry consult in place. Will continue to monitor.

## 2019-05-04 NOTE — PROGRESS NOTES
Nutrition Assessment    Type and Reason for Visit: Initial, Positive Nutrition Screen    Nutrition Recommendations:   Continue diet free of therapeutic restrictions   Nepro BID  Will monitor nutritional adequacy, nutrition-related labs, weights, BMs, and clinical progress     Nutrition Assessment: Pt is nutritionally compromised r/t weight loss over last couple years, increased needs associated with PD. Further nutrition compromise possible r/t clinical status (possible GI bleed, suicide ideation- mood possibly affecting po). Does accept Nepro well usually. Malnutrition Assessment:  · Malnutrition Status: Insufficient data    Nutrition Risk Level: High    Nutrient Needs:  · Estimated Daily Total Kcal: 6516-2968 kcal (28-30 kcal/kg ABW)  · Estimated Daily Protein (g): 71-83 gm (1.3-1.5 gm/kg ABW)  · Estimated Daily Total Fluid (ml/day): per provider     Nutrition Diagnosis:   · Problem: Unintended weight loss  · Etiology: related to Insufficient energy/nutrient consumption     Signs and symptoms:  as evidenced by Weight loss greater than or equal to 7.5% in 3 months    Objective Information:  · Nutrition-Focused Physical Findings: MARA, with other staff during visit   · Wound Type: None  · Current Nutrition Therapies:  · Oral Diet Orders: General   · Oral Diet intake: %  · Oral Nutrition Supplement (ONS) Orders: None  · Anthropometric Measures:  · Ht: 5' 7\" (170.2 cm)   · Current Body Wt: 121 lb (54.9 kg)  · Usual Body Wt: 141 lb (64 kg)(recently )  · % Weight Change:  ,  -14% x 3 months   · Ideal Body Wt: 135 lb (61.2 kg),  · BMI Classification: BMI 18.5 - 24.9 Normal Weight    Nutrition Interventions:   Continue current diet, Start ONS  Continued Inpatient Monitoring    Nutrition Evaluation:   · Evaluation: Goals set   · Goals:  Tolerate diet and consume greater than 50% of meals and supplements     · Monitoring: Meal Intake, Supplement Intake, Diet Tolerance, Mental Status/Confusion, Weight, Pertinent Labs      Electronically signed by Jazzy Mazariegos RD, LD on 5/4/19 at 11:08 AM    Contact Number: [phone]

## 2019-05-04 NOTE — PROGRESS NOTES
Hospitalist Progress Note  5/4/2019 11:09 AM    PCP: Jarvis Simms MD    [id number]     Date of Admission: 5/3/2019                                                                                                                     HOSPITAL COURSE    Patient demographics:  The patient  Arti Mahoney is a 53 y.o. female     Significant past medical history:   Patient Active Problem List   Diagnosis    Depression    [facility]    Hyperkalemia    Metabolic acidosis    Essential hypertension    DMII (diabetes mellitus, type 2) ([facility]    Cellulitis    Cellulitis of lower extremity    Erythema nodosum    Chest pain    Failure to thrive in adult    Abnormal stress test    ESRD (end stage renal disease) on dialysis ([facility]    Suicidal ideation    NSTEMI (non-ST elevated myocardial infarction) ([facility]    Vaginal candidiasis    Vaginal bleeding    Vaginal discharge    Atypical chest pain    Severe episode of recurrent major depressive disorder, without psychotic features ([facility]    Anxiety disorder    Cocaine abuse ([facility]    Anemia    Illicit drug use    Hyperkalemia    Peritoneal dialysis status ([facility]    Hyperlipidemia    Chronic focal neurological deficit    Nausea and vomiting    Moderate malnutrition (HCC)    GI bleed         Presenting symptoms:  Nausea, Rectal Bleeding, Headache        Diagnostic workup:      CONSULTS DURING ADMISSION :   IP CONSULT TO GI  IP CONSULT TO NEPHROLOGY  IP CONSULT TO SPIRITUAL SERVICES  IP CONSULT TO PSYCHIATRY      Patient was diagnosed with:        Treatment while inpatient:                                                                                         ----------------------------------------------------------      SUBJECTIVE COMPLAINTS- follow-up for GI bleed    Diet: DIET GENERAL;      OBJECTIVE:   Patient Active Problem List   Diagnosis    Depression    ESRD [facility] hemodialysis ([facility]    Hyperkalemia    Metabolic acidosis    Essential hypertension    [facility] 2) [facility]    Cellulitis    Cellulitis of lower extremity    Erythema nodosum    Chest pain    Failure to thrive in adult    Abnormal stress test    ESRD (end stage renal disease) on dialysis ([facility]    Suicidal ideation    NSTEMI (non-ST elevated myocardial infarction) ([facility])    Vaginal candidiasis    Vaginal bleeding    Vaginal discharge    Atypical chest pain    [facility]    Anxiety disorder    [facility] abuse [facility]    Anemia    Illicit drug use    Hyperkalemia    Peritoneal dialysis status ([facility])    Hyperlipidemia    Chronic focal neurological deficit    Nausea and vomiting    Moderate malnutrition ([facility])    GI bleed       Allergies  Ferrous sulfate; Cephalexin; Gabapentin; Dicloxacillin; Pcn [penicillins]; and Sulfa antibiotics    Medications    Scheduled Meds:   atorvastatin  20 mg Oral Nightly    carvedilol  3.125 mg Oral BID WC    cinacalcet  120 mg Oral Daily    insulin lispro  0-12 Units Subcutaneous TID WC    insulin lispro  0-6 Units Subcutaneous Nightly    isosorbide mononitrate  120 mg Oral Daily    cetirizine  5 mg Oral Daily    sevelamer  2,400 mg Oral TID WC    sodium chloride flush  10 mL Intravenous 2 times per day    pantoprazole  40 mg Oral BID AC    docusate sodium  100 mg Oral BID    dianeal lo-felicita 1.5%  5,000 mL Intraperitoneal Nightly    And    dianeal lo-felicita 2.5%  5,000 mL Intraperitoneal Nightly     Continuous Infusions:   dextrose       PRN Meds:  dicyclomine, lactulose, sodium chloride flush, acetaminophen, ondansetron, melatonin, glucose, dextrose, glucagon (rDNA), dextrose, loperamide, menthol-zinc oxide    Vitals   Vitals /wt   Patient Vitals for the past 8 hrs:   BP Temp Temp src Pulse Resp SpO2 Height Weight   05/04/19 1035 -- -- -- -- -- -- 5' 7\" (1.702 m) --   05/04/19 0724 125/66 98 °F (36.7 °C) Oral 82 16 97 % -- -- 05/04/19 0406 (!) 97/54 98.2 °F (36.8 °C) Oral 88 16 96 % -- 121 lb 14.6 oz (55.3 kg)        72HR INTAKE/OUTPUT:      Intake/Output Summary (Last 24 hours) at 5/4/2019 1109  Last data filed at 5/4/2019 2625  Gross per 24 hour   Intake 720 ml   Output 35 ml   Net 685 ml       Exam:    Gen:   Alert and oriented ×3   Eyes: PERRL. No sclera icterus. No conjunctival injection. ENT: No discharge. Pharynx clear. External appearance of ears and nose normal.  Neck: Trachea midline. No obvious mass. Resp: No accessory muscle use. No crackles. No wheezes. No rhonchi. CV: Regular rate. Regular rhythm. No murmur or rub. No edema. GI: Non-tender. Non-distended. No hernia. Skin: Warm, dry, normal texture and turgor. Lymph: No cervical LAD. No supraclavicular LAD. M/S: / Ext. No cyanosis. No clubbing. No joint deformity. Neuro: CN 2-12 are intact,  no neurologic deficits noted. PT/INR: No results for input(s): PROTIME, INR in the last 72 hours. APTT: No results for input(s): APTT in the last 72 hours. CBC:   Recent Labs     05/03/19  0935 05/03/19  1634 05/04/19  0247 05/04/19  0731   WBC 15.2*  --   --  7.7   HGB 12.1 11.2* 10.0* 10.1*   HCT 37.9  --   --  30.5*   MCV 94.6  --   --  95.1     --   --  285       BMP:   Recent Labs     05/03/19  0935      K 4.7   CL 94*   CO2 18*   BUN 70*   CREATININE 14.7*       LIVER PROFILE:   Recent Labs     05/03/19  0935 05/04/19  0731   ALKPHOS 505* 409*   AST 40* 19   ALT 39 24   BILIDIR  --  <0.2   BILITOT 0.4 <0.2     No results for input(s): AMYLASE in the last 72 hours. Recent Labs     05/03/19  0935   LIPASE 30.0       UA:No results for input(s): NITRITE, LABCAST, WBCUA, RBCUA, MUCUS in the last 72 hours. TROPONIN: No results for input(s): Delcie Harvard in the last 72 hours. No results found for: URRFLXCULT    No results for input(s): TSHREFLEX in the last 72 hours.     No components found for: [id number]  POC GLUCOSE:    Recent Labs 05/03/19  1544 05/03/19  1846 05/03/19  2128 05/04/19  0754   POCGLU 140* 235* 260* 203*     No results for input(s): LABA1C in the last 72 hours. Lab Results   Component Value Date    LABA1C 6.4 07/05/2017         ASSESSMENT AND PLAN  GI bleed  Likely hemorrhoidal  Protonix twice a day  Discontinue checking hemoglobin every 8 hours  GI plans to do colonoscopy as an outpatient as an elective procedure     Suicidal ideation  Started on mirtazapine  Patient does not need inpatient psych admission  Patient is clinically stable      End-stage renal disease  Patient on peritoneal dialysis  Consults nephrology     Type 2 diabetes mellitus E11.9  Insulin sliding scale                       Code Status: Full Code        Dispo - continue care        The patient and / or the family were informed of the results of any tests, a time was given to answer questions, a plan was proposed and they agreed with plan. Eldridge Bumpers, MD    This note was transcribed using [id number] [employer]. Please disregard any translational errors.

## 2019-05-05 LAB
BASOPHILS ABSOLUTE: 0 K/UL (ref 0–0.2)
BASOPHILS RELATIVE PERCENT: 0.5 %
EOSINOPHILS ABSOLUTE: 0.2 K/UL (ref 0–0.6)
EOSINOPHILS RELATIVE PERCENT: 3.2 %
GLUCOSE BLD-MCNC: 144 MG/DL (ref 70–99)
GLUCOSE BLD-MCNC: 201 MG/DL (ref 70–99)
GLUCOSE BLD-MCNC: 210 MG/DL (ref 70–99)
GLUCOSE BLD-MCNC: 237 MG/DL (ref 70–99)
HCT VFR BLD CALC: 32.3 % (ref 36–48)
HEMOGLOBIN: 10.6 G/DL (ref 12–16)
LYMPHOCYTES ABSOLUTE: 2 K/UL (ref 1–5.1)
LYMPHOCYTES RELATIVE PERCENT: 26.9 %
MCH RBC QN AUTO: 30.8 PG (ref 26–34)
MCHC RBC AUTO-ENTMCNC: 32.8 G/DL (ref 31–36)
MCV RBC AUTO: 93.8 FL (ref 80–100)
MONOCYTES ABSOLUTE: 0.7 K/UL (ref 0–1.3)
MONOCYTES RELATIVE PERCENT: 9.1 %
NEUTROPHILS ABSOLUTE: 4.5 K/UL (ref 1.7–7.7)
NEUTROPHILS RELATIVE PERCENT: 60.3 %
PDW BLD-RTO: 19.7 % (ref 12.4–15.4)
PERFORMED ON: ABNORMAL
PLATELET # BLD: 314 K/UL (ref 135–450)
PMV BLD AUTO: 8.4 FL (ref 5–10.5)
RBC # BLD: 3.44 M/UL (ref 4–5.2)
WBC # BLD: 7.4 K/UL (ref 4–11)

## 2019-05-05 PROCEDURE — 94760 N-INVAS EAR/PLS OXIMETRY 1: CPT

## 2019-05-05 PROCEDURE — 90945 DIALYSIS ONE EVALUATION: CPT

## 2019-05-05 PROCEDURE — 6370000000 HC RX 637 (ALT 250 FOR IP): Performed by: NURSE PRACTITIONER

## 2019-05-05 PROCEDURE — G0378 HOSPITAL OBSERVATION PER HR: HCPCS

## 2019-05-05 PROCEDURE — 6370000000 HC RX 637 (ALT 250 FOR IP): Performed by: INTERNAL MEDICINE

## 2019-05-05 PROCEDURE — 2060000000 HC ICU INTERMEDIATE R&B

## 2019-05-05 PROCEDURE — 6370000000 HC RX 637 (ALT 250 FOR IP): Performed by: PSYCHIATRY & NEUROLOGY

## 2019-05-05 PROCEDURE — 85025 COMPLETE CBC W/AUTO DIFF WBC: CPT

## 2019-05-05 PROCEDURE — 6360000002 HC RX W HCPCS: Performed by: HOSPITALIST

## 2019-05-05 PROCEDURE — 6370000000 HC RX 637 (ALT 250 FOR IP): Performed by: HOSPITALIST

## 2019-05-05 PROCEDURE — 36415 COLL VENOUS BLD VENIPUNCTURE: CPT

## 2019-05-05 PROCEDURE — 1200000000 HC SEMI PRIVATE

## 2019-05-05 PROCEDURE — 2580000003 HC RX 258: Performed by: INTERNAL MEDICINE

## 2019-05-05 PROCEDURE — 2580000003 HC RX 258: Performed by: HOSPITALIST

## 2019-05-05 PROCEDURE — A4722 DIALYS SOL FLD VOL > 1999CC: HCPCS | Performed by: INTERNAL MEDICINE

## 2019-05-05 RX ORDER — PREGABALIN 50 MG/1
50 CAPSULE ORAL 2 TIMES DAILY
Status: DISCONTINUED | OUTPATIENT
Start: 2019-05-05 | End: 2019-05-07 | Stop reason: HOSPADM

## 2019-05-05 RX ORDER — DIAPER,BRIEF,INFANT-TODD,DISP
EACH MISCELLANEOUS 2 TIMES DAILY
Status: DISCONTINUED | OUTPATIENT
Start: 2019-05-05 | End: 2019-05-07 | Stop reason: HOSPADM

## 2019-05-05 RX ORDER — SODIUM CHLORIDE, SODIUM LACTATE, CALCIUM CHLORIDE, MAGNESIUM CHLORIDE AND DEXTROSE 1.5; 538; 448; 18.3; 5.08 G/100ML; MG/100ML; MG/100ML; MG/100ML; MG/100ML
4000 INJECTION, SOLUTION INTRAPERITONEAL NIGHTLY
Status: DISCONTINUED | OUTPATIENT
Start: 2019-05-05 | End: 2019-05-07

## 2019-05-05 RX ORDER — SODIUM CHLORIDE, SODIUM LACTATE, CALCIUM CHLORIDE, MAGNESIUM CHLORIDE AND DEXTROSE 1.5; 538; 448; 18.3; 5.08 G/100ML; MG/100ML; MG/100ML; MG/100ML; MG/100ML
6000 INJECTION, SOLUTION INTRAPERITONEAL NIGHTLY
Status: DISCONTINUED | OUTPATIENT
Start: 2019-05-05 | End: 2019-05-07 | Stop reason: HOSPADM

## 2019-05-05 RX ORDER — SODIUM CHLORIDE, SODIUM LACTATE, CALCIUM CHLORIDE, MAGNESIUM CHLORIDE AND DEXTROSE 1.5; 538; 448; 18.3; 5.08 G/100ML; MG/100ML; MG/100ML; MG/100ML; MG/100ML
12000 INJECTION, SOLUTION INTRAPERITONEAL NIGHTLY
Status: DISCONTINUED | OUTPATIENT
Start: 2019-05-05 | End: 2019-05-05

## 2019-05-05 RX ORDER — MIRTAZAPINE 15 MG/1
15 TABLET, FILM COATED ORAL NIGHTLY
Qty: 30 TABLET | Refills: 3 | Status: ON HOLD | OUTPATIENT
Start: 2019-05-05 | End: 2022-05-17 | Stop reason: CLARIF

## 2019-05-05 RX ORDER — PREGABALIN 75 MG/1
75 CAPSULE ORAL 2 TIMES DAILY
Qty: 60 CAPSULE | Refills: 0 | Status: ON HOLD | OUTPATIENT
Start: 2019-05-05 | End: 2022-05-17 | Stop reason: CLARIF

## 2019-05-05 RX ADMIN — LOPERAMIDE HYDROCHLORIDE 2 MG: 2 CAPSULE ORAL at 16:25

## 2019-05-05 RX ADMIN — Medication 10 ML: at 08:42

## 2019-05-05 RX ADMIN — ACETAMINOPHEN 650 MG: 325 TABLET ORAL at 18:50

## 2019-05-05 RX ADMIN — INSULIN LISPRO 2 UNITS: 100 INJECTION, SOLUTION INTRAVENOUS; SUBCUTANEOUS at 21:45

## 2019-05-05 RX ADMIN — PANTOPRAZOLE SODIUM 40 MG: 40 TABLET, DELAYED RELEASE ORAL at 16:25

## 2019-05-05 RX ADMIN — SEVELAMER CARBONATE 2400 MG: 800 TABLET, FILM COATED ORAL at 16:25

## 2019-05-05 RX ADMIN — CARVEDILOL 3.12 MG: 3.12 TABLET, FILM COATED ORAL at 16:25

## 2019-05-05 RX ADMIN — INSULIN LISPRO 4 UNITS: 100 INJECTION, SOLUTION INTRAVENOUS; SUBCUTANEOUS at 08:41

## 2019-05-05 RX ADMIN — SODIUM CHLORIDE, SODIUM LACTATE, CALCIUM CHLORIDE, MAGNESIUM CHLORIDE AND DEXTROSE 6000 ML: 1.5; 538; 448; 18.3; 5.08 INJECTION, SOLUTION INTRAPERITONEAL at 21:44

## 2019-05-05 RX ADMIN — INSULIN LISPRO 2 UNITS: 100 INJECTION, SOLUTION INTRAVENOUS; SUBCUTANEOUS at 17:30

## 2019-05-05 RX ADMIN — PANTOPRAZOLE SODIUM 40 MG: 40 TABLET, DELAYED RELEASE ORAL at 08:42

## 2019-05-05 RX ADMIN — PREGABALIN 75 MG: 75 CAPSULE ORAL at 08:42

## 2019-05-05 RX ADMIN — SEVELAMER CARBONATE 2400 MG: 800 TABLET, FILM COATED ORAL at 08:42

## 2019-05-05 RX ADMIN — MIRTAZAPINE 15 MG: 15 TABLET, FILM COATED ORAL at 21:44

## 2019-05-05 RX ADMIN — ONDANSETRON 4 MG: 2 INJECTION INTRAMUSCULAR; INTRAVENOUS at 18:51

## 2019-05-05 RX ADMIN — HYDROCORTISONE: 1 CREAM TOPICAL at 23:31

## 2019-05-05 RX ADMIN — LUBIPROSTONE 24 MCG: 8 CAPSULE, GELATIN COATED ORAL at 08:42

## 2019-05-05 RX ADMIN — CINACALCET HYDROCHLORIDE 120 MG: 30 TABLET, COATED ORAL at 08:41

## 2019-05-05 RX ADMIN — CARVEDILOL 3.12 MG: 3.12 TABLET, FILM COATED ORAL at 08:42

## 2019-05-05 RX ADMIN — ISOSORBIDE MONONITRATE 120 MG: 60 TABLET, EXTENDED RELEASE ORAL at 08:42

## 2019-05-05 RX ADMIN — INSULIN LISPRO 6 UNITS: 100 INJECTION, SOLUTION INTRAVENOUS; SUBCUTANEOUS at 12:16

## 2019-05-05 RX ADMIN — ATORVASTATIN CALCIUM 20 MG: 20 TABLET, FILM COATED ORAL at 21:44

## 2019-05-05 RX ADMIN — CETIRIZINE HYDROCHLORIDE 5 MG: 10 TABLET, FILM COATED ORAL at 08:42

## 2019-05-05 RX ADMIN — PREGABALIN 50 MG: 50 CAPSULE ORAL at 21:44

## 2019-05-05 RX ADMIN — SEVELAMER CARBONATE 2400 MG: 800 TABLET, FILM COATED ORAL at 12:16

## 2019-05-05 RX ADMIN — Medication 10 ML: at 21:44

## 2019-05-05 RX ADMIN — DOCUSATE SODIUM 100 MG: 100 CAPSULE, LIQUID FILLED ORAL at 08:42

## 2019-05-05 ASSESSMENT — PAIN DESCRIPTION - PAIN TYPE: TYPE: ACUTE PAIN

## 2019-05-05 ASSESSMENT — PAIN SCALES - GENERAL
PAINLEVEL_OUTOF10: 0
PAINLEVEL_OUTOF10: 3
PAINLEVEL_OUTOF10: 0

## 2019-05-05 ASSESSMENT — PAIN DESCRIPTION - DESCRIPTORS: DESCRIPTORS: ACHING

## 2019-05-05 ASSESSMENT — PAIN DESCRIPTION - LOCATION: LOCATION: HEAD

## 2019-05-05 NOTE — PLAN OF CARE
No new evidence of skin breakdown. Will continue to monitor closely. Patient is not complaining of any pain at this time. Patient has been free from falls. Room is free from clutter. Non-skid footwear on while out of bed. Call light within reach. Calls for assistance appropriately.

## 2019-05-05 NOTE — PROGRESS NOTES
Hospitalist Progress Note  5/5/2019 7:52 AM    PCP: David Kilpatrick MD    [id number]     Date of Admission: 5/3/2019                                                                                                                     HOSPITAL COURSE    Patient demographics:  The patient  Osborne Galeazzi is a 53 y.o. female     Significant past medical history:   Patient Active Problem List   Diagnosis    Depression with suicidal ideation    [facility]    Hyperkalemia    Metabolic acidosis    Essential hypertension    DMII (diabetes mellitus, type 2) ([facility]    Cellulitis    Cellulitis of lower extremity    Erythema nodosum    Chest pain    Failure to thrive in adult    Abnormal stress test    ESRD (end stage renal disease) on dialysis ([facility]    Suicidal ideation    NSTEMI (non-ST elevated myocardial infarction) ([facility]    Vaginal candidiasis    Vaginal bleeding    Vaginal discharge    Atypical chest pain    Severe episode of recurrent major depressive disorder, without psychotic features ([facility]    Anxiety disorder    Cocaine abuse ([facility]    Anemia    Illicit drug use    Hyperkalemia    Peritoneal dialysis status ([facility]    Hyperlipidemia    Chronic focal neurological deficit    Nausea and vomiting    Moderate malnutrition (HCC)    GI bleed         Presenting symptoms:  Nausea, Rectal Bleeding, Headache        Diagnostic workup:      CONSULTS DURING ADMISSION :   IP CONSULT TO GI  IP CONSULT TO NEPHROLOGY  IP CONSULT TO SPIRITUAL SERVICES  IP CONSULT TO PSYCHIATRY      Patient was diagnosed with:        Treatment while inpatient:  The patient presented to the emergency room with lower GI bleed. GI was consulted and according to their evaluation most likely it is due to hemorrhoids and constipation. Patient normally takes diabetes. Patient also reported anxiety depression and suicidal ideation. Patient was evaluated by psychiatry.   Patient's psychiatric medication was adjusted but patient does not need inpatient hospital care. Patient is on peritoneal dialysis for which nephrology followed the patient in the hospital patient is to continue her peritoneal dialysis at the extended care facility.   Patient complaint of bilateral lower extremity pain which is most likely due to peripheral neuropathy for which patient was started on Lyrica and patient reported some improvement of her pain.                                                                ----------------------------------------------------------      SUBJECTIVE COMPLAINTS- follow-up for GI bleed    Diet: DIET GENERAL;  Dietary Nutrition Supplements: Renal Oral Supplement      OBJECTIVE:   Patient Active Problem List   Diagnosis    Depression with suicidal ideation    [facility]    Hyperkalemia    Metabolic acidosis    Essential hypertension    DMII (diabetes mellitus, type 2) ([facility]    Cellulitis    Cellulitis of lower extremity    Erythema nodosum    Chest pain    Failure to thrive in adult    Abnormal stress test    ESRD (end stage renal disease) on dialysis ([facility]    Suicidal ideation    NSTEMI (non-ST elevated myocardial infarction) ([facility]    Vaginal candidiasis    Vaginal bleeding    Vaginal discharge    Atypical chest pain    Severe episode of recurrent major depressive disorder, without psychotic features ([facility]    Anxiety disorder    Cocaine abuse ([facility]    Anemia    Illicit drug use    Hyperkalemia    Peritoneal dialysis status (HCC)    Hyperlipidemia    Chronic focal neurological deficit    Nausea and vomiting    Moderate malnutrition (HCC)    GI bleed       Allergies  Ferrous sulfate; Cephalexin; Gabapentin; Dicloxacillin; Pcn [penicillins]; and Sulfa antibiotics    Medications    Scheduled Meds:   pregabalin  75 mg Oral BID    mirtazapine  15 mg Oral Nightly    lubiprostone  24 mcg Oral BID WC    atorvastatin  20 mg Oral Nightly    carvedilol Labs     05/03/19  0935 05/03/19  1634 05/04/19  0247 05/04/19  0731 05/05/19  0540   WBC 15.2*  --   --  7.7 7.4   HGB 12.1 11.2* 10.0* 10.1* 10.6*   HCT 37.9  --   --  30.5* 32.3*   MCV 94.6  --   --  95.1 93.8     --   --  285 314       BMP:   Recent Labs     05/03/19  0935      K 4.7   CL 94*   CO2 18*   BUN 70*   CREATININE 14.7*       LIVER PROFILE:   Recent Labs     05/03/19  0935 05/04/19  0731   ALKPHOS 505* 409*   AST 40* 19   ALT 39 24   BILIDIR  --  <0.2   BILITOT 0.4 <0.2     No results for input(s): AMYLASE in the last 72 hours. Recent Labs     05/03/19  0935   LIPASE 30.0       UA:No results for input(s): NITRITE, LABCAST, WBCUA, RBCUA, MUCUS in the last 72 hours. TROPONIN: No results for input(s): Sloane Jubilee in the last 72 hours. No results found for: URRFLXCULT    No results for input(s): TSHREFLEX in the last 72 hours. No components found for: PCA7080  POC GLUCOSE:    Recent Labs     05/04/19  0754 05/04/19  1129 05/04/19  1623 05/04/19  2033 05/05/19  0734   POCGLU 203* 165* 123* 208* 210*     No results for input(s): LABA1C in the last 72 hours. Lab Results   Component Value Date    LABA1C 6.4 07/05/2017         ASSESSMENT AND PLAN  GI bleed  Likely hemorrhoidal  Protonix twice a day  Discontinue checking hemoglobin every 8 hours  GI plans to do colonoscopy as an outpatient as an elective procedure     Suicidal ideation  Started on mirtazapine  Patient does not need inpatient psych admission  Patient is clinically stable      Peripheral neuropathy  Start patient on Lyrica      End-stage renal disease  Patient on peritoneal dialysis  Consults nephrology     Type 2 diabetes mellitus E11.9  Insulin sliding scale                       Code Status: Full Code        Dispo - extended CARE facility in a.m.         The patient and / or the family were informed of the results of any tests, a time was given to answer questions, a plan was proposed and they agreed with

## 2019-05-05 NOTE — FLOWSHEET NOTE
Pt expressed a positive desire to overcome her medical issues and stated, \"I know God has his hand on me. \"  Pt mentioned some goals she hopes to fulfill. 05/05/19 1159   Encounter Summary   Services provided to: Patient   Referral/Consult From: Nurse   Support System Children;Yazidi/willian community   Place of [facility]  (per pt Orthodoxy already knows)   Continue Visiting   (visit, prayer, blessing, AD doc discussion)   Complexity of Encounter Moderate   Length of Encounter 45 minutes   Spiritual/Denominational   Type Spiritual support   Assessment Calm; Approachable; Hopeful;Coping   Intervention Active listening;Explored feelings, thoughts, concerns;Prayer;Discussed relationship with God;Discussed belief system/Mandaen practices/willian;Discussed illness/injury and it's impact  (life review)   Outcome Expressed gratitude;Engaged in conversation;Coping; Hopeful   Advance Directives (For Healthcare)   Healthcare Directive No, patient does not have an advance directive for healthcare treatment   Advance Directives Documents given;Documents explained   Electronically signed by Keegan Reeves on 5/5/2019 at 12:06 PM

## 2019-05-05 NOTE — PROGRESS NOTES
Nephrology ([facility]) Progress Note    CC: ESRD management    Subjective:    HPI:  Breathing comfortably. No CP. Having diarrhea. ROS:  In bed. [address]:  medications reviewed. Awaiting precert for [facility]. Objective:  Blood pressure 124/64, pulse 91, temperature 98.7 °F (37.1 °C), temperature source Oral, resp. rate 16, height 5' 7\" (1.702 m), weight 136 lb 14.5 oz (62.1 kg), SpO2 97 %, not currently breastfeeding. Intake/Output Summary (Last 24 hours) at 5/5/2019 1709  Last data filed at 5/5/2019 1201  Gross per 24 hour   Intake 240 ml   Output 859 ml   Net -619 ml     General:  NAD, A+Ox3  Chest:  CTAB  CVS:  RRR  Abdominal:  NTND, soft, +BS  Extremities:  no edema  Skin:  no rash    Labs:  Renal panel:  Lab Results   Component Value Date/Time     05/03/2019 09:35 AM    K 4.7 05/03/2019 09:35 AM    CO2 18 (L) 05/03/2019 09:35 AM    BUN 70 (H) 05/03/2019 09:35 AM    CREATININE 14.7 (HH) 05/03/2019 09:35 AM    CALCIUM 9.7 05/03/2019 09:35 AM    PHOS 3.9 03/29/2019 05:21 AM    MG 3.40 (H) 05/03/2019 09:35 AM     CBC:  Lab Results   Component Value Date/Time    WBC 7.4 05/05/2019 05:40 AM    HGB 10.6 (L) 05/05/2019 05:40 AM    HCT 32.3 (L) 05/05/2019 05:40 AM     05/05/2019 05:40 AM       Assessment/Plan:  Reviewed old records and labs. 1) ESRD              - [facility] nephrology patient              - continue PD outpatient regimen with all 1.5% dianeal over 10 hours with 5 exchanges of 2 liters     2) GI              - perianal bleeding   - outpt elective colonoscopy     3) depression              - per psychiatry     4) non-compliance     The patient has poor insight and should not be on PD. The only way she can/should do PD is in a supervised NH setting. Otherwise, she should be forced to do HD. Personally, I do not believe the patient is capable of living independently. The patient can be discharged. I suspect patient is here for secondary gain.

## 2019-05-05 NOTE — CARE COORDINATION
ROSALINA spoke with Morgan Rai (ANTHONY at [facility]) who states she will need a precert to return to [facility] as she called Rachel Harrison to check this out. She will initiate a precert on Monday.   [place], [place]     Case Management   [phone]    5/5/2019  2:59 PM

## 2019-05-05 NOTE — CARE COORDINATION
ROSALINA met with patient to discuss DC planning. She reports she is aware of DC for today and plans to return to [facility]. SANDYW informed her that this worker has already spoken with DON at [facility], who reports they will need to have a precert in order for her to return. They cannot start this until Monday. Patient states she left the facility on Friday and went to [facility] ED. Notes in the chart state she left [facility] on Wed and stayed home for a few days before coming to ED at [facility] DR USMAN CONTRERAS. She approved of this plan.   [place], [place]     Case Management   [phone]    5/5/2019  3:45 PM

## 2019-05-05 NOTE — PLAN OF CARE
Problem: Falls - Risk of:  Goal: Will remain free from falls  Description  Will remain free from falls  Outcome: Ongoing  Goal: Absence of physical injury  Description  Absence of physical injury  Outcome: Ongoing     Problem: Pain:  Goal: Pain level will decrease  Description  Pain level will decrease  Outcome: Ongoing  Goal: Control of acute pain  Description  Control of acute pain  Outcome: Ongoing  Goal: Control of chronic pain  Description  Control of chronic pain  Outcome: Ongoing       Problem: SAFETY  Goal: Free from accidental physical injury  Outcome: Ongoing  Goal: Free from intentional harm  Outcome: Ongoing     Problem: DAILY CARE  Goal: Daily care needs are met  Outcome: Ongoing     Problem: SKIN INTEGRITY  Goal: Skin integrity is maintained or improved  Outcome: Ongoing     Problem: KNOWLEDGE DEFICIT  Goal: Patient/S.O. demonstrates understanding of disease process, treatment plan, medications, and discharge instructions.   Outcome: Ongoing     Problem: DISCHARGE BARRIERS  Goal: Patient's continuum of care needs are met  Outcome: Ongoing   Problem: SAFETY  Goal: Free from accidental physical injury  Outcome: Ongoing  Goal: Free from intentional harm  Outcome: Ongoing

## 2019-05-06 LAB
BASOPHILS ABSOLUTE: 0.1 K/UL (ref 0–0.2)
BASOPHILS RELATIVE PERCENT: 0.8 %
EOSINOPHILS ABSOLUTE: 0.2 K/UL (ref 0–0.6)
EOSINOPHILS RELATIVE PERCENT: 3.1 %
GLUCOSE BLD-MCNC: 149 MG/DL (ref 70–99)
GLUCOSE BLD-MCNC: 185 MG/DL (ref 70–99)
GLUCOSE BLD-MCNC: 214 MG/DL (ref 70–99)
GLUCOSE BLD-MCNC: 268 MG/DL (ref 70–99)
HCT VFR BLD CALC: 31.2 % (ref 36–48)
HEMOGLOBIN: 10.2 G/DL (ref 12–16)
LYMPHOCYTES ABSOLUTE: 2.6 K/UL (ref 1–5.1)
LYMPHOCYTES RELATIVE PERCENT: 35.7 %
MCH RBC QN AUTO: 30.8 PG (ref 26–34)
MCHC RBC AUTO-ENTMCNC: 32.6 G/DL (ref 31–36)
MCV RBC AUTO: 94.6 FL (ref 80–100)
MONOCYTES ABSOLUTE: 0.7 K/UL (ref 0–1.3)
MONOCYTES RELATIVE PERCENT: 9.1 %
NEUTROPHILS ABSOLUTE: 3.7 K/UL (ref 1.7–7.7)
NEUTROPHILS RELATIVE PERCENT: 51.3 %
PDW BLD-RTO: 19.4 % (ref 12.4–15.4)
PERFORMED ON: ABNORMAL
PLATELET # BLD: 289 K/UL (ref 135–450)
PMV BLD AUTO: 8.7 FL (ref 5–10.5)
RBC # BLD: 3.3 M/UL (ref 4–5.2)
WBC # BLD: 7.2 K/UL (ref 4–11)

## 2019-05-06 PROCEDURE — 6370000000 HC RX 637 (ALT 250 FOR IP): Performed by: PSYCHIATRY & NEUROLOGY

## 2019-05-06 PROCEDURE — 6370000000 HC RX 637 (ALT 250 FOR IP): Performed by: PHYSICIAN ASSISTANT

## 2019-05-06 PROCEDURE — 90945 DIALYSIS ONE EVALUATION: CPT

## 2019-05-06 PROCEDURE — 6370000000 HC RX 637 (ALT 250 FOR IP): Performed by: INTERNAL MEDICINE

## 2019-05-06 PROCEDURE — G0378 HOSPITAL OBSERVATION PER HR: HCPCS

## 2019-05-06 PROCEDURE — 2580000003 HC RX 258: Performed by: HOSPITALIST

## 2019-05-06 PROCEDURE — 36415 COLL VENOUS BLD VENIPUNCTURE: CPT

## 2019-05-06 PROCEDURE — A4722 DIALYS SOL FLD VOL > 1999CC: HCPCS | Performed by: INTERNAL MEDICINE

## 2019-05-06 PROCEDURE — 2580000003 HC RX 258: Performed by: INTERNAL MEDICINE

## 2019-05-06 PROCEDURE — 6370000000 HC RX 637 (ALT 250 FOR IP): Performed by: HOSPITALIST

## 2019-05-06 PROCEDURE — 94760 N-INVAS EAR/PLS OXIMETRY 1: CPT

## 2019-05-06 PROCEDURE — 6360000002 HC RX W HCPCS: Performed by: HOSPITALIST

## 2019-05-06 PROCEDURE — 2060000000 HC ICU INTERMEDIATE R&B

## 2019-05-06 PROCEDURE — 1200000000 HC SEMI PRIVATE

## 2019-05-06 PROCEDURE — 85025 COMPLETE CBC W/AUTO DIFF WBC: CPT

## 2019-05-06 RX ORDER — LACTULOSE 10 G/15ML
20 SOLUTION ORAL DAILY
Status: DISCONTINUED | OUTPATIENT
Start: 2019-05-06 | End: 2019-05-07 | Stop reason: HOSPADM

## 2019-05-06 RX ORDER — CALCIUM CARBONATE 200(500)MG
500 TABLET,CHEWABLE ORAL ONCE
Status: COMPLETED | OUTPATIENT
Start: 2019-05-06 | End: 2019-05-06

## 2019-05-06 RX ADMIN — INSULIN LISPRO 2 UNITS: 100 INJECTION, SOLUTION INTRAVENOUS; SUBCUTANEOUS at 17:09

## 2019-05-06 RX ADMIN — CINACALCET HYDROCHLORIDE 120 MG: 30 TABLET, COATED ORAL at 08:41

## 2019-05-06 RX ADMIN — INSULIN LISPRO 6 UNITS: 100 INJECTION, SOLUTION INTRAVENOUS; SUBCUTANEOUS at 08:42

## 2019-05-06 RX ADMIN — SODIUM CHLORIDE, SODIUM LACTATE, CALCIUM CHLORIDE, MAGNESIUM CHLORIDE AND DEXTROSE 6000 ML: 1.5; 538; 448; 18.3; 5.08 INJECTION, SOLUTION INTRAPERITONEAL at 21:37

## 2019-05-06 RX ADMIN — ACETAMINOPHEN 650 MG: 325 TABLET ORAL at 05:25

## 2019-05-06 RX ADMIN — ONDANSETRON 4 MG: 2 INJECTION INTRAMUSCULAR; INTRAVENOUS at 14:39

## 2019-05-06 RX ADMIN — DOCUSATE SODIUM 100 MG: 100 CAPSULE, LIQUID FILLED ORAL at 21:22

## 2019-05-06 RX ADMIN — PREGABALIN 50 MG: 50 CAPSULE ORAL at 08:41

## 2019-05-06 RX ADMIN — INSULIN LISPRO 4 UNITS: 100 INJECTION, SOLUTION INTRAVENOUS; SUBCUTANEOUS at 12:26

## 2019-05-06 RX ADMIN — MIRTAZAPINE 15 MG: 15 TABLET, FILM COATED ORAL at 21:22

## 2019-05-06 RX ADMIN — ISOSORBIDE MONONITRATE 120 MG: 60 TABLET, EXTENDED RELEASE ORAL at 08:41

## 2019-05-06 RX ADMIN — PANTOPRAZOLE SODIUM 40 MG: 40 TABLET, DELAYED RELEASE ORAL at 15:47

## 2019-05-06 RX ADMIN — SEVELAMER CARBONATE 2400 MG: 800 TABLET, FILM COATED ORAL at 17:09

## 2019-05-06 RX ADMIN — SEVELAMER CARBONATE 2400 MG: 800 TABLET, FILM COATED ORAL at 12:26

## 2019-05-06 RX ADMIN — ANTACID TABLETS 500 MG: 500 TABLET, CHEWABLE ORAL at 21:22

## 2019-05-06 RX ADMIN — PREGABALIN 50 MG: 50 CAPSULE ORAL at 21:22

## 2019-05-06 RX ADMIN — SEVELAMER CARBONATE 2400 MG: 800 TABLET, FILM COATED ORAL at 08:41

## 2019-05-06 RX ADMIN — INSULIN LISPRO 1 UNITS: 100 INJECTION, SOLUTION INTRAVENOUS; SUBCUTANEOUS at 21:23

## 2019-05-06 RX ADMIN — LUBIPROSTONE 24 MCG: 8 CAPSULE, GELATIN COATED ORAL at 17:09

## 2019-05-06 RX ADMIN — Medication 10 ML: at 08:42

## 2019-05-06 RX ADMIN — HYDROCORTISONE: 1 CREAM TOPICAL at 09:36

## 2019-05-06 RX ADMIN — Medication 10 ML: at 21:23

## 2019-05-06 RX ADMIN — CARVEDILOL 3.12 MG: 3.12 TABLET, FILM COATED ORAL at 08:41

## 2019-05-06 RX ADMIN — ATORVASTATIN CALCIUM 20 MG: 20 TABLET, FILM COATED ORAL at 21:22

## 2019-05-06 RX ADMIN — PANTOPRAZOLE SODIUM 40 MG: 40 TABLET, DELAYED RELEASE ORAL at 05:25

## 2019-05-06 RX ADMIN — CETIRIZINE HYDROCHLORIDE 5 MG: 10 TABLET, FILM COATED ORAL at 08:41

## 2019-05-06 RX ADMIN — LUBIPROSTONE 24 MCG: 8 CAPSULE, GELATIN COATED ORAL at 09:36

## 2019-05-06 RX ADMIN — DOCUSATE SODIUM 100 MG: 100 CAPSULE, LIQUID FILLED ORAL at 08:41

## 2019-05-06 RX ADMIN — LACTULOSE 20 G: 20 SOLUTION ORAL at 15:47

## 2019-05-06 RX ADMIN — CARVEDILOL 3.12 MG: 3.12 TABLET, FILM COATED ORAL at 17:09

## 2019-05-06 RX ADMIN — ACETAMINOPHEN 650 MG: 325 TABLET ORAL at 19:58

## 2019-05-06 ASSESSMENT — PAIN SCALES - GENERAL
PAINLEVEL_OUTOF10: 6
PAINLEVEL_OUTOF10: 0
PAINLEVEL_OUTOF10: 4
PAINLEVEL_OUTOF10: 0
PAINLEVEL_OUTOF10: 0
PAINLEVEL_OUTOF10: 7

## 2019-05-06 ASSESSMENT — PAIN - FUNCTIONAL ASSESSMENT: PAIN_FUNCTIONAL_ASSESSMENT: ACTIVITIES ARE NOT PREVENTED

## 2019-05-06 ASSESSMENT — PAIN DESCRIPTION - FREQUENCY
FREQUENCY: INTERMITTENT
FREQUENCY: INTERMITTENT

## 2019-05-06 ASSESSMENT — PAIN DESCRIPTION - DESCRIPTORS
DESCRIPTORS: HEADACHE
DESCRIPTORS: HEADACHE

## 2019-05-06 ASSESSMENT — PAIN SCALES - WONG BAKER
WONGBAKER_NUMERICALRESPONSE: 4
WONGBAKER_NUMERICALRESPONSE: 6

## 2019-05-06 ASSESSMENT — PAIN DESCRIPTION - PROGRESSION
CLINICAL_PROGRESSION: GRADUALLY WORSENING
CLINICAL_PROGRESSION: GRADUALLY WORSENING
CLINICAL_PROGRESSION: GRADUALLY IMPROVING

## 2019-05-06 ASSESSMENT — PAIN DESCRIPTION - ONSET
ONSET: AWAKENED FROM SLEEP
ONSET: GRADUAL

## 2019-05-06 ASSESSMENT — PAIN DESCRIPTION - PAIN TYPE
TYPE: ACUTE PAIN
TYPE: ACUTE PAIN

## 2019-05-06 ASSESSMENT — PAIN DESCRIPTION - LOCATION
LOCATION: HEAD
LOCATION: HEAD

## 2019-05-06 NOTE — DISCHARGE INSTR - COC
Continuity of Care Form    Patient Name: Abril Bernard   :  1975  MRN:  [id number]    Admit date:  5/3/2019  Discharge date:  2019    Code Status Order: Full Code   Advance Directives:   Advance Care Flowsheet Documentation     Date/Time Healthcare Directive Type of Healthcare Directive Copy in [address] Agent's Name Healthcare Agent's Phone Number    19 1159  No, patient does not have an advance directive for healthcare treatment -- -- -- -- --    19 1404  No, patient does not have an advance directive for healthcare treatment -- -- -- -- --          Admitting Physician:  Brynn Reno MD  PCP: Mo Colunga MD    Discharging Nurse: [facility] Unit/Room#: D7I-9187/5126-01  Discharging Unit Phone Number: [phone]    Emergency Contact:   Extended Emergency Contact Information  Primary Emergency Contact: Jena Maldonado   [address] Phone: [phone]  Relation: Brother/Sister    Past Surgical History:  Past Surgical History:   Procedure Laterality Date    BACK SURGERY      Tumor removal    CHOLECYSTECTOMY      KIDNEY TRANSPLANT      KIDNEY TRANSPLANT      LAPAROSCOPY      right ovary removed and left ovarian cyst removed for endometriosis    OTHER SURGICAL HISTORY      peritoneal dialysis catheter    PARACENTESIS      TUBAL LIGATION      UPPER GASTROINTESTINAL ENDOSCOPY  2017    UPPER GASTROINTESTINAL ENDOSCOPY N/A 3/25/2019    EGD BIOPSY performed by Harini Escoto MD at [facility] ENDOSCOPY       Immunization History: There is no immunization history on file for this patient.     Active Problems:  Patient Active Problem List   Diagnosis Code    Depression with suicidal ideation F32.9, R45.851    ESRD on hemodialysis ([facility] N18.6, Z99.2    Hyperkalemia W17.2    Metabolic acidosis D63.6    Essential hypertension I10    DMII (diabetes mellitus, type 2) ([facility] E11.9    Cellulitis L03.90    Cellulitis of lower extremity L03.119    Erythema nodosum L52    Chest pain R07.9    Failure to thrive in adult R62.7    Abnormal stress test R94.39    ESRD (end stage renal disease) on dialysis ([facility] N18.6, Z99.2    Suicidal ideation R45.851    NSTEMI (non-ST elevated myocardial infarction) ([facility]) I21.4    Vaginal candidiasis B37.3    Vaginal bleeding N93.9    Vaginal discharge N89.8    Atypical chest pain R07.89    Severe episode of recurrent major depressive disorder, without psychotic features ([facility] F33.2    Anxiety disorder F41.9    Cocaine abuse ([facility]) F14.10    Anemia O16.3    Illicit drug use S27.82    Hyperkalemia E87.5    Peritoneal dialysis status ([facility]) Z99.2    Hyperlipidemia E78.5    Chronic focal neurological deficit R29.818    Nausea and vomiting R11.2    Moderate malnutrition ([facility]) E44.0    GI bleed K92.2       Isolation/Infection:   Isolation          No Isolation            Nurse Assessment:  Last Vital Signs: /71   Pulse 81   Temp 98.7 °F (37.1 °C) (Oral)   Resp 20   Ht 5' 7\" (1.702 m)   Wt 136 lb 14.5 oz (62.1 kg)   SpO2 98%   Breastfeeding? No   BMI 21.44 kg/m²     Last documented pain score (0-10 scale): Pain Level: 0  Last Weight:   Wt Readings from Last 1 Encounters:   05/05/19 136 lb 14.5 oz (62.1 kg)     Mental Status:  oriented and alert    IV Access:  - PD catheter    Nursing Mobility/ADLs:  Walking   Independent  Transfer  Independent  Bathing  Independent  Dressing  Independent  [address] Delivery   none    Wound Care Documentation and Therapy:        Elimination:  Continence:   · Bowel: Yes  · Bladder: Yes  Urinary Catheter: None   Colostomy/Ileostomy/Ileal Conduit: No       Date of Last BM: 5/6    Intake/Output Summary (Last 24 hours) at 5/6/2019 1036  Last data filed at 5/6/2019 0842  Gross per 24 hour   Intake 720 ml   Output 107 ml   Net 613 ml     I/O last 3 completed shifts:   In: 480 [P.O.:480]  Out: -     Safety Concerns:     None    Impairments/Disabilities:      None    Nutrition Therapy:  Current Nutrition Therapy:   - Oral Diet:  Carb Control 4 carbs/meal (1800kcals/day)    Routes of Feeding: Oral  Liquids: Thin Liquids  Daily Fluid Restriction: no  Last Modified Barium Swallow with Video (Video Swallowing Test): not done    Treatments at the Time of Hospital Discharge:   Respiratory Treatments: ***  Oxygen Therapy:  is not on home oxygen therapy. Ventilator:    - No ventilator support    Rehab Therapies: none  Weight Bearing Status/Restrictions: No weight bearing restirctions  Other Medical Equipment (for information only, NOT a DME order):  {EQUIPMENT:[id number]}  Other Treatments: PD    Patient's personal belongings (please select all that are sent with patient):  John Wilburn, radha, cell phone    RN SIGNATURE:  Electronically signed by Nida Sandoval RN on 5/7/19 at 2:18 PM    CASE MANAGEMENT/SOCIAL WORK SECTION    Inpatient Status Date: 5/3/19    Readmission Risk Assessment Score:  Readmission Risk              Risk of Unplanned Readmission:        35           Discharging to Facility/ Agency   · Name: [facility]  · Address:  · Phone: [phone]  · Fax:    Dialysis Facility (if applicable)   · Name:  · Address:  · Dialysis Schedule:  · Phone:  · Fax:    / signature:  Electronically signed by SHAUNA Santiago, SANDYW on 5/7/19 at 1:21 PM      PHYSICIAN SECTION    Prognosis: Fair    Condition at Discharge: Stable    Rehab Potential (if transferring to Rehab): Good    Recommended Labs or Other Treatments After Discharge: ot/pt    Physician Certification: I certify the above information and transfer of Madhu Jerry  is necessary for the continuing treatment of the diagnosis listed and that she requires [facility] for less 30 days.      Update Admission H&P: No change in H&P    PHYSICIAN SIGNATURE:  Electronically signed by Counts include 234 beds at the [facility] Amirah Posada MD on 5/6/19 at 10:37 AM

## 2019-05-06 NOTE — PLAN OF CARE
Problem: Falls - Risk of:  Goal: Will remain free from falls  Description  Will remain free from falls  Outcome: Ongoing  Goal: Absence of physical injury  Description  Absence of physical injury  Outcome: Ongoing     Problem: Pain:  Goal: Pain level will decrease  Description  Pain level will decrease  Outcome: Ongoing  Goal: Control of acute pain  Description  Control of acute pain  Outcome: Ongoing  Goal: Control of chronic pain  Description  Control of chronic pain  Outcome: Ongoing     Problem: SAFETY  Goal: Free from accidental physical injury  Outcome: Ongoing  Goal: Free from intentional harm  Outcome: Ongoing     Problem: DAILY CARE  Goal: Daily care needs are met  Outcome: Ongoing     Problem: SKIN INTEGRITY  Goal: Skin integrity is maintained or improved  Outcome: Ongoing     Problem: KNOWLEDGE DEFICIT  Goal: Patient/S.O. demonstrates understanding of disease process, treatment plan, medications, and discharge instructions.   Outcome: Ongoing     Problem: DISCHARGE BARRIERS  Goal: Patient's continuum of care needs are met  Outcome: Ongoing

## 2019-05-06 NOTE — PROGRESS NOTES
Nephrology ([facility]) Progress Note    CC: ESRD management    Subjective:    HPI:  Breathing comfortably. No CP. Having diarrhea. ROS:  In bed. [address]:  medications reviewed. Awaiting precert for [facility]. Objective:  Blood pressure 138/71, pulse 81, temperature 98.7 °F (37.1 °C), temperature source Oral, resp. rate 20, height 5' 7\" (1.702 m), weight 136 lb 14.5 oz (62.1 kg), SpO2 98 %, not currently breastfeeding. Intake/Output Summary (Last 24 hours) at 5/6/2019 0956  Last data filed at 5/6/2019 0828  Gross per 24 hour   Intake 480 ml   Output 107 ml   Net 373 ml     General:  NAD, A+Ox3  Chest:  CTAB  CVS:  RRR  Abdominal:  NTND, soft, +BS  Extremities:  no edema  Skin:  no rash    Labs:  Renal panel:  Lab Results   Component Value Date/Time     05/03/2019 09:35 AM    K 4.7 05/03/2019 09:35 AM    CO2 18 (L) 05/03/2019 09:35 AM    BUN 70 (H) 05/03/2019 09:35 AM    CREATININE 14.7 (HH) 05/03/2019 09:35 AM    CALCIUM 9.7 05/03/2019 09:35 AM    PHOS 3.9 03/29/2019 05:21 AM    MG 3.40 (H) 05/03/2019 09:35 AM     CBC:  Lab Results   Component Value Date/Time    WBC 7.2 05/06/2019 05:12 AM    HGB 10.2 (L) 05/06/2019 05:12 AM    HCT 31.2 (L) 05/06/2019 05:12 AM     05/06/2019 05:12 AM       Assessment/Plan:  Reviewed old records and labs. 1) ESRD              - [facility] nephrology patient              - continue PD outpatient regimen with all 1.5% dianeal over 10 hours with 5 exchanges of 2 liters     2) GI              - perianal bleeding   - outpt elective colonoscopy     3) depression              - per psychiatry     4) non-compliance     The patient has poor insight and should not be on PD. The only way she can/should do PD is in a supervised NH setting. Otherwise, she should be forced to do HD. Personally, I do not believe the patient is capable of living independently. The patient can be discharged. I suspect patient is here for secondary gain.

## 2019-05-06 NOTE — CARE COORDINATION
Per Hunter Reis at [facility], pre-cert has been started. PT/OT is not needed for pre-cert. They will inform Sw once decision is known.      Be Cason, [address]  [phone]  5/6/19 at 11:28 AM

## 2019-05-06 NOTE — PROGRESS NOTES
Hospitalist Progress Note  5/6/2019 9:30 AM    PCP: Myles Menendez MD    [phone]     Date of Admission: 5/3/2019                                                                                                                     HOSPITAL COURSE    Patient demographics:  The patient  Millicent Cheema is a 53 y.o. female     Significant past medical history:   Patient Active Problem List   Diagnosis    Depression with suicidal ideation    [facility] ([facility]    Hyperkalemia    Metabolic acidosis    Essential hypertension    DMII (diabetes mellitus, type 2) ([facility]    Cellulitis    Cellulitis of lower extremity    Erythema nodosum    Chest pain    Failure to thrive in adult    Abnormal stress test    ESRD (end stage renal disease) on dialysis ([facility]    Suicidal ideation    NSTEMI (non-ST elevated myocardial infarction) ([facility]    Vaginal candidiasis    Vaginal bleeding    Vaginal discharge    Atypical chest pain    Severe episode of recurrent major depressive disorder, without psychotic features ([facility]    Anxiety disorder    Cocaine abuse ([facility]    Anemia    Illicit drug use    Hyperkalemia    Peritoneal dialysis status (HCC)    Hyperlipidemia    Chronic focal neurological deficit    Nausea and vomiting    Moderate malnutrition (HCC)    GI bleed         Presenting symptoms:  Nausea, Rectal Bleeding, Headache        Diagnostic workup:      CONSULTS DURING ADMISSION :   IP CONSULT TO GI  IP CONSULT TO NEPHROLOGY  IP CONSULT TO SPIRITUAL SERVICES  IP CONSULT TO PSYCHIATRY  IP CONSULT TO SOCIAL WORK      Patient was diagnosed with:        Treatment while inpatient:  The patient presented to the emergency room with lower GI bleed. GI was consulted and according to their evaluation most likely it is due to hemorrhoids and constipation. Patient normally takes diabetes. Patient also reported anxiety depression and suicidal ideation.   Patient was evaluated by psychiatry. Patient's psychiatric medication was adjusted but patient does not need inpatient hospital care. Patient is on peritoneal dialysis for which nephrology followed the patient in the hospital patient is to continue her peritoneal dialysis at the [facility] care facility.   Patient complaint of bilateral lower extremity pain which is most likely due to peripheral neuropathy for which patient was started on Lyrica and patient reported some improvement of her pain.                                                                ----------------------------------------------------------      SUBJECTIVE COMPLAINTS- follow-up for GI bleed    Diet: DIET GENERAL;  Dietary Nutrition Supplements: Renal Oral Supplement      OBJECTIVE:   Patient Active Problem List   Diagnosis    Depression with suicidal ideation    ESRD on hemodialysis ([address]    Hyperkalemia    Metabolic acidosis    Essential hypertension    DMII (diabetes mellitus, type 2) ([address]    Cellulitis    Cellulitis of lower extremity    Erythema nodosum    Chest pain    Failure to thrive in adult    Abnormal stress test    ESRD (end stage renal disease) on dialysis ([address]    Suicidal ideation    NSTEMI (non-ST elevated myocardial infarction) ([address]    Vaginal candidiasis    Vaginal bleeding    Vaginal discharge    Atypical chest pain    Severe episode of recurrent major depressive disorder, without psychotic features ([address]    Anxiety disorder    Cocaine abuse ([address]    Anemia    Illicit drug use    Hyperkalemia    Peritoneal dialysis status (HCC)    Hyperlipidemia    Chronic focal neurological deficit    Nausea and vomiting    Moderate malnutrition (HCC)    GI bleed       Allergies  Ferrous sulfate; Cephalexin; Gabapentin; Dicloxacillin; Pcn [penicillins]; and Sulfa antibiotics    Medications    Scheduled Meds:   pregabalin  50 mg Oral BID    dianeal lo-felicita 1.5%  6,000 mL Intraperitoneal Nightly    Or    dianeal lo-felicita 1.5%  4,000 mL Intraperitoneal Nightly    hydrocortisone   Topical BID    mirtazapine  15 mg Oral Nightly    lubiprostone  24 mcg Oral BID     atorvastatin  20 mg Oral Nightly    carvedilol  3.125 mg Oral BID     cinacalcet  120 mg Oral Daily    insulin lispro  0-12 Units Subcutaneous TID     insulin lispro  0-6 Units Subcutaneous Nightly    isosorbide mononitrate  120 mg Oral Daily    cetirizine  5 mg Oral Daily    sevelamer  2,400 mg Oral TID     sodium chloride flush  10 mL Intravenous 2 times per day    pantoprazole  40 mg Oral BID AC    docusate sodium  100 mg Oral BID     Continuous Infusions:   dextrose       PRN Meds:  dicyclomine, methyl salicylate-menthol, lactulose, sodium chloride flush, acetaminophen, ondansetron, melatonin, glucose, dextrose, glucagon (rDNA), dextrose, loperamide, menthol-zinc oxide    Vitals   Vitals /wt   Patient Vitals for the past 8 hrs:   BP Temp Temp src Pulse Resp SpO2   05/06/19 0805 138/71 98.7 °F (37.1 °C) Oral 81 20 98 %   05/06/19 0536 (!) 162/88 -- -- 81 -- --   05/06/19 0534 (!) 163/91 97.4 °F (36.3 °C) Oral 80 16 98 %        72HR INTAKE/OUTPUT:      Intake/Output Summary (Last 24 hours) at 5/6/2019 0930  Last data filed at 5/6/2019 0828  Gross per 24 hour   Intake 480 ml   Output 107 ml   Net 373 ml       Exam:    Gen:   Alert and oriented ×3   Eyes: PERRL. No sclera icterus. No conjunctival injection. ENT: No discharge. Pharynx clear. External appearance of ears and nose normal.  Neck: Trachea midline. No obvious mass. Resp: No accessory muscle use. No crackles. No wheezes. No rhonchi. CV: Regular rate. Regular rhythm. No murmur or rub. No edema. GI: Non-tender. Non-distended. No hernia. Skin: Warm, dry, normal texture and turgor. Lymph: No cervical LAD. No supraclavicular LAD. M/S: / Ext. No cyanosis. No clubbing. No joint deformity. Neuro: CN 2-12 are intact,  no neurologic deficits noted.     PT/INR: No results for input(s): PROTIME, INR in the last 72 hours. APTT: No results for input(s): APTT in the last 72 hours. CBC:   Recent Labs     05/03/19  0935  05/04/19  0247 05/04/19  0731 05/05/19  0540 05/06/19  0512   WBC 15.2*  --   --  7.7 7.4 7.2   HGB 12.1   < > 10.0* 10.1* 10.6* 10.2*   HCT 37.9  --   --  30.5* 32.3* 31.2*   MCV 94.6  --   --  95.1 93.8 94.6     --   --  285 314 289    < > = values in this interval not displayed. BMP:   Recent Labs     05/03/19  0935      K 4.7   CL 94*   CO2 18*   BUN 70*   CREATININE 14.7*       LIVER PROFILE:   Recent Labs     05/03/19  0935 05/04/19  0731   ALKPHOS 505* 409*   AST 40* 19   ALT 39 24   BILIDIR  --  <0.2   BILITOT 0.4 <0.2     No results for input(s): AMYLASE in the last 72 hours. Recent Labs     05/03/19  0935   LIPASE 30.0       UA:No results for input(s): NITRITE, LABCAST, WBCUA, RBCUA, MUCUS in the last 72 hours. TROPONIN: No results for input(s): Jesenia Dark in the last 72 hours. No results found for: URRFLXCULT    No results for input(s): TSHREFLEX in the last 72 hours. No components found for: [id number]  POC GLUCOSE:    Recent Labs     05/05/19  0734 05/05/19  1201 05/05/19  1650 05/05/19  2050 05/06/19  0740   POCGLU 210* 237* 144* 201* 268*     No results for input(s): LABA1C in the last 72 hours. Lab Results   Component Value Date    LABA1C 6.4 07/05/2017         ASSESSMENT AND PLAN  GI bleed  Likely hemorrhoidal  Discontinue Protonix  Discontinue checking hemoglobin every 8 hours  GI plans to do colonoscopy as an outpatient as an elective procedure     Suicidal ideation  Started on mirtazapine  Patient does not need inpatient psych admission  Patient is clinically stable      Peripheral neuropathy  Start patient on Lyrica      End-stage renal disease  Patient on peritoneal dialysis  Consults nephrology     Type 2 diabetes mellitus E11.9  Insulin sliding scale         Code Status: Full Code        Dispo - extended CARE facility in a.m.         The patient and / or the family were informed of the results of any tests, a time was given to answer questions, a plan was proposed and they agreed with plan. Krys Rae MD    This note was transcribed using "Shahab P. Tabatabai, Broker". Please disregard any translational errors.

## 2019-05-07 VITALS
OXYGEN SATURATION: 98 % | WEIGHT: 136.91 LBS | HEIGHT: 67 IN | BODY MASS INDEX: 21.49 KG/M2 | TEMPERATURE: 99.6 F | DIASTOLIC BLOOD PRESSURE: 80 MMHG | HEART RATE: 99 BPM | SYSTOLIC BLOOD PRESSURE: 152 MMHG | RESPIRATION RATE: 18 BRPM

## 2019-05-07 LAB
BASOPHILS ABSOLUTE: 0 K/UL (ref 0–0.2)
BASOPHILS RELATIVE PERCENT: 0.5 %
EOSINOPHILS ABSOLUTE: 0.2 K/UL (ref 0–0.6)
EOSINOPHILS RELATIVE PERCENT: 3.1 %
GLUCOSE BLD-MCNC: 218 MG/DL (ref 70–99)
GLUCOSE BLD-MCNC: 245 MG/DL (ref 70–99)
GLUCOSE BLD-MCNC: 282 MG/DL (ref 70–99)
HCT VFR BLD CALC: 30.6 % (ref 36–48)
HEMOGLOBIN: 9.9 G/DL (ref 12–16)
LYMPHOCYTES ABSOLUTE: 2 K/UL (ref 1–5.1)
LYMPHOCYTES RELATIVE PERCENT: 28.9 %
MCH RBC QN AUTO: 30.6 PG (ref 26–34)
MCHC RBC AUTO-ENTMCNC: 32.2 G/DL (ref 31–36)
MCV RBC AUTO: 94.8 FL (ref 80–100)
MONOCYTES ABSOLUTE: 0.6 K/UL (ref 0–1.3)
MONOCYTES RELATIVE PERCENT: 8.9 %
NEUTROPHILS ABSOLUTE: 4 K/UL (ref 1.7–7.7)
NEUTROPHILS RELATIVE PERCENT: 58.6 %
PDW BLD-RTO: 19.9 % (ref 12.4–15.4)
PERFORMED ON: ABNORMAL
PLATELET # BLD: 276 K/UL (ref 135–450)
PMV BLD AUTO: 8.7 FL (ref 5–10.5)
RBC # BLD: 3.22 M/UL (ref 4–5.2)
WBC # BLD: 6.8 K/UL (ref 4–11)

## 2019-05-07 PROCEDURE — 94760 N-INVAS EAR/PLS OXIMETRY 1: CPT

## 2019-05-07 PROCEDURE — 6370000000 HC RX 637 (ALT 250 FOR IP): Performed by: INTERNAL MEDICINE

## 2019-05-07 PROCEDURE — 90945 DIALYSIS ONE EVALUATION: CPT

## 2019-05-07 PROCEDURE — 85025 COMPLETE CBC W/AUTO DIFF WBC: CPT

## 2019-05-07 PROCEDURE — 2580000003 HC RX 258: Performed by: HOSPITALIST

## 2019-05-07 PROCEDURE — 6370000000 HC RX 637 (ALT 250 FOR IP): Performed by: HOSPITALIST

## 2019-05-07 PROCEDURE — 36415 COLL VENOUS BLD VENIPUNCTURE: CPT

## 2019-05-07 RX ORDER — SODIUM CHLORIDE, SODIUM LACTATE, CALCIUM CHLORIDE, MAGNESIUM CHLORIDE AND DEXTROSE 2.5; 538; 448; 18.3; 5.08 G/100ML; MG/100ML; MG/100ML; MG/100ML; MG/100ML
6000 INJECTION, SOLUTION INTRAPERITONEAL NIGHTLY
Status: DISCONTINUED | OUTPATIENT
Start: 2019-05-07 | End: 2019-05-07 | Stop reason: HOSPADM

## 2019-05-07 RX ADMIN — INSULIN LISPRO 4 UNITS: 100 INJECTION, SOLUTION INTRAVENOUS; SUBCUTANEOUS at 16:48

## 2019-05-07 RX ADMIN — SEVELAMER CARBONATE 2400 MG: 800 TABLET, FILM COATED ORAL at 07:48

## 2019-05-07 RX ADMIN — ISOSORBIDE MONONITRATE 120 MG: 60 TABLET, EXTENDED RELEASE ORAL at 07:49

## 2019-05-07 RX ADMIN — ACETAMINOPHEN 650 MG: 325 TABLET ORAL at 16:48

## 2019-05-07 RX ADMIN — CARVEDILOL 3.12 MG: 3.12 TABLET, FILM COATED ORAL at 07:49

## 2019-05-07 RX ADMIN — SEVELAMER CARBONATE 2400 MG: 800 TABLET, FILM COATED ORAL at 16:48

## 2019-05-07 RX ADMIN — DOCUSATE SODIUM 100 MG: 100 CAPSULE, LIQUID FILLED ORAL at 07:49

## 2019-05-07 RX ADMIN — LUBIPROSTONE 24 MCG: 8 CAPSULE, GELATIN COATED ORAL at 17:31

## 2019-05-07 RX ADMIN — PREGABALIN 50 MG: 50 CAPSULE ORAL at 07:49

## 2019-05-07 RX ADMIN — HYDROCORTISONE: 1 CREAM TOPICAL at 10:21

## 2019-05-07 RX ADMIN — INSULIN LISPRO 4 UNITS: 100 INJECTION, SOLUTION INTRAVENOUS; SUBCUTANEOUS at 07:48

## 2019-05-07 RX ADMIN — Medication 10 ML: at 07:50

## 2019-05-07 RX ADMIN — LUBIPROSTONE 24 MCG: 8 CAPSULE, GELATIN COATED ORAL at 07:48

## 2019-05-07 RX ADMIN — CARVEDILOL 3.12 MG: 3.12 TABLET, FILM COATED ORAL at 16:48

## 2019-05-07 RX ADMIN — INSULIN LISPRO 6 UNITS: 100 INJECTION, SOLUTION INTRAVENOUS; SUBCUTANEOUS at 11:58

## 2019-05-07 RX ADMIN — LACTULOSE 20 G: 20 SOLUTION ORAL at 07:48

## 2019-05-07 RX ADMIN — PANTOPRAZOLE SODIUM 40 MG: 40 TABLET, DELAYED RELEASE ORAL at 07:16

## 2019-05-07 RX ADMIN — CETIRIZINE HYDROCHLORIDE 5 MG: 10 TABLET, FILM COATED ORAL at 07:49

## 2019-05-07 RX ADMIN — SEVELAMER CARBONATE 2400 MG: 800 TABLET, FILM COATED ORAL at 11:58

## 2019-05-07 RX ADMIN — CINACALCET HYDROCHLORIDE 120 MG: 30 TABLET, COATED ORAL at 07:49

## 2019-05-07 ASSESSMENT — PAIN DESCRIPTION - PROGRESSION: CLINICAL_PROGRESSION: GRADUALLY WORSENING

## 2019-05-07 ASSESSMENT — PAIN DESCRIPTION - FREQUENCY: FREQUENCY: INTERMITTENT

## 2019-05-07 ASSESSMENT — PAIN SCALES - GENERAL
PAINLEVEL_OUTOF10: 0
PAINLEVEL_OUTOF10: 0
PAINLEVEL_OUTOF10: 7
PAINLEVEL_OUTOF10: 0
PAINLEVEL_OUTOF10: 3
PAINLEVEL_OUTOF10: 0
PAINLEVEL_OUTOF10: 0

## 2019-05-07 ASSESSMENT — PAIN - FUNCTIONAL ASSESSMENT: PAIN_FUNCTIONAL_ASSESSMENT: ACTIVITIES ARE NOT PREVENTED

## 2019-05-07 ASSESSMENT — PAIN DESCRIPTION - PAIN TYPE: TYPE: ACUTE PAIN

## 2019-05-07 ASSESSMENT — PAIN DESCRIPTION - ORIENTATION: ORIENTATION: ANTERIOR

## 2019-05-07 ASSESSMENT — PAIN DESCRIPTION - DESCRIPTORS: DESCRIPTORS: HEADACHE

## 2019-05-07 ASSESSMENT — PAIN DESCRIPTION - ONSET: ONSET: GRADUAL

## 2019-05-07 ASSESSMENT — PAIN DESCRIPTION - LOCATION: LOCATION: HIP

## 2019-05-07 NOTE — PLAN OF CARE
Problem: Falls - Risk of:  Goal: Will remain free from falls  Description  Will remain free from falls  Outcome: Ongoing  Goal: Absence of physical injury  Description  Absence of physical injury  Outcome: Ongoing     Problem: Pain:  Goal: Pain level will decrease  Description  Pain level will decrease  Outcome: Ongoing  Goal: Control of acute pain  Description  Control of acute pain  Outcome: Ongoing     Problem: SAFETY  Goal: Free from accidental physical injury  Outcome: Ongoing  Goal: Free from intentional harm  Outcome: Ongoing     Problem: DAILY CARE  Goal: Daily care needs are met  Outcome: Ongoing     Problem: SKIN INTEGRITY  Goal: Skin integrity is maintained or improved  Outcome: Ongoing     Problem: KNOWLEDGE DEFICIT  Goal: Patient/S.O. demonstrates understanding of disease process, treatment plan, medications, and discharge instructions.   Outcome: Ongoing     Problem: Nutrition  Goal: Optimal nutrition therapy  Outcome: Ongoing

## 2019-05-07 NOTE — CARE COORDINATION
DISCHARGE SUMMARY     DATE OF DISCHARGE: 5/7/19    DISCHARGE DESTINATION: [address]    Level of Care: Intermediate    Report Number: [phone]    TRANSPORTATION: [place] [address] Name:  [address] [place] [address] up Time: 4:30pm    Phone Number: [phone]    COMMENTS: Per Kenia Ewing at [facility], approval from Mohit Tillman has been received to return. RN aware of d/c. Patient aware.      Domenic Velasquez, [facility]  [phone]  5/7/19 at 1:23 PM

## 2019-05-07 NOTE — PROGRESS NOTES
First care transport here to  pt. Discharged with belongings to [facility].

## 2019-05-07 NOTE — PROGRESS NOTES
Nephrology ([facility]) Progress Note    CC: ESRD management    Subjective:    HPI:  Breathing comfortably. No CP.    ROS:  In bed. [address]:  medications reviewed. Awaiting precert for [facility]. Objective:  Blood pressure (!) 164/90, pulse 88, temperature 97.9 °F (36.6 °C), temperature source Oral, resp. rate 18, height 5' 7\" (1.702 m), weight 136 lb 14.5 oz (62.1 kg), SpO2 97 %, not currently breastfeeding. Intake/Output Summary (Last 24 hours) at 5/7/2019 1103  Last data filed at 5/7/2019 0755  Gross per 24 hour   Intake 720 ml   Output 674 ml   Net 46 ml     General:  NAD, A+Ox3  Chest:  CTAB  CVS:  RRR  Abdominal:  NTND, soft, +BS  Extremities:  no edema  Skin:  no rash    Labs:  Renal panel:  Lab Results   Component Value Date/Time     05/03/2019 09:35 AM    K 4.7 05/03/2019 09:35 AM    CO2 18 (L) 05/03/2019 09:35 AM    BUN 70 (H) 05/03/2019 09:35 AM    CREATININE 14.7 (HH) 05/03/2019 09:35 AM    CALCIUM 9.7 05/03/2019 09:35 AM    PHOS 3.9 03/29/2019 05:21 AM    MG 3.40 (H) 05/03/2019 09:35 AM     CBC:  Lab Results   Component Value Date/Time    WBC 6.8 05/07/2019 05:15 AM    HGB 9.9 (L) 05/07/2019 05:15 AM    HCT 30.6 (L) 05/07/2019 05:15 AM     05/07/2019 05:15 AM       Assessment/Plan:  Reviewed old records and labs. 1) ESRD              - UC nephrology patient              - continue PD outpatient regimen with half 1.5% dianeal and half 2.5% dianeal over 10 hours with 5 exchanges of 2 liters     2) GI              - perianal bleeding   - outpt elective colonoscopy     3) depression              - per psychiatry     4) non-compliance     The patient has poor insight and should not be on PD. The only way she can/should do PD is in a supervised NH setting. Otherwise, she should be forced to do HD. Personally, I do not believe the patient is capable of living independently. The patient can be discharged. I suspect patient is here for secondary gain.

## 2019-05-07 NOTE — PROGRESS NOTES
Report called to Chey Kirk at [facility]. Not further questions at this time.

## 2019-05-07 NOTE — PROGRESS NOTES
PT IV and telemetry removed. Discharge instructions reviewed. Patient is going back to [facility] via ambulance.

## 2019-05-07 NOTE — PROGRESS NOTES
Patient masked and nurse masked and then Peritoneal catheter cleansed per protocol using sterile technique. Patient catheter then attached to the Cycler per tubing and initial drain started.

## 2019-05-08 NOTE — DISCHARGE SUMMARY
Hospital Medicine Discharge Summary      Patient ID: Abril Bernard , [id number]     Patient's PCP: Mo Colunga MD    Admit Date: 5/3/2019     Discharge Date: 5/7/2019      Admitting Physician: Brynn Reno MD    Discharge Physician: Neptali Smith MD     Discharge Diagnoses: Active Hospital Problems    Diagnosis Date Noted    GI bleed [K92.2] 05/03/2019    Depression with suicidal ideation [F32.9, R45.851] 01/15/2016         The patient was seen and examined on the day of discharge and this discharge summary is in conjunction with any daily progress note from day of discharge.     Hospital Course:      Patient demographics:  The patient  Abril Bernard is a 53 y.o. female      Significant past medical history:       Patient Active Problem List   Diagnosis    Depression with suicidal ideation    ESRD on hemodialysis ([address]    Hyperkalemia    Metabolic acidosis    Essential hypertension    DMII (diabetes mellitus, type 2) ([address]    Cellulitis    Cellulitis of lower extremity    Erythema nodosum    Chest pain    Failure to thrive in adult    Abnormal stress test    ESRD (end stage renal disease) on dialysis ([address]    Suicidal ideation    NSTEMI (non-ST elevated myocardial infarction) ([address]    Vaginal candidiasis    Vaginal bleeding    Vaginal discharge    Atypical chest pain    Severe episode of recurrent major depressive disorder, without psychotic features ([address]    Anxiety disorder    Cocaine abuse ([address]    Anemia    Illicit drug use    Hyperkalemia    Peritoneal dialysis status (HCC)    Hyperlipidemia    Chronic focal neurological deficit    Nausea and vomiting    Moderate malnutrition (HCC)    GI bleed            Presenting symptoms:  Nausea, Rectal Bleeding, Headache           Diagnostic workup:        CONSULTS DURING ADMISSION :   IP CONSULT TO GI  IP CONSULT TO NEPHROLOGY  IP CONSULT TO SPIRITUAL SERVICES  IP CONSULT TO PSYCHIATRY  IP CONSULT TO SOCIAL WORK        Patient was diagnosed with:   GI bleed  Suicidal ideation  Peripheral neuropathy  End-stage renal disease    Treatment while inpatient:  The patient presented to the emergency room with lower GI bleed. GI was consulted and according to their evaluation most likely it is due to hemorrhoids and constipation. Patient normally takes diabetes. Patient also reported anxiety depression and suicidal ideation. Patient was evaluated by psychiatry. Patient's psychiatric medication was adjusted but patient does not need inpatient hospital care. Patient is on peritoneal dialysis for which nephrology followed the patient in the hospital patient is to continue her peritoneal dialysis at the [facility] care facility. Patient complaint of bilateral lower extremity pain which is most likely due to peripheral neuropathy for which patient was started on Lyrica and patient reported some improvement of her pain.         Discharge Condition:  stable:     Discharged to:  skilled nursing  facility     Activity:   as tolerated:     Follow Up: Follow-up with the nursing home [facility]:  For convenience and continuity at follow-up the following most recent labs are provided:      CBC:   Lab Results   Component Value Date    WBC 6.8 05/07/2019    HGB 9.9 05/07/2019    HCT 30.6 05/07/2019     05/07/2019       RENAL:   Lab Results   Component Value Date     05/03/2019    K 4.7 05/03/2019    CL 94 05/03/2019    CO2 18 05/03/2019    BUN 70 05/03/2019    CREATININE 14.7 05/03/2019           Discharge Medications:    Alberto Culp   [place] Medication Instructions GUN:[id number]    Printed on:05/08/19 1113   Medication Information                      atorvastatin (LIPITOR) 20 MG tablet  Take 1 tablet by mouth nightly             carvedilol (COREG) 3.125 MG tablet  Take 1 tablet by mouth 2 times daily (with meals)             cinacalcet (SENSIPAR) 30 MG tablet  Take 120 mg by mouth daily             darbepoetin hina-polysorbate (ARANESP, ALBUMIN FREE,) 100 MCG/0.5ML SOSY injection  Inject 200 mcg into the skin once a week Las t dose was 1 week ago             gentamicin (GARAMYCIN) 0.1 % cream  Apply to PD dialysis catheter when accessing the site. Apply topically 3 times daily. insulin aspart (NOVOLOG FLEXPEN) 100 UNIT/ML injection pen  Inject into the skin 2 times daily (with meals) 151-200 = Give 1 unit  201-250 = Give 2 units  251-300 = Give 3 units  301-350 = Give 4 units  351-400 = Give 5 units             isosorbide mononitrate (IMDUR) 60 MG extended release tablet  Take 120 mg by mouth daily             lactulose (CHRONULAC) 10 GM/15ML solution  Take 45 mLs by mouth 2 times daily as needed (Constipation resistant to other methods.)             loratadine (CLARITIN) 10 MG tablet  Take 10 mg by mouth daily as needed (Allergies)             losartan (COZAAR) 100 MG tablet  Take 100 mg by mouth daily             lubiprostone (AMITIZA) 24 MCG capsule  Take 24 mcg by mouth 2 times daily (with meals)              melatonin 5 MG TABS tablet  Take 5 mg by mouth nightly as needed (Sleep)             mirtazapine (REMERON) 15 MG tablet  Take 1 tablet by mouth nightly             NIFEdipine (ADALAT CC) 90 MG extended release tablet  Take 90 mg by mouth daily             pantoprazole (PROTONIX) 40 MG tablet  Take 1 tablet by mouth 2 times daily             pregabalin (LYRICA) 75 MG capsule  Take 1 capsule by mouth 2 times daily for 30 days.              senna-docusate (PERICOLACE) 8.6-50 MG per tablet  Take 1 tablet by mouth 2 times daily as needed for Constipation             sevelamer (RENVELA) 800 MG tablet  Take 3 tablets by mouth 3 times daily (with meals)             sucralfate (CARAFATE) 1 GM/10ML suspension  Take 10 mLs by mouth 2 times daily for 5 days                    Time Spent on discharge is more than 30 min in the examination, evaluation, counseling and review of medications and discharge plan. Patient's discharge was delayed after the initial entry of the discharge. Patient was seen on the day of discharge that is May 7, 2019  Delay in patient's discharge was related to patient's placement precertification and availability of the bed. Signed:  Karissa Meyer MD   5/8/2019      Thank you Isaac Christine MD for the opportunity to be involved in this patient's care. If you have any questions or concerns please feel free to contact me at [phone]. This note was transcribed using [employer]. Please disregard any translational errors.

## 2019-05-15 NOTE — DISCHARGE SUMMARY
Hospital Medicine Discharge Summary      Patient ID: Remedios Elmore , [id number]     Patient's PCP: Gordon Cabral MD    Admit Date: 5/3/2019     Discharge Date: 5/7/2019      Admitting Physician: Chucky Herrera MD    Discharge Physician: Rafaela Mcrae MD     Discharge Diagnoses: Active Hospital Problems    Diagnosis Date Noted    GI bleed [K92.2] 05/03/2019    Depression with suicidal ideation [F32.9, R45.851] 01/15/2016         The patient was seen and examined on the day of discharge and this discharge summary is in conjunction with any daily progress note from day of discharge.     Hospital Course:        Patient demographics:  The patient  Miryam Dumont is a 53 y.o. female      Significant past medical history:         Patient Active Problem List   Diagnosis    Depression with suicidal ideation    ESRD on hemodialysis ([address]    Hyperkalemia    Metabolic acidosis    Essential hypertension    DMII (diabetes mellitus, type 2) ([address]    Cellulitis    Cellulitis of lower extremity    Erythema nodosum    Chest pain    Failure to thrive in adult    Abnormal stress test    ESRD (end stage renal disease) on dialysis ([address]    Suicidal ideation    NSTEMI (non-ST elevated myocardial infarction) (HCC)    Vaginal candidiasis    Vaginal bleeding    Vaginal discharge    Atypical chest pain    Severe episode of recurrent major depressive disorder, without psychotic features ([address]    Anxiety disorder    Cocaine abuse ([address]    Anemia    Illicit drug use    Hyperkalemia    Peritoneal dialysis status (HCC)    Hyperlipidemia    Chronic focal neurological deficit    Nausea and vomiting    Moderate malnutrition (HCC)    GI bleed            Presenting symptoms:  Nausea, Rectal Bleeding, Headache           Diagnostic workup:        CONSULTS DURING ADMISSION :   IP CONSULT TO GI  IP CONSULT TO NEPHROLOGY  IP CONSULT TO SPIRITUAL SERVICES  IP CONSULT TO PSYCHIATRY  IP CONSULT TO by mouth daily             darbepoetin hina-polysorbate (ARANESP, ALBUMIN FREE,) 100 MCG/0.5ML SOSY injection  Inject 200 mcg into the skin once a week Las t dose was 1 week ago             gentamicin (GARAMYCIN) 0.1 % cream  Apply to PD dialysis catheter when accessing the site. Apply topically 3 times daily. insulin aspart (NOVOLOG FLEXPEN) 100 UNIT/ML injection pen  Inject into the skin 2 times daily (with meals) 151-200 = Give 1 unit  201-250 = Give 2 units  251-300 = Give 3 units  301-350 = Give 4 units  351-400 = Give 5 units             isosorbide mononitrate (IMDUR) 60 MG extended release tablet  Take 120 mg by mouth daily             lactulose (CHRONULAC) 10 GM/15ML solution  Take 45 mLs by mouth 2 times daily as needed (Constipation resistant to other methods.)             loratadine (CLARITIN) 10 MG tablet  Take 10 mg by mouth daily as needed (Allergies)             losartan (COZAAR) 100 MG tablet  Take 100 mg by mouth daily             lubiprostone (AMITIZA) 24 MCG capsule  Take 24 mcg by mouth 2 times daily (with meals)              melatonin 5 MG TABS tablet  Take 5 mg by mouth nightly as needed (Sleep)             mirtazapine (REMERON) 15 MG tablet  Take 1 tablet by mouth nightly             NIFEdipine (ADALAT CC) 90 MG extended release tablet  Take 90 mg by mouth daily             pantoprazole (PROTONIX) 40 MG tablet  Take 1 tablet by mouth 2 times daily             pregabalin (LYRICA) 75 MG capsule  Take 1 capsule by mouth 2 times daily for 30 days.              senna-docusate (PERICOLACE) 8.6-50 MG per tablet  Take 1 tablet by mouth 2 times daily as needed for Constipation             sevelamer (RENVELA) 800 MG tablet  Take 3 tablets by mouth 3 times daily (with meals)             sucralfate (CARAFATE) 1 GM/10ML suspension  Take 10 mLs by mouth 2 times daily for 5 days                    Time Spent on discharge is more than 30 min in the examination, evaluation, counseling and review of medications and discharge plan. Signed:  Delmi Lanza MD   5/15/2019      Thank you Epifanio Steve MD for the opportunity to be involved in this patient's care. If you have any questions or concerns please feel free to contact me at [phone]. This note was transcribed using [id number] [employer]. Please disregard any translational errors.

## 2019-05-24 NOTE — ED NOTES
Handoff report given to [facility].       Ariela Ramirez RN  05/03/19 1213 Dr Benavides

## 2019-06-17 ENCOUNTER — HOSPITAL ENCOUNTER (EMERGENCY)
Age: 44
Discharge: HOME | End: 2019-06-17
Attending: EMERGENCY MEDICINE
Payer: MEDICAID

## 2019-06-17 VITALS
RESPIRATION RATE: 16 BRPM | WEIGHT: 150.79 LBS | HEART RATE: 100 BPM | HEIGHT: 68 IN | TEMPERATURE: 98.5 F | BODY MASS INDEX: 22.85 KG/M2 | SYSTOLIC BLOOD PRESSURE: 138 MMHG | DIASTOLIC BLOOD PRESSURE: 71 MMHG | OXYGEN SATURATION: 98 %

## 2019-06-17 DIAGNOSIS — M79.671 RIGHT FOOT PAIN: Primary | ICD-10-CM

## 2019-06-17 PROCEDURE — 99283 EMERGENCY DEPT VISIT LOW MDM: CPT

## 2019-06-17 PROCEDURE — 6370000000 HC RX 637 (ALT 250 FOR IP): Performed by: EMERGENCY MEDICINE

## 2019-06-17 RX ORDER — OXYCODONE HYDROCHLORIDE AND ACETAMINOPHEN 5; 325 MG/1; MG/1
1 TABLET ORAL EVERY 6 HOURS PRN
Qty: 8 TABLET | Refills: 0 | Status: SHIPPED | OUTPATIENT
Start: 2019-06-17 | End: 2019-06-19

## 2019-06-17 RX ORDER — OXYCODONE HYDROCHLORIDE AND ACETAMINOPHEN 5; 325 MG/1; MG/1
1 TABLET ORAL ONCE
Status: COMPLETED | OUTPATIENT
Start: 2019-06-17 | End: 2019-06-17

## 2019-06-17 RX ADMIN — OXYCODONE HYDROCHLORIDE AND ACETAMINOPHEN 1 TABLET: 5; 325 TABLET ORAL at 10:34

## 2019-06-17 ASSESSMENT — PAIN SCALES - GENERAL
PAINLEVEL_OUTOF10: 6
PAINLEVEL_OUTOF10: 8
PAINLEVEL_OUTOF10: 8

## 2019-06-17 ASSESSMENT — PAIN DESCRIPTION - FREQUENCY: FREQUENCY: CONTINUOUS

## 2019-06-17 ASSESSMENT — PAIN DESCRIPTION - PROGRESSION: CLINICAL_PROGRESSION: GRADUALLY WORSENING

## 2019-06-17 ASSESSMENT — PAIN DESCRIPTION - DESCRIPTORS: DESCRIPTORS: ACHING

## 2019-06-17 ASSESSMENT — PAIN DESCRIPTION - ONSET: ONSET: ON-GOING

## 2019-06-17 ASSESSMENT — PAIN DESCRIPTION - LOCATION
LOCATION: FOOT
LOCATION: FOOT

## 2019-06-17 ASSESSMENT — PAIN DESCRIPTION - ORIENTATION
ORIENTATION: RIGHT
ORIENTATION: RIGHT

## 2019-06-17 ASSESSMENT — PAIN - FUNCTIONAL ASSESSMENT
PAIN_FUNCTIONAL_ASSESSMENT: 0-10
PAIN_FUNCTIONAL_ASSESSMENT: ACTIVITIES ARE NOT PREVENTED

## 2019-06-17 ASSESSMENT — PAIN DESCRIPTION - PAIN TYPE: TYPE: CHRONIC PAIN

## 2019-06-17 NOTE — ED NOTES
Bed: [facility]  Expected date: 6/17/19  Expected time: 8:55 AM  Means of arrival: [facility]  Comments:  Sebastián Davis 44F uncontrolled pain     Ariadne Morales RN  06/17/19 5521

## 2019-06-17 NOTE — ED PROVIDER NOTES
CHIEF COMPLAINT  Foot Pain (chronic foot pain states she thinks it is swollen. she states she had the same thing in her left foot and thought it might be cellulitis )      HISTORY OF PRESENT ILLNESS  Arnaldo Peralta is a 53 y.o. female who presents to the ED complaining of acute on chronic right foot pain. She states she has a hyperactive thyroid gland and gets frequent bone pain in her lower extremities. She states Percocet is only thing that seems to help. She received Vicodin at 3:00 this morning but it did not seem to help. She is otherwise been well and denies any trauma or injury to the foot. No numbness in the foot. No other complaints, modifying factors or associated symptoms. Nursing notes reviewed.    Past Medical History:   Diagnosis Date    Cerebral artery occlusion with cerebral infarction [facility]) 2010    Chronic kidney disease     Cocaine abuse ([address]     Crohn's disease ([address]     Depression     Diabetes mellitus ([address]     ESRD (end stage renal disease) ([address]     GERD (gastroesophageal reflux disease)     Hemodialysis patient ([address] 2016    peritoneal     Hyperlipidemia     Hypertension     MI, old     Mood disorder ([address]     Pericarditis     Peritoneal dialysis catheter in place ([address]     Peritonitis ([address]     Pneumonia     Thyroid disease      Past Surgical History:   Procedure Laterality Date    BACK SURGERY      Tumor removal    CHOLECYSTECTOMY      KIDNEY TRANSPLANT  1990    KIDNEY TRANSPLANT      LAPAROSCOPY      right ovary removed and left ovarian cyst removed for endometriosis    OTHER SURGICAL HISTORY      peritoneal dialysis catheter    PARACENTESIS      TUBAL LIGATION      UPPER GASTROINTESTINAL ENDOSCOPY  03/31/2017    UPPER GASTROINTESTINAL ENDOSCOPY N/A 3/25/2019    EGD BIOPSY performed by Zarina Vincent MD at [address] History   Problem Relation Age of Onset    Heart Attack Mother     Diabetes Mother     Hypertension Mother    [facility] Stroke Father     Diabetes Father     Hypertension Father      Social History     Socioeconomic History    Marital status:      Spouse name: Not on file    Number of children: 2    Years of education: Not on file    Highest education level: Not on file   Occupational History    Occupation: volunteer   Social Needs    Financial resource strain: Not on file    Food insecurity:     Worry: Not on file     Inability: Not on file    Transportation needs:     Medical: Not on file     Non-medical: Not on file   Tobacco Use    Smoking status: Never Smoker    Smokeless tobacco: Never Used   Substance and Sexual Activity    Alcohol use: Not Currently     Alcohol/week: 0.0 oz     Comment: monthly    Drug use: No    Sexual activity: Yes     Partners: Male   Lifestyle    Physical activity:     Days per week: Not on file     Minutes per session: Not on file    Stress: Not on file   Relationships    Social connections:     Talks on phone: Not on file     Gets together: Not on file     Attends [employer] service: Not on file     Active member of club or organization: Not on file     Attends meetings of clubs or organizations: Not on file     Relationship status: Not on file    Intimate partner violence:     Fear of current or ex partner: Not on file     Emotionally abused: Not on file     Physically abused: Not on file     Forced sexual activity: Not on file   Other Topics Concern    Not on file   Social History Narrative    Not on file     Current Facility-Administered Medications   Medication Dose Route Frequency Provider Last Rate Last Dose    oxyCODONE-acetaminophen (PERCOCET) 5-325 MG per tablet 1 tablet  1 tablet Oral Once Joshua Mar MD         Current Outpatient Medications   Medication Sig Dispense Refill    oxyCODONE-acetaminophen (PERCOCET) 5-325 MG per tablet Take 1 tablet by mouth every 6 hours as needed for Pain for up to 2 days.  8 tablet 0    pregabalin (LYRICA) 75 MG capsule Take 1 mouth daily       Allergies   Allergen Reactions    Ferrous Sulfate Shortness Of Breath    Cephalexin Itching and Swelling    Gabapentin Other (See Comments)     Various adverse reaction    Dicloxacillin Rash    Pcn [Penicillins] Rash    Sulfa Antibiotics Rash       Screening          REVIEW OF SYSTEMS  6 systems reviewed, pertinent positives per HPI otherwise noted to be negative    PHYSICAL EXAM  /71   Pulse 103   Temp 98.5 °F (36.9 °C) (Oral)   Resp 16   Ht 5' 7.5\" (1.715 m)   Wt 150 lb 12.7 oz (68.4 kg)   SpO2 98%   BMI 23.27 kg/m²   GENERAL APPEARANCE: Awake and alert. Cooperative. No acute distress. HEAD: Normocephalic. Atraumatic. EYES: EOM's grossly intact. ENT: Mucous membranes are moist.   NECK: Supple. Normal ROM. CHEST: Equal symmetric chest rise. LUNGS: Breathing is unlabored. Speaking comfortably in full sentences. EXTREMITIES: MAEE. No acute deformities. Strong right pedal pulse present. Diffuse right distal foot tenderness present. SKIN: Warm and dry. No cellulitis or erythema of the right foot present. NEUROLOGICAL: Alert and oriented. Sensory and motor function intact in the right lower extremity and foot. RADIOLOGY  X-RAYS:  I have reviewed radiologic plain film image(s). ALL OTHER NON-PLAIN FILM IMAGES SUCH AS CT, ULTRASOUND AND MRI HAVE BEEN READ BY THE RADIOLOGIST. No orders to display              PROCEDURES    ED COURSE/MDM  Patient seen and evaluated. Patient was given Percocet for pain while in the ED. she has no evidence of cellulitis and I do not suspect arterial occlusion or other vascular disease causing her pain. She had no trauma imaging is not warranted at this time. She has had this pain multiple times in the past and states it is exactly the same as her chronic pain. She can be discharged home. I discussed results and plan of care with patient and family. I do feel patient can be safely discharged to home.  Recommend follow up with

## 2020-03-12 ENCOUNTER — APPOINTMENT (OUTPATIENT)
Dept: CT IMAGING | Age: 45
End: 2020-03-12
Payer: MEDICAID

## 2020-03-12 ENCOUNTER — APPOINTMENT (OUTPATIENT)
Dept: GENERAL RADIOLOGY | Age: 45
End: 2020-03-12
Payer: MEDICAID

## 2020-03-12 ENCOUNTER — HOSPITAL ENCOUNTER (EMERGENCY)
Age: 45
Discharge: ANOTHER HOSPITAL | End: 2020-03-13
Attending: EMERGENCY MEDICINE
Payer: MEDICAID

## 2020-03-12 LAB
BASOPHILS ABSOLUTE: 0.1 K/UL (ref 0–0.2)
BASOPHILS RELATIVE PERCENT: 0.5 %
EOSINOPHILS ABSOLUTE: 0.5 K/UL (ref 0–0.6)
EOSINOPHILS RELATIVE PERCENT: 5.2 %
HCT VFR BLD CALC: 30.3 % (ref 36–48)
HEMOGLOBIN: 9.7 G/DL (ref 12–16)
LYMPHOCYTES ABSOLUTE: 2.9 K/UL (ref 1–5.1)
LYMPHOCYTES RELATIVE PERCENT: 28 %
MCH RBC QN AUTO: 31.4 PG (ref 26–34)
MCHC RBC AUTO-ENTMCNC: 32.1 G/DL (ref 31–36)
MCV RBC AUTO: 97.9 FL (ref 80–100)
MONOCYTES ABSOLUTE: 0.7 K/UL (ref 0–1.3)
MONOCYTES RELATIVE PERCENT: 7.2 %
NEUTROPHILS ABSOLUTE: 6.1 K/UL (ref 1.7–7.7)
NEUTROPHILS RELATIVE PERCENT: 59.1 %
PDW BLD-RTO: 17.4 % (ref 12.4–15.4)
PLATELET # BLD: 172 K/UL (ref 135–450)
PMV BLD AUTO: 8.2 FL (ref 5–10.5)
RBC # BLD: 3.09 M/UL (ref 4–5.2)
WBC # BLD: 10.3 K/UL (ref 4–11)

## 2020-03-12 PROCEDURE — 83880 ASSAY OF NATRIURETIC PEPTIDE: CPT

## 2020-03-12 PROCEDURE — 71045 X-RAY EXAM CHEST 1 VIEW: CPT

## 2020-03-12 PROCEDURE — 70496 CT ANGIOGRAPHY HEAD: CPT

## 2020-03-12 PROCEDURE — 99291 CRITICAL CARE FIRST HOUR: CPT

## 2020-03-12 PROCEDURE — 84484 ASSAY OF TROPONIN QUANT: CPT

## 2020-03-12 PROCEDURE — 80053 COMPREHEN METABOLIC PANEL: CPT

## 2020-03-12 PROCEDURE — 85610 PROTHROMBIN TIME: CPT

## 2020-03-12 PROCEDURE — 70450 CT HEAD/BRAIN W/O DYE: CPT

## 2020-03-12 PROCEDURE — 6360000004 HC RX CONTRAST MEDICATION: Performed by: EMERGENCY MEDICINE

## 2020-03-12 PROCEDURE — 93005 ELECTROCARDIOGRAM TRACING: CPT | Performed by: EMERGENCY MEDICINE

## 2020-03-12 PROCEDURE — 85730 THROMBOPLASTIN TIME PARTIAL: CPT

## 2020-03-12 PROCEDURE — 99285 EMERGENCY DEPT VISIT HI MDM: CPT

## 2020-03-12 PROCEDURE — 85025 COMPLETE CBC W/AUTO DIFF WBC: CPT

## 2020-03-12 RX ADMIN — IOPAMIDOL 75 ML: 755 INJECTION, SOLUTION INTRAVENOUS at 23:44

## 2020-03-13 ENCOUNTER — APPOINTMENT (OUTPATIENT)
Dept: VASCULAR LAB | Age: 45
DRG: 045 | End: 2020-03-13
Attending: INTERNAL MEDICINE
Payer: MEDICAID

## 2020-03-13 ENCOUNTER — HOSPITAL ENCOUNTER (INPATIENT)
Age: 45
LOS: 4 days | Discharge: NURSING, REHAB OR LONG-TERM CARE | DRG: 045 | End: 2020-03-17
Attending: INTERNAL MEDICINE | Admitting: INTERNAL MEDICINE
Payer: MEDICAID

## 2020-03-13 ENCOUNTER — APPOINTMENT (OUTPATIENT)
Dept: MRI IMAGING | Age: 45
DRG: 045 | End: 2020-03-13
Attending: INTERNAL MEDICINE
Payer: MEDICAID

## 2020-03-13 ENCOUNTER — APPOINTMENT (OUTPATIENT)
Dept: GENERAL RADIOLOGY | Age: 45
DRG: 045 | End: 2020-03-13
Attending: INTERNAL MEDICINE
Payer: MEDICAID

## 2020-03-13 ENCOUNTER — APPOINTMENT (OUTPATIENT)
Dept: CT IMAGING | Age: 45
DRG: 045 | End: 2020-03-13
Attending: INTERNAL MEDICINE
Payer: MEDICAID

## 2020-03-13 VITALS
RESPIRATION RATE: 16 BRPM | SYSTOLIC BLOOD PRESSURE: 87 MMHG | DIASTOLIC BLOOD PRESSURE: 47 MMHG | HEIGHT: 67 IN | OXYGEN SATURATION: 98 % | TEMPERATURE: 98.2 F | WEIGHT: 125.66 LBS | BODY MASS INDEX: 19.72 KG/M2 | HEART RATE: 76 BPM

## 2020-03-13 PROBLEM — I63.9 ACUTE ISCHEMIC STROKE (HCC): Status: ACTIVE | Noted: 2020-03-13

## 2020-03-13 LAB
A/G RATIO: 0.5 (ref 1.1–2.2)
ALBUMIN SERPL-MCNC: 2.4 G/DL (ref 3.4–5)
ALP BLD-CCNC: 311 U/L (ref 40–129)
ALT SERPL-CCNC: 12 U/L (ref 10–40)
ANION GAP SERPL CALCULATED.3IONS-SCNC: 14 MMOL/L (ref 3–16)
ANION GAP SERPL CALCULATED.3IONS-SCNC: 15 MMOL/L (ref 3–16)
ANTI-XA UNFRAC HEPARIN: 0.23 IU/ML (ref 0.3–0.7)
APTT: 34.5 SEC (ref 24.2–36.2)
APTT: 52.3 SEC (ref 24.2–36.2)
AST SERPL-CCNC: 38 U/L (ref 15–37)
BASOPHILS ABSOLUTE: 0.1 K/UL (ref 0–0.2)
BASOPHILS RELATIVE PERCENT: 0.7 %
BILIRUB SERPL-MCNC: <0.2 MG/DL (ref 0–1)
BUN BLDV-MCNC: 13 MG/DL (ref 7–20)
BUN BLDV-MCNC: 15 MG/DL (ref 7–20)
C-REACTIVE PROTEIN: 13.1 MG/L (ref 0–5.1)
CALCIUM SERPL-MCNC: 8.1 MG/DL (ref 8.3–10.6)
CALCIUM SERPL-MCNC: 8.3 MG/DL (ref 8.3–10.6)
CHLORIDE BLD-SCNC: 94 MMOL/L (ref 99–110)
CHLORIDE BLD-SCNC: 97 MMOL/L (ref 99–110)
CHOLESTEROL, TOTAL: 71 MG/DL (ref 0–199)
CO2: 25 MMOL/L (ref 21–32)
CO2: 25 MMOL/L (ref 21–32)
CREAT SERPL-MCNC: 4.1 MG/DL (ref 0.6–1.1)
CREAT SERPL-MCNC: 4.5 MG/DL (ref 0.6–1.1)
EKG ATRIAL RATE: 69 BPM
EKG ATRIAL RATE: 81 BPM
EKG DIAGNOSIS: NORMAL
EKG DIAGNOSIS: NORMAL
EKG P AXIS: 23 DEGREES
EKG P AXIS: 24 DEGREES
EKG P-R INTERVAL: 158 MS
EKG P-R INTERVAL: 182 MS
EKG Q-T INTERVAL: 408 MS
EKG Q-T INTERVAL: 428 MS
EKG QRS DURATION: 70 MS
EKG QRS DURATION: 76 MS
EKG QTC CALCULATION (BAZETT): 458 MS
EKG QTC CALCULATION (BAZETT): 473 MS
EKG R AXIS: 45 DEGREES
EKG R AXIS: 46 DEGREES
EKG T AXIS: 29 DEGREES
EKG T AXIS: 44 DEGREES
EKG VENTRICULAR RATE: 69 BPM
EKG VENTRICULAR RATE: 81 BPM
EOSINOPHILS ABSOLUTE: 0.5 K/UL (ref 0–0.6)
EOSINOPHILS RELATIVE PERCENT: 4.8 %
GFR AFRICAN AMERICAN: 13
GFR AFRICAN AMERICAN: 14
GFR NON-AFRICAN AMERICAN: 10
GFR NON-AFRICAN AMERICAN: 12
GLOBULIN: 4.6 G/DL
GLUCOSE BLD-MCNC: 113 MG/DL (ref 70–99)
GLUCOSE BLD-MCNC: 132 MG/DL (ref 70–99)
GLUCOSE BLD-MCNC: 182 MG/DL (ref 70–99)
GLUCOSE BLD-MCNC: 94 MG/DL (ref 70–99)
HCT VFR BLD CALC: 29.5 % (ref 36–48)
HDLC SERPL-MCNC: 27 MG/DL (ref 40–60)
HEMOGLOBIN: 9.2 G/DL (ref 12–16)
INR BLD: 1.17 (ref 0.86–1.14)
INR BLD: 1.18 (ref 0.86–1.14)
LDL CHOLESTEROL CALCULATED: 17 MG/DL
LV EF: 58 %
LVEF MODALITY: NORMAL
LYMPHOCYTES ABSOLUTE: 3.6 K/UL (ref 1–5.1)
LYMPHOCYTES RELATIVE PERCENT: 34.5 %
MCH RBC QN AUTO: 30.8 PG (ref 26–34)
MCHC RBC AUTO-ENTMCNC: 31.2 G/DL (ref 31–36)
MCV RBC AUTO: 98.8 FL (ref 80–100)
MONOCYTES ABSOLUTE: 1 K/UL (ref 0–1.3)
MONOCYTES RELATIVE PERCENT: 9.2 %
NEUTROPHILS ABSOLUTE: 5.3 K/UL (ref 1.7–7.7)
NEUTROPHILS RELATIVE PERCENT: 50.8 %
PDW BLD-RTO: 17.5 % (ref 12.4–15.4)
PERFORMED ON: ABNORMAL
PERFORMED ON: NORMAL
PLATELET # BLD: 179 K/UL (ref 135–450)
PMV BLD AUTO: 8.1 FL (ref 5–10.5)
POTASSIUM REFLEX MAGNESIUM: 4.1 MMOL/L (ref 3.5–5.1)
POTASSIUM REFLEX MAGNESIUM: 4.2 MMOL/L (ref 3.5–5.1)
PREALBUMIN: 16.2 MG/DL (ref 20–40)
PRO-BNP: 5697 PG/ML (ref 0–124)
PROTHROMBIN TIME: 13.6 SEC (ref 10–13.2)
PROTHROMBIN TIME: 13.7 SEC (ref 10–13.2)
RBC # BLD: 2.99 M/UL (ref 4–5.2)
SEDIMENTATION RATE, ERYTHROCYTE: 71 MM/HR (ref 0–20)
SODIUM BLD-SCNC: 133 MMOL/L (ref 136–145)
SODIUM BLD-SCNC: 137 MMOL/L (ref 136–145)
TOTAL PROTEIN: 7 G/DL (ref 6.4–8.2)
TRIGL SERPL-MCNC: 136 MG/DL (ref 0–150)
TROPONIN: 0.57 NG/ML
VLDLC SERPL CALC-MCNC: 27 MG/DL
WBC # BLD: 10.4 K/UL (ref 4–11)

## 2020-03-13 PROCEDURE — 85670 THROMBIN TIME PLASMA: CPT

## 2020-03-13 PROCEDURE — 84134 ASSAY OF PREALBUMIN: CPT

## 2020-03-13 PROCEDURE — 6360000002 HC RX W HCPCS: Performed by: STUDENT IN AN ORGANIZED HEALTH CARE EDUCATION/TRAINING PROGRAM

## 2020-03-13 PROCEDURE — 2580000003 HC RX 258: Performed by: STUDENT IN AN ORGANIZED HEALTH CARE EDUCATION/TRAINING PROGRAM

## 2020-03-13 PROCEDURE — 2000000000 HC ICU R&B

## 2020-03-13 PROCEDURE — 86140 C-REACTIVE PROTEIN: CPT

## 2020-03-13 PROCEDURE — 85730 THROMBOPLASTIN TIME PARTIAL: CPT

## 2020-03-13 PROCEDURE — 93010 ELECTROCARDIOGRAM REPORT: CPT | Performed by: INTERNAL MEDICINE

## 2020-03-13 PROCEDURE — 83036 HEMOGLOBIN GLYCOSYLATED A1C: CPT

## 2020-03-13 PROCEDURE — C8929 TTE W OR WO FOL WCON,DOPPLER: HCPCS

## 2020-03-13 PROCEDURE — 6360000002 HC RX W HCPCS: Performed by: NEUROLOGICAL SURGERY

## 2020-03-13 PROCEDURE — 85613 RUSSELL VIPER VENOM DILUTED: CPT

## 2020-03-13 PROCEDURE — 6360000004 HC RX CONTRAST MEDICATION: Performed by: INTERNAL MEDICINE

## 2020-03-13 PROCEDURE — 70498 CT ANGIOGRAPHY NECK: CPT

## 2020-03-13 PROCEDURE — 95819 EEG AWAKE AND ASLEEP: CPT

## 2020-03-13 PROCEDURE — 93005 ELECTROCARDIOGRAM TRACING: CPT | Performed by: STUDENT IN AN ORGANIZED HEALTH CARE EDUCATION/TRAINING PROGRAM

## 2020-03-13 PROCEDURE — 70551 MRI BRAIN STEM W/O DYE: CPT

## 2020-03-13 PROCEDURE — 85520 HEPARIN ASSAY: CPT

## 2020-03-13 PROCEDURE — 93970 EXTREMITY STUDY: CPT

## 2020-03-13 PROCEDURE — 85652 RBC SED RATE AUTOMATED: CPT

## 2020-03-13 PROCEDURE — 73620 X-RAY EXAM OF FOOT: CPT

## 2020-03-13 PROCEDURE — 73630 X-RAY EXAM OF FOOT: CPT

## 2020-03-13 PROCEDURE — 80061 LIPID PANEL: CPT

## 2020-03-13 PROCEDURE — 80048 BASIC METABOLIC PNL TOTAL CA: CPT

## 2020-03-13 PROCEDURE — 0042T CT BRAIN PERFUSION: CPT

## 2020-03-13 PROCEDURE — 85635 REPTILASE TEST: CPT

## 2020-03-13 PROCEDURE — 70450 CT HEAD/BRAIN W/O DYE: CPT

## 2020-03-13 PROCEDURE — 85025 COMPLETE CBC W/AUTO DIFF WBC: CPT

## 2020-03-13 PROCEDURE — 85610 PROTHROMBIN TIME: CPT

## 2020-03-13 RX ORDER — ACETAMINOPHEN 325 MG/1
650 TABLET ORAL EVERY 6 HOURS PRN
Status: DISCONTINUED | OUTPATIENT
Start: 2020-03-13 | End: 2020-03-17 | Stop reason: HOSPADM

## 2020-03-13 RX ORDER — LABETALOL 20 MG/4 ML (5 MG/ML) INTRAVENOUS SYRINGE
10 EVERY 4 HOURS PRN
Status: DISCONTINUED | OUTPATIENT
Start: 2020-03-13 | End: 2020-03-17 | Stop reason: HOSPADM

## 2020-03-13 RX ORDER — CINACALCET 30 MG/1
120 TABLET, FILM COATED ORAL DAILY
Status: DISCONTINUED | OUTPATIENT
Start: 2020-03-13 | End: 2020-03-17 | Stop reason: HOSPADM

## 2020-03-13 RX ORDER — POLYETHYLENE GLYCOL 3350 17 G/17G
17 POWDER, FOR SOLUTION ORAL DAILY PRN
Status: DISCONTINUED | OUTPATIENT
Start: 2020-03-13 | End: 2020-03-17 | Stop reason: HOSPADM

## 2020-03-13 RX ORDER — INSULIN LISPRO 100 [IU]/ML
0-3 INJECTION, SOLUTION INTRAVENOUS; SUBCUTANEOUS NIGHTLY
Status: DISCONTINUED | OUTPATIENT
Start: 2020-03-13 | End: 2020-03-17 | Stop reason: HOSPADM

## 2020-03-13 RX ORDER — ASPIRIN 81 MG/1
81 TABLET, CHEWABLE ORAL DAILY
Status: DISCONTINUED | OUTPATIENT
Start: 2020-03-13 | End: 2020-03-17

## 2020-03-13 RX ORDER — ONDANSETRON 2 MG/ML
4 INJECTION INTRAMUSCULAR; INTRAVENOUS EVERY 6 HOURS PRN
Status: DISCONTINUED | OUTPATIENT
Start: 2020-03-13 | End: 2020-03-17 | Stop reason: HOSPADM

## 2020-03-13 RX ORDER — INSULIN LISPRO 100 [IU]/ML
0-6 INJECTION, SOLUTION INTRAVENOUS; SUBCUTANEOUS
Status: DISCONTINUED | OUTPATIENT
Start: 2020-03-13 | End: 2020-03-17 | Stop reason: HOSPADM

## 2020-03-13 RX ORDER — HEPARIN SODIUM 10000 [USP'U]/100ML
12 INJECTION, SOLUTION INTRAVENOUS CONTINUOUS
Status: DISCONTINUED | OUTPATIENT
Start: 2020-03-13 | End: 2020-03-14

## 2020-03-13 RX ORDER — SODIUM CHLORIDE 0.9 % (FLUSH) 0.9 %
10 SYRINGE (ML) INJECTION PRN
Status: DISCONTINUED | OUTPATIENT
Start: 2020-03-13 | End: 2020-03-17 | Stop reason: HOSPADM

## 2020-03-13 RX ORDER — DEXTROSE MONOHYDRATE 25 G/50ML
12.5 INJECTION, SOLUTION INTRAVENOUS PRN
Status: DISCONTINUED | OUTPATIENT
Start: 2020-03-13 | End: 2020-03-17 | Stop reason: HOSPADM

## 2020-03-13 RX ORDER — DEXTROSE MONOHYDRATE 50 MG/ML
100 INJECTION, SOLUTION INTRAVENOUS PRN
Status: DISCONTINUED | OUTPATIENT
Start: 2020-03-13 | End: 2020-03-17 | Stop reason: HOSPADM

## 2020-03-13 RX ORDER — PROMETHAZINE HYDROCHLORIDE 25 MG/1
12.5 TABLET ORAL EVERY 6 HOURS PRN
Status: DISCONTINUED | OUTPATIENT
Start: 2020-03-13 | End: 2020-03-17 | Stop reason: HOSPADM

## 2020-03-13 RX ORDER — SEVELAMER CARBONATE 800 MG/1
2400 TABLET, FILM COATED ORAL
Status: DISCONTINUED | OUTPATIENT
Start: 2020-03-13 | End: 2020-03-17 | Stop reason: HOSPADM

## 2020-03-13 RX ORDER — PREGABALIN 50 MG/1
50 CAPSULE ORAL 2 TIMES DAILY
Status: DISCONTINUED | OUTPATIENT
Start: 2020-03-13 | End: 2020-03-17 | Stop reason: HOSPADM

## 2020-03-13 RX ORDER — HEPARIN SODIUM 10000 [USP'U]/100ML
6.8 INJECTION, SOLUTION INTRAVENOUS CONTINUOUS
Status: DISCONTINUED | OUTPATIENT
Start: 2020-03-13 | End: 2020-03-13 | Stop reason: HOSPADM

## 2020-03-13 RX ORDER — MIRTAZAPINE 15 MG/1
15 TABLET, FILM COATED ORAL NIGHTLY
Status: DISCONTINUED | OUTPATIENT
Start: 2020-03-13 | End: 2020-03-17 | Stop reason: HOSPADM

## 2020-03-13 RX ORDER — ACETAMINOPHEN 650 MG/1
650 SUPPOSITORY RECTAL EVERY 6 HOURS PRN
Status: DISCONTINUED | OUTPATIENT
Start: 2020-03-13 | End: 2020-03-17 | Stop reason: HOSPADM

## 2020-03-13 RX ORDER — SODIUM CHLORIDE 0.9 % (FLUSH) 0.9 %
10 SYRINGE (ML) INJECTION EVERY 12 HOURS SCHEDULED
Status: DISCONTINUED | OUTPATIENT
Start: 2020-03-13 | End: 2020-03-17 | Stop reason: HOSPADM

## 2020-03-13 RX ORDER — HEPARIN SODIUM 5000 [USP'U]/ML
30 INJECTION, SOLUTION INTRAVENOUS; SUBCUTANEOUS PRN
Status: DISCONTINUED | OUTPATIENT
Start: 2020-03-13 | End: 2020-03-15 | Stop reason: ALTCHOICE

## 2020-03-13 RX ORDER — HEPARIN SODIUM 5000 [USP'U]/ML
60 INJECTION, SOLUTION INTRAVENOUS; SUBCUTANEOUS PRN
Status: DISCONTINUED | OUTPATIENT
Start: 2020-03-13 | End: 2020-03-15 | Stop reason: ALTCHOICE

## 2020-03-13 RX ORDER — NICOTINE POLACRILEX 4 MG
15 LOZENGE BUCCAL PRN
Status: DISCONTINUED | OUTPATIENT
Start: 2020-03-13 | End: 2020-03-17 | Stop reason: HOSPADM

## 2020-03-13 RX ORDER — ATORVASTATIN CALCIUM 80 MG/1
80 TABLET, FILM COATED ORAL NIGHTLY
Status: DISCONTINUED | OUTPATIENT
Start: 2020-03-13 | End: 2020-03-17 | Stop reason: HOSPADM

## 2020-03-13 RX ORDER — HEPARIN SODIUM 5000 [USP'U]/ML
5000 INJECTION, SOLUTION INTRAVENOUS; SUBCUTANEOUS EVERY 8 HOURS SCHEDULED
Status: DISCONTINUED | OUTPATIENT
Start: 2020-03-13 | End: 2020-03-17 | Stop reason: HOSPADM

## 2020-03-13 RX ADMIN — HEPARIN SODIUM 5000 UNITS: 5000 INJECTION INTRAVENOUS; SUBCUTANEOUS at 18:51

## 2020-03-13 RX ADMIN — HEPARIN SODIUM 5000 UNITS: 5000 INJECTION INTRAVENOUS; SUBCUTANEOUS at 22:32

## 2020-03-13 RX ADMIN — IOPAMIDOL 80 ML: 755 INJECTION, SOLUTION INTRAVENOUS at 05:21

## 2020-03-13 RX ADMIN — LEVETIRACETAM 1000 MG: 100 INJECTION, SOLUTION INTRAVENOUS at 13:20

## 2020-03-13 RX ADMIN — Medication 10 ML: at 21:05

## 2020-03-13 RX ADMIN — IOPAMIDOL 40 ML: 755 INJECTION, SOLUTION INTRAVENOUS at 05:34

## 2020-03-13 RX ADMIN — HEPARIN SODIUM 6.8 ML/HR: 10000 INJECTION, SOLUTION INTRAVENOUS at 01:19

## 2020-03-13 RX ADMIN — Medication 10 ML: at 08:23

## 2020-03-13 ASSESSMENT — ENCOUNTER SYMPTOMS
EYES NEGATIVE: 1
GASTROINTESTINAL NEGATIVE: 1
RESPIRATORY NEGATIVE: 1
ALLERGIC/IMMUNOLOGIC NEGATIVE: 1

## 2020-03-13 ASSESSMENT — PAIN SCALES - GENERAL
PAINLEVEL_OUTOF10: 0

## 2020-03-13 NOTE — CONSULTS
the beginning of the exam, but by the end is more awake. Minimal participation in the exam that does not appear to be related to aphasia as patient is able to follow commands easily. Oriented to name. States she does not know what hospital she is in. No obvious expressive aphasia but patient primarily responds with one-to-two word answers. Able to correctly identify a pen and state its function. Moving all four extremities equally without evidence of a focal deficit. No drift. Sensation grossly intact to touch in all four extremities    Labs: Labs personally reviewed    Imaging:  I personally reviewed and independently interpreted the following imaging:  - CTH: Chronic L parietal infarct and diffuse microvascular changes and lacunar infarcts. No evidence of hemorrhage or new ischemia  - CTA: Likely subocclusive thrombus of the distal L MCA  - Repeat CTH: No new infarcts. Suspected PH 1 hemorrhagic transformation of the L parietal infarct  - Repeat CTA: Redemonstration of the likely distal L M1 segment subocclusive thrombus without significant change  - CTP: Increased CBV and CBF and increased Tmax and MTT within the L cerebral hemisphere. Suspected 13 cc, distal L MCA territory penumbra  - MRI brain: Chronic L parietal infarct (low DWI and high ADC) with minimal hemorrhagic transformation with only petechial staining within the infarct. Linear punctate acute L lentiform nucleus infarct. No other acute infarcts    A/P:  49 yo F with a hx of HTN, HLD, DM2, MI, ESRD on dialysis, hemicolectomy in late January 2020 for a GI bleed thought to be related to mesenteric ischemia, PVD s/p peripheral stenting, hx of drug abuse, depression, and GERD who presented to [facility] around 23:20 on 3/12 with acute onset R-sided weakness and aphasia. CTH revealed a chronic L parietal infarct. CTA revealed a likely subocclusive thrombus within the distal L M1 segment.  Patient's exam dramatically improved and so she was not a candidate

## 2020-03-13 NOTE — ED NOTES
Bed: B-05  Expected date:   Expected time:   Means of arrival:   Comments:  lavern Reeves, [place]  03/12/20 9400

## 2020-03-13 NOTE — CONSULTS
NEUROSURGERY CONSULT NOTE    [place]  [id number]   1975   3/13/2020    Requesting physician: Doe Field MD    Reason for consultation: Hemorrhagic conversion of ischemic stroke    History of present illness: Patient is a 50 y.o. female w/ PMH of hemicolectomy for GI bleed in January, ESRD on dialysis, DM, and PVD with necrotic toes who presented on 3/12/2020 to [facility] ED w/right sided weakness and aphasia. CT head in ED was reviewed by  Stroke Team Neurologist and suggests old left parietal ischemia. Her CTA was also reviewed and was concerning for a subocclusive left MCA thrombus. No tPA administered 2/2  dramatic improvement, also was not a candidate for mechanical thrombectomy at that time. Patient was started on a low dose heparin gtt and transferred to [facility] ICU for further neurological assessment. The patient had a recurrence of her symptoms at approximately 02:20. Recommendation was to continue with transfer to [facility], continue hep gtt, and to get a repeat CTH, CTA, and CTP. CTH showed PH 1 hemorrhagic transformation of the chronic L parietal infarct. Hep gtt was stopped because of the hemorrhagic conversion. ROS:   GENERAL:  Denies fever or recent illness.  Denies weight changes   EYES:  Denies vision change or diplopia  EARS:  Denies hearing loss  CARDIAC:  Denies chest pain  RESPIRATORY:  Denies shortness of breath  SKIN: Endorses necrotic toes (chronic)   HEM:  Denies excessive bruising  PSYCH:  Denies anxiety or depression  NEURO: Endorses right side weakness  :  Denies urinary difficulty  GI: Denies nausea, vomiting, diarrhea or constipation  MUSCULOSKELETAL:  No arthralgias    Allergies   Allergen Reactions    Ferrous Sulfate Shortness Of Breath    Cephalexin Itching and Swelling    Gabapentin Other (See Comments)     Various adverse reaction    Dicloxacillin Rash    Hydromorphone Itching    Pcn [Penicillins] Rash    Sulfa Antibiotics Rash and Other (See Comments) Chest pain. Past Medical History:   Diagnosis Date    Cerebral artery occlusion with cerebral infarction ([facility] 2010    Cocaine abuse ([facility]     Crohn's disease ([facility]     Depression     Diabetes mellitus ([facility]     ESRD (end stage renal disease) ([facility]     HD Tues-Thurs-Sat    GERD (gastroesophageal reflux disease)     Hemodialysis patient ([facility] 2016    peritoneal     Hyperlipidemia     Hypertension     MI, old     Mood disorder ([facility]     Pericarditis     Peritonitis ([facility]     Pneumonia     Thyroid disease         Past Surgical History:   Procedure Laterality Date    BACK SURGERY      Tumor removal    CHOLECYSTECTOMY      KIDNEY TRANSPLANT  1990    KIDNEY TRANSPLANT      LAPAROSCOPY      right ovary removed and left ovarian cyst removed for endometriosis    OTHER SURGICAL HISTORY      peritoneal dialysis catheter    PARACENTESIS      TUBAL LIGATION      UPPER GASTROINTESTINAL ENDOSCOPY  03/31/2017    UPPER GASTROINTESTINAL ENDOSCOPY N/A 3/25/2019    EGD BIOPSY performed by Mavis Aguilera MD at [address] History     Occupational History    Occupation: volunteer   Tobacco Use    Smoking status: Never Smoker    Smokeless tobacco: Never Used   Substance and Sexual Activity    Alcohol use: Not Currently     Alcohol/week: 0.0 standard drinks     Comment: monthly    Drug use: No    Sexual activity: Yes     Partners: Male        Family History   Problem Relation Age of Onset    Heart Attack Mother     Diabetes Mother     Hypertension Mother     Stroke Father     Diabetes Father     Hypertension Father         No outpatient medications have been marked as taking for the 3/13/20 encounter [facility] Encounter).         Current Facility-Administered Medications   Medication Dose Route Frequency Provider Last Rate Last Dose    sodium chloride flush 0.9 % injection 10 mL  10 mL Intravenous 2 times per day Manuel Marroquin MD        sodium chloride flush 0.9 % injection 10 mL  10 mL Intravenous PRN Medardo Wilkinson MD   10 mL at 03/13/20 0823    acetaminophen (TYLENOL) tablet 650 mg  650 mg Oral Q6H PRN Medardo Wilkinson MD        Or    acetaminophen (TYLENOL) suppository 650 mg  650 mg Rectal Q6H PRN Medardo Wilkinson MD        polyethylene glycol (GLYCOLAX) packet 17 g  17 g Oral Daily PRN Medardo Wilkinson MD        promethazine (PHENERGAN) tablet 12.5 mg  12.5 mg Oral Q6H PRN Medardo Wilkinson MD        Or    ondansetron [facility]) injection 4 mg  4 mg Intravenous Q6H PRN Medardo Wilkinson MD        perflutren lipid microspheres (DEFINITY) injection 1.65 mg  1.5 mL Intravenous ONCE PRN Medardo Wilkinson MD        heparin (porcine) injection 3,250 Units  60 Units/kg Intravenous PRN Medardo Wilkinson MD        heparin (porcine) injection 1,600 Units  30 Units/kg Intravenous PRN Medardo Wilkinson MD        [Held by provider] heparin 25,000 units in dextrose 5% 250 mL infusion  12 Units/kg/hr Intravenous Continuous Medardo Wilkinson MD   Stopped at 03/13/20 8445    insulin lispro (1 Unit Dial) 0-6 Units  0-6 Units Subcutaneous TID WC Medardo Wilkinson MD        insulin lispro (1 Unit Dial) 0-3 Units  0-3 Units Subcutaneous Nightly Medardo Wilkinson MD        glucose (GLUTOSE) 40 % oral gel 15 g  15 g Oral PRN Medardo Wilkinson MD        dextrose 50 % IV solution  12.5 g Intravenous PRN Medardo Wilkinson MD        glucagon (rDNA) injection 1 mg  1 mg Intramuscular PRN Medardo Wilkinson MD        dextrose 5 % solution  100 mL/hr Intravenous PRN Medardo Wilkinson MD        atorvastatin (LIPITOR) tablet 80 mg  80 mg Oral Nightly Devin Lord MD        aspirin chewable tablet 81 mg  81 mg Oral Daily Devin Lord MD   Stopped at 03/13/20 0930    labetalol (NORMODYNE;TRANDATE) injection syringe 10 mg  10 mg Intravenous Q4H PRN Duran Vides MD        [START ON 3/14/2020] levETIRAcetam (KEPPRA) 500 mg in sodium chloride 0.9 % 100 mL IVPB  500 mg Intravenous Q24H MD Livia Stanton 5   Biceps  3 5  Knee Extensors  2 5   Triceps  3 5  Knee Flexors  2 5   Wrist Ext  3 5  Ankle Dorsiflex. 2 5   Wrist Flex  3 5  Ankle Plantarflex. 2 5   Handgrip  3 5  Ext Napoleon Longus  2 5   Thumb Ext  3 5         Radiological Findings:  Ct Head Wo Contrast  Result Date: 3/12/2020  Multifocal chronic ischemic changes as above. No acute intracranial abnormality. No evidence of acute hemorrhage. Cta Head Neck W Contrast  Result Date: 3/13/2020  1. Bilateral carotid bifurcation atherosclerotic calcification with approximately bilateral 25% stenosis involving the internal carotid artery origin. Finding is compatible with 16-49% stenosis per sonographic NASCET index. 2. Bilateral internal carotid artery cavernous segment scattered atherosclerotic calcification with up to approximately bilateral 30% arterial stenosis. Finding is compatible with 16-49% stenosis per sonographic NASCET index. 3. Congenitally hypoplastic left vertebral artery. 4. Left parietal lobe remote infarction. 5. Bilateral caudate nucleus remote lacunar infarcts, as discussed above. 6. Inferior left cerebellar small remote infarction. Ct Head Wo Contrast  Result Date: 3/13/2020  Hemorrhagic transformation of left parietal infarct along the high superior parietal white matter just posterior to the frontal lobe as described above with some surrounding low attenuation which could be vasogenic edema or infarction changes. No midline shift or hydrocephalus. Brain volume loss is symmetric, atrophic change    Cta Head Neck W Contrast  Result Date: 3/13/2020  Head:  1. There is once again a 2.9 mm long thrombus within the proximal inferior and M2 segment of the left MCA. There is enhancement of the MCA distal to the thrombus. 2.  Acute evolving left parietal infarct. Hyperdensity seen in the left parietal lobe better seen on the noncontrast CT performed earlier.   3.  Mild multifocal stenosis of the right A2 and distal segments of the AMELIA.  4.  Mild stenosis of the right ICA. 5.  Mild stenosis of the left ICA. Neck:  1.  15% stenosis of the left ICA. 2.  Atherosclerosis of the right ICA with no significant stenosis using NASCET criteria. Ct Brain Perfusion  Result Date: 3/13/2020  No core infarct identified however core infarct is seen on prior studies in the left parietal region. Elevated Tmax with a threshold of 6 seconds in the left temporal parietal region measuring 13 mL in volume consistent with ischemic penumbra. Mri Brain Wo Contrast  Result Date: 3/13/2020  1. Late subacute infarct in the left parietal region with evidence of cortical laminar necrosis and early encephalomalacia. Focal areas of susceptibility artifact favor areas of petechial hemorrhage. 2. Punctate focus of abnormal diffusion signal in the posterior left lentiform nucleus compatible with a small lacunar infarct. 3. Multiple foci of prior remote lacunar infarcts and small infarcts in the right and left cerebellum with focal areas of encephalomalacia. Correlate for possible central embolic phenomenon.       Labs:  Recent Labs     03/13/20  0553   WBC 10.4   HGB 9.2*   HCT 29.5*          Recent Labs     03/13/20  0553   *   K 4.2   CL 94*   CO2 25   BUN 15   CREATININE 4.5*   GLUCOSE 132*   CALCIUM 8.1*       Recent Labs     03/13/20  0553   PROTIME 13.7*   INR 1.18*   APTT 52.3*       Patient Active Problem List    Diagnosis Date Noted    Atypical chest pain      Priority: High    NSTEMI (non-ST elevated myocardial infarction) ([facility]      Priority: High    Acute ischemic stroke ([facility] 03/13/2020    GI bleed 05/03/2019    Moderate malnutrition (HCC) 03/27/2019    Nausea and vomiting 03/24/2019    Peritoneal dialysis status ([facility] 01/03/2019    Hyperlipidemia 01/03/2019    Chronic focal neurological deficit 01/03/2019    Hyperkalemia 07/01/2018    Anemia 32/21/8006    Illicit drug use 28/49/5782    Severe episode of recurrent major depressive disorder, without psychotic features ([facility]     Anxiety disorder     Cocaine abuse ([facility]     Vaginal discharge     Vaginal bleeding 07/27/2017    Vaginal candidiasis 07/17/2017    Abnormal stress test     ESRD (end stage renal disease) on dialysis ([facility]     Suicidal ideation     Chest pain 07/04/2017    Failure to thrive in adult 07/04/2017    Cellulitis 04/22/2016    Cellulitis of lower extremity     Erythema nodosum     Depression with suicidal ideation 01/15/2016    ESRD on hemodialysis ([facility] 01/15/2016    Hyperkalemia 86/74/1914    Metabolic acidosis 83/77/3007    Essential hypertension 01/15/2016    DMII (diabetes mellitus, type 2) ([facility] 01/15/2016       Assessment:  Patient is a 50 y.o. female w/stable hemorrhagic transformation of left parietal infarct along the high superior parietal white matter just posterior to the frontal lobe    Plan:  1. No emergent neurosurgical intervention indicated  2. Neurologic exams frequency: Q4H  3. For change in exam MUST contact neurosurgery team along with critical care or primary team  4. SAH:  - MRI shows stable petechial hemorrhage  - Maintain SBP <160; If PRN med insufficient, then may start Nicardipine infusion  - Hold all full dose anticoagulation & antiplatelet for 2 weeks  - Keep Plt >100k & INR <1.4  - Neurology consulted for stroke w/u  - Keppra for seizure precautions  5. Cerebral edema prophylaxis:  - Keep HOB >30 degrees  - Keep Na WNL  - NO dextrose in IVF's or in IV drips  - NO Decadron  - If central venous access is needed please use subclavian vs femoral - No IJ as this can decrease venous return and worsen cerebral edema  6. DVT Prophylaxis: SCD's & OK for SQ Heparin  7. Pain: Managed by medical team  8. Speech consulted for swallow eval, appreciate recs  9. PT/OT consulted, appreciate recs  10. Advance diet / activity per primary team  11. Appreciate critical care team assistance in management  12.  Will follow peripherally

## 2020-03-13 NOTE — ED TRIAGE NOTES
Pt came in via squad with stroke like symptoms that \"started around 2100\". Pt was unable to speak on arrival and was on 15 L nonrebreather. Pt was placed on room air on arrival and had oxygen saturation in the high 90's. Pt alert on arrival but not oriented.

## 2020-03-13 NOTE — ED NOTES
Report called to Saundra Vega RN at [facility] ICU. All questions answered.      Gabriel Alston RN  03/13/20 1608

## 2020-03-13 NOTE — ED PROVIDER NOTES
MD at [address]    Current Outpatient Rx   Medication Sig Dispense Refill    pregabalin (LYRICA) 75 MG capsule Take 1 capsule by mouth 2 times daily for 30 days. 60 capsule 0    mirtazapine (REMERON) 15 MG tablet Take 1 tablet by mouth nightly 30 tablet 3    carvedilol (COREG) 3.125 MG tablet Take 1 tablet by mouth 2 times daily (with meals) 60 tablet 3    gentamicin (GARAMYCIN) 0.1 % cream Apply to PD dialysis catheter when accessing the site. Apply topically 3 times daily.  1 Tube 0    lactulose (CHRONULAC) 10 GM/15ML solution Take 45 mLs by mouth 2 times daily as needed (Constipation resistant to other methods.) 1 Bottle 1    pantoprazole (PROTONIX) 40 MG tablet Take 1 tablet by mouth 2 times daily 60 tablet 1    sucralfate (CARAFATE) 1 GM/10ML suspension Take 10 mLs by mouth 2 times daily for 5 days 50 mL 1    isosorbide mononitrate (IMDUR) 60 MG extended release tablet Take 120 mg by mouth daily      loratadine (CLARITIN) 10 MG tablet Take 10 mg by mouth daily as needed (Allergies)      melatonin 5 MG TABS tablet Take 5 mg by mouth nightly as needed (Sleep)      NIFEdipine (ADALAT CC) 90 MG extended release tablet Take 90 mg by mouth daily      sevelamer (RENVELA) 800 MG tablet Take 3 tablets by mouth 3 times daily (with meals)      senna-docusate (PERICOLACE) 8.6-50 MG per tablet Take 1 tablet by mouth 2 times daily as needed for Constipation      cinacalcet (SENSIPAR) 30 MG tablet Take 120 mg by mouth daily      insulin aspart (NOVOLOG FLEXPEN) 100 UNIT/ML injection pen Inject into the skin 2 times daily (with meals) 151-200 = Give 1 unit  201-250 = Give 2 units  251-300 = Give 3 units  301-350 = Give 4 units  351-400 = Give 5 units      atorvastatin (LIPITOR) 20 MG tablet Take 1 tablet by mouth nightly 30 tablet 0    lubiprostone (AMITIZA) 24 MCG capsule Take 24 mcg by mouth 2 times daily (with meals)       darbepoetin hina-polysorbate (ARANESP, ALBUMIN FREE,) 100 MCG/0.5ML SOSY injection Inject 200 mcg into the skin once a week Las t dose was 1 week ago      losartan (COZAAR) 100 MG tablet Take 100 mg by mouth daily         ALLERGIES    Allergies   Allergen Reactions    Ferrous Sulfate Shortness Of Breath    Cephalexin Itching and Swelling    Gabapentin Other (See Comments)     Various adverse reaction    Dicloxacillin Rash    Hydromorphone Itching    Pcn [Penicillins] Rash    Sulfa Antibiotics Rash and Other (See Comments)     Chest pain.        FAMILY HISTORY    Family History   Problem Relation Age of Onset    Heart Attack Mother     Diabetes Mother     Hypertension Mother     Stroke Father     Diabetes Father     Hypertension Father        SOCIAL HISTORY    Social History     Socioeconomic History    Marital status:      Spouse name: None    Number of children: 2    Years of education: None    Highest education level: None   Occupational History    Occupation: volunteer   Social Needs    Financial resource strain: None    Food insecurity     Worry: None     Inability: None    Transportation needs     Medical: None     Non-medical: None   Tobacco Use    Smoking status: Never Smoker    Smokeless tobacco: Never Used   Substance and Sexual Activity    Alcohol use: Not Currently     Alcohol/week: 0.0 standard drinks     Comment: monthly    Drug use: No    Sexual activity: Yes     Partners: Male   Lifestyle    Physical activity     Days per week: None     Minutes per session: None    Stress: None   Relationships    Social connections     Talks on phone: None     Gets together: None     Attends Gnosticism service: None     Active member of club or organization: None     Attends meetings of clubs or organizations: None     Relationship status: None    Intimate partner violence     Fear of current or ex partner: None     Emotionally abused: None     Physically abused: None     Forced sexual activity: None   Other Topics Concern    None Social History Narrative    None       REVIEW OF SYSTEMS    Constitutional:  Denies fever, chills, weight loss or weakness   Eyes:  Denies photophobia or discharge   HENT:  Denies sore throat or ear pain   Respiratory:  Denies cough or shortness of breath   Cardiovascular:  Denies chest pain, palpitations or swelling   GI:  Denies abdominal pain, nausea, vomiting, or diarrhea   Musculoskeletal:  Denies back pain   Skin:  Denies rash   Neurologic:  Denies headache, focal weakness or sensory changes   Endocrine:  Denies polyuria or polydypsia   Lymphatic:  Denies swollen glands   Psychiatric:  Denies depression, suicidal ideation or homicidal ideation   All systems negative except as marked. PHYSICAL EXAM    VITAL SIGNS: BP (!) 117/58   Pulse 86   Temp 97.7 °F (36.5 °C) (Oral)   Resp 18   Ht 5' 7\" (1.702 m)   Wt 125 lb 10.6 oz (57 kg)   SpO2 93%   BMI 19.68 kg/m²    Constitutional:  Well developed, Well nourished, No acute distress, Non-toxic appearance. Scrolling on her cell phone  HENT:  Normocephalic, Atraumatic, Bilateral external ears normal, Oropharynx moist, No oral exudates, Nose normal. Neck- Normal range of motion, No tenderness, Supple, No stridor. Eyes:  PERRL, EOMI, Conjunctiva normal, No discharge. Respiratory:  Normal breath sounds, No respiratory distress, No wheezing, No chest tenderness. Cardiovascular:  Normal heart rate, Normal rhythm, No murmurs, No rubs, No gallops. GI:  Bowel sounds normal, Soft, No tenderness, No masses, No pulsatile masses. : External genitalia appear normal, No masses or lesions. No discharge. No CVA tenderness. Musculoskeletal:  Intact distal pulses, No edema, No tenderness, No cyanosis, No clubbing. s. No tenderness to palpation or major deformities noted. Back- No tenderness. Integument:  Warm, Dry, No erythema, No rash. Lymphatic:  No lymphadenopathy noted. Neurologic:  This patient is alert and she has right upper extremity and right lower extremity flaccidity. On the left upper extremity she follows commands and has 5 out of 5 strength. Same with the left lower extremity. Sensation is intact as well. She has an expressive aphasia and does not answer any questions but again, does understand and follows commands. Ppsychiatric:  Affect normal, Judgment normal, Mood normal.     EKG    Normal sinus rhythm with no ST elevation or depression    RADIOLOGY    CTA HEAD NECK W CONTRAST   Final Result   1. Bilateral carotid bifurcation atherosclerotic calcification with   approximately bilateral 25% stenosis involving the internal carotid artery   origin. Finding is compatible with 16-49% stenosis per sonographic NASCET   index. 2. Bilateral internal carotid artery cavernous segment scattered   atherosclerotic calcification with up to approximately bilateral 30% arterial   stenosis. Finding is compatible with 16-49% stenosis per sonographic NASCET   index. 3. Congenitally hypoplastic left vertebral artery. 4. Left parietal lobe remote infarction. 5. Bilateral caudate nucleus remote lacunar infarcts, as discussed above. 6. Inferior left cerebellar small remote infarction. XR CHEST PORTABLE   Preliminary Result   Low lung volumes. No acute cardiopulmonary process. CT Head WO Contrast   Final Result   Multifocal chronic ischemic changes as above. No acute intracranial   abnormality. No evidence of acute hemorrhage. Findings were discussed with KIKI WINSTON at 11:53 pm on 3/12/2020.            Labs Reviewed   CBC WITH AUTO DIFFERENTIAL - Abnormal; Notable for the following components:       Result Value    RBC 3.09 (*)     Hemoglobin 9.7 (*)     Hematocrit 30.3 (*)     RDW 17.4 (*)     All other components within normal limits    Narrative:     Performed at:  [address] [address]   Phone [phone]   COMPREHENSIVE METABOLIC PANEL W/ REFLEX TO MG FOR LOW K - Abnormal; Notable for the following components:    Chloride 97 (*)     Glucose 182 (*)     CREATININE 4.1 (*)     GFR Non- 12 (*)     GFR  14 (*)     Alb 2.4 (*)     Albumin/Globulin Ratio 0.5 (*)     Alkaline Phosphatase 311 (*)     AST 38 (*)     All other components within normal limits    Narrative:     Performed at:  [facility]  [address] [facility] [employer] [address]   Phone [phone]   TROPONIN - Abnormal; Notable for the following components:    Troponin 0.57 (*)     All other components within normal limits    Narrative:     [address],  Chemistry results called to and read back by alee norton rn, 03/13/2020  00:22, by [facility]  Performed at:  [facility]  [address] [employer] [address]   Phone [phone]   BRAIN NATRIURETIC PEPTIDE - Abnormal; Notable for the following components:    Pro-BNP 5,697 (*)     All other components within normal limits    Narrative:     [employer] tel. [phone],  Chemistry results called to and read back by alee norton rn, 03/13/2020  00:22, by [facility]  Performed at:  [facility]  [address], [facility] [employer] [address]   Phone [phone]   PROTIME-INR - Abnormal; Notable for the following components:    Protime 13.6 (*)     INR 1.17 (*)     All other components within normal limits    Narrative:     Performed at:  [facility]  [address], [facility] [employer] [address]   Phone [phone]   APTT    Narrative:     Performed at:  [facility]  [address] [facility] [employer] [address]   Phone [phone]   URINE RT REFLEX TO CULTURE         PROCEDURES        ED COURSE & MEDICAL DECISION MAKING    Pertinent Labs & Imaging studies reviewed. (See chart for details)  We called the stroke team immediately.   When this patient came in, the outgoing physician ordered all the testing and called the stroke team and call me immediately to let me know the patient was in the room. Dr. Michoacano Arora from the stroke team called and we discussed the patient. She is going over to get the CT as of this dictation. Within an hour the patient being here, she started to have resolution of her symptoms. I went back and rechecked the neuro examination she can hold her right upper extremity against gravity for 10 seconds in the right lower extremity for 5 seconds. Sensation was intact. Her speech was more intelligible as well. Initially, she was unable to speak at all and then it went to garbled speech and now it is fairly intelligible. I spoke with the stroke physician several times and he spoke with Dr. Raji López who is the neurosurgeon over at [facility]  I spoke with Dr. Arlene Morgan as well and discussed the case in full. I spoke with Dr. Tawanda Chaidez who is the internist at [facility] we discussed the case in full including all abnormal laboratories and imaging findings, and he agreed to accept the patient. The neurosurgeon put some orders in that are being started here as well. There is a significant chance for life-threatening deterioration is patient condition if not from urgent intervention. Total critical care time is 30 minutes  FINAL IMPRESSION    1.  Ischemic stroke ([facility]             Jeremy Murry MD  03/13/20 0127

## 2020-03-13 NOTE — CONSULTS
Peritonitis ([facility] [address]     Pneumonia     Thyroid disease      Past Surgical History:        Procedure Laterality Date    BACK SURGERY      Tumor removal    CHOLECYSTECTOMY      KIDNEY TRANSPLANT  1990    KIDNEY TRANSPLANT      LAPAROSCOPY      right ovary removed and left ovarian cyst removed for endometriosis    OTHER SURGICAL HISTORY      peritoneal dialysis catheter    PARACENTESIS      TUBAL LIGATION      UPPER GASTROINTESTINAL ENDOSCOPY  03/31/2017    UPPER GASTROINTESTINAL ENDOSCOPY N/A 3/25/2019    EGD BIOPSY performed by Garrett Christopher MD at [facility] ENDOSCOPY     Current Medications:    Current Facility-Administered Medications: sodium chloride flush 0.9 % injection 10 mL, 10 mL, Intravenous, 2 times per day  sodium chloride flush 0.9 % injection 10 mL, 10 mL, Intravenous, PRN  acetaminophen (TYLENOL) tablet 650 mg, 650 mg, Oral, Q6H PRN **OR** acetaminophen (TYLENOL) suppository 650 mg, 650 mg, Rectal, Q6H PRN  polyethylene glycol (GLYCOLAX) packet 17 g, 17 g, Oral, Daily PRN  promethazine (PHENERGAN) tablet 12.5 mg, 12.5 mg, Oral, Q6H PRN **OR** ondansetron (ZOFRAN) injection 4 mg, 4 mg, Intravenous, Q6H PRN  perflutren lipid microspheres (DEFINITY) injection 1.65 mg, 1.5 mL, Intravenous, ONCE PRN  heparin (porcine) injection 3,250 Units, 60 Units/kg, Intravenous, PRN  heparin (porcine) injection 1,600 Units, 30 Units/kg, Intravenous, PRN  [Held by provider] heparin 25,000 units in dextrose 5% 250 mL infusion, 12 Units/kg/hr, Intravenous, Continuous  insulin lispro (1 Unit Dial) 0-6 Units, 0-6 Units, Subcutaneous, TID WC  insulin lispro (1 Unit Dial) 0-3 Units, 0-3 Units, Subcutaneous, Nightly  glucose (GLUTOSE) 40 % oral gel 15 g, 15 g, Oral, PRN  dextrose 50 % IV solution, 12.5 g, Intravenous, PRN  glucagon (rDNA) injection 1 mg, 1 mg, Intramuscular, PRN  dextrose 5 % solution, 100 mL/hr, Intravenous, PRN  atorvastatin (LIPITOR) tablet 80 mg, 80 mg, Oral, Nightly  aspirin chewable tablet 81 mg, 81 is performed with the patient in a supine position.  Continuous-wave (CW) Doppler is used for waveform acquisition routinely at the common femoral, superficial femoral, popliteal, posterior tibial and dorsalis pedis levels with selection of either a 4MHz or 8MHz probe.  Appropriately sized pneumatic cuffs for the limb segments are applied and pressures obtained at the brachial and ankle levels.  Photoplethysmography (PPG) is routinely used to obtain digit waveforms and pressure recordings. Other Result Information   Interface, Results In - 11/25/2019  3:33 PM EST  Bilateral: Unable to obtain bilateral ABIs due to non-compressible arterial walls. Bilateral lower extremity aorto-iliac arterial disease is suggested based on monophasic CW waveforms at all levels. Right: The right digit PPG waveform contour appears to be absent. Left: The left TBI is 0.35 and the digit PPG waveform contours are absent. Conclusions: Unable to obtain bilateral ABIs due to vessel non-compressibility. Aorto-iliac arterial disease noted in the BLE. Procedure: Non-invasive lower extremity arterial examination is performed with the patient in a supine position. Continuous-wave (CW) Doppler is used for waveform acquisition routinely at the common femoral, superficial femoral, popliteal, posterior tibial and dorsalis pedis levels with selection of either a 4MHz or 8MHz probe. Appropriately sized pneumatic cuffs for the limb segments are applied and pressures obtained at the brachial and ankle levels. Photoplethysmography (PPG) is routinely used to obtain digit waveforms and pressure recordings. IMPRESSION/RECOMMENDATIONS:    Dry gangrene fifth digit, right foot  Full thickness ulceration, right 5th digit  Hx of PAD    -Patient seen and examined at bedside this p.m.  -VSS, afebrile, no leukocytosis noted. -X-rays right foot; see impression above  -Arterial duplex performed on 11/5/2019; see impression above.   -Ordered CRP/ESR,

## 2020-03-13 NOTE — CONSULTS
Neurology consult Note      Patient: Saba Ortiz MRN: [id number]    YOB: 1975  Age: 50 y.o. Sex: female   Unit: [address] [place] [address] ICU TOWER Room/Bed: 5561/9969-44 Location: [address] [place] [address]    Date of Consultation: 3/13/2020  Date of Admission: 3/13/2020  4:06 AM ( LOS: 0 days )  Admitting Physician: Dwayne Bahena    Primary Care Physician: Jack Orozco MD   Consult Requested By: Dr. Dierdre Soulier, MD     Reason for Consult: \"R sided weakness\"    Chief Complaint:   Right sided weakness    History of Present Illness:    Ms. Tong Hernandez is a 49 yo F with PMH of ESRD on HD, GI bleed (s/p hemicolectomy), T2DM, PVD, previous CVA who presented from nursing home to [facility] with Rt sided weakness and aphasia (last known well 9 pm on 3/12). Pt unable to provide history. Based on Chart review Pt had Rt sided hemiplegia in upper and lower extremities and aphasia on arrival. During course of ED, Pt improved without intervention and was able to move Rt upper and lower extremity and was dysarthric. CTH revealed chronic L parietal infarct and diffuse microvascular changes and lacunar infarcts. CTA head and neck demonstrated possible subocclusive thrombous of L MCA and redemonstration of chronic L pariteal infarct and diffuse microvascular changes and lacunar infarcts but no hemorrhage. CT perfusion scan with elevated Tmax with ischemic penumbra. As tPA was being considered overnight, she dramatically improved as she was able to follow all commands and could hold up her R arm and leg without difficulty. She was then started on low dose heparin gtt as per  stroke team and neurosurgery recommendations and transferred to [address] [place] [address] ICU for further management. Heparin gtt started. On my exam, Pt is dysarthric and has some degree of receptive aphasia. Unable to follow commands, trying to verbalize but has slurred incoherent speech. Pt is alert but still is aphasic, slurred speech.  Pt is oriented to self and time. Pt denies any pain. Pt able to hold right arm/leg off bed but does have a drift. Pt is unable to fully follow commands. Unable to lift RLE above the bed. Past Medical History:     has a past medical history of Cerebral artery occlusion with cerebral infarction ([facility] [address] Cocaine abuse ([facility] [address] [facility] Crohn's disease ([facility] [address] Depression, Diabetes mellitus ([facility] [address] [facility] ESRD (end stage renal disease) ([facility] [address] GERD (gastroesophageal reflux disease), Hemodialysis patient ([facility] [address] Hyperlipidemia, Hypertension, MI, old, Mood disorder ([facility] [address] Pericarditis, Peritonitis ([address] Pneumonia, and Thyroid disease.   Patient Active Problem List   Diagnosis    Depression with suicidal ideation    ESRD on hemodialysis ([facility]    Hyperkalemia    Metabolic acidosis    Essential hypertension    DMII (diabetes mellitus, type 2) ([facility] [facility]    Cellulitis    Cellulitis of lower extremity    Erythema nodosum    Chest pain    Failure to thrive in adult    Abnormal stress test    ESRD (end stage renal disease) on dialysis ([facility] [facility]    Suicidal ideation    NSTEMI (non-ST elevated myocardial infarction) ([facility] [facility]    Vaginal candidiasis    Vaginal bleeding    Vaginal discharge    Atypical chest pain    Severe episode of recurrent major depressive disorder, without psychotic features ([facility] [facility]    Anxiety disorder    Cocaine abuse ([facility] [facility]    Anemia    Illicit drug use    Hyperkalemia    Peritoneal dialysis status ([facility] [address] [facility]    Hyperlipidemia    Chronic focal neurological deficit    Nausea and vomiting    Moderate malnutrition ([address]    GI bleed    Acute ischemic stroke ([facility] [address]       Past Surgical History:  Past Surgical History:   Procedure Laterality Date    BACK SURGERY      Tumor removal    CHOLECYSTECTOMY      KIDNEY TRANSPLANT  1990    KIDNEY TRANSPLANT      LAPAROSCOPY      right ovary removed and left ovarian cyst removed for endometriosis    OTHER SURGICAL HISTORY      peritoneal dialysis catheter    PARACENTESIS      TUBAL LIGATION      UPPER GASTROINTESTINAL ENDOSCOPY  03/31/2017    UPPER GASTROINTESTINAL ENDOSCOPY N/A 3/25/2019    EGD BIOPSY performed by Rashawn Steele MD at [address] History:  family history includes Diabetes in her father and mother; Heart Attack in her mother; Hypertension in her father and mother; Stroke in her father. Social History:  she reports that she has never smoked. She has never used smokeless tobacco. She reports previous alcohol use. She reports that she does not use drugs. Social History     Socioeconomic History    Marital status:      Spouse name: Not on file    Number of children: 2    Years of education: Not on file    Highest education level: Not on file   Occupational History    Occupation: volunteer   Social Needs    Financial resource strain: Not on file    Food insecurity     Worry: Not on file     Inability: Not on file   [employer] needs     Medical: Not on file     Non-medical: Not on file   Tobacco Use    Smoking status: Never Smoker    Smokeless tobacco: Never Used   Substance and Sexual Activity    Alcohol use: Not Currently     Alcohol/week: 0.0 standard drinks     Comment: monthly    Drug use: No    Sexual activity: Yes     Partners: Male   Lifestyle    Physical activity     Days per week: Not on file     Minutes per session: Not on file    Stress: Not on file   Relationships    Social connections     Talks on phone: Not on file     Gets together: Not on file     Attends [employer] service: Not on file     Active member of club or organization: Not on file     Attends meetings of clubs or organizations: Not on file     Relationship status: Not on file    Intimate partner violence     Fear of current or ex partner: Not on file     Emotionally abused: Not on file     Physically abused: Not on file     Forced sexual activity: Not on file   Other Topics Concern    Not on file   Social History Narrative    Not on file       Medications:   Allergies Allergen Reactions    Ferrous Sulfate Shortness Of Breath    Cephalexin Itching and Swelling    Gabapentin Other (See Comments)     Various adverse reaction    Dicloxacillin Rash    Hydromorphone Itching    Pcn [Penicillins] Rash    Sulfa Antibiotics Rash and Other (See Comments)     Chest pain. Medications Prior to Admission: pregabalin (LYRICA) 75 MG capsule, Take 1 capsule by mouth 2 times daily for 30 days. mirtazapine (REMERON) 15 MG tablet, Take 1 tablet by mouth nightly  carvedilol (COREG) 3.125 MG tablet, Take 1 tablet by mouth 2 times daily (with meals)  gentamicin (GARAMYCIN) 0.1 % cream, Apply to PD dialysis catheter when accessing the site. Apply topically 3 times daily.   lactulose (CHRONULAC) 10 GM/15ML solution, Take 45 mLs by mouth 2 times daily as needed (Constipation resistant to other methods.)  pantoprazole (PROTONIX) 40 MG tablet, Take 1 tablet by mouth 2 times daily  sucralfate (CARAFATE) 1 GM/10ML suspension, Take 10 mLs by mouth 2 times daily for 5 days  isosorbide mononitrate (IMDUR) 60 MG extended release tablet, Take 120 mg by mouth daily  loratadine (CLARITIN) 10 MG tablet, Take 10 mg by mouth daily as needed (Allergies)  melatonin 5 MG TABS tablet, Take 5 mg by mouth nightly as needed (Sleep)  NIFEdipine (ADALAT CC) 90 MG extended release tablet, Take 90 mg by mouth daily  sevelamer (RENVELA) 800 MG tablet, Take 3 tablets by mouth 3 times daily (with meals)  senna-docusate (PERICOLACE) 8.6-50 MG per tablet, Take 1 tablet by mouth 2 times daily as needed for Constipation  cinacalcet (SENSIPAR) 30 MG tablet, Take 120 mg by mouth daily  insulin aspart (NOVOLOG FLEXPEN) 100 UNIT/ML injection pen, Inject into the skin 2 times daily (with meals) 151-200 = Give 1 unit 201-250 = Give 2 units 251-300 = Give 3 units 301-350 = Give 4 units 351-400 = Give 5 units  atorvastatin (LIPITOR) 20 MG tablet, Take 1 tablet by mouth nightly  lubiprostone (AMITIZA) 24 MCG capsule, Take 24 mcg by core infarct is seen on prior    studies in the left parietal region. Elevated Tmax with a threshold of 6    seconds in the left temporal parietal region measuring 13 mL in volume    consistent with ischemic penumbra. CTA HEAD NECK W CONTRAST   Final Result   1. There is once again a 2.9 mm long thrombus within the proximal    inferior and M2 segment of the left MCA. There is enhancement of the MCA    distal to the thrombus. 2.  Acute evolving left parietal infarct. Hyperdensity seen in the left    parietal lobe better seen on the noncontrast CT performed earlier. 3.  Mild multifocal stenosis of the right A2 and distal segments of the    AMELIA. 4.  Mild stenosis of the right ICA. 5.  Mild stenosis of the left ICA. _______________________________________________       IMPRESSION:        1.  15% stenosis of the left ICA. 2.  Atherosclerosis of the right ICA with no significant stenosis using    NASCET criteria. CT HEAD WO CONTRAST   Final Result      Hemorrhagic transformation of left parietal infarct along the high superior parietal white matter just posterior to the frontal lobe as described above with some surrounding low attenuation which could be vasogenic edema or infarction changes. No midline shift or hydrocephalus. Brain volume loss is symmetric, atrophic change      VL Extremity Venous Bilateral    (Results Pending)     MRI of the head (3/13/2020):          CT scan of the head (3/13/2020):          CTA of the  Head/Neck:   Impression   1.  There is once again a 2.9 mm long thrombus within the proximal    inferior and M2 segment of the left MCA.  There is enhancement of the MCA    distal to the thrombus. 2.  Acute evolving left parietal infarct.  Hyperdensity seen in the left    parietal lobe better seen on the noncontrast CT performed earlier. 3.  Mild multifocal stenosis of the right A2 and distal segments of the    AMELIA. 4.  Mild stenosis of the right ICA.    5.

## 2020-03-13 NOTE — PROGRESS NOTES
Pt to CT.  Spoke with Dr. Star Rivera, ICU resident about pt receiving contrast again with high cr. Daniels Book per Star Rivera MD to go ahead with contrast/

## 2020-03-13 NOTE — PROGRESS NOTES
ICU Progress Note       Code:Full Code  Admit Date: 3/13/2020  Day: 2  Vent Day: None  Lines/Tubes (Day): PIV  IV Fluids:None  Vasopressors:None                Antibiotics: None  Diet: Diet NPO Effective Now    CC: No chief complaint on file. ON:  - NAEO    Interval Hx:  - Seen at bedside this AM. Patient continues to be dysarthic. Slurred speech. Continue to have R extremity weakness.  - Denied nausea, vomiting, fever, chills, headache, vision changes, SOB, chest pain, abdominal pain. HISTORY OF PRESENT ILLNESS:   Ms. Pina Grew. Extensive co-morbidities including hemicolectomy for GI bleed in January, ESRD on dialysis, DM, and PVD with necrotic toes. Developed R-sided weakness and aphasia at 21:00. Brought to [facility]. Exam significantly improved to where she was slightly dysarthric. neuro examination she can hold her right upper extremity against gravity for 10 seconds in the right lower extremity for 5 seconds. No tPA administered. Because of dramatic improvement within an hour of being there, also not a candidate for mechanical thrombectomy at this time. CTH revealed chronic L parietal infarct and diffuse microvascular changes and lacunar infarcts. No evidence of hemorrhage or new ischemia. CTA revealed possible subocclusive thrombus of the distal L MCA with enhancement of the MCA distal to the thrombus. On my exam, Pt is dysarthric and has some degree of receptive aphasia. Unable to follow commands, trying to verbalize but has slurred incoherent speech.  CTP performed which showed no core infarct    MEDICATIONS:   Scheduled Meds:   sodium chloride flush  10 mL Intravenous 2 times per day    insulin lispro  0-6 Units Subcutaneous TID WC    insulin lispro  0-3 Units Subcutaneous Nightly      Continuous Infusions:   [Held by provider] heparin (porcine) 14 Units/kg/hr (03/13/20 0711)    dextrose       PRN Meds:sodium chloride flush, acetaminophen **OR** acetaminophen, polyethylene glycol, promethazine **OR** ondansetron, perflutren lipid microspheres, heparin (porcine), heparin (porcine), glucose, dextrose, glucagon (rDNA), dextrose    Allergies: Allergies   Allergen Reactions    Ferrous Sulfate Shortness Of Breath    Cephalexin Itching and Swelling    Gabapentin Other (See Comments)     Various adverse reaction    Dicloxacillin Rash    Hydromorphone Itching    Pcn [Penicillins] Rash    Sulfa Antibiotics Rash and Other (See Comments)     Chest pain. PHYSICAL EXAM:       Vitals:   T-max:  Patient Vitals for the past 8 hrs:   BP Temp Temp src Pulse Resp SpO2 Height Weight   03/13/20 0700 101/62 -- -- 73 -- 98 % -- --   03/13/20 0530 -- -- -- 77 -- -- -- --   03/13/20 0432 (!) 97/53 97.5 °F (36.4 °C) Oral 79 16 100 % 5' 7\" (1.702 m) 118 lb 13.3 oz (53.9 kg)   03/13/20 0430 -- -- -- 79 -- -- -- --     No intake or output data in the 24 hours ending 03/13/20 0751  No intake/output data recorded. No intake/output data recorded. Review of Systems   Constitutional: Negative. HENT: Negative. Eyes: Negative. Respiratory: Negative. Cardiovascular: Negative. Gastrointestinal: Negative. Endocrine: Negative. Genitourinary: Negative. Musculoskeletal: Negative. Skin: Positive for rash and wound. Allergic/Immunologic: Negative. Neurological: Positive for speech difficulty and weakness. Hematological: Negative. Psychiatric/Behavioral: Negative. Neurological Exam:  Exam:  Blood pressure 102/65, pulse 73, temperature 97.8 °F (36.6 °C), temperature source Oral, resp. rate 16, height 5' 7\" (1.702 m), weight 118 lb 13.3 oz (53.9 kg), SpO2 96 %, not currently breastfeeding. Constitutional                          Vital signs: BP, HR, and RR reviewed            General Alert, no distress, well-nourished  Eyes: fundoscopic exam not visualized. Cardiovascular: pulses symmetric in all 4 extremities. BLLE digital ulcers, chronic. No peripheral edema.   Psychiatric: RBCUA, MUCUS, TRICHOMONAS, YEAST, BACTERIA, CLARITYU, SPECGRAV, LEUKOCYTESUR, UROBILINOGEN, BILIRUBINUR, BLOODU, GLUCOSEU, AMORPHOUS in the last 72 hours. Invalid input(s): Juli Damon  CT Brain Perfusion   Final Result     No core infarct identified however core infarct is seen on prior    studies in the left parietal region. Elevated Tmax with a threshold of 6    seconds in the left temporal parietal region measuring 13 mL in volume    consistent with ischemic penumbra. CTA HEAD NECK W CONTRAST   Final Result   1. There is once again a 2.9 mm long thrombus within the proximal    inferior and M2 segment of the left MCA. There is enhancement of the MCA    distal to the thrombus. 2.  Acute evolving left parietal infarct. Hyperdensity seen in the left    parietal lobe better seen on the noncontrast CT performed earlier. 3.  Mild multifocal stenosis of the right A2 and distal segments of the    AMELIA. 4.  Mild stenosis of the right ICA. 5.  Mild stenosis of the left ICA. _______________________________________________       IMPRESSION:        1.  15% stenosis of the left ICA. 2.  Atherosclerosis of the right ICA with no significant stenosis using    NASCET criteria. CT HEAD WO CONTRAST   Final Result      Hemorrhagic transformation of left parietal infarct along the high superior parietal white matter just posterior to the frontal lobe as described above with some surrounding low attenuation which could be vasogenic edema or infarction changes. No midline shift or hydrocephalus. Brain volume loss is symmetric, atrophic change          ASSESSMENT AND PLAN:     Acute CVA 2/2 to L MCA thrombus  CTH showed PH 1 hemorrhagic transformation of the chronic L parietal infarct. CTA was essentially stable with a likely distal L M1 subocclusive thrombus. CTP demonstrated a 13 cc distal penumbra.     - cont ASA  - EEG  - Keppra  - f/u Venous duplex given PFO on TTE  - atorvastatin 80 mg PO qD  - Permissive HTN, head of bed flat, q1h neurochecks.    - STOP heparin gtt.   - NPO, SLP Eval. Holding home meds.      ESRD on HD  Dr. Mel Vásquez at [facility] is Pt's Nephrologist but has [facility] previous [facility] admission.     - Next HD tomorrow per schedule  - Dose JOSEMANUEL with HD     T2DM. -LDSS  -Last A1c 7.7 in 12/2019     HTN  -holding home meds to allow for permissive HTN.       History of GI bleed  -Daily CBC.      HLD  -High dose statin      Code Status:Full Code  Diet NPO Effective Now  PPX:  SQH  DISPO: ICU    This patient has been staffed and discussed with Jose Fontenot MD  -----------------------------  Xavi Ornelas MD, PGY-1  3/13/2020  7:51 AM

## 2020-03-13 NOTE — PLAN OF CARE
Called by Dr. Chico Chaudhry of the  stroke team regarding Ms. Dumont. Extensive co-morbidities including hemicolectomy for GI bleed in January, ESRD on dialysis, DM, and PVD with necrotic toes. Developed R-sided weakness and aphasia at 21:00. Brought to [facility]. Exam significantly improved to where she was slightly dysarthric. No tPA administered. Because of dramatic improvement, also not a candidate for mechanical thrombectomy at this time. CTH revealed chronic L parietal infarct and diffuse microvascular changes and lacunar infarcts. No evidence of hemorrhage or new ischemia. CTA revealed possible subocclusive thrombus of the distal L MCA.  - Transfer to [facility]. and admit to the ICU on the Intensivist service  - Keep [facility] flat to improve cerebral perfusion  - Permissive HTN  - Rec starting a low dose (low range PTT), no bolus, hep gtt to help maintain patency of the L MCA in the setting of likely subocclusive thrombus. Patient does not have any findings on her [address] that would appear to place her at increased bleeding risk and her GI bleeding source is likely treated following the colectomy (According to Dr. Chico Chaudhry, the note from her last general surgery appointment stated that they were pleased with her progress and essentially recommended f/u PRN).  If patient develops signs of bleeding, rec stopping hep gtt immediately  - Rec consulting Vascular Neurology for further work up and management

## 2020-03-13 NOTE — H&P
ICU HISTORY AND [facility] Day: 1  ICU Day: 1                                                         Code:Full Code  Admit Date: 3/13/2020  PCP: Aramis Nettles MD                                  CC: Rt sided weakness. HISTORY OF PRESENT ILLNESS:   Ms. Alphonso Becker is a 51 yo F with PMH of ESRD on HD, GI bleed (s/p hemicolectomy), T2DM, PVD, previous CVA who presented from nursing home to [facility] with Rt sided weakness and aphasia (last known well 9 pm on 3/12). Pt unable to provide history. Based on Chart review Pt had Rt sided hemiplegia im upper and lower extremities and aphasia on arrival. During course of ED, Pt improved without intervention and was able to move Rt upper and lower extremity and was dysarthric. CTA head and neck demonstrated possible subocclusive thrombous of L MCA and redemonstration of chronic L pariteal infarct and diffuse microvascular changes and lacunar infarcts but mo hemorrhage. She was then started on low dose heparin gtt as per Dr. Jasmine Vasquez (Neurosurgery) and transferred to [facility] ICU for further management. On my exam, Pt is dysarthric and has some degree of receptive aphasia. Unable to follow commands, trying to verbalize but has slurred incoherent speech.      PAST HISTORY:     Past Medical History:   Diagnosis Date    Cerebral artery occlusion with cerebral infarction [facility]) 2010    Chronic kidney disease     Cocaine abuse ([address]     Crohn's disease ([address]     Depression     Diabetes mellitus ([address]     ESRD (end stage renal disease) ([address]     GERD (gastroesophageal reflux disease)     Hemodialysis patient ([address] 2016    peritoneal     Hyperlipidemia     Hypertension     MI, old     Mood disorder ([address]     Pericarditis     Peritoneal dialysis catheter in place ([address]     Peritonitis ([address]     Pneumonia     Thyroid disease        Past Surgical History:   Procedure Laterality Date    BACK SURGERY      Tumor removal    CHOLECYSTECTOMY      KIDNEY recorded. Physical Examination:     Physical Exam  Constitutional:       General: She is not in acute distress. Eyes:      Extraocular Movements: Extraocular movements intact. Cardiovascular:      Rate and Rhythm: Normal rate and regular rhythm. Heart sounds: No murmur. Pulmonary:      Effort: Pulmonary effort is normal.      Breath sounds: No wheezing or rales. Abdominal:      Palpations: Abdomen is soft. Tenderness: There is no abdominal tenderness. Skin:     General: Skin is warm. Neurological:      Mental Status: She is alert. Comments: Dysarthric. Some degree of receptive aphasia. Not following commands. Lt upper and lower extremity strength normal. Drift of Rt upper and lower extremity 2 seconds after passive elevation. Access:                           -Peripheral Access Day#:1      DATA:       Labs:  CBC:   Recent Labs     03/12/20  2350   WBC 10.3   HGB 9.7*   HCT 30.3*          BMP:   Recent Labs     03/12/20  2350      K 4.1   CL 97*   CO2 25   BUN 13   CREATININE 4.1*   GLUCOSE 182*     LFT's:   Recent Labs     03/12/20  2350   AST 38*   ALT 12   BILITOT <0.2   ALKPHOS 311*     Troponin:   Recent Labs     03/12/20  2350   TROPONINI 0.57*     BNP:No results for input(s): BNP in the last 72 hours. ABGs: No results for input(s): PHART, FMH9NOH, PO2ART in the last 72 hours. INR:   Recent Labs     03/12/20  2350   INR 1.17*           CT Brain Perfusion    (Results Pending)   CT HEAD WO CONTRAST    (Results Pending)   CTA HEAD NECK W CONTRAST    (Results Pending)       EKG: pending  Echo:pending      ASSESSMENT AND PLAN:     Acute CVA 2/2 to L MCA thrombus  -Permissive HTN, head of bed flat, q1h neurochecks. -f/u CT head wo Contrast, CTA head and neck, CT perfusion scan   -Neurosurgery Consult to evaluate role of thrombectomy (Dr. Jordy Adamson following), Neurology Consult.  -Low dose heparin gtt.   -NPO, SLP Eval. Holding home meds.    -f/u ECHO    ESRD on

## 2020-03-13 NOTE — PROGRESS NOTES
4 Eyes Skin Assessment     The patient is being assess for  Admission    I agree that 2 RN's have performed a thorough Head to Toe Skin Assessment on the patient. ALL assessment sites listed below have been assessed. Areas assessed by both nurses: ***  [x]   Head, Face, and Ears   [x]   Shoulders, Back, and Chest  [x]   Arms, Elbows, and Hands   [x]   Coccyx, Sacrum, and IschIum  [x]   Legs, Feet, and Heels        Does the Patient have Skin Breakdown?   Yes   Wound to right pinky toe, right big toe  Wound to left ankle        Devonte Prevention initiated:  Yes   Wound Care Orders initiated:  Yes      [address]  nurse consulted for Pressure Injury (Stage 3,4, Unstageable, DTI, NWPT, and Complex wounds), New and Established Ostomies:  Yes      Nurse 1 eSignature: Electronically signed by Jaswinder Macdonald RN on 3/13/20 at 4:47 AM EDT    **SHARE this note so that the co-signing nurse is able to place an eSignature**    Nurse 2 eSignature: {Esignature:[id number]}

## 2020-03-13 NOTE — CONSULTS
[facility]  Consult Note           Reason for Consult:  End stage renal disease  Requesting Physician:  Dr. Jed Qureshi    Chief Complaint:  Right sided weakness  History Obtained From:  patient, electronic medical record    History of Present Ilness:    50year old female with ESRD on HD Tues-Thurs-Sat, DM, PVD, prior CVA admitted with right sided weakness. We have been asked to assist in further dialysis care. Limited history obtained from patient due to altered mental status. Hx obtained from chart. Admitted with right sided weakness and aphasia. Unable to follow any commands, aphasic with slurred speech. Denies any pain. Last had HD on 3/12. Past Medical History:        Diagnosis Date    Cerebral artery occlusion with cerebral infarction ([address] 2010    Cocaine abuse ([address]     Crohn's disease ([address]     Depression     Diabetes mellitus ([address]     ESRD (end stage renal disease) ([address]     HD Tues-Thurs-Sat    GERD (gastroesophageal reflux disease)     Hemodialysis patient ([address] 2016    peritoneal     Hyperlipidemia     Hypertension     MI, old     Mood disorder ([address]     Pericarditis     Peritonitis ([address]     Pneumonia     Thyroid disease        Past Surgical History:        Procedure Laterality Date    BACK SURGERY      Tumor removal    CHOLECYSTECTOMY      KIDNEY TRANSPLANT  1990    KIDNEY TRANSPLANT      LAPAROSCOPY      right ovary removed and left ovarian cyst removed for endometriosis    OTHER SURGICAL HISTORY      peritoneal dialysis catheter    PARACENTESIS      TUBAL LIGATION      UPPER GASTROINTESTINAL ENDOSCOPY  03/31/2017    UPPER GASTROINTESTINAL ENDOSCOPY N/A 3/25/2019    EGD BIOPSY performed by Jamari Johnson MD at [facility] Medications:    No current facility-administered medications on file prior to encounter.       Current Outpatient Medications on File Prior to Encounter   Medication Sig Dispense Refill    pregabalin (Vernard [employer]) 75 MG capsule Take 1 capsule by mouth 2 times daily for 30 days. 60 capsule 0    mirtazapine (REMERON) 15 MG tablet Take 1 tablet by mouth nightly 30 tablet 3    carvedilol (COREG) 3.125 MG tablet Take 1 tablet by mouth 2 times daily (with meals) 60 tablet 3    gentamicin (GARAMYCIN) 0.1 % cream Apply to PD dialysis catheter when accessing the site. Apply topically 3 times daily.  1 Tube 0    lactulose (CHRONULAC) 10 GM/15ML solution Take 45 mLs by mouth 2 times daily as needed (Constipation resistant to other methods.) 1 Bottle 1    pantoprazole (PROTONIX) 40 MG tablet Take 1 tablet by mouth 2 times daily 60 tablet 1    sucralfate (CARAFATE) 1 GM/10ML suspension Take 10 mLs by mouth 2 times daily for 5 days 50 mL 1    isosorbide mononitrate (IMDUR) 60 MG extended release tablet Take 120 mg by mouth daily      loratadine (CLARITIN) 10 MG tablet Take 10 mg by mouth daily as needed (Allergies)      melatonin 5 MG TABS tablet Take 5 mg by mouth nightly as needed (Sleep)      NIFEdipine (ADALAT CC) 90 MG extended release tablet Take 90 mg by mouth daily      sevelamer (RENVELA) 800 MG tablet Take 3 tablets by mouth 3 times daily (with meals)      senna-docusate (PERICOLACE) 8.6-50 MG per tablet Take 1 tablet by mouth 2 times daily as needed for Constipation      cinacalcet (SENSIPAR) 30 MG tablet Take 120 mg by mouth daily      insulin aspart (NOVOLOG FLEXPEN) 100 UNIT/ML injection pen Inject into the skin 2 times daily (with meals) 151-200 = Give 1 unit  201-250 = Give 2 units  251-300 = Give 3 units  301-350 = Give 4 units  351-400 = Give 5 units      atorvastatin (LIPITOR) 20 MG tablet Take 1 tablet by mouth nightly 30 tablet 0    lubiprostone (AMITIZA) 24 MCG capsule Take 24 mcg by mouth 2 times daily (with meals)       darbepoetin hina-polysorbate (ARANESP, ALBUMIN FREE,) 100 MCG/0.5ML SOSY injection Inject 200 mcg into the skin once a week Las t dose was 1 week ago      losartan (COZAAR) 100 MG

## 2020-03-13 NOTE — ED PROVIDER NOTES
Called to evaluate patient in room 5 for potential stroke alert. EMS states were called to [facility] for respiratory distress, their arrival patient with oxygen saturations in the 70s. She had slurred speech. During transport patient developed right-sided weakness which seemed worsening. Fingerstick 202. On my exam patient does have demonstrable right-sided weakness and totally aphasia. Stroke alert activated    Discussed with Dr. Bharati Altamirano. Patient is dialysis, due to concern for acute stroke with severe right sided weakness and aphasia will proceed with CTA of the head and neck as benefits felt to outweigh risks.      11 [facility], DO  03/12/20 425 [facility],   03/12/20 1332

## 2020-03-13 NOTE — ED NOTES
MD made aware of current hypotension, advised he wants to leaved patient with a lower BP per stroke team. Advised need of second line due to heparin administration and possible need for central line since BP is low.  MD okay with second line only at this time     [facility]  03/13/20 5601

## 2020-03-13 NOTE — PLAN OF CARE
Low dose, no bolus hep gtt was started at [facility]. I was notified that patient had a recurrence of her symptoms at approximately 02:20. Recommended continuing with transfer to [facility] and admission to the ICU, continue hep gtt, and to get a repeat CTH, CTA, and CTP upon admission to evaluate for possible candidacy for mechanical thrombectomy. Conveyed these recommendations to the accepting Hospitalist via telephone. Imaging was obtained at 05:30 after patient arrived at [facility]. CTH showed PH 1 hemorrhagic transformation of the chronic L parietal infarct. CTA was essentially stable with a likely distal L M1 subocclusive thrombus. CTP demonstrated a 13 cc distal penumbra. Discussed the case with my senior vascular partner and it was felt that the patient was not a good candidate for mechanical thrombectomy given the relatively small, distal penumbra. It was felt that the penumbra was likely due to a distal branch vessel occlusion and that intervening on the proximal, likely subocclusive thrombus, would not affect this distal occlusion and there were no signs of significant territory at risk from the proximal, likely subocclusive thrombus.  - Hep gtt was stopped because of the hemorrhagic conversion.  Agree with this  - Repeat CTH in 6 hrs to evaluate for stability of the hemorrhagic conversion  - Rec Keppra for at least 7 days for seizure prophylaxis related to the hemorrhagic conversion  - Rec Neurology consult for management of likely subocclusive thrombus, stroke etiology w/u, and secondary prevention  - MRI brain to evaluate infarct burden

## 2020-03-14 LAB
ALBUMIN SERPL-MCNC: 2.1 G/DL (ref 3.4–5)
ANION GAP SERPL CALCULATED.3IONS-SCNC: 13 MMOL/L (ref 3–16)
BASOPHILS ABSOLUTE: 0.1 K/UL (ref 0–0.2)
BASOPHILS RELATIVE PERCENT: 0.7 %
BUN BLDV-MCNC: 23 MG/DL (ref 7–20)
CALCIUM SERPL-MCNC: 7.9 MG/DL (ref 8.3–10.6)
CHLORIDE BLD-SCNC: 97 MMOL/L (ref 99–110)
CO2: 24 MMOL/L (ref 21–32)
CREAT SERPL-MCNC: 6 MG/DL (ref 0.6–1.1)
EOSINOPHILS ABSOLUTE: 0.4 K/UL (ref 0–0.6)
EOSINOPHILS RELATIVE PERCENT: 5 %
ESTIMATED AVERAGE GLUCOSE: 91.1 MG/DL
ESTIMATED AVERAGE GLUCOSE: 99.7 MG/DL
GFR AFRICAN AMERICAN: 9
GFR NON-AFRICAN AMERICAN: 8
GLUCOSE BLD-MCNC: 133 MG/DL (ref 70–99)
GLUCOSE BLD-MCNC: 175 MG/DL (ref 70–99)
GLUCOSE BLD-MCNC: 67 MG/DL (ref 70–99)
GLUCOSE BLD-MCNC: 75 MG/DL (ref 70–99)
GLUCOSE BLD-MCNC: 78 MG/DL (ref 70–99)
GLUCOSE BLD-MCNC: 88 MG/DL (ref 70–99)
GLUCOSE BLD-MCNC: 88 MG/DL (ref 70–99)
GLUCOSE BLD-MCNC: 95 MG/DL (ref 70–99)
HBA1C MFR BLD: 4.8 %
HBA1C MFR BLD: 5.1 %
HCT VFR BLD CALC: 27.4 % (ref 36–48)
HEMOGLOBIN: 8.9 G/DL (ref 12–16)
LYMPHOCYTES ABSOLUTE: 1.6 K/UL (ref 1–5.1)
LYMPHOCYTES RELATIVE PERCENT: 20.1 %
MCH RBC QN AUTO: 31.2 PG (ref 26–34)
MCHC RBC AUTO-ENTMCNC: 32.5 G/DL (ref 31–36)
MCV RBC AUTO: 95.9 FL (ref 80–100)
MONOCYTES ABSOLUTE: 0.5 K/UL (ref 0–1.3)
MONOCYTES RELATIVE PERCENT: 6.6 %
NEUTROPHILS ABSOLUTE: 5.5 K/UL (ref 1.7–7.7)
NEUTROPHILS RELATIVE PERCENT: 67.6 %
PDW BLD-RTO: 17.6 % (ref 12.4–15.4)
PERFORMED ON: ABNORMAL
PERFORMED ON: NORMAL
PHOSPHORUS: 5.1 MG/DL (ref 2.5–4.9)
PLATELET # BLD: 217 K/UL (ref 135–450)
PMV BLD AUTO: 8 FL (ref 5–10.5)
POTASSIUM REFLEX MAGNESIUM: 5.1 MMOL/L (ref 3.5–5.1)
POTASSIUM SERPL-SCNC: 5.1 MMOL/L (ref 3.5–5.1)
RBC # BLD: 2.86 M/UL (ref 4–5.2)
SODIUM BLD-SCNC: 134 MMOL/L (ref 136–145)
WBC # BLD: 8.1 K/UL (ref 4–11)

## 2020-03-14 PROCEDURE — 92610 EVALUATE SWALLOWING FUNCTION: CPT

## 2020-03-14 PROCEDURE — 6370000000 HC RX 637 (ALT 250 FOR IP): Performed by: STUDENT IN AN ORGANIZED HEALTH CARE EDUCATION/TRAINING PROGRAM

## 2020-03-14 PROCEDURE — 90935 HEMODIALYSIS ONE EVALUATION: CPT

## 2020-03-14 PROCEDURE — 2580000003 HC RX 258: Performed by: STUDENT IN AN ORGANIZED HEALTH CARE EDUCATION/TRAINING PROGRAM

## 2020-03-14 PROCEDURE — 6360000002 HC RX W HCPCS: Performed by: STUDENT IN AN ORGANIZED HEALTH CARE EDUCATION/TRAINING PROGRAM

## 2020-03-14 PROCEDURE — 85025 COMPLETE CBC W/AUTO DIFF WBC: CPT

## 2020-03-14 PROCEDURE — 1200000000 HC SEMI PRIVATE

## 2020-03-14 PROCEDURE — 83036 HEMOGLOBIN GLYCOSYLATED A1C: CPT

## 2020-03-14 PROCEDURE — 80069 RENAL FUNCTION PANEL: CPT

## 2020-03-14 PROCEDURE — 92507 TX SP LANG VOICE COMM INDIV: CPT

## 2020-03-14 PROCEDURE — 6360000002 HC RX W HCPCS: Performed by: INTERNAL MEDICINE

## 2020-03-14 PROCEDURE — 5A1D70Z PERFORMANCE OF URINARY FILTRATION, INTERMITTENT, LESS THAN 6 HOURS PER DAY: ICD-10-PCS | Performed by: INTERNAL MEDICINE

## 2020-03-14 PROCEDURE — 36415 COLL VENOUS BLD VENIPUNCTURE: CPT

## 2020-03-14 PROCEDURE — 92523 SPEECH SOUND LANG COMPREHEN: CPT

## 2020-03-14 RX ORDER — HEPARIN SODIUM 1000 [USP'U]/ML
1000 INJECTION, SOLUTION INTRAVENOUS; SUBCUTANEOUS PRN
Status: DISCONTINUED | OUTPATIENT
Start: 2020-03-14 | End: 2020-03-17 | Stop reason: HOSPADM

## 2020-03-14 RX ADMIN — Medication 10 ML: at 21:46

## 2020-03-14 RX ADMIN — Medication 10 ML: at 13:19

## 2020-03-14 RX ADMIN — SEVELAMER CARBONATE 2400 MG: 800 TABLET, FILM COATED ORAL at 17:31

## 2020-03-14 RX ADMIN — PREGABALIN 50 MG: 50 CAPSULE ORAL at 21:46

## 2020-03-14 RX ADMIN — LEVETIRACETAM 500 MG: 100 INJECTION, SOLUTION INTRAVENOUS at 11:30

## 2020-03-14 RX ADMIN — HEPARIN SODIUM 5000 UNITS: 5000 INJECTION INTRAVENOUS; SUBCUTANEOUS at 13:32

## 2020-03-14 RX ADMIN — ATORVASTATIN CALCIUM 80 MG: 80 TABLET, FILM COATED ORAL at 21:46

## 2020-03-14 RX ADMIN — DEXTROSE 50 % IN WATER (D50W) INTRAVENOUS SYRINGE 12.5 G: at 13:19

## 2020-03-14 RX ADMIN — HEPARIN SODIUM 3200 UNITS: 1000 INJECTION INTRAVENOUS; SUBCUTANEOUS at 09:00

## 2020-03-14 RX ADMIN — HEPARIN SODIUM 5000 UNITS: 5000 INJECTION INTRAVENOUS; SUBCUTANEOUS at 21:47

## 2020-03-14 RX ADMIN — CINACALCET HYDROCHLORIDE 120 MG: 30 TABLET, FILM COATED ORAL at 10:57

## 2020-03-14 RX ADMIN — Medication 10 ML: at 11:01

## 2020-03-14 RX ADMIN — SEVELAMER CARBONATE 2400 MG: 800 TABLET, FILM COATED ORAL at 10:57

## 2020-03-14 RX ADMIN — ONDANSETRON 4 MG: 2 INJECTION INTRAMUSCULAR; INTRAVENOUS at 13:31

## 2020-03-14 RX ADMIN — HEPARIN SODIUM 5000 UNITS: 5000 INJECTION INTRAVENOUS; SUBCUTANEOUS at 06:49

## 2020-03-14 RX ADMIN — PREGABALIN 50 MG: 50 CAPSULE ORAL at 11:01

## 2020-03-14 RX ADMIN — MIRTAZAPINE 15 MG: 15 TABLET, FILM COATED ORAL at 21:45

## 2020-03-14 RX ADMIN — ASPIRIN 81 MG 81 MG: 81 TABLET ORAL at 10:56

## 2020-03-14 ASSESSMENT — PAIN SCALES - PAIN ASSESSMENT IN ADVANCED DEMENTIA (PAINAD)
FACIALEXPRESSION: 0
CONSOLABILITY: 0
TOTALSCORE: 0
BODYLANGUAGE: 0
NEGVOCALIZATION: 0
BREATHING: 0
BREATHING: 0
BODYLANGUAGE: 0
FACIALEXPRESSION: 0
NEGVOCALIZATION: 0
TOTALSCORE: 0
CONSOLABILITY: 0

## 2020-03-14 ASSESSMENT — ENCOUNTER SYMPTOMS
EYES NEGATIVE: 1
ALLERGIC/IMMUNOLOGIC NEGATIVE: 1
RESPIRATORY NEGATIVE: 1
GASTROINTESTINAL NEGATIVE: 1

## 2020-03-14 ASSESSMENT — PAIN SCALES - GENERAL
PAINLEVEL_OUTOF10: 0

## 2020-03-14 NOTE — PROGRESS NOTES
Progress Note    Updates  Transferred to stepdown. No significant events overnight.      Past Medical History:   Diagnosis Date    Cerebral artery occlusion with cerebral infarction ([facility] 2010    Crohn's disease ([facility]     Depression     Diabetes mellitus ([place] [address]     ESRD (end stage renal disease) ([facility]     GERD (gastroesophageal reflux disease)     Hemodialysis patient ([facility] 2016    Hypertension     MI, old     Mood disorder ([facility]     Pericarditis     Peritonitis ([place] [address].)     Pneumonia     Thyroid disease      Current Facility-Administered Medications:     heparin (porcine) injection 1,000 Units, 1,000 Units, Intracatheter, PRN, Valentino Sr MD, 3,200 Units at 03/14/20 0900    sodium chloride flush 0.9 % injection 10 mL, 10 mL, Intravenous, 2 times per day, Devon Dominguez MD, 10 mL at 03/14/20 1101    sodium chloride flush 0.9 % injection 10 mL, 10 mL, Intravenous, PRN, Devon Dominguez MD, 10 mL at 03/14/20 1319    acetaminophen (TYLENOL) tablet 650 mg, 650 mg, Oral, Q6H PRN **OR** acetaminophen (TYLENOL) suppository 650 mg, 650 mg, Rectal, Q6H PRN, Devon Dominguez MD    polyethylene glycol (GLYCOLAX) packet 17 g, 17 g, Oral, Daily PRN, Devon Dominguez MD    promethazine (PHENERGAN) tablet 12.5 mg, 12.5 mg, Oral, Q6H PRN **OR** ondansetron (ZOFRAN) injection 4 mg, 4 mg, Intravenous, Q6H PRN, Devon Dominguez MD, 4 mg at 03/14/20 1331    perflutren lipid microspheres (DEFINITY) injection 1.65 mg, 1.5 mL, Intravenous, ONCE PRN, Devon Dominguez MD    heparin (porcine) injection 3,250 Units, 60 Units/kg, Intravenous, PRN, Devon Dominguez MD    heparin (porcine) injection 1,600 Units, 30 Units/kg, Intravenous, PRN, Devno Dominguez MD    insulin lispro (1 Unit Dial) 0-6 Units, 0-6 Units, Subcutaneous, TID WC, Devon Dominguez MD    insulin lispro (1 Unit Dial) 0-3 Units, 0-3 Units, Subcutaneous, Nightly, Devon Dominguez MD    glucose (GLUTOSE) 40 % oral gel 15 g, 15 g, Oral, PRN, Devon Dominguez, commands  Cranial nerves:   CN2: visual fields full   CN 3,4,6: extraocular muscles intact  CN7: face symmetric without dysarthria  CN8: hearing grossly intact  CN11: trap full strength on shoulder shrug  CN12: tongue midline with protrusion  Strength: good strength in all 4 extremities   Sensory: light touch intact in all 4 extremities. Cerebellar/coordination: finger nose finger not accurately assessed at this time. Tone: normal in all 4 extremities  Gait: deferred at this time for safety. ROS  Constitutional- No weight loss or fevers  Eyes- No diplopia. No photophobia. Ears/nose/throat- No dysphagia. No Dysarthria  Cardiovascular- No palpitations. No chest pain  Respiratory- No dyspnea. No Cough  Gastrointestinal- No Abdominal pain. No Vomiting. Genitourinary- No incontinence. No urinary retention  Musculoskeletal- No myalgia. No arthralgia  Skin- No rash. No easy bruising. Psychiatric- hx depression. No anxiety  Endocrine- hx diabetes. No thyroid issues. Hematologic- No bleeding difficulty. No fatigue  Neurologic- No weakness. No Headache. Labs  Glucose 133  Na 134  K 5.1  BUN 23  Creatinine 6.0    WBC 8.1K  Hg 8.9  Platelets 451    Sed Rate 71    Studies  EEG 3/14/20  Left focal slowing. No epileptiform discharges. MRI brain w/o 3/13/20, independently reviewed  Late subacute infarct in the L parietal region w/ evidence of cortical laminar necrosis and encephalomalacia. Focal areas of susceptibility artifact favor areas of petechial hemorrhage. Punctate focus of abnormal diffusion signal in the posterior left lentiform nucleus. Multiple foci of prior remote lacunar infarcts and small infarcts in the R/L cerebellum. CTA head/neck 3/13/20, independently reviewed  2.9 mm thrombus w/ in the proximal inferior and M2 segment of the L MCA. Mild multifocal stenosis of the right A2 and distal segments of the AMELIA. Mild stenosis of the L/R ICA.       TTE 3/13/20  EF

## 2020-03-14 NOTE — PROGRESS NOTES
Exceptions to [facility]  Intelligibility: Moderate  Dysarthria : Moderate    Cognition:      Orientation  Overall Orientation Status: Impaired  Orientation Level: Oriented to person;Disoriented to time;Disoriented to place  Attention  Attention: Exceptions to [facility]  Alternating Attention: Moderate  Sustained Attention: Mild  Memory  Memory: Unable to assess  Problem Solving  Problem Solving: Unable to assess  Numeric Reasoning  Numeric Reasoning: Unable to assess  Abstract Reasoning  Abstract Reasoning: Unable to assess  Safety/Judgement  Safety/Judgement: Unable to assess      Prognosis:  Speech Therapy Prognosis  Prognosis: Fair  Prognosis Considerations: Age  Individuals consulted  Consulted and agree with results and recommendations: Patient;RN;Family member  Family member consulted: sister    Education:  Patient Education: Educated pt/sister re: role of SLP, purpose of evaluation, swallow function, aspiration risk  Patient Education Response: Needs reinforcement  Safety Devices in place: Yes  Type of devices: Nurse notified; All fall risk precautions in place; Left in bed;Call light within reach    Therapy Time:   Individual Concurrent Group Co-treatment   Time In 1025         Time Out 1050         Minutes 25            Timed Code Treatment Minutes: 0 Minutes  Total Treatment Time: Gilberto , [facility] [address] [facility].[id number]  Speech-Language Pathologist    This document will serve as a discharge summary if pt discharges before next treatment.

## 2020-03-14 NOTE — PROGRESS NOTES
Pt came to room 5513 on transfer from ICU, Assessment complete, see flow sheet. Skin assessment done. Call light placed in reach. When asked about the pt's name she said she knew who is was, but when I asked for her name she said \"I don't know. \"  Pt later was able to tell me her name. Pt's speech can be slurred and pt has word finding issues. NIHSS done and recorded in the assessment flow sheet. Will continue to follow throughout the shift.  Rufina Lazar

## 2020-03-14 NOTE — PLAN OF CARE
Completed swallowing evaluation. Please refer to EMR.     Les Hemphill M.A., [address]  Speech-Language Pathologist

## 2020-03-14 NOTE — PROGRESS NOTES
Resident Transfer Acceptance Note    Admit Date: 3/13/2020    PCP: Andrea Stafford MD                  : 1975  MRN: [id number]     HPI: Ms. Lizbeth Velasquez is a 49 yo F with PMH of ESRD on HD, GI bleed (s/p hemicolectomy), T2DM, PVD, previous CVA who presented from nursing home to [facility] with Rt sided weakness and aphasia (last known well 9 pm on 3/12). Pt unable to provide history. Based on Chart review Pt had Rt sided hemiplegia im upper and lower extremities and aphasia on arrival. During course of ED, Pt improved without intervention and was able to move Rt upper and lower extremity and was dysarthric. CTA head and neck demonstrated possible subocclusive thrombous of L MCA and redemonstration of chronic L pariteal infarct and diffuse microvascular changes and lacunar infarcts but mo hemorrhage. She was then started on low dose heparin gtt as per Dr. Toby Abbott (Neurosurgery) and transferred to [facility] ICU for further management.     On my exam, Pt is dysarthric and has some degree of receptive aphasia. Unable to follow commands, trying to verbalize but has slurred incoherent speech. Better w/o tPA. Came to [facility] for possible thrombectomy. CT showed possible hemorrhagic conversion. MRI showed previous stroke. EEG showed possible focal discharges. On Keppra 500 mg BID. AMS w/ possible clonus, possibly non convulsive seizure. Subjective: Interval History: Pt sleeping in bed but arousable. Does not want to seem to want to participate in questioning. Will follow commands. Denies knowing year, where she is, or her name. Neurology has no plans for intervention at this time based on EEG, will evaluate the patient later today.      Diet: Diet NPO Effective Now      Data:   Scheduled Meds:   sodium chloride flush  10 mL Intravenous 2 times per day    insulin lispro  0-6 Units Subcutaneous TID WC    insulin lispro  0-3 Units Subcutaneous Nightly    atorvastatin  80 mg Oral Nightly    aspirin  81 mg Oral Daily    3. Multiple foci of prior remote lacunar infarcts and small infarcts in the right and left cerebellum with focal areas of encephalomalacia. Correlate for possible central embolic phenomenon. CT Brain Perfusion   Final Result     No core infarct identified however core infarct is seen on prior    studies in the left parietal region. Elevated Tmax with a threshold of 6    seconds in the left temporal parietal region measuring 13 mL in volume    consistent with ischemic penumbra. CTA HEAD NECK W CONTRAST   Final Result   1. There is once again a 2.9 mm long thrombus within the proximal    inferior and M2 segment of the left MCA. There is enhancement of the MCA    distal to the thrombus. 2.  Acute evolving left parietal infarct. Hyperdensity seen in the left    parietal lobe better seen on the noncontrast CT performed earlier. 3.  Mild multifocal stenosis of the right A2 and distal segments of the    AMELIA. 4.  Mild stenosis of the right ICA. 5.  Mild stenosis of the left ICA. _______________________________________________       IMPRESSION:        1.  15% stenosis of the left ICA. 2.  Atherosclerosis of the right ICA with no significant stenosis using    NASCET criteria. CT HEAD WO CONTRAST   Final Result      Hemorrhagic transformation of left parietal infarct along the high superior parietal white matter just posterior to the frontal lobe as described above with some surrounding low attenuation which could be vasogenic edema or infarction changes. No midline shift or hydrocephalus. Brain volume loss is symmetric, atrophic change        Assessment/Plan:     See prior progress note for further detail regarding assessment and plan.           The objective and subjective findings as well as the ICU course of treatment have been reviewed with the ICU team. The treatment plan has been reviewed with the ICU team. The patient is being transferred to [place] [address] in stable condition.         Code Status: Full Code   FEN: Diet NPO Effective Now  PPx:   Dispo:     Rachel Bianchi MD  Internal Medicine, PGY-1  Pager: [phone]  3/14/2020  11:30 AM    This patient has been staffed and discussed with attending [place] MD.

## 2020-03-14 NOTE — PROGRESS NOTES
acquisition routinely at the common femoral, superficial femoral, popliteal, posterior tibial and dorsalis pedis levels with selection of either a 4MHz or 8MHz probe.  Appropriately sized pneumatic cuffs for the limb segments are applied and pressures obtained at the brachial and ankle levels.  Photoplethysmography (PPG) is routinely used to obtain digit waveforms and pressure recordings. Other Result Information   Interface, Results In - 11/25/2019  3:33 PM EST  Bilateral: Unable to obtain bilateral ABIs due to non-compressible arterial walls. Bilateral lower extremity aorto-iliac arterial disease is suggested based on monophasic CW waveforms at all levels. Right: The right digit PPG waveform contour appears to be absent. Left: The left TBI is 0.35 and the digit PPG waveform contours are absent. Conclusions: Unable to obtain bilateral ABIs due to vessel non-compressibility. Aorto-iliac arterial disease noted in the BLE. Procedure: Non-invasive lower extremity arterial examination is performed with the patient in a supine position. Continuous-wave (CW) Doppler is used for waveform acquisition routinely at the common femoral, superficial femoral, popliteal, posterior tibial and dorsalis pedis levels with selection of either a 4MHz or 8MHz probe. Appropriately sized pneumatic cuffs for the limb segments are applied and pressures obtained at the brachial and ankle levels. Photoplethysmography (PPG) is routinely used to obtain digit waveforms and pressure recordings.           IMPRESSION/RECOMMENDATIONS:    Full thickness ulceration, right 5th digit 2/2 PAD  Hx of PAD       -Patient seen and examined at bedside this AM.  -Hypotensive, afebrile, no leukocytosis noted (8.1)  -X-rays right foot; see impression above. -Arterial duplex performed on 11/5/2019; see impression above. -CRP 13.1/ESR 71  -Prealbumin 16.2; will order nutrient supplements.  -Ordered A1c, follow-up results.   -Right lower extremity dressed with Betadine, DSD, and Ace bandage.  -Patient is heel weightbearing.  -[facility] [place] 12/10/2019 abdominal aortogram, right SFA angioplasty drug-coated balloon angioplasty, placement of 2 intact tacks in right SFA. -Right lower extremity 5th digit ulceration stable without signs of infection.         DISPO: Stable full-thickness ulceration fifth digit, right foot with no acute infection noted. Will follow-up on labs. No surgical intervention from podiatry standpoint. Will monitor patient while in house and do local wound care. Discussed assessment and plan at bedside with Dr. Madonna Duggan.     Wild Powell  Pager: [phone]  3/14/2020  11:54 AM

## 2020-03-14 NOTE — PROCEDURES
Name: Mela Nova  MRN: [id number]  : 1975  Interpreting Physician: Mahamed Sharpe MD  Referring Physician: Helga Hodgkin, MD  Date of EEG: 3/13    Clinical History:  Mela Nova is a 50 y.o. female with a reported history of stroke and seizure who was referred for EEG    Current Antiepileptic Medications:    sodium chloride flush  10 mL Intravenous 2 times per day    insulin lispro  0-6 Units Subcutaneous TID WC    insulin lispro  0-3 Units Subcutaneous Nightly    atorvastatin  80 mg Oral Nightly    aspirin  81 mg Oral Daily    levetiracetam  500 mg Intravenous Q24H    mirtazapine  15 mg Oral Nightly    pregabalin  50 mg Oral BID    sevelamer  2,400 mg Oral TID WC    cinacalcet  120 mg Oral Daily    heparin (porcine)  5,000 Units Subcutaneous 3 times per day       Indication:stroke, seizure    Technical Summary:  20 channels of EEG were recorded in a digital format on a patient who is reported to be awake and drowsy during the recording. The patient was not sleep deprived prior to the EEG. The recording revealed a normal background rhythm in the alpha frequency range that was maximal over the posterior head regions and reactive to eye opening and closure. Prominent left sided slowing. A few poorly formed sharp waves. Photic stimulation showed an occipital driving response better seen on right. During the recording stage II sleep was not seen. The EKG lead revealed no rhythm abnormalties. EEG Interpretation:   The EEG was abnormal due to the presence of:     left hemisphere Focal slowing which is consistent with a focal disturbance of cerebral function and an underlying structural lesion should be considered. Clinical correlation is recommended.   The absence of epileptiform discharges on a single EEG does not rule out a diagnosis of  epilepsy or rule out non-convulsive or complex partial status epilepticus as a cause of altered mental status

## 2020-03-14 NOTE — PROGRESS NOTES
Hospital Medicine Care  Progress Note      Chief complain; right sided weakness, speech troubles        Subjective:     Patient is anxious. She denies any pain. Still complaining of right-sided weakness. Still having speech troubles. Afebrile  Review of Systems:     Review of Systems as mentioned above, other ros is negative. Objective:       Medications        Scheduled Meds:   sodium chloride flush  10 mL Intravenous 2 times per day    insulin lispro  0-6 Units Subcutaneous TID WC    insulin lispro  0-3 Units Subcutaneous Nightly    atorvastatin  80 mg Oral Nightly    aspirin  81 mg Oral Daily    levetiracetam  500 mg Intravenous Q24H    mirtazapine  15 mg Oral Nightly    pregabalin  50 mg Oral BID    sevelamer  2,400 mg Oral TID WC    cinacalcet  120 mg Oral Daily    heparin (porcine)  5,000 Units Subcutaneous 3 times per day     Continuous Infusions:   dextrose       PRN Meds:.heparin (porcine), sodium chloride flush, acetaminophen **OR** acetaminophen, polyethylene glycol, promethazine **OR** ondansetron, perflutren lipid microspheres, heparin (porcine), heparin (porcine), glucose, dextrose, glucagon (rDNA), dextrose, labetalol      Allergies  Allergies   Allergen Reactions    Ferrous Sulfate Shortness Of Breath    Cephalexin Itching and Swelling    Gabapentin Other (See Comments)     Various adverse reaction    Dicloxacillin Rash    Hydromorphone Itching    Pcn [Penicillins] Rash    Sulfa Antibiotics Rash and Other (See Comments)     Chest pain. Vitals:    03/14/20 0945 03/14/20 1000 03/14/20 1015 03/14/20 1100   BP:  (!) 100/57  116/75   Pulse: 82 90 91 89   Resp:       Temp:       TempSrc:       SpO2: 100% 100%  100%   Weight:       Height:             Physical exam:         Physical Exam  Constitutional:       Appearance: Normal appearance. Neck:      Musculoskeletal: Normal range of motion and neck supple.    Cardiovascular:      Rate and Rhythm: Normal rate and following. ESRD on hemodialysis. Continue HD per nephrology.             Raquel Melvin MD  3/14/2020

## 2020-03-14 NOTE — PROGRESS NOTES
Nephrology Progress Note  [phone]  [phone]   [email or web address]    Patient:  Alayna Dasilva   : 1975    CC:  ESRD    Subjective:  Mrs. Dumont had HD this morning. No issues. Under her dry weight, so no fluid was removed. ROS:   No chest pain. +right sided weakness. SHx:  No visitors this morning. Meds:  Scheduled Meds:   sodium chloride flush  10 mL Intravenous 2 times per day    insulin lispro  0-6 Units Subcutaneous TID WC    insulin lispro  0-3 Units Subcutaneous Nightly    atorvastatin  80 mg Oral Nightly    aspirin  81 mg Oral Daily    levetiracetam  500 mg Intravenous Q24H    mirtazapine  15 mg Oral Nightly    pregabalin  50 mg Oral BID    sevelamer  2,400 mg Oral TID WC    cinacalcet  120 mg Oral Daily    heparin (porcine)  5,000 Units Subcutaneous 3 times per day     Continuous Infusions:   dextrose       PRN Meds:.heparin (porcine), sodium chloride flush, acetaminophen **OR** acetaminophen, polyethylene glycol, promethazine **OR** ondansetron, perflutren lipid microspheres, heparin (porcine), heparin (porcine), glucose, dextrose, glucagon (rDNA), dextrose, labetalol    Vitals:  /75   Pulse 89   Temp 97.6 °F (36.4 °C) (Oral)   Resp 16   Ht 5' 7\" (1.702 m)   Wt 121 lb 4.1 oz (55 kg)   SpO2 100%   BMI 18.99 kg/m²     Physical Exam:  Gen: Resting in bed, NAD. In ICU. HEENT: MMM, OP clear. CV: RRR, no S3.  Lungs: good respiratory effort and clear air entry   Abd: S/NT +BS  Ext: No edema, no cyanosis  Skin: Warm. No rashes appreciated. Access: Left IJ tunneled HD catheter c/d/i.     Labs:  CBC:   Lab Results   Component Value Date    WBC 8.1 2020    RBC 2.86 2020    HGB 8.9 2020    HCT 27.4 2020    MCV 95.9 2020    MCH 31.2 2020    MCHC 32.5 2020    RDW 17.6 2020     2020    MPV 8.0 2020     BMP:    Lab Results   Component Value Date     2020    K 5.1 2020    K 5.1 03/14/2020    CL 97 03/14/2020    CO2 24 03/14/2020    BUN 23 03/14/2020    LABALBU 2.1 03/14/2020    CREATININE 6.0 03/14/2020    CALCIUM 7.9 03/14/2020    GFRAA 9 03/14/2020    GFRAA 7 09/23/2012    LABGLOM 8 03/14/2020    GLUCOSE 88 03/14/2020       Assessment/Plan:  - End stage renal disease - on HD Tues-Thurs-Sat at [address] [place], follows with  Nephrology     - Left MCA stroke with hemorrhagic transformation - plans per Neurology     - Anemia of chronic disease - JOSEMANUEL with HD. Hgb below outpatient ESRD goal.     - Hypertension - running on lower side since admission     - Renal osteodystrophy - on renvela with meals as outpatient        Plan/     - S/p HD earlier today.   - Dosing JOSEMANUEL with HD  - BP meds on hold, allowing for permissive HTN    Tomasa Schirmer, MD  [facility]  Office Number: [phone]

## 2020-03-14 NOTE — PROGRESS NOTES
Bedside handoff completed. HD in progress, HD RN at bedside. Pt able to state name, unable to state place, time, or situation. Able to grasp hands with a moderate  on command, unable to perform any further commands with upper extremities. Wiggles toes on command. Pupils equal, round and reactive. Healing abrasion to L inner ankle, R foot wrap in place. VSS, NSR on monitor. PIVs capped, NS locked. Will continue to monitor.

## 2020-03-14 NOTE — PROGRESS NOTES
Speech Language Pathology  Facility/Department: [facility] ICU   CLINICAL BEDSIDE SWALLOW EVALUATION    NAME: Alayna Dasilva  : 1975  MRN: [id number]    ADMISSION DATE: 3/13/2020  ADMITTING DIAGNOSIS: has Depression with suicidal ideation; ESRD on hemodialysis ([address] Hyperkalemia; Metabolic acidosis; Essential hypertension; DMII (diabetes mellitus, type 2) ([address] Cellulitis; Cellulitis of lower extremity; Erythema nodosum; Chest pain; Failure to thrive in adult; Abnormal stress test; ESRD (end stage renal disease) on dialysis ([address] Suicidal ideation; NSTEMI (non-ST elevated myocardial infarction) ([address] Vaginal candidiasis; Vaginal bleeding; Vaginal discharge; Atypical chest pain; Severe episode of recurrent major depressive disorder, without psychotic features ([address] Anxiety disorder; Cocaine abuse ([address] Anemia; Illicit drug use; Hyperkalemia; Peritoneal dialysis status ([address] Hyperlipidemia; Chronic focal neurological deficit; Nausea and vomiting; Moderate malnutrition ([address] GI bleed; and Acute ischemic stroke ([address] on their problem list.  ONSET DATE: 2020    Recent Chest Xray: (2020)  Impression   Low lung volumes.  No acute cardiopulmonary process. Date of Eval: 3/14/2020  Evaluating Therapist: Reynaldo Kapoor    Current Diet level:  Current Diet : NPO  Current Liquid Diet : NPO      Primary Complaint  Patient Complaint: Unable to state.     Pain:  Pain Assessment  Pain Assessment: Advanced Dementia  Pain Level: 0  Patient's Stated Pain Goal: No pain  RASS Score: Alert and calm  PAINAD (Pain Assessment in Advance Dementia)  Breathing: normal  Negative Vocalization: none  Facial Expression: smiling or inexpressive  Body Language: relaxed  Consolability: no need to console  PAINAD Score: 0    Reason for Referral  Alayna Dasilva was referred for a bedside swallow evaluation to assess the efficiency of her swallow function, identify signs and symptoms of aspiration and make Patient/caregiver will demonstrate understanding of compensatory strategies for improved swallowing safety. Goal 3: Patient will improve oral motor function via therapeutic oral motor exercises. General  Chart Reviewed: Yes  Comments: Per admitting H&P (03/13/2020): 'Ms. Emelia Bradley is a 49 yo F with PMH of ESRD on HD, GI bleed (s/p hemicolectomy), T2DM, PVD, previous CVA who presented from nursing home to [facility] with Rt sided weakness and aphasia (last known well 9 pm on 3/12). Pt unable to provide history. Based on Chart review Pt had Rt sided hemiplegia im upper and lower extremities and aphasia on arrival. During course of ED, Pt improved without intervention and was able to move Rt upper and lower extremity and was dysarthric. CTA head and neck demonstrated possible subocclusive thrombous of L MCA and redemonstration of chronic L pariteal infarct and diffuse microvascular changes and lacunar infarcts but mo hemorrhage. She was then started on low dose heparin gtt as per Dr. Ilan Miller (Neurosurgery) and transferred to [facility] ICU for further management. On my exam, Pt is dysarthric and has some degree of receptive aphasia. Unable to follow commands, trying to verbalize but has slurred incoherent speech.'  Subjective  Subjective: Patient alert and awake, seen resting in bed on room air. Cleared by RN to see pt. Sister at bedside. Behavior/Cognition: Alert;Pleasant mood;Confused; Requires cueing  Respiratory Status: Room air  O2 Device: None (Room air)  Communication Observation: Aphasia; Dysarthria  Follows Directions: Simple  Dentition: Adequate  Patient Positioning: Upright in bed  Baseline Vocal Quality: Normal  Volitional Cough: Strong  Prior Dysphagia History: None per chart review. Consistencies Administered: Dysphagia Minced and Moist (Dysphagia II); Dysphagia Pureed (Dysphagia I); Thin;Thin - cup; Thin - straw      Vision/Hearing  Vision  Vision: Impaired  Vision Exceptions: Visual field cut  Hearing  Hearing: Within functional limits    Oral Motor Deficits  Oral/Motor  Oral Motor: Exceptions to [facility]  Labial ROM: Reduced left  Labial Symmetry: Abnormal symmetry left  Labial Coordination: Reduced  Lingual ROM: Reduced left  Lingual Coordination: Reduced    Prognosis  Prognosis  Prognosis for safe diet advancement: good  Individuals consulted  Consulted and agree with results and recommendations: Patient;RN;Family member  Family member consulted: sister    Education  Patient Education: Educated pt/sister re: role of SLP, purpose of evaluation, swallow function, aspiration risk  Patient Education Response: Needs reinforcement  Safety Devices in place: Yes  Type of devices: Nurse notified; All fall risk precautions in place; Left in bed;Call light within reach       Therapy Time  SLP Individual Minutes  Time In: 9726  Time Out: 1050  Minutes: 25     SLP Total Treatment Time  Timed Code Treatment Minutes: 0 Minutes  Total Treatment Time: 25    Plan  Diet Recommendations: thin liquids (no straws), puree, meds crushed in puree as able, assist feed, check for pocketing. If consistent s/s aspiration arise, recommend withhold PO and contact speech. Discharge Plan:  TBD  Discussed with RN, Mariama Pimentel. Needs within reach. Electronically Signed by:  Candice Lara M.A., Wale Silverio   Speech-Language Pathologist  Pager #[phone]    This document will serve as a discharge summary if pt discharges before next treatment.

## 2020-03-14 NOTE — PROGRESS NOTES
4 Eyes Admission Assessment     I agree as the admission nurse that 2 RN's have performed a thorough Head to Toe Skin Assessment on the patient. ALL assessment sites listed below have been assessed on admission. Areas assessed by both nurses:   [x]   Head, Face, and Ears   [x]   Shoulders, Back, and Chest  [x]   Arms, Elbows, and Hands   [x]   Coccyx, Sacrum, and Ischum  [x]   Legs, Feet, and Heels        Does the Patient have Skin Breakdown? Pt has a healing scabbed over wound on her left inner ankle, the pt also has multiple wounds on her right foot which is currently wrapped up and is being followed by podiatry. No open areas seen on pt's coccyx but preventive dressing is in place. No other wounds are noted around the body, this assessment was done with another nurse.       [place] Prevention initiated:  yes  Wound Care Orders initiated: yes      [facility] nurse consulted for Pressure Injury (Stage 3,4, Unstageable, DTI, NWPT, and Complex wounds): no    Nurse 1 eSignature: Electronically signed by Vivek Vergara RN on 3/14/20 at 3:53 PM EDT      Nurse 2 eSignature: Electronically signed by Moustapha Barrientos RN on 3/14/20 at 4:02 PM EDT

## 2020-03-14 NOTE — PROGRESS NOTES
TID     cinacalcet  120 mg Oral Daily    heparin (porcine)  5,000 Units Subcutaneous 3 times per day      Continuous Infusions:   [Held by provider] heparin (porcine) Stopped (03/13/20 0822)    dextrose       PRN Meds:sodium chloride flush, acetaminophen **OR** acetaminophen, polyethylene glycol, promethazine **OR** ondansetron, perflutren lipid microspheres, heparin (porcine), heparin (porcine), glucose, dextrose, glucagon (rDNA), dextrose, labetalol    Allergies: Allergies   Allergen Reactions    Ferrous Sulfate Shortness Of Breath    Cephalexin Itching and Swelling    Gabapentin Other (See Comments)     Various adverse reaction    Dicloxacillin Rash    Hydromorphone Itching    Pcn [Penicillins] Rash    Sulfa Antibiotics Rash and Other (See Comments)     Chest pain. PHYSICAL EXAM:       Vitals:   T-max:  Patient Vitals for the past 8 hrs:   BP Temp Temp src Pulse Resp SpO2 Weight   03/14/20 0615 (!) 91/52 -- -- 82 16 -- --   03/14/20 0600 (!) 98/54 -- -- 72 16 100 % --   03/14/20 0545 87/60 -- -- 75 13 100 % --   03/14/20 0530 (!) 91/49 -- -- 74 16 -- --   03/14/20 0515 (!) 109/56 98.4 °F (36.9 °C) Oral 76 16 100 % 121 lb 7.6 oz (55.1 kg)   03/14/20 0500 (!) 103/58 -- -- 77 16 100 % --   03/14/20 0400 (!) 87/45 98 °F (36.7 °C) Oral 77 14 100 % --   03/14/20 0300 (!) 91/44 -- -- 78 16 98 % --   03/14/20 0200 (!) 94/51 -- -- 78 18 100 % --   03/14/20 0100 (!) 111/58 -- -- 80 16 95 % --   03/14/20 0000 (!) 96/53 97.8 °F (36.6 °C) Oral 80 18 100 % --   03/13/20 2300 (!) 88/37 -- -- 79 16 99 % --       Intake/Output Summary (Last 24 hours) at 3/14/2020 0625  Last data filed at 3/14/2020 0000  Gross per 24 hour   Intake 0 ml   Output 0 ml   Net 0 ml     No intake/output data recorded. No intake/output data recorded. Review of Systems   Constitutional: Negative. HENT: Negative. Eyes: Negative. Respiratory: Negative. Cardiovascular: Negative. Gastrointestinal: Negative.     Endocrine: Negative. Genitourinary: Negative. Musculoskeletal: Negative. Skin: Positive for rash and wound. Allergic/Immunologic: Negative. Neurological: Positive for speech difficulty and weakness. Hematological: Negative. Psychiatric/Behavioral: Negative. Neurological Exam:  Exam:  Vitals:    03/14/20 0615   BP: (!) 91/52   Pulse: 82   Resp: 16   Temp:    SpO2:        Constitutional                          Vital signs: BP, HR, and RR reviewed            General Alert, no distress, well-nourished  Eyes: fundoscopic exam not visualized. Cardiovascular: pulses symmetric in all 4 extremities. BLLE digital ulcers, chronic. No peripheral edema. Psychiatric: cooperative with examination, no  psychotic behavior noted. Neurologic  Mental status:   orientation to person and time, unable to state place              General fund of knowledge unable to assess, lethargic              Memory lethargic, unable to assess              Attention poor              Language dysarthria              Comprehension intact; follows simple commands  Cranial nerves:   CN2: Visual Fields full w/o extinction on confrontational testing,   CN 3,4,6: extraocular muscles intact, L-sided gaze preference, unable to tract to the right  CN5: facial sensation symmetric   CN7:face symmetric with dysarthria   CN8: hearing grossly intact  CN9: palate elevated symmetrically  CN11: trap full strength on shoulder shrug  CN12: tongue midline with protrusion  Strength: RUE pronator drift with 3/5 strength RUE. Rhythmic myoclonic jerking of RUE. Strength 2/5 RLE. Deep tendon reflexes: normal in all 4 extremities  Sensory: light touch intact in all 4 extremities.   No sensory extinction on double simultaneous stimulation  Cerebellar/coordination: unable to participate  Tone: normal in all 4 extremities  Gait: unable to be assessed    DATA:       Labs:  CBC:   Recent Labs     03/12/20  2350 03/13/20  0553 03/14/20  0439   WBC 10.3 10.4 8.1 HGB 9.7* 9.2* 8.9*   HCT 30.3* 29.5* 27.4*    179 217       BMP:   Recent Labs     03/12/20 2350 03/13/20  0553 03/14/20  0439    133* 134*   K 4.1 4.2 5.1  5.1   CL 97* 94* 97*   CO2 25 25 24   BUN 13 15 23*   CREATININE 4.1* 4.5* 6.0*   GLUCOSE 182* 132* 88   PHOS  --   --  5.1*     LFT's:   Recent Labs     03/12/20 2350   AST 38*   ALT 12   BILITOT <0.2   ALKPHOS 311*     Troponin:   Recent Labs     03/12/20 2350   TROPONINI 0.57*       INR:   Recent Labs     03/12/20 2350 03/13/20 0553   INR 1.17* 1.18*         RADIOLOGY  VL Extremity Venous Bilateral         XR FOOT RIGHT (2 VIEWS)   Final Result      Questionable fracture of the distal portion of the proximal phalanx of the fifth digit. Appearance could be caused by overlying shadows of bandage material.      MRI BRAIN WO CONTRAST   Final Result   1. Late subacute infarct in the left parietal region with evidence of cortical laminar necrosis and early encephalomalacia. Focal areas of susceptibility artifact favor areas of petechial hemorrhage. 2. Punctate focus of abnormal diffusion signal in the posterior left lentiform nucleus compatible with a small lacunar infarct. 3. Multiple foci of prior remote lacunar infarcts and small infarcts in the right and left cerebellum with focal areas of encephalomalacia. Correlate for possible central embolic phenomenon. CT Brain Perfusion   Final Result     No core infarct identified however core infarct is seen on prior    studies in the left parietal region. Elevated Tmax with a threshold of 6    seconds in the left temporal parietal region measuring 13 mL in volume    consistent with ischemic penumbra. CTA HEAD NECK W CONTRAST   Final Result   1. There is once again a 2.9 mm long thrombus within the proximal    inferior and M2 segment of the left MCA. There is enhancement of the MCA    distal to the thrombus. 2.  Acute evolving left parietal infarct.   Hyperdensity seen in the left    parietal lobe better seen on the noncontrast CT performed earlier. 3.  Mild multifocal stenosis of the right A2 and distal segments of the    AMELIA. 4.  Mild stenosis of the right ICA. 5.  Mild stenosis of the left ICA. _______________________________________________       IMPRESSION:        1.  15% stenosis of the left ICA. 2.  Atherosclerosis of the right ICA with no significant stenosis using    NASCET criteria. CT HEAD WO CONTRAST   Final Result      Hemorrhagic transformation of left parietal infarct along the high superior parietal white matter just posterior to the frontal lobe as described above with some surrounding low attenuation which could be vasogenic edema or infarction changes. No midline shift or hydrocephalus. Brain volume loss is symmetric, atrophic change          ASSESSMENT AND PLAN:     Acute CVA 2/2 to L MCA thrombus  CTH showed PH 1 hemorrhagic transformation of the chronic L parietal infarct. CTA was essentially stable with a likely distal L M1 subocclusive thrombus. CTP demonstrated a 13 cc distal penumbra. - cont ASA  - EEG  - Keppra  - f/u Venous duplex given PFO on TTE  - atorvastatin 80 mg PO qD  - Permissive HTN, head of bed flat, .    - STOP heparin gtt.   - NPO, SLP Eval today. -q1 neurochecks -> q4 neurochecks and downgrade to 5T     ESRD on HD  Dr. Annett Dakins at [facility] [place] [place] is Pt's Nephrologist but has [facility] during previous [facility] admission.     - Next HD tomorrow per schedule  - Dose JOSEMANUEL with HD     T2DM. -LDSS  -Last A1c 7.7 in 12/2019     HTN  -holding home meds to allow for permissive HTN.    History of GI bleed  -Daily CBC.      HLD  -High dose statin      Code Status:Full Code  Diet NPO Effective Now  PPX:  SQH  DISPO: Transfer to .      This patient has been staffed and discussed with Annia Medina MD  -----------------------------  Electa Phoenix, MD, PGY-1  3/14/2020  6:25 AM

## 2020-03-14 NOTE — PROGRESS NOTES
Pt had a large incontinent bowel movement. Pt changed and hi care done. Pt fed her dinner until she no longer wanted to eat.   Jada Ruiz

## 2020-03-15 LAB
ANION GAP SERPL CALCULATED.3IONS-SCNC: 12 MMOL/L (ref 3–16)
BASOPHILS ABSOLUTE: 0.1 K/UL (ref 0–0.2)
BASOPHILS RELATIVE PERCENT: 1.2 %
BUN BLDV-MCNC: 11 MG/DL (ref 7–20)
CALCIUM SERPL-MCNC: 7.3 MG/DL (ref 8.3–10.6)
CHLORIDE BLD-SCNC: 95 MMOL/L (ref 99–110)
CO2: 26 MMOL/L (ref 21–32)
CREAT SERPL-MCNC: 3.9 MG/DL (ref 0.6–1.1)
EOSINOPHILS ABSOLUTE: 0.4 K/UL (ref 0–0.6)
EOSINOPHILS RELATIVE PERCENT: 5.8 %
GFR AFRICAN AMERICAN: 15
GFR NON-AFRICAN AMERICAN: 12
GLUCOSE BLD-MCNC: 106 MG/DL (ref 70–99)
GLUCOSE BLD-MCNC: 123 MG/DL (ref 70–99)
GLUCOSE BLD-MCNC: 173 MG/DL (ref 70–99)
GLUCOSE BLD-MCNC: 67 MG/DL (ref 70–99)
GLUCOSE BLD-MCNC: 74 MG/DL (ref 70–99)
GLUCOSE BLD-MCNC: 92 MG/DL (ref 70–99)
GLUCOSE BLD-MCNC: 95 MG/DL (ref 70–99)
HCT VFR BLD CALC: 27 % (ref 36–48)
HEMOGLOBIN: 8.7 G/DL (ref 12–16)
LYMPHOCYTES ABSOLUTE: 1.8 K/UL (ref 1–5.1)
LYMPHOCYTES RELATIVE PERCENT: 23.9 %
MCH RBC QN AUTO: 30.7 PG (ref 26–34)
MCHC RBC AUTO-ENTMCNC: 32 G/DL (ref 31–36)
MCV RBC AUTO: 95.9 FL (ref 80–100)
MONOCYTES ABSOLUTE: 0.6 K/UL (ref 0–1.3)
MONOCYTES RELATIVE PERCENT: 8.3 %
NEUTROPHILS ABSOLUTE: 4.5 K/UL (ref 1.7–7.7)
NEUTROPHILS RELATIVE PERCENT: 60.8 %
PDW BLD-RTO: 16.6 % (ref 12.4–15.4)
PERFORMED ON: ABNORMAL
PERFORMED ON: NORMAL
PLATELET # BLD: 214 K/UL (ref 135–450)
PMV BLD AUTO: 8 FL (ref 5–10.5)
POTASSIUM REFLEX MAGNESIUM: 3.8 MMOL/L (ref 3.5–5.1)
RBC # BLD: 2.82 M/UL (ref 4–5.2)
SODIUM BLD-SCNC: 133 MMOL/L (ref 136–145)
WBC # BLD: 7.5 K/UL (ref 4–11)

## 2020-03-15 PROCEDURE — 6360000002 HC RX W HCPCS: Performed by: STUDENT IN AN ORGANIZED HEALTH CARE EDUCATION/TRAINING PROGRAM

## 2020-03-15 PROCEDURE — 1200000000 HC SEMI PRIVATE

## 2020-03-15 PROCEDURE — 2580000003 HC RX 258: Performed by: STUDENT IN AN ORGANIZED HEALTH CARE EDUCATION/TRAINING PROGRAM

## 2020-03-15 PROCEDURE — 80048 BASIC METABOLIC PNL TOTAL CA: CPT

## 2020-03-15 PROCEDURE — 92526 ORAL FUNCTION THERAPY: CPT

## 2020-03-15 PROCEDURE — 36415 COLL VENOUS BLD VENIPUNCTURE: CPT

## 2020-03-15 PROCEDURE — 6370000000 HC RX 637 (ALT 250 FOR IP): Performed by: STUDENT IN AN ORGANIZED HEALTH CARE EDUCATION/TRAINING PROGRAM

## 2020-03-15 PROCEDURE — 85025 COMPLETE CBC W/AUTO DIFF WBC: CPT

## 2020-03-15 RX ORDER — LIDOCAINE 4 G/G
1 PATCH TOPICAL DAILY
Status: DISCONTINUED | OUTPATIENT
Start: 2020-03-15 | End: 2020-03-17 | Stop reason: HOSPADM

## 2020-03-15 RX ADMIN — SEVELAMER CARBONATE 2400 MG: 800 TABLET, FILM COATED ORAL at 12:22

## 2020-03-15 RX ADMIN — ASPIRIN 81 MG 81 MG: 81 TABLET ORAL at 08:04

## 2020-03-15 RX ADMIN — CINACALCET HYDROCHLORIDE 120 MG: 30 TABLET, FILM COATED ORAL at 08:03

## 2020-03-15 RX ADMIN — PREGABALIN 50 MG: 50 CAPSULE ORAL at 08:03

## 2020-03-15 RX ADMIN — Medication 10 ML: at 20:20

## 2020-03-15 RX ADMIN — SEVELAMER CARBONATE 2400 MG: 800 TABLET, FILM COATED ORAL at 08:03

## 2020-03-15 RX ADMIN — Medication 10 ML: at 08:05

## 2020-03-15 RX ADMIN — SEVELAMER CARBONATE 2400 MG: 800 TABLET, FILM COATED ORAL at 18:19

## 2020-03-15 RX ADMIN — PREGABALIN 50 MG: 50 CAPSULE ORAL at 20:20

## 2020-03-15 RX ADMIN — ACETAMINOPHEN 650 MG: 325 TABLET ORAL at 08:04

## 2020-03-15 RX ADMIN — HYDROMORPHONE HYDROCHLORIDE 0.5 MG: 1 INJECTION, SOLUTION INTRAMUSCULAR; INTRAVENOUS; SUBCUTANEOUS at 14:01

## 2020-03-15 RX ADMIN — HEPARIN SODIUM 5000 UNITS: 5000 INJECTION INTRAVENOUS; SUBCUTANEOUS at 22:48

## 2020-03-15 RX ADMIN — LEVETIRACETAM 500 MG: 100 INJECTION, SOLUTION INTRAVENOUS at 12:22

## 2020-03-15 RX ADMIN — ATORVASTATIN CALCIUM 80 MG: 80 TABLET, FILM COATED ORAL at 20:19

## 2020-03-15 RX ADMIN — MIRTAZAPINE 15 MG: 15 TABLET, FILM COATED ORAL at 20:19

## 2020-03-15 RX ADMIN — DEXTROSE 50 % IN WATER (D50W) INTRAVENOUS SYRINGE 12.5 G: at 03:14

## 2020-03-15 RX ADMIN — HEPARIN SODIUM 5000 UNITS: 5000 INJECTION INTRAVENOUS; SUBCUTANEOUS at 06:20

## 2020-03-15 RX ADMIN — HYDROMORPHONE HYDROCHLORIDE 0.25 MG: 1 INJECTION, SOLUTION INTRAMUSCULAR; INTRAVENOUS; SUBCUTANEOUS at 12:22

## 2020-03-15 RX ADMIN — HEPARIN SODIUM 5000 UNITS: 5000 INJECTION INTRAVENOUS; SUBCUTANEOUS at 14:01

## 2020-03-15 ASSESSMENT — PAIN SCALES - PAIN ASSESSMENT IN ADVANCED DEMENTIA (PAINAD)
CONSOLABILITY: 0
NEGVOCALIZATION: 0
CONSOLABILITY: 0
BODYLANGUAGE: 0
NEGVOCALIZATION: 0
FACIALEXPRESSION: 0
CONSOLABILITY: 0
CONSOLABILITY: 0
TOTALSCORE: 0
TOTALSCORE: 0
BODYLANGUAGE: 0
FACIALEXPRESSION: 0
BODYLANGUAGE: 0
FACIALEXPRESSION: 0
NEGVOCALIZATION: 0
BREATHING: 0
TOTALSCORE: 0
BODYLANGUAGE: 0
TOTALSCORE: 0
NEGVOCALIZATION: 0
FACIALEXPRESSION: 0
BREATHING: 0

## 2020-03-15 ASSESSMENT — PAIN SCALES - GENERAL
PAINLEVEL_OUTOF10: 3
PAINLEVEL_OUTOF10: 0
PAINLEVEL_OUTOF10: 3
PAINLEVEL_OUTOF10: 5
PAINLEVEL_OUTOF10: 9
PAINLEVEL_OUTOF10: 6

## 2020-03-15 NOTE — CONSULTS
 OTHER SURGICAL HISTORY      peritoneal dialysis catheter    PARACENTESIS      TUBAL LIGATION      UPPER GASTROINTESTINAL ENDOSCOPY  03/31/2017    UPPER GASTROINTESTINAL ENDOSCOPY N/A 3/25/2019    EGD BIOPSY performed by Boy Parekh MD at [facility]:  Ferrous sulfate; Cephalexin; Gabapentin; Dicloxacillin; Hydromorphone; Pcn [penicillins]; and Sulfa antibiotics    Medications:   Home Meds  No current facility-administered medications on file prior to encounter. Current Outpatient Medications on File Prior to Encounter   Medication Sig Dispense Refill    pregabalin (LYRICA) 75 MG capsule Take 1 capsule by mouth 2 times daily for 30 days. 60 capsule 0    mirtazapine (REMERON) 15 MG tablet Take 1 tablet by mouth nightly 30 tablet 3    carvedilol (COREG) 3.125 MG tablet Take 1 tablet by mouth 2 times daily (with meals) 60 tablet 3    gentamicin (GARAMYCIN) 0.1 % cream Apply to PD dialysis catheter when accessing the site. Apply topically 3 times daily.  1 Tube 0    lactulose (CHRONULAC) 10 GM/15ML solution Take 45 mLs by mouth 2 times daily as needed (Constipation resistant to other methods.) 1 Bottle 1    pantoprazole (PROTONIX) 40 MG tablet Take 1 tablet by mouth 2 times daily 60 tablet 1    sucralfate (CARAFATE) 1 GM/10ML suspension Take 10 mLs by mouth 2 times daily for 5 days 50 mL 1    isosorbide mononitrate (IMDUR) 60 MG extended release tablet Take 120 mg by mouth daily      loratadine (CLARITIN) 10 MG tablet Take 10 mg by mouth daily as needed (Allergies)      melatonin 5 MG TABS tablet Take 5 mg by mouth nightly as needed (Sleep)      NIFEdipine (ADALAT CC) 90 MG extended release tablet Take 90 mg by mouth daily      sevelamer (RENVELA) 800 MG tablet Take 3 tablets by mouth 3 times daily (with meals)      senna-docusate (PERICOLACE) 8.6-50 MG per tablet Take 1 tablet by mouth 2 times daily as needed for Constipation      cinacalcet (SENSIPAR) 30 MG tablet Take day        Family History:   Family History   Problem Relation Age of Onset    Heart Attack Mother     Diabetes Mother     Hypertension Mother     Stroke Father     Diabetes Father     Hypertension Father        Social History:   TOBACCO:   reports that she has never smoked. She has never used smokeless tobacco.  ETOH:   reports previous alcohol use. DRUGS:   reports no history of drug use. ROS: A 10 point review of systems was conducted, significant findings as noted in HPI. Constitutional: Negative. HENT: Negative. Eyes: Negative. Respiratory: Negative. Cardiovascular: Negative. Gastrointestinal: Negative. Genitourinary: Negative. Musculoskeletal: Right lower extremity pain. Skin: Negative. Endocrine: Negative. Allergic/Immunologic: Negative. Neurological: Negative. Hematological: Negative. Psychiatric/Behavioral: Negative.       Physical exam:    Vitals:    03/15/20 1510   BP: (!) 129/59   Pulse: 75   Resp: 16   Temp: 97.6 °F (36.4 °C)   SpO2: 100%       General appearance: alert, no acute distress, grooming appropriate  Eyes: EOMI, no scleral icterus  Neck: trachea midline, no JVD  Chest/Lungs: normal effort, no wheezing, central line in left chest  Cardiovascular: RRR  Abdomen: soft, non-tender, non-distended, no guarding/rigidity  Skin: warm and dry, no rashes  Extremities: no edema, no cyanosis, dry gangrene of 5th metatarsal and tip of the 1st metatarsal on the right, nontender to palpation  Neuro: A&Ox3, no focal deficits, sensation intact    Pulses    F P PT DP   R Palp  Palp Doppler Doppler    L Palp Palp Doppler Doppler           Labs:    CBC:   Recent Labs     03/13/20  0553 03/14/20 0439 03/15/20  0349   WBC 10.4 8.1 7.5   HGB 9.2* 8.9* 8.7*   HCT 29.5* 27.4* 27.0*   MCV 98.8 95.9 95.9    217 214     BMP:   Recent Labs     03/13/20  0553 03/14/20 0439 03/15/20  0349   * 134* 133*   K 4.2 5.1  5.1 3.8   CL 94* 97* 95*   CO2 25 24 26   PHOS  --  5.1*  --    BUN 15 23* 11   CREATININE 4.5* 6.0* 3.9*     PT/INR:   Recent Labs     03/12/20  2350 03/13/20  0553   PROTIME 13.6* 13.7*   INR 1.17* 1.18*     APTT:   Recent Labs     03/12/20  2350 03/13/20  0553   APTT 34.5 52.3*     Liver Profile:  Lab Results   Component Value Date    AST 38 03/12/2020    ALT 12 03/12/2020    BILIDIR <0.2 05/04/2019    BILITOT <0.2 03/12/2020    ALKPHOS 311 03/12/2020     Lab Results   Component Value Date    CHOL 71 03/13/2020    HDL 27 03/13/2020    TRIG 136 03/13/2020     UA:   Lab Results   Component Value Date    COLORU RED 07/28/2017    PHUR 8.0 07/01/2018    LABCAST All else obscured 10/24/2010    WBCUA 6 07/28/2017    RBCUA 1 07/28/2017    MUCUS All else obscured 10/24/2010    TRICHOMONAS Present 05/31/2011    BACTERIA 1+ 07/28/2017    CLARITYU TURBID 07/28/2017    SPECGRAV 1.020 07/28/2017    LEUKOCYTESUR LARGE 07/28/2017    UROBILINOGEN 0.2 07/28/2017    BILIRUBINUR Negative 07/28/2017    BILIRUBINUR NEGATIVE 06/03/2012    BLOODU LARGE 07/28/2017    GLUCOSEU Negative 07/28/2017    GLUCOSEU 500 06/03/2012    AMORPHOUS All else obscured 10/24/2010       Assessment/Plan:  50 y.o. female with a history of stroke, DM, ESRD on HD, MI, pericarditis, HTN, HLD, Crohn's disease, and drug abuse who is admitted for stroke who complains of chronic, unchanged right lower extremity pain all the way up to her thigh. She has an established vascular surgeon whom the patient just saw last week and has scheduled outpatient testing at the end of the month for this issue. There are no concerns for any acute ischemic issues to the lower extremity. Additionally, it is unlikely that her pain is vascular in nature given that her pain is not limited to the foot/lower leg but is located in her thigh as well.      - no acute surgical intervention required at this time  - no need to expedite outpatient scheduled exams; can keep scheduled dates at the end of the month  - f/u with established vascular surgeon at [facility] for further management  - continue medical management per primary    Case discussed with senior resident and staffed with Dr. Jerrell Bernal.     Denisse Maldonado DO, PGY-1  3/15/2020 5:50 PM  Pager: [phone]

## 2020-03-15 NOTE — PROGRESS NOTES
Assessment complete, see flow sheet. Pt is much improved this morning compared to yesterday evening when I last saw her, pt's right arm is stronger, her speech is better, not as slurred as yesterday and her word finding is slightly better, she is easier to understand, NIHSS completed, see flow sheets. Pt ate about 50% of her breakfast, pt encouraged to eat and drink her apple juice because of her lower blood sugar, pt states understanding, pt is eating her diet without any coughing or clearing her throat noted. Will continue to follow throughout the shift.   Carmen Contreras

## 2020-03-15 NOTE — PROGRESS NOTES
Pt wants a different diet. Pt is swallowing better and a lot more awake today, placed a page out for SLP to see if they would be able to see the pt and recommend to advance her diet.   Gisele Hoang

## 2020-03-15 NOTE — PROGRESS NOTES
Internal Medicine PGY- 2  Resident Progress Note        PCP: Serafin Merlin, MD    Date of Admission: 3/13/2020    Chief Complaint: Right side weakness and slurred speech     Subjective:   Pt is feeling better, however she still has difficulty in speech  Right side weakness but in general she is improving  Mild headache  Pt complains of severe leg pain, she reports she does not have allergy to dilaudid   Afebrile  Denies any chest pain , SOB,         Medications:  Reviewed    Infusion Medications    dextrose       Scheduled Medications    sodium chloride flush  10 mL Intravenous 2 times per day    insulin lispro  0-6 Units Subcutaneous TID WC    insulin lispro  0-3 Units Subcutaneous Nightly    atorvastatin  80 mg Oral Nightly    aspirin  81 mg Oral Daily    levetiracetam  500 mg Intravenous Q24H    mirtazapine  15 mg Oral Nightly    pregabalin  50 mg Oral BID    sevelamer  2,400 mg Oral TID WC    cinacalcet  120 mg Oral Daily    heparin (porcine)  5,000 Units Subcutaneous 3 times per day     PRN Meds: heparin (porcine), sodium chloride flush, acetaminophen **OR** acetaminophen, polyethylene glycol, promethazine **OR** ondansetron, perflutren lipid microspheres, glucose, dextrose, glucagon (rDNA), dextrose, labetalol      Intake/Output Summary (Last 24 hours) at 3/15/2020 0835  Last data filed at 3/15/2020 0819  Gross per 24 hour   Intake 1380 ml   Output 1050 ml   Net 330 ml       Physical Exam Performed:    /70   Pulse 76   Temp 98.2 °F (36.8 °C) (Oral)   Resp 16   Ht 5' 7\" (1.702 m)   Wt 121 lb 4.1 oz (55 kg)   SpO2 98%   BMI 18.99 kg/m²     Constitutional:       Appearance: Normal appearance. Neck:      Musculoskeletal: Normal range of motion and neck supple. Cardiovascular:      Rate and Rhythm: Normal rate and regular rhythm. Pulmonary:      Effort: Pulmonary effort is normal.      Breath sounds: Normal breath sounds. Abdominal:      General: Abdomen is flat.       Palpations: Abdomen is soft. Musculoskeletal: Normal range of motion. Skin:     General: Skin is warm and dry. Capillary Refill: Capillary refill takes less than 2 seconds. Neurological:      Mental Status: She is alert. Comments: Right-sided strength appears to be weaker. Some intermittent aphasia noted   Psychiatric:         Mood and Affect: Mood normal.         Behavior: Behavior normal.        Labs:   Recent Labs     03/13/20  0553 03/14/20  0439 03/15/20  0349   WBC 10.4 8.1 7.5   HGB 9.2* 8.9* 8.7*   HCT 29.5* 27.4* 27.0*    217 214     Recent Labs     03/13/20  0553 03/14/20  0439 03/15/20  0349   * 134* 133*   K 4.2 5.1  5.1 3.8   CL 94* 97* 95*   CO2 25 24 26   BUN 15 23* 11   CREATININE 4.5* 6.0* 3.9*   CALCIUM 8.1* 7.9* 7.3*   PHOS  --  5.1*  --      Recent Labs     03/12/20  2350   AST 38*   ALT 12   BILITOT <0.2   ALKPHOS 311*     Recent Labs     03/12/20  2350 03/13/20  0553   INR 1.17* 1.18*     Recent Labs     03/12/20  2350   TROPONINI 0.57*       Urinalysis:      Lab Results   Component Value Date    NITRU Negative 07/28/2017    WBCUA 6 07/28/2017    BACTERIA 1+ 07/28/2017    RBCUA 1 07/28/2017    BLOODU LARGE 07/28/2017    SPECGRAV 1.020 07/28/2017    GLUCOSEU Negative 07/28/2017    GLUCOSEU 500 06/03/2012       Radiology:  VL Extremity Venous Bilateral         XR FOOT RIGHT (2 VIEWS)   Final Result      Questionable fracture of the distal portion of the proximal phalanx of the fifth digit. Appearance could be caused by overlying shadows of bandage material.      MRI BRAIN WO CONTRAST   Final Result   1. Late subacute infarct in the left parietal region with evidence of cortical laminar necrosis and early encephalomalacia. Focal areas of susceptibility artifact favor areas of petechial hemorrhage. 2. Punctate focus of abnormal diffusion signal in the posterior left lentiform nucleus compatible with a small lacunar infarct.    3. Multiple foci of prior remote lacunar infarcts duplex. -Getting better  -ASA, Statin    Seizures  With intermittent a fascia right-sided weakness. Patient is on Siva Fuchs. Neurology following.     ESRD on hemodialysis. Continue HD per nephrology  - Next HD tomorrow per schedule  - Dose JOSEMANUEL with HD     T2DM. -LDSS  -Last A1c 7.7 in 12/2019     HTN  -holding home meds to allow for permissive HTN.    History of GI bleed  -Daily CBC.        Diet: DIET DYSPHAGIA PUREED;  Code Status: Full Code  Dispo - [facility]  I will discuss the patient with MD Lexie Echeverria MD  Internal Medicine Resident PGY-2

## 2020-03-15 NOTE — PROGRESS NOTES
Pt wiped down with bath wipes per request. Repositioned for comfort. Sheets changed, bed pad changed, gown changed. Pt denies other needs at this time. Call light is in reach. Bed alarm is engaged.

## 2020-03-15 NOTE — PROGRESS NOTES
Spoke to Dottie Barrios with speech, she is going to try and see the pt today.   [facility]

## 2020-03-15 NOTE — PROGRESS NOTES
Pt is stating she has pain in her feet and that the Tylenol she got earlier didn't help, talked to the on call MD who will be covering her about her pain, he states he will talk to her and take care of it.   Christine Christopher

## 2020-03-15 NOTE — PROGRESS NOTES
Nephrology Progress Note  [phone]  [phone]   [email or web address]    Patient:  Keisha Simms   : 1975    CC:  ESRD    Subjective:  Mrs. Dumont is out of the ICU. No new complaints. ROS:   No chest pain. +right sided weakness. SHx:  No visitors this morning. Meds:  Scheduled Meds:   sodium chloride flush  10 mL Intravenous 2 times per day    insulin lispro  0-6 Units Subcutaneous TID WC    insulin lispro  0-3 Units Subcutaneous Nightly    atorvastatin  80 mg Oral Nightly    aspirin  81 mg Oral Daily    levetiracetam  500 mg Intravenous Q24H    mirtazapine  15 mg Oral Nightly    pregabalin  50 mg Oral BID    sevelamer  2,400 mg Oral TID WC    cinacalcet  120 mg Oral Daily    heparin (porcine)  5,000 Units Subcutaneous 3 times per day     Continuous Infusions:   dextrose       PRN Meds:.heparin (porcine), sodium chloride flush, acetaminophen **OR** acetaminophen, polyethylene glycol, promethazine **OR** ondansetron, perflutren lipid microspheres, glucose, dextrose, glucagon (rDNA), dextrose, labetalol    Vitals:  /70   Pulse 76   Temp 98.2 °F (36.8 °C) (Oral)   Resp 16   Ht 5' 7\" (1.702 m)   Wt 121 lb 4.1 oz (55 kg)   SpO2 98%   BMI 18.99 kg/m²     Physical Exam:  Gen: Resting in bed, NAD. HEENT: MMM, OP clear. CV: RRR, no S3.  Lungs: good respiratory effort and clear air entry   Abd: S/NT +BS  Ext: No edema, no cyanosis  Skin: Warm. No rashes appreciated. Access: Left IJ tunneled HD catheter c/d/i.     Labs:  CBC:   Lab Results   Component Value Date    WBC 7.5 03/15/2020    RBC 2.82 03/15/2020    HGB 8.7 03/15/2020    HCT 27.0 03/15/2020    MCV 95.9 03/15/2020    MCH 30.7 03/15/2020    MCHC 32.0 03/15/2020    RDW 16.6 03/15/2020     03/15/2020    MPV 8.0 03/15/2020     BMP:    Lab Results   Component Value Date     03/15/2020    K 3.8 03/15/2020    CL 95 03/15/2020    CO2 26 03/15/2020    BUN 11 03/15/2020    LABALBU 2.1 2020    CREATININE 3.9 03/15/2020    CALCIUM 7.3 03/15/2020    GFRAA 15 03/15/2020    GFRAA 7 09/23/2012    LABGLOM 12 03/15/2020    GLUCOSE 123 03/15/2020       Assessment/Plan:  - End stage renal disease - on HD Tues-Thurs-Sat at [address] [facility] [place], follows with  Nephrology.       - Left MCA stroke with hemorrhagic transformation - plans per Neurology     - Anemia of chronic disease - JOSEMANUEL with HD. Hgb below outpatient ESRD goal.     - Hypertension - running on lower side since admission     - Renal osteodystrophy - on renvela with meals as outpatient        Plan/     - Next HD on Monday.   - Dosing JOSEMANUEL with HD  - BP meds on hold, allowing for permissive HTN    Lisette Gallegos MD  [facility]  Office Number: [phone]

## 2020-03-15 NOTE — PROGRESS NOTES
b/l.     DERMATOLOGIC:  Dermatological changes noted B/L lower extremity. #1 Right lower extremity distal tuft of hallux less than 0.6 cm in diameter dry wound bed granular with slightly hyperkeratotic borders. Wound does not probe to bone, tunnels, or tracks. No fluctuance, crepitus, drainage, malodor noted. No periwound erythema noted. Wound appears stable and without signs of infection. #2 right lower extremity fifth digit thickness ulceration that measured 0.3 cm in diameter pink granular tissue with hyperkeratotic tissue. Wound does probe to PIPJ, no tunneling, or tracking noted. No malodor, no drainage, crepitus, or fluctuance noted. No periwound erythema noted. Wound appears stable and without signs of infection.     Picture taken on 3/15/2020      Left lower extremity no open wounds noted. Slight hyperkeratotic and stable tissue noted to the left fifth metatarsal base. MUSCULSKELETAL: Muscle strength is 4/5 for all pedal groups tested. No pain with palpation of the foot or ankle b/l. Ankle joint ROM is decreased in dorsiflexion with the knee extended.      IMAGING:   Narrative   RIGHT FOOT ON 3/13/2020       HISTORY: Fifth toe pain and ulcer.       COMPARISON: None.       FINDINGS:       2 portable views of the right foot were obtained.       There is distal vascular calcification noted.       There is severe demineralization.       Fracture of the distal portion of the proximal phalanx of the fifth digit is not excluded. Overlying bandage material obscures bony detail.       No other acute bony pathology is seen. Alignment is anatomic.           Impression       Questionable fracture of the distal portion of the proximal phalanx of the fifth digit. Appearance could be caused by overlying shadows of bandage material.               Bilateral: Unable to obtain bilateral ABIs due to non-compressible arterial walls.  Bilateral lower extremity aorto-iliac arterial disease is suggested based on monophasic CW waveforms at all levels. Right: The right digit PPG waveform contour appears to be absent. Left: The left TBI is 0.35 and the digit PPG waveform contours are absent. Conclusions: Unable to obtain bilateral ABIs due to vessel non-compressibility. Aorto-iliac arterial disease noted in the BLE. Procedure: Non-invasive lower extremity arterial examination is performed with the patient in a supine position.  Continuous-wave (CW) Doppler is used for waveform acquisition routinely at the common femoral, superficial femoral, popliteal, posterior tibial and dorsalis pedis levels with selection of either a 4MHz or 8MHz probe.  Appropriately sized pneumatic cuffs for the limb segments are applied and pressures obtained at the brachial and ankle levels.  Photoplethysmography (PPG) is routinely used to obtain digit waveforms and pressure recordings. Other Result Information   Interface, Results In - 11/25/2019  3:33 PM EST  Bilateral: Unable to obtain bilateral ABIs due to non-compressible arterial walls. Bilateral lower extremity aorto-iliac arterial disease is suggested based on monophasic CW waveforms at all levels. Right: The right digit PPG waveform contour appears to be absent. Left: The left TBI is 0.35 and the digit PPG waveform contours are absent. Conclusions: Unable to obtain bilateral ABIs due to vessel non-compressibility. Aorto-iliac arterial disease noted in the BLE. Procedure: Non-invasive lower extremity arterial examination is performed with the patient in a supine position. Continuous-wave (CW) Doppler is used for waveform acquisition routinely at the common femoral, superficial femoral, popliteal, posterior tibial and dorsalis pedis levels with selection of either a 4MHz or 8MHz probe. Appropriately sized pneumatic cuffs for the limb segments are applied and pressures obtained at the brachial and ankle levels.   Photoplethysmography (PPG) is routinely used to obtain digit waveforms and

## 2020-03-16 LAB
ANION GAP SERPL CALCULATED.3IONS-SCNC: 13 MMOL/L (ref 3–16)
BASOPHILS ABSOLUTE: 0.1 K/UL (ref 0–0.2)
BASOPHILS RELATIVE PERCENT: 1.1 %
BUN BLDV-MCNC: 17 MG/DL (ref 7–20)
CALCIUM SERPL-MCNC: 6.9 MG/DL (ref 8.3–10.6)
CHLORIDE BLD-SCNC: 95 MMOL/L (ref 99–110)
CO2: 25 MMOL/L (ref 21–32)
CREAT SERPL-MCNC: 5.3 MG/DL (ref 0.6–1.1)
EOSINOPHILS ABSOLUTE: 0.4 K/UL (ref 0–0.6)
EOSINOPHILS RELATIVE PERCENT: 6.5 %
GFR AFRICAN AMERICAN: 10
GFR NON-AFRICAN AMERICAN: 9
GLUCOSE BLD-MCNC: 113 MG/DL (ref 70–99)
GLUCOSE BLD-MCNC: 125 MG/DL (ref 70–99)
GLUCOSE BLD-MCNC: 84 MG/DL (ref 70–99)
GLUCOSE BLD-MCNC: 85 MG/DL (ref 70–99)
GLUCOSE BLD-MCNC: 89 MG/DL (ref 70–99)
GLUCOSE BLD-MCNC: 92 MG/DL (ref 70–99)
HCT VFR BLD CALC: 26.8 % (ref 36–48)
HEMOGLOBIN: 8.5 G/DL (ref 12–16)
HOMOCYSTEINE: 11 UMOL/L (ref 0–10)
LYMPHOCYTES ABSOLUTE: 1.6 K/UL (ref 1–5.1)
LYMPHOCYTES RELATIVE PERCENT: 24.1 %
MAGNESIUM: 1.9 MG/DL (ref 1.8–2.4)
MCH RBC QN AUTO: 30.5 PG (ref 26–34)
MCHC RBC AUTO-ENTMCNC: 31.8 G/DL (ref 31–36)
MCV RBC AUTO: 95.9 FL (ref 80–100)
MONOCYTES ABSOLUTE: 0.7 K/UL (ref 0–1.3)
MONOCYTES RELATIVE PERCENT: 10.5 %
NEUTROPHILS ABSOLUTE: 3.8 K/UL (ref 1.7–7.7)
NEUTROPHILS RELATIVE PERCENT: 57.8 %
PDW BLD-RTO: 16.4 % (ref 12.4–15.4)
PERFORMED ON: ABNORMAL
PERFORMED ON: ABNORMAL
PERFORMED ON: NORMAL
PHOSPHORUS: 3.8 MG/DL (ref 2.5–4.9)
PLATELET # BLD: 220 K/UL (ref 135–450)
PMV BLD AUTO: 8.1 FL (ref 5–10.5)
POTASSIUM REFLEX MAGNESIUM: 4.4 MMOL/L (ref 3.5–5.1)
RBC # BLD: 2.79 M/UL (ref 4–5.2)
SODIUM BLD-SCNC: 133 MMOL/L (ref 136–145)
WBC # BLD: 6.5 K/UL (ref 4–11)

## 2020-03-16 PROCEDURE — 85613 RUSSELL VIPER VENOM DILUTED: CPT

## 2020-03-16 PROCEDURE — 84100 ASSAY OF PHOSPHORUS: CPT

## 2020-03-16 PROCEDURE — 2580000003 HC RX 258: Performed by: STUDENT IN AN ORGANIZED HEALTH CARE EDUCATION/TRAINING PROGRAM

## 2020-03-16 PROCEDURE — 83735 ASSAY OF MAGNESIUM: CPT

## 2020-03-16 PROCEDURE — 81241 F5 GENE: CPT

## 2020-03-16 PROCEDURE — 85303 CLOT INHIBIT PROT C ACTIVITY: CPT

## 2020-03-16 PROCEDURE — 85305 CLOT INHIBIT PROT S TOTAL: CPT

## 2020-03-16 PROCEDURE — 80048 BASIC METABOLIC PNL TOTAL CA: CPT

## 2020-03-16 PROCEDURE — 92507 TX SP LANG VOICE COMM INDIV: CPT

## 2020-03-16 PROCEDURE — 6370000000 HC RX 637 (ALT 250 FOR IP): Performed by: INTERNAL MEDICINE

## 2020-03-16 PROCEDURE — 86147 CARDIOLIPIN ANTIBODY EA IG: CPT

## 2020-03-16 PROCEDURE — 86146 BETA-2 GLYCOPROTEIN ANTIBODY: CPT

## 2020-03-16 PROCEDURE — 85302 CLOT INHIBIT PROT C ANTIGEN: CPT

## 2020-03-16 PROCEDURE — 6360000002 HC RX W HCPCS: Performed by: STUDENT IN AN ORGANIZED HEALTH CARE EDUCATION/TRAINING PROGRAM

## 2020-03-16 PROCEDURE — 83090 ASSAY OF HOMOCYSTEINE: CPT

## 2020-03-16 PROCEDURE — 1200000000 HC SEMI PRIVATE

## 2020-03-16 PROCEDURE — 81240 F2 GENE: CPT

## 2020-03-16 PROCEDURE — 85306 CLOT INHIBIT PROT S FREE: CPT

## 2020-03-16 PROCEDURE — 86038 ANTINUCLEAR ANTIBODIES: CPT

## 2020-03-16 PROCEDURE — 92526 ORAL FUNCTION THERAPY: CPT

## 2020-03-16 PROCEDURE — 85598 HEXAGNAL PHOSPH PLTLT NEUTRL: CPT

## 2020-03-16 PROCEDURE — 85025 COMPLETE CBC W/AUTO DIFF WBC: CPT

## 2020-03-16 PROCEDURE — 6370000000 HC RX 637 (ALT 250 FOR IP): Performed by: STUDENT IN AN ORGANIZED HEALTH CARE EDUCATION/TRAINING PROGRAM

## 2020-03-16 PROCEDURE — 85610 PROTHROMBIN TIME: CPT

## 2020-03-16 PROCEDURE — 85301 ANTITHROMBIN III ANTIGEN: CPT

## 2020-03-16 PROCEDURE — 90945 DIALYSIS ONE EVALUATION: CPT

## 2020-03-16 PROCEDURE — 85300 ANTITHROMBIN III ACTIVITY: CPT

## 2020-03-16 PROCEDURE — 36415 COLL VENOUS BLD VENIPUNCTURE: CPT

## 2020-03-16 PROCEDURE — 85730 THROMBOPLASTIN TIME PARTIAL: CPT

## 2020-03-16 RX ORDER — MAGNESIUM SULFATE 1 G/100ML
1 INJECTION INTRAVENOUS ONCE
Status: COMPLETED | OUTPATIENT
Start: 2020-03-16 | End: 2020-03-16

## 2020-03-16 RX ORDER — LACTULOSE 10 G/15ML
10 SOLUTION ORAL ONCE
Status: COMPLETED | OUTPATIENT
Start: 2020-03-16 | End: 2020-03-16

## 2020-03-16 RX ADMIN — MAGNESIUM SULFATE HEPTAHYDRATE 1 G: 1 INJECTION, SOLUTION INTRAVENOUS at 12:55

## 2020-03-16 RX ADMIN — PREGABALIN 50 MG: 50 CAPSULE ORAL at 08:41

## 2020-03-16 RX ADMIN — Medication 10 ML: at 08:41

## 2020-03-16 RX ADMIN — ATORVASTATIN CALCIUM 80 MG: 80 TABLET, FILM COATED ORAL at 20:53

## 2020-03-16 RX ADMIN — HEPARIN SODIUM 5000 UNITS: 5000 INJECTION INTRAVENOUS; SUBCUTANEOUS at 22:27

## 2020-03-16 RX ADMIN — HYDROMORPHONE HYDROCHLORIDE 0.25 MG: 1 INJECTION, SOLUTION INTRAMUSCULAR; INTRAVENOUS; SUBCUTANEOUS at 02:48

## 2020-03-16 RX ADMIN — HEPARIN SODIUM 5000 UNITS: 5000 INJECTION INTRAVENOUS; SUBCUTANEOUS at 15:54

## 2020-03-16 RX ADMIN — MIRTAZAPINE 15 MG: 15 TABLET, FILM COATED ORAL at 20:54

## 2020-03-16 RX ADMIN — ONDANSETRON 4 MG: 2 INJECTION INTRAMUSCULAR; INTRAVENOUS at 12:19

## 2020-03-16 RX ADMIN — LEVETIRACETAM 500 MG: 100 INJECTION, SOLUTION INTRAVENOUS at 12:22

## 2020-03-16 RX ADMIN — CINACALCET HYDROCHLORIDE 120 MG: 30 TABLET, FILM COATED ORAL at 08:41

## 2020-03-16 RX ADMIN — SEVELAMER CARBONATE 2400 MG: 800 TABLET, FILM COATED ORAL at 08:50

## 2020-03-16 RX ADMIN — LACTULOSE 10 G: 20 SOLUTION ORAL at 15:53

## 2020-03-16 RX ADMIN — HEPARIN SODIUM 5000 UNITS: 5000 INJECTION INTRAVENOUS; SUBCUTANEOUS at 06:20

## 2020-03-16 RX ADMIN — ASPIRIN 81 MG 81 MG: 81 TABLET ORAL at 08:41

## 2020-03-16 RX ADMIN — SEVELAMER CARBONATE 2400 MG: 800 TABLET, FILM COATED ORAL at 18:43

## 2020-03-16 RX ADMIN — PREGABALIN 50 MG: 50 CAPSULE ORAL at 20:54

## 2020-03-16 ASSESSMENT — PAIN SCALES - PAIN ASSESSMENT IN ADVANCED DEMENTIA (PAINAD)
BREATHING: 0
TOTALSCORE: 0
BREATHING: 0
TOTALSCORE: 0
CONSOLABILITY: 0
NEGVOCALIZATION: 0
FACIALEXPRESSION: 0
BODYLANGUAGE: 0
BODYLANGUAGE: 0
NEGVOCALIZATION: 0
CONSOLABILITY: 0
FACIALEXPRESSION: 0

## 2020-03-16 ASSESSMENT — PAIN SCALES - GENERAL
PAINLEVEL_OUTOF10: 10
PAINLEVEL_OUTOF10: 0
PAINLEVEL_OUTOF10: 0

## 2020-03-16 ASSESSMENT — PAIN DESCRIPTION - PAIN TYPE: TYPE: ACUTE PAIN

## 2020-03-16 ASSESSMENT — PAIN DESCRIPTION - LOCATION: LOCATION: LEG;FOOT

## 2020-03-16 NOTE — PROGRESS NOTES
subacute infarct in the left parietal region with evidence of cortical laminar necrosis and early encephalomalacia. Focal areas of susceptibility artifact favor areas of petechial hemorrhage. 2. Punctate focus of abnormal diffusion signal in the posterior left lentiform nucleus compatible with a small lacunar infarct. 3. Multiple foci of prior remote lacunar infarcts and small infarcts in the right and left cerebellum with focal areas of encephalomalacia. Correlate for possible central embolic phenomenon.        Previous MBS - none  Chart reviewed. Medical Diagnosis: acute ischemic stroke  Treatment Diagnosis: dysphagia    BSE Impression 3/14/2020  Pt presents with mild-moderate oral stage dysphagia characterized by reduced and prolonged mastication of solids and mild pocketing. Pt also with reduced ability to form appropriate seal/suction for straw use. No overt s/s aspiration evident for any po trials: thins via cup (single, sequential sips (pt unable to follow directions for 3oz water screen)), puree, soft solid. Single throat clear/instance of wet vocal quality noted during session. Recommend thin liquids (no straws), puree, meds crushed in puree as able. If consistent s/s aspiration arise, recommend withhold PO and contact speech.      MBS results -not indicated at this time    Pain: none    Current Diet :  dysphagia III soft and bite sized with thin liquids - recommend upgrade to regular diet/thin liquids, 3/16    Treatment:  Pt seen bedside to address the following goals:  Goal 1: Patient will tolerate least restrictive diet without overt signs of aspiration or respiratory decline  3/15: called by RN to report that pt doing much better this date. RN reports lungs clear. Pt analyzed with puree and thin liquids with no overt signs of aspiration, voice clear. This included 3 ounces of uninterrupted swallows of water. Pt able to draw liquid up through straw this date, no anterior loss of bolus.  Pt

## 2020-03-16 NOTE — PROGRESS NOTES
25 03/16/2020    BUN 17 03/16/2020    LABALBU 2.1 03/14/2020    CREATININE 5.3 03/16/2020    CALCIUM 6.9 03/16/2020    GFRAA 10 03/16/2020    GFRAA 7 09/23/2012    LABGLOM 9 03/16/2020    GLUCOSE 85 03/16/2020       Assessment/Plan:  - End stage renal disease - on HD Tues-Thurs-Sat at [address] [facility] [place], follows with  Nephrology.       - Left MCA stroke with hemorrhagic transformation - plans per Neurology     - Anemia of chronic disease - JOSEMANUEL with HD.   Hgb below outpatient ESRD goal.     - Hypertension -better      - Renal osteodystrophy - on renvela with meals as outpatient            Wanda Berg MD  [facility]  Office Number: [phone]

## 2020-03-16 NOTE — PROGRESS NOTES
Internal Medicine PGY- 1  Resident Progress Note        PCP: Carmen Varghese MD    Date of Admission: 3/13/2020    Chief Complaint: Right side weakness and slurred speech     Subjective:   Pt is feeling better, however she still has difficulty in speech but improved. Denies extremity weakness, numbness or tingling sensation. Pt complains of severe leg pain, she reports she does not have allergy to dilaudid   Afebrile  Denies fever, night sweats, chills, chest pain, cough, dyspnea, palpitations, nausea, vomiting, diarrhea, dysuria. Medications:  Reviewed    Infusion Medications    dextrose       Scheduled Medications    lactulose  10 g Oral Once    lidocaine  1 patch Transdermal Daily    sodium chloride flush  10 mL Intravenous 2 times per day    insulin lispro  0-6 Units Subcutaneous TID WC    insulin lispro  0-3 Units Subcutaneous Nightly    atorvastatin  80 mg Oral Nightly    aspirin  81 mg Oral Daily    levetiracetam  500 mg Intravenous Q24H    mirtazapine  15 mg Oral Nightly    pregabalin  50 mg Oral BID    sevelamer  2,400 mg Oral TID WC    cinacalcet  120 mg Oral Daily    heparin (porcine)  5,000 Units Subcutaneous 3 times per day     PRN Meds: HYDROmorphone, heparin (porcine), sodium chloride flush, acetaminophen **OR** acetaminophen, polyethylene glycol, promethazine **OR** ondansetron, perflutren lipid microspheres, glucose, dextrose, glucagon (rDNA), dextrose, labetalol      Intake/Output Summary (Last 24 hours) at 3/16/2020 1410  Last data filed at 3/15/2020 1915  Gross per 24 hour   Intake 300 ml   Output --   Net 300 ml       Physical Exam Performed:    /65   Pulse 68   Temp 97.7 °F (36.5 °C) (Oral)   Resp 16   Ht 5' 7\" (1.702 m)   Wt 121 lb 11.1 oz (55.2 kg)   SpO2 100%   BMI 19.06 kg/m²     Constitutional:       Appearance: Normal appearance. Neck:      Musculoskeletal: Normal range of motion and neck supple.    Cardiovascular:      Rate and Rhythm: Normal rate Punctate focus of abnormal diffusion signal in the posterior left lentiform nucleus compatible with a small lacunar infarct. 3. Multiple foci of prior remote lacunar infarcts and small infarcts in the right and left cerebellum with focal areas of encephalomalacia. Correlate for possible central embolic phenomenon. CT Brain Perfusion   Final Result     No core infarct identified however core infarct is seen on prior    studies in the left parietal region. Elevated Tmax with a threshold of 6    seconds in the left temporal parietal region measuring 13 mL in volume    consistent with ischemic penumbra. CTA HEAD NECK W CONTRAST   Final Result   1. There is once again a 2.9 mm long thrombus within the proximal    inferior and M2 segment of the left MCA. There is enhancement of the MCA    distal to the thrombus. 2.  Acute evolving left parietal infarct. Hyperdensity seen in the left    parietal lobe better seen on the noncontrast CT performed earlier. 3.  Mild multifocal stenosis of the right A2 and distal segments of the    AMELIA. 4.  Mild stenosis of the right ICA. 5.  Mild stenosis of the left ICA. _______________________________________________       IMPRESSION:        1.  15% stenosis of the left ICA. 2.  Atherosclerosis of the right ICA with no significant stenosis using    NASCET criteria. CT HEAD WO CONTRAST   Final Result      Hemorrhagic transformation of left parietal infarct along the high superior parietal white matter just posterior to the frontal lobe as described above with some surrounding low attenuation which could be vasogenic edema or infarction changes. No midline shift or hydrocephalus.  Brain volume loss is symmetric, atrophic change              Assessment/Plan:    Sariah Blevins, 50 y.o. female   Admitted for stroke   Active Hospital Problems    Diagnosis Date Noted    Acute ischemic stroke ([facility] [address] [I63.9] 03/13/2020     Subacute ischemic stroke

## 2020-03-16 NOTE — PROGRESS NOTES
PRN dilaudid administered per report of R leg/foot pain 10/10, see MAR. Repositioned for comfort. Pt refusing to wear boots at this time. Pt denies other needs at this time. Call light is in reach. Bed alarm is engaged.

## 2020-03-16 NOTE — PROGRESS NOTES
Neurology Follow Up  Note    Updates:  Seen for stroke. No changes. ROS:  Constitutional: denies fatigue, malaise  Eyes: denies any vision changes,  Musculoskeletal: denies any pain or decreased ROM, no functional deficit. Neurological: denies headache, numbness or tingling, speech problems. Exam:  Blood pressure 125/69, pulse 64, temperature 97.4 °F (36.3 °C), temperature source Oral, resp. rate 16, height 5' 7\" (1.702 m), weight 121 lb 11.1 oz (55.2 kg), SpO2 99 %, not currently breastfeeding. Constitutional    Vital signs: BP, HR, and RR reviewed   General Alert, no distress, well-nourished  Eyes: fundoscopic exam not visualized. .   Cardiovascular: pulses symmetric in all 4 extremities. No peripheral edema. Psychiatric: cooperative with examination, no  psychotic behavior noted. Neurologic  Mental status:   orientation to person, place and time. General fund of knowledge grossly intact   Memory grossly intact   Attention intact as able to attend well to the exam     Language fluent with one word sentences, mild aphasia    Comprehension intact; follows simple commands  Cranial nerves:   CN2: Visual Fields full to threat ,   CN 3,4,6: extraocular muscles intact can track from side to side   CN5: facial sensation symmetric   CN7:face symmetric without dysarthria,   CN8: hearing grossly intact  CN9: palate elevated symmetrically  CN11: trap full strength on shoulder shrug  CN12: tongue midline with protrusion  Strength: Good strength in all 4 extremities   Sensory: light touch intact in all 4 extremities. Tone: normal in all 4 extremities  Gait: deferred due to safety concerns.        Labs:    CBC:   Lab Results   Component Value Date    WBC 6.5 03/16/2020    RBC 2.79 03/16/2020    HGB 8.5 03/16/2020    HCT 26.8 03/16/2020    MCV 95.9 03/16/2020    MCH 30.5 03/16/2020    MCHC 31.8 03/16/2020    RDW 16.4 03/16/2020     03/16/2020    MPV 8.1 03/16/2020     BMP:    Lab Results Component Value Date     03/16/2020    K 4.4 03/16/2020    CL 95 03/16/2020    CO2 25 03/16/2020    BUN 17 03/16/2020    LABALBU 2.1 03/14/2020    CREATININE 5.3 03/16/2020    CALCIUM 6.9 03/16/2020    GFRAA 10 03/16/2020    GFRAA 7 09/23/2012    LABGLOM 9 03/16/2020    GLUCOSE 85 03/16/2020       HGBA1c:  Recent Labs     03/14/20  0439   LABA1C 4.8       Studies:  VL Extremity Venous Bilateral   Final Result      XR FOOT RIGHT (2 VIEWS)   Final Result      Questionable fracture of the distal portion of the proximal phalanx of the fifth digit. Appearance could be caused by overlying shadows of bandage material.      MRI BRAIN WO CONTRAST   Final Result   1. Late subacute infarct in the left parietal region with evidence of cortical laminar necrosis and early encephalomalacia. Focal areas of susceptibility artifact favor areas of petechial hemorrhage. 2. Punctate focus of abnormal diffusion signal in the posterior left lentiform nucleus compatible with a small lacunar infarct. 3. Multiple foci of prior remote lacunar infarcts and small infarcts in the right and left cerebellum with focal areas of encephalomalacia. Correlate for possible central embolic phenomenon. CT Brain Perfusion   Final Result     No core infarct identified however core infarct is seen on prior    studies in the left parietal region. Elevated Tmax with a threshold of 6    seconds in the left temporal parietal region measuring 13 mL in volume    consistent with ischemic penumbra. CTA HEAD NECK W CONTRAST   Final Result   1. There is once again a 2.9 mm long thrombus within the proximal    inferior and M2 segment of the left MCA. There is enhancement of the MCA    distal to the thrombus. 2.  Acute evolving left parietal infarct. Hyperdensity seen in the left    parietal lobe better seen on the noncontrast CT performed earlier. 3.  Mild multifocal stenosis of the right A2 and distal segments of the    AMELIA.    4.  Mild stenosis of the right ICA. 5.  Mild stenosis of the left ICA. _______________________________________________       IMPRESSION:        1.  15% stenosis of the left ICA. 2.  Atherosclerosis of the right ICA with no significant stenosis using    NASCET criteria. CT HEAD WO CONTRAST   Final Result      Hemorrhagic transformation of left parietal infarct along the high superior parietal white matter just posterior to the frontal lobe as described above with some surrounding low attenuation which could be vasogenic edema or infarction changes. No midline shift or hydrocephalus. Brain volume loss is symmetric, atrophic change            EEG:  The EEG was abnormal due to the presence of:   left hemisphere Focal slowing which is consistent with a focal   disturbance of cerebral function and an underlying structural   lesion should be considered. ECHO:    Normal left ventricle size, wall thickness, and systolic function with an   estimated ejection fraction of 55-60%. No regional wall motion abnormalities   are seen. Diastolic filling parameters suggests normal diastolic function. Mild tricuspid regurgitation. The right ventricle is normal in size and function. IVC size is normal (<2.1 cm) but collapses < 50% with respiration consistent   with elevated RA pressure (8 mmHg). Estimated pulmonary artery systolic pressure is at 36 mmHg assuming a right   atrial pressure of 8 mmHg. Positive bubble study for a small PFO.     Scheduled Meds:   magnesium sulfate  1 g Intravenous Once    lidocaine  1 patch Transdermal Daily    sodium chloride flush  10 mL Intravenous 2 times per day    insulin lispro  0-6 Units Subcutaneous TID     insulin lispro  0-3 Units Subcutaneous Nightly    atorvastatin  80 mg Oral Nightly    aspirin  81 mg Oral Daily    levetiracetam  500 mg Intravenous Q24H    mirtazapine  15 mg Oral Nightly    pregabalin  50 mg Oral BID    sevelamer  2,400 mg Oral TID     cinacalcet  120 mg Oral Daily    heparin (porcine)  5,000 Units Subcutaneous 3 times per day     ASSESSMENT:   49 yo with acute aphasia and right sided weakness that improved but subsequently worsened again in ED. [facility] CT/CTA showed new left posterior hypodensity that is very well defined (not suggestive of ischemia in past few hours) and distal left M1/M2 stenosis. She was started on heparin infusion with some mild hemorrhagic infarction/contrast leakage. Exam with mild aphasia and regular right upper limb myoclonus  MRI shows subacute stroke (older than 7-10 days) and mild hemorrhagic staining. There are some subtle distal T2 changes in both hemisphere suggestive embolic strokes. None of these findings are on MRI jan 2019. CTP shows some increased CBF throughout the left hemisphere which raises question of seizure following subacute stroke (likely occurred around her bowel surgery in late January).     RECOMMENDATIONS:  -Continue Asa daily for now   -Continue statin therapy daily   -May need cvEEG if exam fluctuations. - Continue keppra 500 mg bid.   -Seizure precautions.   -Patient advised and instructed on seizure precautions she cannot engage in any activity where loss of consciousness would be dangerous to herself  or others (e.g. driving, climbing, operating heavy machinery, swimming alone, etc.) until approved by her physician (seizure-free for at least 3-6 months). She  expressed understanding of all instructions given. -Cardiology Consult re PFO and possible embolic strokes. May need SY. -Hypercoagulable workup for complete stroke workup as ordered.          A copy of this note was provided for MD Jenna Hill, APRN-[address] Neuroscience  [phone]  Evenings, weekends, and off weeks please discuss with the covering neurologist.

## 2020-03-16 NOTE — PROGRESS NOTES
Speech Language Pathology  Facility/Department: [facility] 5T ORTHO/NEURO  Speech/language Daily Treatment Note    NAME: Jordon Grant  : 1975  MRN: [id number]    Patient Diagnosis(es):   Patient Active Problem List    Diagnosis Date Noted    Atypical chest pain      Priority: High    NSTEMI (non-ST elevated myocardial infarction) ([facility] [address]      Priority: High    Acute ischemic stroke ([facility] [address] 2020    GI bleed 2019    Moderate malnutrition ([address] 2019    Nausea and vomiting 2019    Peritoneal dialysis status ([address] 2019    Hyperlipidemia 2019    Chronic focal neurological deficit 2019    Hyperkalemia 2018    Anemia     Illicit drug use     Severe episode of recurrent major depressive disorder, without psychotic features ([address]     Anxiety disorder     Cocaine abuse ([facility] [address]     Vaginal discharge     Vaginal bleeding 2017    Vaginal candidiasis 2017    Abnormal stress test     ESRD (end stage renal disease) on dialysis ([address]     Suicidal ideation     Chest pain 2017    Failure to thrive in adult 2017    Cellulitis 2016    Cellulitis of lower extremity     Erythema nodosum     Depression with suicidal ideation 01/15/2016    ESRD on hemodialysis ([address] 01/15/2016    Hyperkalemia     Metabolic acidosis     Essential hypertension 01/15/2016    DMII (diabetes mellitus, type 2) ([facility] [address] 01/15/2016     Allergies: Allergies   Allergen Reactions    Ferrous Sulfate Shortness Of Breath    Cephalexin Itching and Swelling    Gabapentin Other (See Comments)     Various adverse reaction    Dicloxacillin Rash    Hydromorphone Itching    Pcn [Penicillins] Rash    Sulfa Antibiotics Rash and Other (See Comments)     Chest pain. RECENT RESULTS MRI BRAIN: 2020  Impression   1.  Late subacute infarct in the left parietal region with evidence of cortical laminar necrosis and early encephalomalacia. Focal areas of susceptibility artifact favor areas of petechial hemorrhage. 2. Punctate focus of abnormal diffusion signal in the posterior left lentiform nucleus compatible with a small lacunar infarct. 3. Multiple foci of prior remote lacunar infarcts and small infarcts in the right and left cerebellum with focal areas of encephalomalacia. Correlate for possible central embolic phenomenon. Chart reviewed. Pain: none    Medical diagnosis:  Treatment diagnosis: acute ischemic stroke    Treatment:  Pt seen to address the following goals:  Goal 1: Patient will tolerate ongoing speech/language/cognitive assessment  3/16:   pt demonstrating apraxia of speech, significant difficulty completing diadokinetic tasks. Pt had difficulty repeating multi syllabic words, with decreased coordination. Breakdowns occurred during structured tasks as well as in conversation  Cont goal    Goal 2: Patient will complete naming tasks with 70% accuracy given moderate cues. 3/14: Targeted; pt benefited from mod-max cues (first phoneme). Cont goal.  3/16: pt named common objects with 90% accuracy. Pt had significant difficulty naming words/category provided,naming only 1-2 words. Cont goal to ensure consistency    Goal 3: Patient will improve yes/no accuracy to 50%. 3/16: pt answered simple yes/no questions with 80% accuracy and 2 unit with 60%  Cont goal    Goal 4: Patient will identify objects with 70% accuracy given mod cues. 3/14: pt required mod cues and rearrangement of objects for left neglect. Cont goal.  3/16: pt ID objects with 90% accuracy, neglect not as significant as initial session  Cont goal    Goal 5: Patient will improve articulation at the multi word level utilizing compensatory strategies. 3/16:speech intelligibility improving, mild dysarthria only at this time. Reviewed strategies for improved intelligibility. Pt utilized with min cues at sentence level.   Cont goal    Education:  Pt educated to strategies for dysarthria and word finding. Pt stated comprehension    Plan:  Continue speech/language therapy to address above goals, 3-5 x/week x LOS  DC recommendations: ongoing treatment upon dc is indicated  D/W nursing   Needs met prior to leaving room, call button in reach. Treatment time: [id number] [facility] #[id number]  Pg.  # [phone]    If patient isdischarged prior to next treatment, this note will serve as the discharge summary

## 2020-03-16 NOTE — CARE COORDINATION
be added to hospital bill. If financial assistance is needed, please contact unit  or ;  or  CANNOT provide pharmacy voucher for patients co-pays  5. Patients can then  the prescription on their way out of the hospital at discharge, or pharmacy can deliver to the bedside if staff is available. (payment due at time of pick-up or delivery - cash, check, or card accepted)     Able to afford home medications/ co-pay costs: Yes    ADLS  Support Systems:      PT AM-PAC:   /24  OT AM-PAC:   /24    [place]: LTC  Steps:NA    Plans to RETURN to current housing: Yes  Barriers to RETURNING to current housing: NA    Dialysis  Active with HD/PD prior to admission: Yes  Nephrologist: Dian Bae    HD Center:  [facility]  Address: [place] [address]  Phone: [phone]    DISCHARGE PLAN:  Disposition: [facility] (LTC): [facility]  Phone: NA  Fax: NA    Transportation PLAN for discharge: EMS transportation     Factors facilitating achievement of predicted outcomes: Family support    Barriers to discharge: none noted    Additional Case Management Notes: spoke with Lili and she agreed to have the patient return to [facility] at d/c but would like her to have more therapy. The Plan for Transition of Care is related to the following treatment goals of Acute ischemic stroke [facility]) [I63.9]    The Patient and/or patient representative Yolanda Melara and her family were provided with a choice of provider and agrees with the discharge plan Not Indicated    Freedom of choice list was provided with basic dialogue that supports the patient's individualized plan of care/goals and shares the quality data associated with the providers.  Not Indicated      Care Transition patient: No    Paolo Paige RN  [facility] [email or web address]  Case Management Department  Ph: [phone]   Fax: [phone]

## 2020-03-16 NOTE — PROGRESS NOTES
Initial shift assessment completed, see doc flow. Scheduled medications administered. Insulin held per [facility] parameters for . Incontinence check, pt is dry. Appears comfortable, sleepy. Warm blanket provided for comfort. Call light is in reach. Bed alarm is engaged. Heel boots on. Pt refusing SCD pumps.

## 2020-03-17 VITALS
TEMPERATURE: 97.5 F | RESPIRATION RATE: 15 BRPM | BODY MASS INDEX: 18.37 KG/M2 | DIASTOLIC BLOOD PRESSURE: 74 MMHG | OXYGEN SATURATION: 96 % | SYSTOLIC BLOOD PRESSURE: 114 MMHG | HEIGHT: 67 IN | WEIGHT: 117.06 LBS | HEART RATE: 88 BPM

## 2020-03-17 LAB
ANION GAP SERPL CALCULATED.3IONS-SCNC: 19 MMOL/L (ref 3–16)
ANTI-NUCLEAR ANTIBODY (ANA): NEGATIVE
BASOPHILS ABSOLUTE: 0 K/UL (ref 0–0.2)
BASOPHILS RELATIVE PERCENT: 0.7 %
BUN BLDV-MCNC: 22 MG/DL (ref 7–20)
CALCIUM SERPL-MCNC: 7 MG/DL (ref 8.3–10.6)
CHLORIDE BLD-SCNC: 93 MMOL/L (ref 99–110)
CO2: 23 MMOL/L (ref 21–32)
CREAT SERPL-MCNC: 6.3 MG/DL (ref 0.6–1.1)
EOSINOPHILS ABSOLUTE: 0.5 K/UL (ref 0–0.6)
EOSINOPHILS RELATIVE PERCENT: 7.9 %
GFR AFRICAN AMERICAN: 9
GFR NON-AFRICAN AMERICAN: 7
GLUCOSE BLD-MCNC: 128 MG/DL (ref 70–99)
GLUCOSE BLD-MCNC: 71 MG/DL (ref 70–99)
GLUCOSE BLD-MCNC: 92 MG/DL (ref 70–99)
GLUCOSE BLD-MCNC: 95 MG/DL (ref 70–99)
HCT VFR BLD CALC: 30.6 % (ref 36–48)
HEMOGLOBIN: 9.7 G/DL (ref 12–16)
LYMPHOCYTES ABSOLUTE: 2 K/UL (ref 1–5.1)
LYMPHOCYTES RELATIVE PERCENT: 34.6 %
MCH RBC QN AUTO: 30.3 PG (ref 26–34)
MCHC RBC AUTO-ENTMCNC: 31.7 G/DL (ref 31–36)
MCV RBC AUTO: 95.5 FL (ref 80–100)
MONOCYTES ABSOLUTE: 0.6 K/UL (ref 0–1.3)
MONOCYTES RELATIVE PERCENT: 9.5 %
NEUTROPHILS ABSOLUTE: 2.8 K/UL (ref 1.7–7.7)
NEUTROPHILS RELATIVE PERCENT: 47.3 %
PDW BLD-RTO: 16.5 % (ref 12.4–15.4)
PERFORMED ON: ABNORMAL
PERFORMED ON: NORMAL
PERFORMED ON: NORMAL
PLATELET # BLD: 252 K/UL (ref 135–450)
PMV BLD AUTO: 7.7 FL (ref 5–10.5)
POTASSIUM REFLEX MAGNESIUM: 4.7 MMOL/L (ref 3.5–5.1)
RBC # BLD: 3.2 M/UL (ref 4–5.2)
REASON FOR REJECTION: NORMAL
REJECTED TEST: NORMAL
SODIUM BLD-SCNC: 135 MMOL/L (ref 136–145)
WBC # BLD: 5.9 K/UL (ref 4–11)

## 2020-03-17 PROCEDURE — 6370000000 HC RX 637 (ALT 250 FOR IP): Performed by: STUDENT IN AN ORGANIZED HEALTH CARE EDUCATION/TRAINING PROGRAM

## 2020-03-17 PROCEDURE — 36415 COLL VENOUS BLD VENIPUNCTURE: CPT

## 2020-03-17 PROCEDURE — 92507 TX SP LANG VOICE COMM INDIV: CPT

## 2020-03-17 PROCEDURE — 85025 COMPLETE CBC W/AUTO DIFF WBC: CPT

## 2020-03-17 PROCEDURE — 6360000002 HC RX W HCPCS: Performed by: STUDENT IN AN ORGANIZED HEALTH CARE EDUCATION/TRAINING PROGRAM

## 2020-03-17 PROCEDURE — 92526 ORAL FUNCTION THERAPY: CPT

## 2020-03-17 PROCEDURE — 80048 BASIC METABOLIC PNL TOTAL CA: CPT

## 2020-03-17 PROCEDURE — 90935 HEMODIALYSIS ONE EVALUATION: CPT

## 2020-03-17 PROCEDURE — 6370000000 HC RX 637 (ALT 250 FOR IP): Performed by: NURSE PRACTITIONER

## 2020-03-17 RX ORDER — ATORVASTATIN CALCIUM 80 MG/1
80 TABLET, FILM COATED ORAL NIGHTLY
Qty: 30 TABLET | Refills: 3 | Status: ON HOLD | OUTPATIENT
Start: 2020-03-17 | End: 2021-01-11

## 2020-03-17 RX ORDER — ASPIRIN 81 MG/1
81 TABLET, CHEWABLE ORAL DAILY
Status: DISCONTINUED | OUTPATIENT
Start: 2020-03-18 | End: 2020-03-17 | Stop reason: HOSPADM

## 2020-03-17 RX ORDER — LEVETIRACETAM 500 MG/1
500 TABLET ORAL DAILY
Status: DISCONTINUED | OUTPATIENT
Start: 2020-03-17 | End: 2020-03-17 | Stop reason: HOSPADM

## 2020-03-17 RX ORDER — CLOPIDOGREL BISULFATE 75 MG/1
75 TABLET ORAL DAILY
Status: DISCONTINUED | OUTPATIENT
Start: 2020-03-18 | End: 2020-03-17 | Stop reason: HOSPADM

## 2020-03-17 RX ORDER — LIDOCAINE 4 G/G
1 PATCH TOPICAL DAILY
Qty: 1 BOX | Refills: 0 | Status: SHIPPED | OUTPATIENT
Start: 2020-03-18 | End: 2020-09-07

## 2020-03-17 RX ORDER — LACTULOSE 10 G/15ML
30 SOLUTION ORAL 2 TIMES DAILY PRN
Status: DISCONTINUED | OUTPATIENT
Start: 2020-03-17 | End: 2020-03-17 | Stop reason: HOSPADM

## 2020-03-17 RX ORDER — LEVETIRACETAM 500 MG/1
500 TABLET ORAL DAILY
Qty: 60 TABLET | Refills: 0 | Status: ON HOLD | OUTPATIENT
Start: 2020-03-18 | End: 2021-01-11

## 2020-03-17 RX ORDER — CLOPIDOGREL BISULFATE 75 MG/1
75 TABLET ORAL DAILY
Qty: 30 TABLET | Refills: 0 | Status: ON HOLD | OUTPATIENT
Start: 2020-03-18 | End: 2021-01-11

## 2020-03-17 RX ORDER — ASPIRIN 81 MG/1
81 TABLET, CHEWABLE ORAL DAILY
Qty: 30 TABLET | Refills: 0 | Status: SHIPPED | OUTPATIENT
Start: 2020-03-18 | End: 2020-09-07

## 2020-03-17 RX ADMIN — LEVETIRACETAM 500 MG: 500 TABLET ORAL at 12:31

## 2020-03-17 RX ADMIN — CINACALCET HYDROCHLORIDE 120 MG: 30 TABLET, FILM COATED ORAL at 12:30

## 2020-03-17 RX ADMIN — SEVELAMER CARBONATE 2400 MG: 800 TABLET, FILM COATED ORAL at 17:46

## 2020-03-17 RX ADMIN — HEPARIN SODIUM 5000 UNITS: 5000 INJECTION INTRAVENOUS; SUBCUTANEOUS at 05:40

## 2020-03-17 RX ADMIN — ASPIRIN 81 MG 81 MG: 81 TABLET ORAL at 12:31

## 2020-03-17 RX ADMIN — PREGABALIN 50 MG: 50 CAPSULE ORAL at 12:31

## 2020-03-17 RX ADMIN — ACETAMINOPHEN 650 MG: 325 TABLET ORAL at 17:38

## 2020-03-17 RX ADMIN — SEVELAMER CARBONATE 2400 MG: 800 TABLET, FILM COATED ORAL at 12:30

## 2020-03-17 RX ADMIN — HEPARIN SODIUM 5000 UNITS: 5000 INJECTION INTRAVENOUS; SUBCUTANEOUS at 15:21

## 2020-03-17 ASSESSMENT — PAIN SCALES - PAIN ASSESSMENT IN ADVANCED DEMENTIA (PAINAD)
BODYLANGUAGE: 0
CONSOLABILITY: 0
FACIALEXPRESSION: 0
TOTALSCORE: 0
NEGVOCALIZATION: 0
BREATHING: 0

## 2020-03-17 ASSESSMENT — PAIN SCALES - GENERAL
PAINLEVEL_OUTOF10: 7
PAINLEVEL_OUTOF10: 0

## 2020-03-17 NOTE — PROGRESS NOTES
Lab called with a critical potassium of 6.2 however the specimen was hemolyzed. Lab to reject the specimen and phlebotomist to come and draw again.

## 2020-03-17 NOTE — DISCHARGE SUMMARY
INTERNAL MEDICINE DEPARTMENT AT [address] [place] [address]  DISCHARGE SUMMARY    Patient ID: Rubin Benson                                             Discharge Date: 3/17/2020   Patient's PCP: Merna Campoverde MD                                          Discharge Physician: Ines David MD  Admit Date: 3/13/2020   Admitting Physician: Nataly Palencia MD    DISCHARGE DIAGNOSES:  1- Subacute ischemic stroke with minimal hemorrhagic transformation  2- Focal Seizure with Kamari's paresis  3- ESRD on HD  4- PVD  5- Dry gangrene and ulceration in the fifth digit of right foot     Hospital Course:    Ms. Joseph Rosen is a 51 yo F with PMH of ESRD on HD (T-Thu-Sat), mesentric ischemia and ischemic colitis complicated with GI bleed (s/p hemicolectomy 1/2020), T2DM, PVD, and previous CVA who presented from nursing home to [facility] with Rt sided weakness and aphasia (last known well 9 pm on 3/12). CT head was negative for hemorrhage. Patient had CTA head and neck which demonstrated possible subocclusive thrombous of L MCA. Patient didn't receive tPA as her right side weakness improved but still was still having mild aphasia. Patient was then started on low dose heparin drip as per neurosurgery. Patient neurological symptoms worsened which prompted her to have CT head which revealed hemorrhagic transformation. Heparin was stopped and patient was transferred to [facility] ICU for further management. Patient had MRI which revealed late subacute infarct in the left parietal region with cortical necrosis and focal areas of petechial hemorrhage. Neurology was consulted. Patient had spot EEG which revealed focal seizure activity. It was determined that patient symptoms are probably postictal in nature which was the result of subacute stroke she had sometime few weeks ago. Patient was then started on Keppra 500 mg daily and high intensity statin. As part of stroke work up, patient had ECHO which revealed small PFO.  Cardiology was consulted who 88   Temp 97.5 °F (36.4 °C) (Oral)   Resp 15   Ht 5' 7\" (1.702 m)   Wt 117 lb 1 oz (53.1 kg)   SpO2 96%   BMI 18.33 kg/m²   Constitutional:       Appearance: Normal appearance. Neck:      Musculoskeletal: Normal range of motion and neck supple. Cardiovascular:      Rate and Rhythm: Normal rate and regular rhythm. Pulmonary:      Effort: Pulmonary effort is normal.      Breath sounds: Normal breath sounds. Abdominal:      General: Abdomen is flat.      Palpations: Abdomen is soft. Musculoskeletal: Normal range of motion. Skin:     General: Skin is warm and dry.      Capillary Refill: Capillary refill takes less than 2 seconds. Neurological:      Mental Status: She is alert.      Comments: RUE 3/5, LUE 4/5. RLE 4/5, LLE 5/5. Some intermittent aphasia noted but improved compared to yesterday. Psychiatric:         Mood and Affect: Mood normal.         Behavior: Behavior normal.    er; bowel sounds normal; no masses,  no organomegaly  Extremities: extremities normal, atraumatic, no cyanosis or edema  Neurologic: Grossly normal    Consults: pulmonary/intensive care, vascular surgery, podiatry, cardiology, nephrology and neurology    Significant Diagnostic Studies:   VL Extremity Venous Bilateral   Final Result      XR FOOT RIGHT (2 VIEWS)   Final Result      Questionable fracture of the distal portion of the proximal phalanx of the fifth digit. Appearance could be caused by overlying shadows of bandage material.      MRI BRAIN WO CONTRAST   Final Result   1. Late subacute infarct in the left parietal region with evidence of cortical laminar necrosis and early encephalomalacia. Focal areas of susceptibility artifact favor areas of petechial hemorrhage. 2. Punctate focus of abnormal diffusion signal in the posterior left lentiform nucleus compatible with a small lacunar infarct.    3. Multiple foci of prior remote lacunar infarcts and small infarcts in the right and left cerebellum with focal areas of

## 2020-03-17 NOTE — DISCHARGE INSTR - COC
Continuity of Care Form    Patient Name: Chencho Piña   :  1975  MRN:  [id number]    Admit date:  3/13/2020  Discharge date:      Code Status Order: Full Code   Advance Directives:     Admitting Physician:  Jessica Lowery MD  PCP: Phil Gonzalez MD    Discharging Nurse: [facility] Unit/Room#: 6912/9963-29  Discharging Unit Phone Number: [phone]    Emergency Contact:   Extended Emergency Contact Information  Primary Emergency Contact: Jena Maldonado   [address] Phone: [phone]  Relation: Brother/Sister    Past Surgical History:  Past Surgical History:   Procedure Laterality Date    BACK SURGERY      Tumor removal    CHOLECYSTECTOMY      KIDNEY TRANSPLANT      KIDNEY TRANSPLANT      LAPAROSCOPY      right ovary removed and left ovarian cyst removed for endometriosis    OTHER SURGICAL HISTORY      peritoneal dialysis catheter    PARACENTESIS      TUBAL LIGATION      UPPER GASTROINTESTINAL ENDOSCOPY  2017    UPPER GASTROINTESTINAL ENDOSCOPY N/A 3/25/2019    EGD BIOPSY performed by Latisha Cisneros MD at [facility] ENDOSCOPY       Immunization History: There is no immunization history on file for this patient.     Active Problems:  Patient Active Problem List   Diagnosis Code    Depression with suicidal ideation F32.9, R39.200    ESRD on hemodialysis ([facility] N18.6, Z99.2    Hyperkalemia R93.1    Metabolic acidosis Y70.3    Essential hypertension I10    DMII (diabetes mellitus, type 2) ([facility] E11.9    Cellulitis L03.90    Cellulitis of lower extremity L03.119    Erythema nodosum L52    Chest pain R07.9    Failure to thrive in adult R62.7    Abnormal stress test R94.39    ESRD (end stage renal disease) on dialysis ([facility] N18.6, Z99.2    Suicidal ideation R45.851    NSTEMI (non-ST elevated myocardial infarction) ([facility]) I21.4    Vaginal candidiasis B37.3    Vaginal bleeding N93.9    Vaginal discharge N89.8    Atypical chest pain Manager/ signature: Electronically signed by Liana Manzano RN on 3/17/20 at 4:42 PM EDT    PHYSICIAN SECTION    Prognosis: Good    Condition at Discharge: Stable    Rehab Potential (if transferring to Rehab): Good    Recommended Labs or Other Treatments After Discharge:   - speech, occupational and physical therapy twice per week    Podiatry instructions:  Right lower extremity  -Plese apply Betadine paint to the right hallux distal tuft. -Please apply Betadine paint to the fifth digit wound.  -Please apply gauze from the toes to just above the ankle joint.  -Please gently wrap Kerlix starting from the toes to just above the ankle joint.  -Please gently wrap 4 inch Ace starting from the toes to just above the ankle joint.  -Please change dressings daily and inspect feet. Physician Certification: I certify the above information and transfer of Vero Dickinson  is necessary for the continuing treatment of the diagnosis listed and that she requires [place] for greater 30 days.      Update Admission H&P: No change in H&P    PHYSICIAN SIGNATURE:  Electronically signed by Avinash Nunez MD on 3/17/20 at 2:26 PM EDT on behalf of Dr. Julien Ferreira

## 2020-03-17 NOTE — PROGRESS NOTES
Nephrology Progress Note  [phone]  [phone]   [email or web address]    Patient:  Nicolas Gordon   : 1975    CC:  ESRD    Subjective:  Stable  No new complaints. Pt is seen while on HD       ROS:   No chest pain. +right sided weakness. SHx:  No visitors this morning. Meds:  Scheduled Meds:   levETIRAcetam  500 mg Oral Daily    lidocaine  1 patch Transdermal Daily    sodium chloride flush  10 mL Intravenous 2 times per day    insulin lispro  0-6 Units Subcutaneous TID WC    insulin lispro  0-3 Units Subcutaneous Nightly    atorvastatin  80 mg Oral Nightly    aspirin  81 mg Oral Daily    mirtazapine  15 mg Oral Nightly    pregabalin  50 mg Oral BID    sevelamer  2,400 mg Oral TID WC    cinacalcet  120 mg Oral Daily    heparin (porcine)  5,000 Units Subcutaneous 3 times per day     Continuous Infusions:   dextrose       PRN Meds:.lactulose, HYDROmorphone, heparin (porcine), sodium chloride flush, acetaminophen **OR** acetaminophen, polyethylene glycol, promethazine **OR** ondansetron, perflutren lipid microspheres, glucose, dextrose, glucagon (rDNA), dextrose, labetalol    Vitals:  /63   Pulse 63   Temp 97.9 °F (36.6 °C)   Resp 14   Ht 5' 7\" (1.702 m)   Wt 117 lb 1 oz (53.1 kg)   SpO2 100%   BMI 18.33 kg/m²     Physical Exam:  Gen: Resting in bed, NAD. HEENT: MMM, OP clear. CV: RRR, no S3.  Lungs: good respiratory effort and clear air entry   Abd: S/NT +BS  Ext: No edema, no cyanosis  Skin: Warm. No rashes appreciated. Access: Left IJ tunneled HD catheter c/d/i.     Labs:  CBC:   Lab Results   Component Value Date    WBC 5.9 2020    RBC 3.20 2020    HGB 9.7 2020    HCT 30.6 2020    MCV 95.5 2020    MCH 30.3 2020    MCHC 31.7 2020    RDW 16.5 2020     2020    MPV 7.7 2020     BMP:    Lab Results   Component Value Date     2020    K 4.7 2020    CL 93 2020    CO2 23 2020    BUN 22 03/17/2020    LABALBU 2.1 03/14/2020    CREATININE 6.3 03/17/2020    CALCIUM 7.0 03/17/2020    GFRAA 9 03/17/2020    GFRAA 7 09/23/2012    LABGLOM 7 03/17/2020    GLUCOSE 92 03/17/2020       Assessment/Plan:  - End stage renal disease - on HD Tues-Thurs-Sat at [address] [facility] [facility] [place], follows with  Nephrology.       - Left MCA stroke with hemorrhagic transformation - as per  per Neurology     - Anemia of chronic disease - JOSEMANUEL with HD.   Hgb below outpatient ESRD goal.     - Hypertension -better      - Renal osteodystrophy - on renvela with meals as outpatient            Nithya Culp MD  [facility]  Office Number: [phone]

## 2020-03-17 NOTE — CARE COORDINATION
Case Management Assessment            Discharge Note                    Date / Time of Note: 3/17/2020 4:50 PM                  Discharge Note Completed by: Rachael Aquino     Discharge order noted and arranged [facility] on chart. Lyn Krishnan, pt's sister to let her know of discharge as well. Faxed AVS and last dialysis note to [facility] in [place]. Patient Name: Fredi Wilson   YOB: 1975  Diagnosis: Acute ischemic stroke [facility]) [I63.9]   Date / Time: 3/13/2020  4:06 AM    Current PCP: Jacey Ruffin MD  Clinic patient: No    Hospitalization in the last 30 days: No    Advance Directives:  Code Status: Full Code  [place] DNR form completed and on chart: No    Financial:  Payor: [employer] / Plan: 40 [facility] DEPT OF JOB / Product Type: *No Product type* /      Pharmacy:    [facility] [address] [facility]  [address] [facility] [address]  Phone: [phone] Fax: [phone]    [facility] [address] [phone] [facility] [phone]  [facility] [address]  Phone: [phone] Fax: [phone]      Assistance purchasing medications?:    Assistance provided by Case Management: None at this time    Does patient want to participate in local refill/ meds to beds program?:      Meds To Beds General Rules:  1. Can ONLY be done Monday- Friday between 8:30am-5pm  2. Prescription(s) must be in pharmacy by 3pm to be filled same day  3. Copy of patient's insurance/ prescription drug card and patient face sheet must be sent along with the prescription(s)  4. Cost of Rx cannot be added to hospital bill. If financial assistance is needed, please contact unit  or ;  or  CANNOT provide pharmacy voucher for patients co-pays  5.  Patients can then  the prescription on their way out of the hospital at discharge, or pharmacy can deliver to the bedside if staff is available. (payment due at time of pick-up or delivery - cash, check, or card accepted)     Able to afford home medications/ co-pay costs: Yes    ADLS:  Current PT AM-PAC Score:   /24  Current OT AM-PAC Score:   /24      DISCHARGE Disposition: [facility]  Phone: [phone] and ask for 2nd floor  Fax: [phone]    LOC at discharge: Krista Morris Andrewscristiane Completed: Yes    Notification completed in HENS/PAS?:  Not Applicable    IMM Completed:   Not Indicated    Transportation:  Transportation PLAN for discharge: EMS transportation   Mode of Transport: Ambulance stretcher - BLS  Reason for medical transport: Paralysis- larry, semi, or quad: meets the following criteria 1) unable to transfer self to chair, 2) has no extremity mobility or control  Name of [address]: [address]  Phone: [phone]  Time of Transport: 1800    Transport form completed: Yes    Dialysis:  Dialysis patient: Yes    Dialysis Center:  [facility]  Address: [place] [address]  Phone: [phone]      The Plan for Transition of Care is related to the following treatment goals of Acute ischemic stroke [facility]) [I63.9]    The Patient and/or patient representative Tammie Senior and her family were provided with a choice of provider and agrees with the discharge plan Not Indicated    Freedom of choice list was provided with basic dialogue that supports the patient's individualized plan of care/goals and shares the quality data associated with the providers.  Not Indicated    Care Transitions patient: No    Shay Smith RN  [facility] [employer]  Case Management Department  Ph: [phone]  CCK:[phone]

## 2020-03-17 NOTE — PROGRESS NOTES
Pt with no IV at start of shift. This RN attempted a new IV with out success.  Will have another RN attempt,

## 2020-03-17 NOTE — PLAN OF CARE
Problem: ACTIVITY INTOLERANCE/IMPAIRED MOBILITY  Goal: Mobility/activity is maintained at optimum level for patient  Outcome: Ongoing  Note: Pt unable to ambulate at this time d/t leg weakness. Pt is on a special mattress. Problem: Skin Integrity:  Goal: Will show no infection signs and symptoms  Description: Will show no infection signs and symptoms  Outcome: Ongoing  Note: Pt has wounds on her right great toe and 5th toe. Dressing on.

## 2020-03-17 NOTE — PROGRESS NOTES
Speech Language Pathology  Treatment attempt      Chart reviewed. Attempted to see pt this date to address dysphagia and communication but pt was out for dialysis. Will attempt again later as time permits. Discussed with RN, Jeanette Madrigal.     Ministerio Beal, [facility] Diane Kitchen 40  Speech-Language Pathologist  Pager [phone]

## 2020-03-17 NOTE — PROGRESS NOTES
VSS. Pt is A/O to person, place and situation. NIH 10 with no change in neuro assessment. Pt denies pain at this time. Dressing to the right foot CDI. Pt aneuric. Pt to go to dialysis today. No BM this shift. Pt c/o constipation. Pt denies N/V. Bed alarm on, wheels locked, bed in lowest position, side rails up 2/4, nonskid socks on, call light and bedside table in reach. Will continue to monitor.

## 2020-03-17 NOTE — PROGRESS NOTES
Speech Language Pathology  Facility/Department: [facility] 5T ORTHO/NEURO  Dysphagia Daily Treatment Note    NAME: Rachana Chávez  : 1975  MRN: [id number]    Patient Diagnosis(es):   Patient Active Problem List    Diagnosis Date Noted    Atypical chest pain      Priority: High    NSTEMI (non-ST elevated myocardial infarction) ([address]      Priority: High    Acute ischemic stroke ([address] 2020    GI bleed 2019    Moderate malnutrition ([address] 2019    Nausea and vomiting 2019    Peritoneal dialysis status ([address] 2019    Hyperlipidemia 2019    Chronic focal neurological deficit 2019    Hyperkalemia 2018    Anemia     Illicit drug use     Severe episode of recurrent major depressive disorder, without psychotic features ([address]     Anxiety disorder     Cocaine abuse ([address]     Vaginal discharge     Vaginal bleeding 2017    Vaginal candidiasis 2017    Abnormal stress test     ESRD (end stage renal disease) on dialysis ([address]     Suicidal ideation     Chest pain 2017    Failure to thrive in adult 2017    Cellulitis 2016    Cellulitis of lower extremity     Erythema nodosum     Depression with suicidal ideation 01/15/2016    ESRD on hemodialysis ([address] 01/15/2016    Hyperkalemia     Metabolic acidosis     Essential hypertension 01/15/2016    DMII (diabetes mellitus, type 2) ([address] 01/15/2016     Allergies: Allergies   Allergen Reactions    Ferrous Sulfate Shortness Of Breath    Cephalexin Itching and Swelling    Gabapentin Other (See Comments)     Various adverse reaction    Dicloxacillin Rash    Hydromorphone Itching    Pcn [Penicillins] Rash    Sulfa Antibiotics Rash and Other (See Comments)     Chest pain. Recent Chest Xray: (2020)  Impression   Low lung volumes.  No acute cardiopulmonary process.      RECENT RESULTS MRI BRAIN: 2020  Impression   1.  Late upgrade to soft and bite sized this date  Goal met, cont to ensure consistency  3/16: pt just finishing breakfast, demonstrating adequate mastication with soft solid. Pt analyzed with hard solid trial, no pocketing or anterior loss noted. No overt signs of aspiration emerged, voice clear. No concerns per RN, no respiratory decline per chart. Recommend upgrade to regular texture  Goal met, cont with upgraded consistency  3/17- RN reported no concerns re: swallowing. Pt reported no difficulty with swallowing. Pt was analyzed with crackers with peanut butter, water by straw and meds with water. Pt with no pocketing or anterior spillage with any consistency. No s/s aspiration observed with any consistency. con't goal.     Goal 2: Patient/caregiver will demonstrate understanding of compensatory strategies for improved swallowing safety. 3/15: pt recalled eval from last session and reports she is doing much better. Pt speech improved as well. Educated to recommendation to upgrade diet and strategies to improve safety of swallow. Instructed to notify staff if any signs emerge. Pt stated comprehension  Cont goal  3/16:  Pt educated to recommendation for upgraded diet, pt denies any further numbness or weakness in facial area. Educated to s/s of aspiration and concern if aspiration occurs. Pt stated understanding  Goal met, cont to ensure consistency  3/17-  Pt was reclined while eating crackers with peanut butter. Educated pt need for sitting upright as well and need for checking for oral residue. Pt stated comprehension. con't goal     Goal 3: Patient will improve oral motor function via therapeutic oral motor exercises. 3/15: deferred as improvement noted  Cont goal as approp  3/16: goal dc    Patient/Family/Caregiver Education:  As above    Compensatory Strategies:  90 degrees all meals  Check for oral residue     Plan:  Continued daily Dysphagia treatment with goals per  plan of care.   Diet recommendations: regular diet / with thin liquids -if any s/s of aspiration emerge, or there is respiratory decline,  make NPO until further evaluated by SLP  DC recommendation: follow up for speech/language therapy is indicated  Treatment: 12  D/W nursing Jesus Tee  Needs met prior to leaving room, call button in reach.     Mikaela Dial, Diane Sanders  Speech-Language Pathologist  Pager [phone]    If patient is discharged prior to next treatment, this note will serve as the discharge summary

## 2020-03-17 NOTE — PROGRESS NOTES
Speech Language Pathology  Facility/Department: [facility] 5T ORTHO/NEURO  Speech/language Daily Treatment Note    NAME: Tara Cha  : 1975  MRN: [id number]    Patient Diagnosis(es):   Patient Active Problem List    Diagnosis Date Noted    Atypical chest pain      Priority: High    NSTEMI (non-ST elevated myocardial infarction) ([facility]      Priority: High    Acute ischemic stroke ([address] 2020    GI bleed 2019    Moderate malnutrition ([address] 2019    Nausea and vomiting 2019    Peritoneal dialysis status ([address] 2019    Hyperlipidemia 2019    Chronic focal neurological deficit 2019    Hyperkalemia 2018    Anemia     Illicit drug use     Severe episode of recurrent major depressive disorder, without psychotic features ([address]     Anxiety disorder     Cocaine abuse ([facility]     Vaginal discharge     Vaginal bleeding 2017    Vaginal candidiasis 2017    Abnormal stress test     ESRD (end stage renal disease) on dialysis ([address]     Suicidal ideation     Chest pain 2017    Failure to thrive in adult 2017    Cellulitis 2016    Cellulitis of lower extremity     Erythema nodosum     Depression with suicidal ideation 01/15/2016    ESRD on hemodialysis ([address] 01/15/2016    Hyperkalemia     Metabolic acidosis     Essential hypertension 01/15/2016    DMII (diabetes mellitus, type 2) ([address] 01/15/2016     Allergies: Allergies   Allergen Reactions    Ferrous Sulfate Shortness Of Breath    Cephalexin Itching and Swelling    Gabapentin Other (See Comments)     Various adverse reaction    Dicloxacillin Rash    Hydromorphone Itching    Pcn [Penicillins] Rash    Sulfa Antibiotics Rash and Other (See Comments)     Chest pain. RECENT RESULTS MRI BRAIN: 2020  Impression   1.  Late subacute infarct in the left parietal region with evidence of cortical laminar necrosis and early accuracy    Goal 4: Patient will identify objects with 70% accuracy given mod cues. 3/14: pt required mod cues and rearrangement of objects for left neglect. Cont goal.  3/16: pt ID objects with 90% accuracy, neglect not as significant as initial session  Cont goal  3/17- pt was able to id objects from field of 2 with 100% accuracy. . con't goal     Goal 5: Patient will improve articulation at the multi word level utilizing compensatory strategies. 3/16:speech intelligibility improving, mild dysarthria only at this time. Reviewed strategies for improved intelligibility. Pt utilized with min cues at sentence level. Cont goal  3/17-  Pt presents with mild dysarthria- pt reported \"If I slow down I do better\". Reviewed strategies to aid with intelligibility (hand out provided) con't goal     Education:  Pt educated to strategies for dysarthria and word finding. Pt stated comprehension    Plan:  Continue speech/language therapy to address above goals, 3-5 x/week x LOS  DC recommendations: ongoing treatment upon dc is indicated  D/W nursing   Needs met prior to leaving room, call button in reach.   Treatment time: [address], [place], [address]  Speech-Language Pathologist  Pager [phone]      If patient isdischarged prior to next treatment, this note will serve as the discharge summary

## 2020-03-17 NOTE — PROGRESS NOTES
Neurology Follow Up  Note    Updates:  Seen for stroke. No changes. ROS:  Constitutional: denies fatigue, malaise  Eyes: denies any vision changes,  Musculoskeletal: denies any pain or decreased ROM, no functional deficit. Neurological: denies headache, numbness or tingling, speech problems. Exam:  Blood pressure 124/63, pulse 63, temperature 97.9 °F (36.6 °C), resp. rate 14, height 5' 7\" (1.702 m), weight 117 lb 1 oz (53.1 kg), SpO2 100 %, not currently breastfeeding. Constitutional    Vital signs: BP, HR, and RR reviewed   General Alert, no distress, well-nourished  Eyes: fundoscopic exam not visualized. .   Cardiovascular: pulses symmetric in all 4 extremities. No peripheral edema. Psychiatric: cooperative with examination, no  psychotic behavior noted. Neurologic  Mental status:   orientation to person, place and time. General fund of knowledge grossly intact   Memory grossly intact   Attention intact as able to attend well to the exam     Language fluent with one word sentences, mild aphasia    Comprehension intact; follows simple commands  Cranial nerves:   CN2: Visual Fields full to threat ,   CN 3,4,6: extraocular muscles intact can track from side to side   CN5: facial sensation symmetric   CN7:face symmetric without dysarthria,   CN8: hearing grossly intact  CN9: palate elevated symmetrically  CN11: trap full strength on shoulder shrug  CN12: tongue midline with protrusion  Strength: Good strength in all 4 extremities   Sensory: light touch intact in all 4 extremities. Tone: normal in all 4 extremities  Gait: deferred due to safety concerns.        Labs:    CBC:   Lab Results   Component Value Date    WBC 5.9 03/17/2020    RBC 3.20 03/17/2020    HGB 9.7 03/17/2020    HCT 30.6 03/17/2020    MCV 95.5 03/17/2020    MCH 30.3 03/17/2020    MCHC 31.7 03/17/2020    RDW 16.5 03/17/2020     03/17/2020    MPV 7.7 03/17/2020     BMP:    Lab Results   Component Value Date     03/17/2020    K 4.7 03/17/2020    CL 93 03/17/2020    CO2 23 03/17/2020    BUN 22 03/17/2020    LABALBU 2.1 03/14/2020    CREATININE 6.3 03/17/2020    CALCIUM 7.0 03/17/2020    GFRAA 9 03/17/2020    GFRAA 7 09/23/2012    LABGLOM 7 03/17/2020    GLUCOSE 92 03/17/2020       Studies:  VL Extremity Venous Bilateral   Final Result      XR FOOT RIGHT (2 VIEWS)   Final Result      Questionable fracture of the distal portion of the proximal phalanx of the fifth digit. Appearance could be caused by overlying shadows of bandage material.      MRI BRAIN WO CONTRAST   Final Result   1. Late subacute infarct in the left parietal region with evidence of cortical laminar necrosis and early encephalomalacia. Focal areas of susceptibility artifact favor areas of petechial hemorrhage. 2. Punctate focus of abnormal diffusion signal in the posterior left lentiform nucleus compatible with a small lacunar infarct. 3. Multiple foci of prior remote lacunar infarcts and small infarcts in the right and left cerebellum with focal areas of encephalomalacia. Correlate for possible central embolic phenomenon. CT Brain Perfusion   Final Result     No core infarct identified however core infarct is seen on prior    studies in the left parietal region. Elevated Tmax with a threshold of 6    seconds in the left temporal parietal region measuring 13 mL in volume    consistent with ischemic penumbra. CTA HEAD NECK W CONTRAST   Final Result   1. There is once again a 2.9 mm long thrombus within the proximal    inferior and M2 segment of the left MCA. There is enhancement of the MCA    distal to the thrombus. 2.  Acute evolving left parietal infarct. Hyperdensity seen in the left    parietal lobe better seen on the noncontrast CT performed earlier. 3.  Mild multifocal stenosis of the right A2 and distal segments of the    AMELIA. 4.  Mild stenosis of the right ICA. 5.  Mild stenosis of the left ICA. Sadaf Bosch 57, APRN-CNP  [facility]  [phone]  Evenings, weekends, and off weeks please discuss with the covering neurologist.

## 2020-03-18 LAB
ANTICARDIOLIPIN IGA ANTIBODY: 2 APL (ref 0–11)
ANTICARDIOLIPIN IGG ANTIBODY: 5 GPL (ref 0–14)
ANTITHROMBIN ACTIVITY: 86 % (ref 76–128)
ANTITHROMBIN ANTIGEN: 85 % (ref 82–136)
CARDIOLIPIN AB IGM: 0 MPL (ref 0–12)
PROTEIN C ANTIGEN: 65 % (ref 63–153)

## 2020-03-19 LAB
DRVVT CONFIRMATION TEST: ABNORMAL
DRVVT SCREEN: 45 SEC (ref 33–44)
DRVVT,DIL: 40 SEC (ref 33–44)
HEXAGONAL PHOSPHOLIPID NEUTRALIZAT TEST: ABNORMAL
LUPUS ANTICOAG INTERP: ABNORMAL
PLT NEUTA: ABNORMAL
PROTEIN C FUNCTIONAL: 69 % (ref 83–168)
PROTEIN S ANTIGEN, TOTAL: 160 % (ref 63–126)
PROTEIN S, FUNCTIONAL: 58 % (ref 57–131)
PT D: 14.2 SEC (ref 12–15.5)
PTT D: 53 SEC (ref 32–48)
PTT-D CORR REFLEX: ABNORMAL SEC (ref 32–48)
PTT-HEPARIN NEUTRALIZED: 45 SEC (ref 32–48)
REPTILASE TIME: 22 SEC
THROMBIN TIME: 28.6 SEC (ref 14.7–19.5)

## 2020-03-20 LAB
PROTHROMBIN G20210A MUTATION: NEGATIVE
PT PCR SPECIMEN: NORMAL

## 2020-03-21 LAB
FACTOR V LEIDEN: NEGATIVE
SPECIMEN: NORMAL

## 2020-03-23 LAB — REPORT: NORMAL

## 2020-08-28 ENCOUNTER — INITIAL CONSULT (OUTPATIENT)
Dept: SURGERY | Age: 45
End: 2020-08-28
Payer: MEDICAID

## 2020-08-28 VITALS
WEIGHT: 131 LBS | SYSTOLIC BLOOD PRESSURE: 154 MMHG | HEIGHT: 67 IN | BODY MASS INDEX: 20.56 KG/M2 | DIASTOLIC BLOOD PRESSURE: 83 MMHG

## 2020-08-28 PROCEDURE — G8420 CALC BMI NORM PARAMETERS: HCPCS | Performed by: SURGERY

## 2020-08-28 PROCEDURE — G8427 DOCREV CUR MEDS BY ELIG CLIN: HCPCS | Performed by: SURGERY

## 2020-08-28 PROCEDURE — 99243 OFF/OP CNSLTJ NEW/EST LOW 30: CPT | Performed by: SURGERY

## 2020-08-28 ASSESSMENT — ENCOUNTER SYMPTOMS
EYES NEGATIVE: 1
GASTROINTESTINAL NEGATIVE: 1
ALLERGIC/IMMUNOLOGIC NEGATIVE: 1
RESPIRATORY NEGATIVE: 1

## 2020-08-28 NOTE — PROGRESS NOTES
endometriosis    OTHER SURGICAL HISTORY      peritoneal dialysis catheter    PARACENTESIS      TUBAL LIGATION      UPPER GASTROINTESTINAL ENDOSCOPY  03/31/2017    UPPER GASTROINTESTINAL ENDOSCOPY N/A 3/25/2019    EGD BIOPSY performed by Madison Weinberg MD at [address] History     Socioeconomic History    Marital status:      Spouse name: Not on file    Number of children: 2    Years of education: Not on file    Highest education level: Not on file   Occupational History    Occupation: volunteer   Social Needs    Financial resource strain: Not on file    Food insecurity     Worry: Not on file     Inability: Not on file   [employer] needs     Medical: Not on file     Non-medical: Not on file   Tobacco Use    Smoking status: Never Smoker    Smokeless tobacco: Never Used   Substance and Sexual Activity    Alcohol use: Not Currently     Alcohol/week: 0.0 standard drinks     Comment: monthly    Drug use: No    Sexual activity: Yes     Partners: Male   Lifestyle    Physical activity     Days per week: Not on file     Minutes per session: Not on file    Stress: Not on file   Relationships    Social connections     Talks on phone: Not on file     Gets together: Not on file     Attends Hindu service: Not on file     Active member of club or organization: Not on file     Attends meetings of clubs or organizations: Not on file     Relationship status: Not on file    Intimate partner violence     Fear of current or ex partner: Not on file     Emotionally abused: Not on file     Physically abused: Not on file     Forced sexual activity: Not on file   Other Topics Concern    Not on file   Social History Narrative    Not on file       Family History   Problem Relation Age of Onset    Heart Attack Mother     Diabetes Mother     Hypertension Mother     Stroke Father     Diabetes Father     Hypertension Father          Current Outpatient Medications:     levETIRAcetam (KEPPRA) 500 MG tablet, Take 1 tablet by mouth daily Dialysis days: take tablet after dialysis. , Disp: 60 tablet, Rfl: 0    aspirin 81 MG chewable tablet, Take 1 tablet by mouth daily, Disp: 30 tablet, Rfl: 0    atorvastatin (LIPITOR) 80 MG tablet, Take 1 tablet by mouth nightly, Disp: 30 tablet, Rfl: 3    lidocaine 4 % external patch, Place 1 patch onto the skin daily, Disp: 1 box, Rfl: 0    clopidogrel (PLAVIX) 75 MG tablet, Take 1 tablet by mouth daily, Disp: 30 tablet, Rfl: 0    pregabalin (LYRICA) 75 MG capsule, Take 1 capsule by mouth 2 times daily for 30 days. , Disp: 60 capsule, Rfl: 0    mirtazapine (REMERON) 15 MG tablet, Take 1 tablet by mouth nightly, Disp: 30 tablet, Rfl: 3    carvedilol (COREG) 3.125 MG tablet, Take 1 tablet by mouth 2 times daily (with meals), Disp: 60 tablet, Rfl: 3    gentamicin (GARAMYCIN) 0.1 % cream, Apply to PD dialysis catheter when accessing the site. Apply topically 3 times daily. , Disp: 1 Tube, Rfl: 0    lactulose (CHRONULAC) 10 GM/15ML solution, Take 45 mLs by mouth 2 times daily as needed (Constipation resistant to other methods.), Disp: 1 Bottle, Rfl: 1    pantoprazole (PROTONIX) 40 MG tablet, Take 1 tablet by mouth 2 times daily, Disp: 60 tablet, Rfl: 1    sucralfate (CARAFATE) 1 GM/10ML suspension, Take 10 mLs by mouth 2 times daily for 5 days, Disp: 50 mL, Rfl: 1    loratadine (CLARITIN) 10 MG tablet, Take 10 mg by mouth daily as needed (Allergies), Disp: , Rfl:     melatonin 5 MG TABS tablet, Take 5 mg by mouth nightly as needed (Sleep), Disp: , Rfl:     sevelamer (RENVELA) 800 MG tablet, Take 3 tablets by mouth 3 times daily (with meals), Disp: , Rfl:     senna-docusate (PERICOLACE) 8.6-50 MG per tablet, Take 1 tablet by mouth 2 times daily as needed for Constipation, Disp: , Rfl:     cinacalcet (SENSIPAR) 30 MG tablet, Take 120 mg by mouth daily, Disp: , Rfl:     insulin aspart (NOVOLOG FLEXPEN) 100 UNIT/ML injection pen, Inject into the skin 2 times the right side and 0 on the left side. Heart sounds: Normal heart sounds. Pulmonary:      Effort: Pulmonary effort is normal.      Breath sounds: Normal breath sounds. Abdominal:      General: Bowel sounds are normal.       Musculoskeletal: Normal range of motion. Legs:    Neurological:      Mental Status: She is alert and oriented to person, place, and time. Deep Tendon Reflexes: Reflexes are normal and symmetric. Comments: History of a stroke but she says she walks drives etc. speech is slow on her speech she was examined in a wheelchair   Psychiatric:         Speech: Speech normal.         Behavior: Behavior normal.         Thought Content: Thought content normal.         Judgment: Judgment normal.         Ms. Karely Cordero had colon surgery. She had a PD cath on the right. She had a kidney transplant that lasted 23 years, also on the right. The kidney came from her sister. She has two stents in her legs. Ms. Karely Cordero denies any cardiac issues. She has had her gallbladder removed laparoscopically. She had a partial hysterectomy through her umbilicus. She has had a stroke. She has diabetes type 1. ASSESSMENT:    Problem List Items Addressed This Visit     ESRD (end stage renal disease) on dialysis ([facility] - Primary      Other Visit Diagnoses     History of peritoneal dialysis              PLAN:      Dr. Jesus Khan will talk with Dr. Melody Logan about potential surgery for PD cath insertion. Once we have an answer, we will call you to let you know. With all her previous surgeries including a recent right hemicolectomy with the wound left open for 3 days because of ischemic bowel. I told her and her fiancé that there is probably a 10% chance that will be would get the catheter in place.   If we do still may not be enough abdominal cavity to have effective PD therapy  I José Bowles MA am scribing for and in the presence of Elkin Booth MD on this date of 08/28/20    I Nadja Harman MD personally performed the services described in this documentation as scribed by the Medical Assistant Latha Rollins in my presence and it is both accurate and complete.         Electronically signed by Lino Taylor MD on 8/28/2020 at 3:13 PM     ADD I spoke to Dr. Brandi Rahman he agreed this is a very long shot operation and he does not think she should go through with trying to do a PD cath in his hostile abdomen, I agree

## 2020-08-28 NOTE — PATIENT INSTRUCTIONS
Dr. Lizy Julian will talk with Dr. Maria D Sanders about potential surgery for PD cath insertion. Once we have an answer, we will call you to let you know.

## 2020-09-07 ENCOUNTER — HOSPITAL ENCOUNTER (EMERGENCY)
Age: 45
Discharge: HOME | End: 2020-09-08
Payer: MEDICAID

## 2020-09-07 PROCEDURE — 99283 EMERGENCY DEPT VISIT LOW MDM: CPT

## 2020-09-07 RX ORDER — VITAMIN B COMPLEX
CAPSULE ORAL
Status: ON HOLD | COMMUNITY
Start: 2020-08-13 | End: 2022-05-17 | Stop reason: CLARIF

## 2020-09-07 RX ORDER — OXYCODONE HYDROCHLORIDE 5 MG/1
5 TABLET ORAL EVERY 4 HOURS PRN
Status: ON HOLD | COMMUNITY
End: 2021-01-11

## 2020-09-07 ASSESSMENT — PAIN DESCRIPTION - PAIN TYPE: TYPE: ACUTE PAIN

## 2020-09-07 ASSESSMENT — PAIN DESCRIPTION - DESCRIPTORS: DESCRIPTORS: ACHING

## 2020-09-07 ASSESSMENT — PAIN DESCRIPTION - ORIENTATION: ORIENTATION: LEFT;RIGHT

## 2020-09-07 ASSESSMENT — PAIN SCALES - GENERAL: PAINLEVEL_OUTOF10: 9

## 2020-09-07 ASSESSMENT — PAIN DESCRIPTION - LOCATION: LOCATION: FOOT

## 2020-09-08 VITALS
WEIGHT: 137.5 LBS | OXYGEN SATURATION: 98 % | TEMPERATURE: 97.9 F | HEART RATE: 78 BPM | SYSTOLIC BLOOD PRESSURE: 190 MMHG | DIASTOLIC BLOOD PRESSURE: 97 MMHG | BODY MASS INDEX: 21.54 KG/M2 | RESPIRATION RATE: 16 BRPM

## 2020-09-08 LAB
BACTERIA WET PREP: ABNORMAL
CLUE CELLS: ABNORMAL
EPITHELIAL CELLS WET PREP: ABNORMAL
RBC WET PREP: ABNORMAL
SOURCE WET PREP: ABNORMAL
TRICHOMONAS PREP: ABNORMAL
WBC WET PREP: ABNORMAL
YEAST WET PREP: ABNORMAL

## 2020-09-08 PROCEDURE — 2500000003 HC RX 250 WO HCPCS: Performed by: NURSE PRACTITIONER

## 2020-09-08 PROCEDURE — 87210 SMEAR WET MOUNT SALINE/INK: CPT

## 2020-09-08 PROCEDURE — 87591 N.GONORRHOEAE DNA AMP PROB: CPT

## 2020-09-08 PROCEDURE — 87491 CHLMYD TRACH DNA AMP PROBE: CPT

## 2020-09-08 PROCEDURE — 96372 THER/PROPH/DIAG INJ SC/IM: CPT

## 2020-09-08 PROCEDURE — 6360000002 HC RX W HCPCS: Performed by: NURSE PRACTITIONER

## 2020-09-08 PROCEDURE — 96374 THER/PROPH/DIAG INJ IV PUSH: CPT

## 2020-09-08 RX ORDER — HYDROCODONE BITARTRATE AND ACETAMINOPHEN 5; 325 MG/1; MG/1
1 TABLET ORAL ONCE
Status: DISCONTINUED | OUTPATIENT
Start: 2020-09-08 | End: 2020-09-08

## 2020-09-08 RX ORDER — CLINDAMYCIN PHOSPHATE 150 MG/ML
600 INJECTION, SOLUTION INTRAVENOUS ONCE
Status: COMPLETED | OUTPATIENT
Start: 2020-09-08 | End: 2020-09-08

## 2020-09-08 RX ORDER — CLINDAMYCIN HYDROCHLORIDE 300 MG/1
300 CAPSULE ORAL 2 TIMES DAILY
Qty: 14 CAPSULE | Refills: 0 | Status: SHIPPED | OUTPATIENT
Start: 2020-09-08 | End: 2020-09-15

## 2020-09-08 RX ORDER — MORPHINE SULFATE 10 MG/ML
6 INJECTION, SOLUTION INTRAMUSCULAR; INTRAVENOUS ONCE
Status: COMPLETED | OUTPATIENT
Start: 2020-09-08 | End: 2020-09-08

## 2020-09-08 RX ADMIN — CLINDAMYCIN PHOSPHATE 600 MG: 150 INJECTION, SOLUTION INTRAMUSCULAR; INTRAVENOUS at 01:48

## 2020-09-08 RX ADMIN — MORPHINE SULFATE 6 MG: 10 INJECTION, SOLUTION INTRAMUSCULAR; INTRAVENOUS at 01:49

## 2020-09-08 ASSESSMENT — ENCOUNTER SYMPTOMS
ABDOMINAL PAIN: 0
DIARRHEA: 0
COUGH: 0
WHEEZING: 0
VOMITING: 0
NAUSEA: 0
COLOR CHANGE: 0
BACK PAIN: 0
SHORTNESS OF BREATH: 0

## 2020-09-08 ASSESSMENT — PAIN SCALES - GENERAL
PAINLEVEL_OUTOF10: 9
PAINLEVEL_OUTOF10: 9
PAINLEVEL_OUTOF10: 5
PAINLEVEL_OUTOF10: 5

## 2020-09-08 ASSESSMENT — PAIN DESCRIPTION - FREQUENCY: FREQUENCY: CONTINUOUS

## 2020-09-08 ASSESSMENT — PAIN DESCRIPTION - DESCRIPTORS: DESCRIPTORS: DISCOMFORT

## 2020-09-08 ASSESSMENT — PAIN DESCRIPTION - PROGRESSION
CLINICAL_PROGRESSION: GRADUALLY IMPROVING
CLINICAL_PROGRESSION: GRADUALLY IMPROVING

## 2020-09-08 ASSESSMENT — PAIN DESCRIPTION - LOCATION
LOCATION: FOOT
LOCATION: FOOT

## 2020-09-08 ASSESSMENT — PAIN DESCRIPTION - ORIENTATION
ORIENTATION: RIGHT;LEFT
ORIENTATION: RIGHT;LEFT

## 2020-09-08 ASSESSMENT — PAIN DESCRIPTION - PAIN TYPE
TYPE: ACUTE PAIN
TYPE: ACUTE PAIN

## 2020-09-08 ASSESSMENT — PAIN DESCRIPTION - ONSET: ONSET: ON-GOING

## 2020-09-08 NOTE — ED PROVIDER NOTES
**ADVANCED PRACTICE PROVIDER, I HAVE EVALUATED THIS PATIENT**        [address] [facility] ENCOUNTER      Pt Name: Poppy Poster  IOL:[id number]  [place] 1975  Date of evaluation: 9/7/2020  Provider: TREY Connelly CNP      Chief Complaint:    Chief Complaint   Patient presents with    Vaginal Discharge     Started a week ago    Foot Pain     R foot pinky toe    Wound Infection     L foot       Nursing Notes, Past Medical Hx, Past Surgical Hx, Social Hx, Allergies, and Family Hx were all reviewed and agreed with or any disagreements were addressed in the HPI.    HPI:  (Location, Duration, Timing, Severity, Quality, Assoc Sx, Context, Modifying factors)  This is a  50 y.o. female who presents today with a bleeding callous on her left lateral foot that started draining pus yesterday, she states that she has had the callous on her left foot for the past several months and it has done this before. She states that she also has an infected pinky toe that she has been managing, she states that she over the past several days she has had more discomfort in the pinky toe she sees Dr. Florentin Macdonald with vascular. She states that for the past week she had white vaginal discharge, but is denying any concern for STD or pregnancy, she denies any dyspareunia. She denies any cough, congestion, fever or chills, no chest pain pleuritic chest pain or shortness of breath. Denies taking any medicine besides her prescriptions at home, no additional complaints, no additional aggravating or relieving factors. Patient presents awake, alert in the no acute distress or toxic appearance.     PastMedical/Surgical History:      Diagnosis Date    Cerebral artery occlusion with cerebral infarction ([address] 2010    Cocaine abuse ([address]     Crohn's disease ([address]     Depression     Diabetes mellitus ([address]     ESRD (end stage renal disease) ([address]     HD Tues-Thurs-Sat    GERD (gastroesophageal reflux disease)     Hemodialysis patient ([facility] 2016    peritoneal     Hyperlipidemia     Hypertension     MI, old     Mood disorder ([facility]     Pericarditis     Peritonitis ([facility]     Pneumonia     Thyroid disease          Procedure Laterality Date    BACK SURGERY      Tumor removal    CHOLECYSTECTOMY      COLON SURGERY Right     KIDNEY TRANSPLANT  1990    KIDNEY TRANSPLANT      LAPAROSCOPY      right ovary removed and left ovarian cyst removed for endometriosis    OTHER SURGICAL HISTORY      peritoneal dialysis catheter    PARACENTESIS      TUBAL LIGATION      UPPER GASTROINTESTINAL ENDOSCOPY  03/31/2017    UPPER GASTROINTESTINAL ENDOSCOPY N/A 3/25/2019    EGD BIOPSY performed by Dana Zavala MD at [address] [facility]       Medications:  Previous Medications    ATORVASTATIN (LIPITOR) 80 MG TABLET    Take 1 tablet by mouth nightly    B COMPLEX VITAMINS CAPSULE    TAKE 1 TABLET BY MOUTH ONE TIME A DAY    CARVEDILOL (COREG) 3.125 MG TABLET    Take 1 tablet by mouth 2 times daily (with meals)    CLOPIDOGREL (PLAVIX) 75 MG TABLET    Take 1 tablet by mouth daily    DARBEPOETIN TOMAS-POLYSORBATE (ARANESP, ALBUMIN FREE,) 100 MCG/0.5ML SOSY INJECTION    Inject 200 mcg into the skin once a week Las t dose was 1 week ago    LEVETIRACETAM (KEPPRA) 500 MG TABLET    Take 1 tablet by mouth daily Dialysis days: take tablet after dialysis. LUBIPROSTONE (AMITIZA) 24 MCG CAPSULE    Take 24 mcg by mouth 2 times daily (with meals)     MIRTAZAPINE (REMERON) 15 MG TABLET    Take 1 tablet by mouth nightly    OXYCODONE (ROXICODONE) 5 MG IMMEDIATE RELEASE TABLET    Take 5 mg by mouth every 4 hours as needed for Pain. PANTOPRAZOLE (PROTONIX) 40 MG TABLET    Take 1 tablet by mouth 2 times daily    PREGABALIN (LYRICA) 75 MG CAPSULE    Take 1 capsule by mouth 2 times daily for 30 days. Review of Systems:  Review of Systems   Constitutional: Negative for chills and fever.    HENT: Negative for congestion. Respiratory: Negative for cough, shortness of breath and wheezing. Cardiovascular: Negative for chest pain. Gastrointestinal: Negative for abdominal pain, diarrhea, nausea and vomiting. Genitourinary: Positive for vaginal discharge. Negative for difficulty urinating, dysuria, frequency and hematuria. Patient complains of creamy white vaginal discharge with some itching, denies any vaginal bleeding, urinary symptoms, dyspareunia or concerns for STD. She does agree to do self swabbing. Musculoskeletal: Negative for back pain. Skin: Positive for wound. Negative for color change. She is also complaining of wounds on her left lateral foot and right pinky toe, has been dealing with this off and on for the past several weeks, denies any new traumas falls or injuries. She does report having borderline diabetes but definitely vascular circulatory problems. Neurological: Negative for weakness, numbness and headaches. Positives and Pertinent negatives as per HPI. Except as noted above in the ROS, problem specific ROS was completed and is negative. Physical Exam:  Physical Exam  Vitals signs and nursing note reviewed. Constitutional:       Appearance: She is well-developed. She is not diaphoretic. HENT:      Head: Normocephalic. Right Ear: External ear normal.      Left Ear: External ear normal.   Eyes:      General: No scleral icterus. Right eye: No discharge. Left eye: No discharge. Neck:      Musculoskeletal: Normal range of motion and neck supple. Cardiovascular:      Rate and Rhythm: Normal rate. Pulmonary:      Effort: Pulmonary effort is normal. No respiratory distress. Breath sounds: Normal breath sounds. Abdominal:      Palpations: Abdomen is soft. Tenderness: There is no abdominal tenderness. Genitourinary:     Comments: Patient requested to self swab versus a vaginal exam  Musculoskeletal: Normal range of motion.    Skin: Intramuscular Given 9/8/20 0149)       Patient presents with callous on her left lateral foot that started draining pus yesterday, she states that she has had the callous on her left foot for the past several months and it has done this before. She states that she also has an infected pinky toe that she has been managing, she states that she over the past several days she has had more discomfort in the pinky toe she sees Dr. Sonia Torres with vascular. After evaluation and examination patient she has no abdominal tenderness, guarding or rebound tenderness. She requested self swab versus a pelvic exam.  Wet prep is consistent with yeast infection which I will treat her accordingly. However, in regards to the area on her left foot is more is likely a chronic skin infection that she should follow-up with wound care clinic. In addition to the area on her right pinky toe. Neither areas appear warm, erythematous or significantly infected. Therefore, I estimate there is LOW risk for COMPARTMENT SYNDROME, TENDON OR NEUROVASCULAR INJURY, FOREIGN BODY OR signs of INFECTION thus I consider the discharge disposition reasonable. Therefore, shared medical decision was made between the patient and myself and we agreed the patient could be discharged home with outpatient follow-up. Patient was discharged home with prescription for clindamycin for her toes and clindamycin antibiotic. Educated to return to the ER for worsening symptoms but more importantly to follow-up with her family doctor, wound care clinic and vascular surgeon. In regards to her yeast and vaginal discharge, she was instructed to follow-up with her OB/GYN. The patient tolerated their visit well. I evaluated the patient. The physician was available for consultation as needed. The patient and / or the family were informed of the results of any tests, a time was given to answer questions, a plan was proposed and they agreed with plan.   Patient verbalized understanding of discharge instructions and was discharged from the department in stable condition. CLINICAL IMPRESSION:  1. Vaginal yeast infection    2. Left foot pain    3. Local infection of skin and subcutaneous tissue        DISPOSITION Decision To Discharge 09/08/2020 02:09:29 AM      PATIENT REFERRED TO:  [address] [address] [place] [address]  [phone]    Schedule an appointment as soon as possible for a visit in 2 days  Follow-up with PCP in the next 2 to 3 days for reevaluation    [facility] Emergency Department  [address] [address]  [phone]  Go to   If symptoms worsen      DISCHARGE MEDICATIONS:  New Prescriptions    CLINDAMYCIN (CLEOCIN) 300 MG CAPSULE    Take 1 capsule by mouth 2 times daily for 7 days    MICONAZOLE (MICONAZOLE 7) 2 % VAGINAL CREAM    Place vaginally nightly. DISCONTINUED MEDICATIONS:  Discontinued Medications    ASPIRIN 81 MG CHEWABLE TABLET    Take 1 tablet by mouth daily    CINACALCET (SENSIPAR) 30 MG TABLET    Take 120 mg by mouth daily    GENTAMICIN (GARAMYCIN) 0.1 % CREAM    Apply to PD dialysis catheter when accessing the site. Apply topically 3 times daily.     INSULIN ASPART (NOVOLOG FLEXPEN) 100 UNIT/ML INJECTION PEN    Inject into the skin 2 times daily (with meals) 151-200 = Give 1 unit  201-250 = Give 2 units  251-300 = Give 3 units  301-350 = Give 4 units  351-400 = Give 5 units    LACTULOSE (CHRONULAC) 10 GM/15ML SOLUTION    Take 45 mLs by mouth 2 times daily as needed (Constipation resistant to other methods.)    LIDOCAINE 4 % EXTERNAL PATCH    Place 1 patch onto the skin daily    LORATADINE (CLARITIN) 10 MG TABLET    Take 10 mg by mouth daily as needed (Allergies)    MELATONIN 5 MG TABS TABLET    Take 5 mg by mouth nightly as needed (Sleep)    SENNA-DOCUSATE (PERICOLACE) 8.6-50 MG PER TABLET    Take 1 tablet by mouth 2 times daily as needed for Constipation    SEVELAMER

## 2020-09-11 LAB
C TRACH DNA GENITAL QL NAA+PROBE: NEGATIVE
N. GONORRHOEAE DNA: NEGATIVE

## 2021-01-11 ENCOUNTER — HOSPITAL ENCOUNTER (INPATIENT)
Age: 46
LOS: 2 days | Discharge: HOME | DRG: 425 | End: 2021-01-13
Attending: EMERGENCY MEDICINE | Admitting: INTERNAL MEDICINE
Payer: MEDICAID

## 2021-01-11 ENCOUNTER — APPOINTMENT (OUTPATIENT)
Dept: GENERAL RADIOLOGY | Age: 46
DRG: 425 | End: 2021-01-11
Payer: MEDICAID

## 2021-01-11 DIAGNOSIS — R07.9 CHEST PAIN, UNSPECIFIED TYPE: ICD-10-CM

## 2021-01-11 DIAGNOSIS — N18.6 ESRD (END STAGE RENAL DISEASE) (HCC): ICD-10-CM

## 2021-01-11 DIAGNOSIS — E87.5 HYPERKALEMIA: Primary | ICD-10-CM

## 2021-01-11 PROBLEM — Z99.2 ESRD NEEDING DIALYSIS (HCC): Status: ACTIVE | Noted: 2021-01-11

## 2021-01-11 LAB
A/G RATIO: 0.9 (ref 1.1–2.2)
ALBUMIN SERPL-MCNC: 3.5 G/DL (ref 3.4–5)
ALP BLD-CCNC: 152 U/L (ref 40–129)
ALT SERPL-CCNC: 22 U/L (ref 10–40)
ANION GAP SERPL CALCULATED.3IONS-SCNC: 25 MMOL/L (ref 3–16)
AST SERPL-CCNC: 21 U/L (ref 15–37)
BASOPHILS ABSOLUTE: 0.1 K/UL (ref 0–0.2)
BASOPHILS RELATIVE PERCENT: 0.7 %
BILIRUB SERPL-MCNC: 0.3 MG/DL (ref 0–1)
BUN BLDV-MCNC: 78 MG/DL (ref 7–20)
CALCIUM SERPL-MCNC: 7.7 MG/DL (ref 8.3–10.6)
CHLORIDE BLD-SCNC: 92 MMOL/L (ref 99–110)
CO2: 18 MMOL/L (ref 21–32)
CREAT SERPL-MCNC: 12.9 MG/DL (ref 0.6–1.1)
EKG ATRIAL RATE: 84 BPM
EKG DIAGNOSIS: NORMAL
EKG P AXIS: 45 DEGREES
EKG P-R INTERVAL: 198 MS
EKG Q-T INTERVAL: 398 MS
EKG QRS DURATION: 80 MS
EKG QTC CALCULATION (BAZETT): 470 MS
EKG R AXIS: 23 DEGREES
EKG T AXIS: 64 DEGREES
EKG VENTRICULAR RATE: 84 BPM
EOSINOPHILS ABSOLUTE: 0.3 K/UL (ref 0–0.6)
EOSINOPHILS RELATIVE PERCENT: 3.7 %
GFR AFRICAN AMERICAN: 4
GFR NON-AFRICAN AMERICAN: 3
GLOBULIN: 4.1 G/DL
GLUCOSE BLD-MCNC: 135 MG/DL (ref 70–99)
GLUCOSE BLD-MCNC: 140 MG/DL (ref 70–99)
GLUCOSE BLD-MCNC: 86 MG/DL (ref 70–99)
HCT VFR BLD CALC: 25.4 % (ref 36–48)
HEMOGLOBIN: 8.1 G/DL (ref 12–16)
LYMPHOCYTES ABSOLUTE: 3 K/UL (ref 1–5.1)
LYMPHOCYTES RELATIVE PERCENT: 35.5 %
MCH RBC QN AUTO: 31 PG (ref 26–34)
MCHC RBC AUTO-ENTMCNC: 31.7 G/DL (ref 31–36)
MCV RBC AUTO: 97.7 FL (ref 80–100)
MONOCYTES ABSOLUTE: 0.5 K/UL (ref 0–1.3)
MONOCYTES RELATIVE PERCENT: 5.5 %
NEUTROPHILS ABSOLUTE: 4.6 K/UL (ref 1.7–7.7)
NEUTROPHILS RELATIVE PERCENT: 54.6 %
PDW BLD-RTO: 14.9 % (ref 12.4–15.4)
PERFORMED ON: ABNORMAL
PERFORMED ON: NORMAL
PLATELET # BLD: 297 K/UL (ref 135–450)
PMV BLD AUTO: 8.6 FL (ref 5–10.5)
POTASSIUM REFLEX MAGNESIUM: 6.6 MMOL/L (ref 3.5–5.1)
RBC # BLD: 2.6 M/UL (ref 4–5.2)
SODIUM BLD-SCNC: 135 MMOL/L (ref 136–145)
TOTAL PROTEIN: 7.6 G/DL (ref 6.4–8.2)
TROPONIN: 0.67 NG/ML
WBC # BLD: 8.4 K/UL (ref 4–11)

## 2021-01-11 PROCEDURE — G0378 HOSPITAL OBSERVATION PER HR: HCPCS

## 2021-01-11 PROCEDURE — 6360000002 HC RX W HCPCS: Performed by: EMERGENCY MEDICINE

## 2021-01-11 PROCEDURE — 84484 ASSAY OF TROPONIN QUANT: CPT

## 2021-01-11 PROCEDURE — 90935 HEMODIALYSIS ONE EVALUATION: CPT

## 2021-01-11 PROCEDURE — 93005 ELECTROCARDIOGRAM TRACING: CPT | Performed by: EMERGENCY MEDICINE

## 2021-01-11 PROCEDURE — 80053 COMPREHEN METABOLIC PANEL: CPT

## 2021-01-11 PROCEDURE — 5A1D70Z PERFORMANCE OF URINARY FILTRATION, INTERMITTENT, LESS THAN 6 HOURS PER DAY: ICD-10-PCS | Performed by: EMERGENCY MEDICINE

## 2021-01-11 PROCEDURE — 2060000000 HC ICU INTERMEDIATE R&B

## 2021-01-11 PROCEDURE — 71046 X-RAY EXAM CHEST 2 VIEWS: CPT

## 2021-01-11 PROCEDURE — 93010 ELECTROCARDIOGRAM REPORT: CPT | Performed by: INTERNAL MEDICINE

## 2021-01-11 PROCEDURE — 6370000000 HC RX 637 (ALT 250 FOR IP): Performed by: INTERNAL MEDICINE

## 2021-01-11 PROCEDURE — 96372 THER/PROPH/DIAG INJ SC/IM: CPT

## 2021-01-11 PROCEDURE — 85025 COMPLETE CBC W/AUTO DIFF WBC: CPT

## 2021-01-11 PROCEDURE — 6360000002 HC RX W HCPCS: Performed by: INTERNAL MEDICINE

## 2021-01-11 PROCEDURE — 96374 THER/PROPH/DIAG INJ IV PUSH: CPT

## 2021-01-11 PROCEDURE — 99283 EMERGENCY DEPT VISIT LOW MDM: CPT

## 2021-01-11 RX ORDER — HEPARIN SODIUM 5000 [USP'U]/ML
5000 INJECTION, SOLUTION INTRAVENOUS; SUBCUTANEOUS EVERY 8 HOURS SCHEDULED
Status: DISCONTINUED | OUTPATIENT
Start: 2021-01-11 | End: 2021-01-13 | Stop reason: HOSPADM

## 2021-01-11 RX ORDER — ONDANSETRON 4 MG/1
4 TABLET, FILM COATED ORAL EVERY 8 HOURS PRN
Status: ON HOLD | COMMUNITY
End: 2022-05-17 | Stop reason: CLARIF

## 2021-01-11 RX ORDER — PANTOPRAZOLE SODIUM 40 MG/1
40 TABLET, DELAYED RELEASE ORAL DAILY
COMMUNITY

## 2021-01-11 RX ORDER — SODIUM CHLORIDE 0.9 % (FLUSH) 0.9 %
10 SYRINGE (ML) INJECTION PRN
Status: DISCONTINUED | OUTPATIENT
Start: 2021-01-11 | End: 2021-01-13 | Stop reason: HOSPADM

## 2021-01-11 RX ORDER — DEXTROSE MONOHYDRATE 25 G/50ML
25 INJECTION, SOLUTION INTRAVENOUS ONCE
Status: DISCONTINUED | OUTPATIENT
Start: 2021-01-11 | End: 2021-01-11

## 2021-01-11 RX ORDER — LEVETIRACETAM 500 MG/1
500 TABLET ORAL
Status: ON HOLD | COMMUNITY
End: 2021-01-13 | Stop reason: SDUPTHER

## 2021-01-11 RX ORDER — ACETAMINOPHEN 650 MG/1
650 SUPPOSITORY RECTAL EVERY 6 HOURS PRN
Status: DISCONTINUED | OUTPATIENT
Start: 2021-01-11 | End: 2021-01-13 | Stop reason: HOSPADM

## 2021-01-11 RX ORDER — LISINOPRIL 5 MG/1
5 TABLET ORAL DAILY
Status: DISCONTINUED | OUTPATIENT
Start: 2021-01-12 | End: 2021-01-13 | Stop reason: HOSPADM

## 2021-01-11 RX ORDER — DEXTROSE MONOHYDRATE 25 G/50ML
12.5 INJECTION, SOLUTION INTRAVENOUS PRN
Status: DISCONTINUED | OUTPATIENT
Start: 2021-01-11 | End: 2021-01-13 | Stop reason: HOSPADM

## 2021-01-11 RX ORDER — NIFEDIPINE 30 MG/1
30 TABLET, EXTENDED RELEASE ORAL 2 TIMES DAILY
Status: ON HOLD | COMMUNITY
End: 2022-05-20 | Stop reason: HOSPADM

## 2021-01-11 RX ORDER — HEPARIN SODIUM 1000 [USP'U]/ML
3200 INJECTION, SOLUTION INTRAVENOUS; SUBCUTANEOUS PRN
Status: DISCONTINUED | OUTPATIENT
Start: 2021-01-11 | End: 2021-01-13 | Stop reason: HOSPADM

## 2021-01-11 RX ORDER — GABAPENTIN 300 MG/1
100 CAPSULE ORAL 2 TIMES DAILY
COMMUNITY

## 2021-01-11 RX ORDER — PANTOPRAZOLE SODIUM 40 MG/1
40 TABLET, DELAYED RELEASE ORAL 2 TIMES DAILY
Status: DISCONTINUED | OUTPATIENT
Start: 2021-01-11 | End: 2021-01-11

## 2021-01-11 RX ORDER — ATORVASTATIN CALCIUM 80 MG/1
80 TABLET, FILM COATED ORAL NIGHTLY
Status: DISCONTINUED | OUTPATIENT
Start: 2021-01-11 | End: 2021-01-11

## 2021-01-11 RX ORDER — CALCIUM ACETATE 667 MG/1
667 CAPSULE ORAL
COMMUNITY

## 2021-01-11 RX ORDER — LISINOPRIL 5 MG/1
5 TABLET ORAL DAILY
COMMUNITY
End: 2022-01-22

## 2021-01-11 RX ORDER — DEXTROSE MONOHYDRATE 25 G/50ML
12.5 INJECTION, SOLUTION INTRAVENOUS PRN
Status: DISCONTINUED | OUTPATIENT
Start: 2021-01-11 | End: 2021-01-11 | Stop reason: SDUPTHER

## 2021-01-11 RX ORDER — ACETAMINOPHEN 325 MG/1
650 TABLET ORAL EVERY 6 HOURS PRN
Status: DISCONTINUED | OUTPATIENT
Start: 2021-01-11 | End: 2021-01-13 | Stop reason: HOSPADM

## 2021-01-11 RX ORDER — CALCIUM GLUCONATE 94 MG/ML
1 INJECTION, SOLUTION INTRAVENOUS ONCE
Status: COMPLETED | OUTPATIENT
Start: 2021-01-11 | End: 2021-01-11

## 2021-01-11 RX ORDER — DEXTROSE MONOHYDRATE 50 MG/ML
100 INJECTION, SOLUTION INTRAVENOUS PRN
Status: DISCONTINUED | OUTPATIENT
Start: 2021-01-11 | End: 2021-01-11 | Stop reason: SDUPTHER

## 2021-01-11 RX ORDER — CLOPIDOGREL BISULFATE 75 MG/1
75 TABLET ORAL DAILY
Status: DISCONTINUED | OUTPATIENT
Start: 2021-01-12 | End: 2021-01-11

## 2021-01-11 RX ORDER — ONDANSETRON 2 MG/ML
4 INJECTION INTRAMUSCULAR; INTRAVENOUS EVERY 6 HOURS PRN
Status: DISCONTINUED | OUTPATIENT
Start: 2021-01-11 | End: 2021-01-13 | Stop reason: HOSPADM

## 2021-01-11 RX ORDER — LUBIPROSTONE 24 UG/1
24 CAPSULE, GELATIN COATED ORAL 2 TIMES DAILY WITH MEALS
Status: DISCONTINUED | OUTPATIENT
Start: 2021-01-12 | End: 2021-01-13 | Stop reason: HOSPADM

## 2021-01-11 RX ORDER — PANTOPRAZOLE SODIUM 40 MG/1
40 TABLET, DELAYED RELEASE ORAL DAILY
Status: DISCONTINUED | OUTPATIENT
Start: 2021-01-12 | End: 2021-01-13 | Stop reason: HOSPADM

## 2021-01-11 RX ORDER — SODIUM POLYSTYRENE SULFONATE 15 G/60ML
15 SUSPENSION ORAL; RECTAL ONCE
Status: DISCONTINUED | OUTPATIENT
Start: 2021-01-11 | End: 2021-01-11

## 2021-01-11 RX ORDER — SODIUM CHLORIDE 0.9 % (FLUSH) 0.9 %
10 SYRINGE (ML) INJECTION EVERY 12 HOURS SCHEDULED
Status: DISCONTINUED | OUTPATIENT
Start: 2021-01-11 | End: 2021-01-13 | Stop reason: HOSPADM

## 2021-01-11 RX ORDER — CALCIUM ACETATE 667 MG/1
667 CAPSULE ORAL
Status: DISCONTINUED | OUTPATIENT
Start: 2021-01-12 | End: 2021-01-13 | Stop reason: HOSPADM

## 2021-01-11 RX ORDER — LEVETIRACETAM 500 MG/1
500 TABLET ORAL
Status: DISCONTINUED | OUTPATIENT
Start: 2021-01-11 | End: 2021-01-13 | Stop reason: HOSPADM

## 2021-01-11 RX ORDER — CARVEDILOL 3.12 MG/1
3.12 TABLET ORAL 2 TIMES DAILY WITH MEALS
Status: DISCONTINUED | OUTPATIENT
Start: 2021-01-12 | End: 2021-01-11

## 2021-01-11 RX ORDER — PREGABALIN 75 MG/1
75 CAPSULE ORAL 2 TIMES DAILY
Status: DISCONTINUED | OUTPATIENT
Start: 2021-01-11 | End: 2021-01-11 | Stop reason: ALTCHOICE

## 2021-01-11 RX ORDER — MIRTAZAPINE 15 MG/1
15 TABLET, FILM COATED ORAL NIGHTLY
Status: DISCONTINUED | OUTPATIENT
Start: 2021-01-11 | End: 2021-01-13 | Stop reason: HOSPADM

## 2021-01-11 RX ORDER — DEXTROSE MONOHYDRATE 50 MG/ML
100 INJECTION, SOLUTION INTRAVENOUS PRN
Status: DISCONTINUED | OUTPATIENT
Start: 2021-01-11 | End: 2021-01-13 | Stop reason: HOSPADM

## 2021-01-11 RX ORDER — NICOTINE POLACRILEX 4 MG
15 LOZENGE BUCCAL PRN
Status: DISCONTINUED | OUTPATIENT
Start: 2021-01-11 | End: 2021-01-11 | Stop reason: SDUPTHER

## 2021-01-11 RX ORDER — GABAPENTIN 300 MG/1
300 CAPSULE ORAL
Status: DISCONTINUED | OUTPATIENT
Start: 2021-01-11 | End: 2021-01-13 | Stop reason: HOSPADM

## 2021-01-11 RX ORDER — PROMETHAZINE HYDROCHLORIDE 25 MG/1
12.5 TABLET ORAL EVERY 6 HOURS PRN
Status: DISCONTINUED | OUTPATIENT
Start: 2021-01-11 | End: 2021-01-13 | Stop reason: HOSPADM

## 2021-01-11 RX ORDER — NICOTINE POLACRILEX 4 MG
15 LOZENGE BUCCAL PRN
Status: DISCONTINUED | OUTPATIENT
Start: 2021-01-11 | End: 2021-01-13 | Stop reason: HOSPADM

## 2021-01-11 RX ORDER — LEVETIRACETAM 500 MG/1
500 TABLET ORAL DAILY
Status: DISCONTINUED | OUTPATIENT
Start: 2021-01-12 | End: 2021-01-11

## 2021-01-11 RX ADMIN — CALCIUM GLUCONATE 1 G: 98 INJECTION, SOLUTION INTRAVENOUS at 16:26

## 2021-01-11 RX ADMIN — HEPARIN SODIUM 5000 UNITS: 5000 INJECTION INTRAVENOUS; SUBCUTANEOUS at 22:49

## 2021-01-11 RX ADMIN — ACETAMINOPHEN 650 MG: 325 TABLET ORAL at 22:44

## 2021-01-11 RX ADMIN — MIRTAZAPINE 15 MG: 15 TABLET, FILM COATED ORAL at 22:44

## 2021-01-11 ASSESSMENT — PAIN DESCRIPTION - DESCRIPTORS
DESCRIPTORS: ACHING
DESCRIPTORS: ACHING

## 2021-01-11 ASSESSMENT — PAIN DESCRIPTION - LOCATION: LOCATION: GENERALIZED

## 2021-01-11 ASSESSMENT — PAIN SCALES - GENERAL
PAINLEVEL_OUTOF10: 0
PAINLEVEL_OUTOF10: 5
PAINLEVEL_OUTOF10: 5

## 2021-01-11 ASSESSMENT — PAIN - FUNCTIONAL ASSESSMENT: PAIN_FUNCTIONAL_ASSESSMENT: ACTIVITIES ARE NOT PREVENTED

## 2021-01-11 ASSESSMENT — PAIN DESCRIPTION - ONSET
ONSET: GRADUAL
ONSET: GRADUAL

## 2021-01-11 NOTE — ED TRIAGE NOTES
Pt states last Monday she was called and was told her k+ was >7  And was told to go to the ER. Masha Isaac Pt as put of town today was the first day she could come to the ER. she denies chest pain reports body ache .  She has not had a dialysis treatment since last Monday

## 2021-01-11 NOTE — ED NOTES
Bed: D-48  Expected date:   Expected time:   Means of arrival:   Comments:  Scott 49 y/o abnormal lab     Kathy Saab, PRISCILLA  01/11/21 5411

## 2021-01-11 NOTE — PROGRESS NOTES
Treatment time: 3 hours  Net UF:2.2 LPre Weight: 59.1kg  Post weight: 56.9kg  Target Weight: per Clinic 55.5 kg    Access used:LTHDC, dressing changed  Access assessment: Good flow      Medications/Blood Products Given: none      Outpatient schedule/Clinic: [facility]     Treatment summary:Fluid removal of 2.2L over 3 hours HD tolerated well. Blood returned, lines flushed, heparinized, capped and clamped per policy. Dressing changed prior to initiation of HD. Report called to KENIA Aguilera 5W PCU RN      Copy of Dialysis record printed and placed in chart to be scanned.

## 2021-01-11 NOTE — CONSULTS
[address]                                                                        [address] [address]                                                                 Mima Neal                                                         Phone Number: [phone]                                                           Fax Number: [phone]                                                             Nephrology Consult Note    Chief Complaint: Feels weak    History of Present Illness: We are following this patient for Hyperkalemia in setting of ESRD - Follows with  Nephrology - has not had HD since last Monday because she was out of town. Ms Tracey Baltazar is a 47 yo female with a past medical history significant for diabetes, hypertension, end-stage renal disease, currently on peritoneal dialysis, follows with  Nephrology, history of FSGS which ultimately led to her end-stage renal disease status  at the age of 10 status post failed kidney transplant after 23 years, GERD, depression, Crohn's disease, remote history of cocaine abuse and CVA, who presented to the Emergency Department after being told to do so because outpatient labs indicated that she had an elevated potassium level. Patient feels somewhat short of breath and weak. Subjective:    Resting in bed; NAD    ROS: + weakness; + SOB; otherwise limited because patient is not very interactive and giving very little info    Scheduled Meds:   insulin lispro  0-6 Units Subcutaneous TID WC    insulin lispro  0-3 Units Subcutaneous Nightly        dextrose         PRN Meds:.glucose, dextrose, glucagon (rDNA), dextrose    Physical Exam:    TEMPERATURE:  Current - Temp: 98.3 °F (36.8 °C);  Max - Temp  Av.3 °F (36.8 °C)  Min: 98.3 °F (36.8 °C)  Max: 98.3 °F (36.8 °C)  RESPIRATIONS RANGE: Resp  Av  Min: 14  Max: 18 PULSE RANGE: Pulse  Av  Min: 80  Max: 87  BLOOD PRESSURE RANGE:  Systolic (28ZNS), FWQ:575 , Min:125 , NYJ:162   ; Diastolic (89KLG), ZBB:63, Min:51, Max:92    24HR INTAKE/OUTPUT:  No intake or output data in the 24 hours ending 21    No intake or output data in the 24 hours ending 21    No data found. GENERAL:  In no apparent distress, conversant, clinically appears to be mildly volume overloaded  HEENT:  Ears and nose appear externally without deformity. MMM. Normocephalic, atraumatic. Eyes:  Anicteric sclera. Moist conjunctiva. No lid lag. Pupils are equal and reactive to light. NECK:  Free range of motion. Supple. No thyromegaly. CHEST:  Clear to auscultation bilaterally with no intercostal retractions. CARDIOVASCULAR:  Regular rate and rhythm. No rubs. ABDOMEN:  Soft, nontender. Bowel sounds present. No organomegaly. EXTREMITIES:  Lower extremities:  No lower extremity edema. No extremity lymphadenopathy. PSYCHIATRIC:  Appropriate affect. Alert and oriented to time, place, and person. NEUROLOGIC:  No asterixis. No focal motor neurological deficits. SKIN:  Normal texture and turgor. No rash. Access: Left IJ TDC    Allergies: Allergies   Allergen Reactions    Ferrous Sulfate Shortness Of Breath    Cephalexin Itching and Swelling    Gabapentin Other (See Comments)     Various adverse reaction    Dicloxacillin Rash    Hydromorphone Itching    Pcn [Penicillins] Rash    Sulfa Antibiotics Rash and Other (See Comments)     Chest pain.       Past Medical History:   Diagnosis Date    [facility]erebral artery occlusion [facility] infarction ([facility] 2010    Cocaine abuse ([facility]     Crohn's disease ([facility]     Depression     Diabetes mellitus ([facility]     ESRD (end stage renal disease) ([facility] [facility]     HD Tues-Thurs-Sat    GERD (gastroesophageal reflux disease)     Hemodialysis patient ([facility] 2016    peritoneal     Hyperlipidemia     Hypertension     MI, old  Mood disorder ([facility] [facility]     Pericarditis     Peritonitis ([facility] [address] [facility]     Pneumonia     Thyroid disease      Past Surgical History:   Procedure Laterality Date    BACK SURGERY      Tumor removal    CHOLECYSTECTOMY      COLON SURGERY Right     KIDNEY TRANSPLANT  1990    KIDNEY TRANSPLANT      LAPAROSCOPY      right ovary removed and left ovarian cyst removed for endometriosis    OTHER SURGICAL HISTORY      peritoneal dialysis catheter    PARACENTESIS      TUBAL LIGATION      UPPER GASTROINTESTINAL ENDOSCOPY  03/31/2017    UPPER GASTROINTESTINAL ENDOSCOPY N/A 3/25/2019    EGD BIOPSY performed by Lisa Baxter MD at [address] [place] [address] Road History   Problem Relation Age of Onset    Heart Attack Mother     Diabetes Mother     Hypertension Mother     Stroke Father     Diabetes Father     Hypertension Father      Social History     Socioeconomic History    Marital status:      Spouse name: Not on file    Number of children: 2    Years of education: Not on file    Highest education level: Not on file   Occupational History    Occupation: volunteer   Social Needs    Financial resource strain: Not on file    Food insecurity     Worry: Not on file     Inability: Not on file   [employer] needs     Medical: Not on file     Non-medical: Not on file   Tobacco Use    Smoking status: Never Smoker    Smokeless tobacco: Never Used   Substance and Sexual Activity    Alcohol use: Not Currently     Alcohol/week: 0.0 standard drinks     Comment: monthly    Drug use: Not Currently    Sexual activity: Yes     Partners: Male   Lifestyle    Physical activity     Days per week: Not on file     Minutes per session: Not on file    Stress: Not on file   Relationships    Social connections     Talks on phone: Not on file     Gets together: Not on file     Attends [employer] service: Not on file     Active member of club or organization: Not on file Attends meetings of clubs or organizations: Not on file     Relationship status: Not on file    Intimate partner violence     Fear of current or ex partner: Not on file     Emotionally abused: Not on file     Physically abused: Not on file     Forced sexual activity: Not on file   Other Topics Concern    Not on file   Social History Narrative    Not on file         LAB DATA:    CBC:   Lab Results   Component Value Date    WBC 8.4 01/11/2021    RBC 2.60 01/11/2021    HGB 8.1 01/11/2021    HCT 25.4 01/11/2021    MCV 97.7 01/11/2021    MCH 31.0 01/11/2021    MCHC 31.7 01/11/2021    RDW 14.9 01/11/2021     01/11/2021    MPV 8.6 01/11/2021     BMP:    Lab Results   Component Value Date     01/11/2021    K 6.6 01/11/2021    CL 92 01/11/2021    CO2 18 01/11/2021    BUN 78 01/11/2021    CREATININE 12.9 01/11/2021    CALCIUM 7.7 01/11/2021    GFRAA 4 01/11/2021    GFRAA 7 09/23/2012    LABGLOM 3 01/11/2021    GLUCOSE 140 01/11/2021     Ionized Calcium:  No results found for: IONCA  Magnesium:    Lab Results   Component Value Date    MG 1.90 03/16/2020     Phosphorus:    Lab Results   Component Value Date    PHOS 3.8 03/16/2020     U/A:    Lab Results   Component Value Date    COLORU RED 07/28/2017    PHUR 8.0 07/01/2018    LABCAST All else obscured 10/24/2010    WBCUA 6 07/28/2017    RBCUA 1 07/28/2017    MUCUS All else obscured 10/24/2010    TRICHOMONAS Present 05/31/2011    BACTERIA 1+ 07/28/2017    CLARITYU TURBID 07/28/2017    SPECGRAV 1.020 07/28/2017    LEUKOCYTESUR LARGE 07/28/2017    UROBILINOGEN 0.2 07/28/2017    BILIRUBINUR Negative 07/28/2017    BILIRUBINUR NEGATIVE 06/03/2012    BLOODU LARGE 07/28/2017    GLUCOSEU Negative 07/28/2017    GLUCOSEU 500 06/03/2012    AMORPHOUS All else obscured 10/24/2010         IMPRESSION/RECOMMENDATIONS:      Active Problems:    ESRD needing dialysis ([address]  Resolved Problems:    * No resolved hospital problems.  * 1. Hyperkalemia / ESRD - treated medically in ER  - will be getting HD later today  - use outpatient orders    2. Anemia CKD - JOSEMANUEL with HD    3. CKD-MBD - monitor phosphorus    4.  HTN - continue current antihypertensive medication

## 2021-01-11 NOTE — H&P
Hospital Medicine History & Physical      PCP: Letty Jackson    Date of Admission: 1/11/2021    Date of Service: Pt seen/examined on 1/11/2021 and Admitted to Inpatient with expected LOS greater than two midnights due to medical therapy. Chief Complaint: Shortness of breath      History Of Present Illness:     48 y.o. female [id number]. PMHx of ESRD 2/2 FSGS on HD (MWF), secondary hyperparathyroidism s/p parathyroidectomy, strokes, T2DM, HTN, PVD, GI bleed 2/2 ischemic bowel s/p R hemicolectomy, depression, and hypothyrodisim  - HD patient of Dr. Nelia Teresa @ [facility], last HD Monday, and she was in [facility]. Per report was told her K on Friday that labs done at [facility] on 01/06/2021 showed 7.0 was asked to come to the hospital and get dialyzed but she did not. She returned to [place] today and dropped to the hospital.  Labs with K 6.6, EKG with peaked T waves  Patient will be admitted to the hospital for urgent hemodialysis    Review of records:  Admitted to [facility] 12/23 - 12/31 -- admitted for AMS, hyperkalemia with EKG changes, Uremia, and needed urgent Hemodialysis --  2 witnessed generalized tonic clonic seizures prior to admission, worsening of seizures were attributed to metabolic encahlopathy due to uremia. Patient was loaded with on Keppra 1000 mg and was later switched to home dose of Keppra 500 mg BID. Her mental status improved to baseline upon discharge, she was AAO x3 with poor insight in her condition and neurocognitive deficits. Patient left AMA despite repeated explanations and agreeing to staying in the hospirtal until arrangements for her rehab facility and dialysis center are made, but the family later contacted the hospital  that she agreed to be admitted to rehab facility and they were driving her there.      Past Medical History:          Diagnosis Date    Cerebral artery occlusion with cerebral infarction ([facility] [facility] 2010    Cocaine abuse ([facility] [facility]  Crohn's disease ([facility] [facility]     Depression     Diabetes mellitus ([facility]     ESRD (end stage renal disease) ([facility] [facility]     HD Tues-Thurs-Sat    GERD (gastroesophageal reflux disease)     Hemodialysis patient ([facility] [facility] 2016    peritoneal     Hyperlipidemia     Hypertension     MI, old     Mood disorder ([facility] [facility]     Pericarditis     Peritonitis ([facility] [facility]     Pneumonia     Thyroid disease        Past Surgical History:          Procedure Laterality Date    BACK SURGERY      Tumor removal    CHOLECYSTECTOMY      COLON SURGERY Right     KIDNEY TRANSPLANT  1990    KIDNEY TRANSPLANT      LAPAROSCOPY      right ovary removed and left ovarian cyst removed for endometriosis    OTHER SURGICAL HISTORY      peritoneal dialysis catheter    PARACENTESIS      TUBAL LIGATION      UPPER GASTROINTESTINAL ENDOSCOPY  03/31/2017    UPPER GASTROINTESTINAL ENDOSCOPY N/A 3/25/2019    EGD BIOPSY [facility] by Jose [facility] MD at [facility]       Medications Prior to Admission:      Prior to Admission medications    Medication Sig Start Date End Date Taking? Authorizing Provider   gabapentin (NEURONTIN) 300 MG capsule Take 300 mg by mouth See Admin Instructions.  Take after dialysis    Yes Historical Provider, MD   lisinopril (PRINIVIL;ZESTRIL) 5 MG tablet Take 5 mg by mouth daily   Yes Historical Provider, MD   ondansetron (ZOFRAN) 4 MG tablet Take 4 mg by mouth every 8 hours as needed for Nausea or Vomiting   Yes Historical Provider, MD   NIFEdipine (PROCARDIA XL) 60 MG extended release tablet Take 60 mg by mouth daily   Yes Historical Provider, MD   pantoprazole (PROTONIX) 40 MG tablet Take 40 mg by mouth daily   Yes Historical Provider, MD   levETIRAcetam (KEPPRA) 500 MG tablet Take 500 mg by mouth three times a week After dialysis on Mon/Wed/Fri   Yes Historical Provider, MD   Calcium Acetate, Phos Binder, 667 MG CAPS Take 667 mg by mouth 3 times daily (with meals)   Yes Historical Provider, MD b complex vitamins capsule TAKE 1 TABLET BY MOUTH ONE TIME A DAY 8/13/20  Yes Historical Provider, MD   mirtazapine (REMERON) 15 MG tablet Take 1 tablet by mouth nightly 5/5/19  Yes Deidra Crigler, MD   lubiprostone (AMITIZA) 24 MCG capsule Take 24 mcg by mouth 2 times daily (with meals)    Yes Historical Provider, MD   pregabalin (LYRICA) 75 MG capsule Take 1 capsule by mouth 2 times daily for 30 days. 5/5/19 6/4/19  Deidra Crigler, MD   darbepoetin hina-polysorbate (ARANESP, ALBUMIN FREE,) 100 MCG/0.5ML SOSY injection Inject 200 mcg into the skin once a week Las t dose was 1 week ago    Historical Provider, MD       Allergies:  Ferrous sulfate, Cephalexin, Gabapentin, Dicloxacillin, Hydromorphone, Pcn [penicillins], and Sulfa antibiotics    Social History:      The patient currently lives at home with her boyfriend    TOBACCO:   reports that she has never smoked. She has never used smokeless tobacco.  ETOH:   reports previous alcohol use. Family History:      Reviewed        Problem Relation Age of Onset    Heart Attack Mother     Diabetes Mother     Hypertension Mother     Stroke Father     Diabetes Father     Hypertension Father        REVIEW OF SYSTEMS:   Pertinent positives as noted in the HPI. All other systems reviewed and negative. PHYSICAL EXAM PERFORMED:    BP (!) 163/66   Pulse 80   Temp 98.5 °F (36.9 °C) (Oral)   Resp 16   Ht 5' 7\" (1.702 m)   Wt 122 lb 2.2 oz (55.4 kg)   SpO2 100%   BMI 19.13 kg/m²     General appearance:  No apparent distress, appears stated age and cooperative. Left internal jugular Vas-Cath present  HEENT:  Normal cephalic, atraumatic without obvious deformity. Pupils equal, round, and reactive to light. Extra ocular muscles intact. Conjunctivae/corneas clear. Neck: Supple, with full range of motion. No jugular venous distention. Trachea midline.   Respiratory: No increased work of breathing, basilar crackles present Cardiovascular:  Regular rate and rhythm with normal S1/S2 without murmurs, rubs or gallops. Abdomen: Soft, non-tender, non-distended with normal bowel sounds. Musculoskeletal:  No clubbing, cyanosis or edema bilaterally. Full range of motion without deformity. Skin: Skin color, texture, turgor normal.  No rashes or lesions. Neurologic:  Neurovascularly intact without any focal sensory/motor deficits. Cranial nerves: II-XII intact, grossly non-focal.  Psychiatric:  Alert and oriented, thought content appropriate, normal insight  Capillary Refill: Brisk,< 3 seconds   Peripheral Pulses: +2 palpable, equal bilaterally       Labs:     Recent Labs     01/11/21  1304   WBC 8.4   HGB 8.1*   HCT 25.4*        Recent Labs     01/11/21  1304   *   K 6.6*   CL 92*   CO2 18*   BUN 78*   CREATININE 12.9*   CALCIUM 7.7*     Recent Labs     01/11/21  1304   AST 21   ALT 22   BILITOT 0.3   ALKPHOS 152*     No results for input(s): INR in the last 72 hours. Recent Labs     01/11/21  1304   TROPONINI 0.67*       Urinalysis:      Lab Results   Component Value Date    NITRU Negative 07/28/2017    WBCUA 6 07/28/2017    BACTERIA 1+ 07/28/2017    RBCUA 1 07/28/2017    BLOODU LARGE 07/28/2017    SPECGRAV 1.020 07/28/2017    GLUCOSEU Negative 07/28/2017    GLUCOSEU 500 06/03/2012       Radiology:     CXR: I have reviewed the CXR with the following interpretation: Increased vascular congestion, no consolidation effusion or pneumothorax  EKG:  I have reviewed the EKG with the following interpretation:   EKG with peaked T waves, no widening of QRS    XR CHEST (2 VW)   Final Result   Mild central vascular congestion. No focal airspace consolidation, sizeable   pleural effusion or pneumothorax.              ASSESSMENT/PLAN:    Active Hospital Problems    Diagnosis Date Noted    ESRD needing dialysis ([facility] [N18.6, Z99.2] 01/11/2021     Hyperkalemia with peaked T waves changes on EKG, due to noncompliance with hemodialysis Received hyperkalemia cocktail in the ED  Will be going for urgent hemodialysis    ESRD needing hemodialysis  Nephrology consulted  Appreciate getting urgent hemodialysis    Recent history of seizures  Continue home Keppra dose  Will need an extra dose of Keppra after hemodialysis    Hypertension  Continue home blood pressure medications    Hypothyroidism  Continue home Synthroid 50 mcg once daily    History of CVA  Continue Plavix  Continue high intensity statin    Type 2 diabetes  Per my chart review her home regimen includes Lantus 6 units nightly, lispro 2 units with meals  Continue low-dose sliding scale insulin for now  Hypoglycemia protocol  Point of glucose checks    History of GERD? Continue home Protonix    DVT Prophylaxis:heparin  Diet: DIET RENAL;  Code Status: Full Code    PT/OT Eval Status: N/A    Dispo - Admit as inpatient. I anticipate hospitalization spanning more than two midnights for investigation and treatment of the above medically necessary diagnoses. Duran Espinoza MD   Hospitalist    Thank you Hazen Homans for the opportunity to be involved in this patient's care. If you have any questions or concerns please feel free to contact me at [phone].

## 2021-01-11 NOTE — ED NOTES
ED SBAR report provider to [place], [address] [place] [address]. Patient to be transported to Room 5273 after dialysis is complete.      Emily Dillard RN  01/11/21 5277

## 2021-01-11 NOTE — ED PROVIDER NOTES
[address] [facility]      Pt Name: Matty Kinney  MRN: [id number]  [place] 1975  Date of evaluation: 1/11/2021  Provider: Sharad Velázquez MD    CHIEF COMPLAINT       Chief Complaint   Patient presents with    Abnormal Lab     K + 7 something last tuesday was suppose to come than to the er but was out of town last treatment 1 week ago          HISTORY OF PRESENT ILLNESS   (Location/Symptom, Timing/Onset,Context/Setting, Quality, Duration, Modifying Factors, Severity)  Note limiting factors. Matty Kinney is a 48 y.o. female who presents to the emergency department for evaluation of hyperkalemia. Patient reports that she is end-stage renal disease on dialysis, follows with a Dr. Temi Dahl. She last had dialysis 1 week ago today on Monday. She states that she did tell her dialysis team that she was going to be going out of town. She went to [facility] and was called on Friday and told that she had a significantly elevated potassium at approximately 7 and that she should go immediately to the emergency department. Patient states that she returned from [facility] today and her friend dropped her at this hospital.  She states that she has felt fatigued for the past 1 to 2 days and had some mild nausea and chest discomfort today. She describes the chest pain as a pressure which does not radiate, currently mild severity. She has had some mild intermittent palpitations but none currently. Mild intermittent shortness of breath as well, again none currently. NursingNotes were reviewed. REVIEW OF SYSTEMS    (2-9 systems for level 4, 10 or more for level 5)       Constitutional: No fever or chills. Fatigue  Eye: No visual disturbances. No eye pain. Ear/Nose/Mouth/Throat: No nasal congestion. No sore throat. Respiratory: No cough, mid and shortness of breath, mittens sputum production. Cardiovascular: Mild chest pain. No palpitations. Gastrointestinal: No abdominal pain. Mild nausea, no vomiting  Genitourinary: No dysuria. No hematuria. Hematology/Lymphatics: No bleeding or bruising tendency. Immunologic: No malaise. No swollen glands. Musculoskeletal: No back pain. No joint pain. Integumentary: No rash. No abrasions. Neurologic: No headache. No focal numbness or weakness.       PAST MEDICAL HISTORY     Past Medical History:   Diagnosis Date    Cerebral artery occlusion with cerebral infarction ([facility] 2010    Cocaine abuse ([facility]     Crohn's disease ([facility]     Depression     Diabetes mellitus ([facility]     ESRD (end stage renal disease) ([facility]     HD Tues-Thurs-Sat    GERD (gastroesophageal reflux disease)     Hemodialysis patient ([facility] 2016    peritoneal     Hyperlipidemia     Hypertension     MI, old     Mood disorder ([facility]     Pericarditis     Peritonitis ([facility]     Pneumonia     Thyroid disease          SURGICALHISTORY       Past Surgical History:   Procedure Laterality Date    BACK SURGERY      Tumor removal    CHOLECYSTECTOMY      COLON SURGERY Right     KIDNEY TRANSPLANT  1990    KIDNEY TRANSPLANT      LAPAROSCOPY      right ovary removed and left ovarian cyst removed for endometriosis    OTHER SURGICAL HISTORY      peritoneal dialysis catheter    PARACENTESIS      TUBAL LIGATION      UPPER GASTROINTESTINAL ENDOSCOPY  03/31/2017    UPPER GASTROINTESTINAL ENDOSCOPY N/A 3/25/2019    EGD BIOPSY performed by Saeid Villalpando MD at [address] [place] [address]       Previous Medications    ATORVASTATIN (LIPITOR) 80 MG TABLET    Take 1 tablet by mouth nightly    B COMPLEX VITAMINS CAPSULE    TAKE 1 TABLET BY MOUTH ONE TIME A DAY    CARVEDILOL (COREG) 3.125 MG TABLET    Take 1 tablet by mouth 2 times daily (with meals)    CLOPIDOGREL (PLAVIX) 75 MG TABLET    Take 1 tablet by mouth daily Gets together: None     Attends Gnosticism service: None     Active member of club or organization: None     Attends meetings of clubs or organizations: None     Relationship status: None    Intimate partner violence     Fear of current or ex partner: None     Emotionally abused: None     Physically abused: None     Forced sexual activity: None   Other Topics Concern    None   Social History Narrative    None       SCREENINGS             PHYSICAL EXAM    (up to 7 for level 4, 8 or more for level 5)     ED Triage Vitals [01/11/21 1159]   BP Temp Temp Source Pulse Resp SpO2 Height Weight   (!) 192/92 98.3 °F (36.8 °C) Oral 85 14 100 % -- --       General: Alert and oriented, No acute distress. Eye: Normal conjunctiva. Pupils equal and reactive. HENT: Oral mucosa is moist.  Respiratory: Lungs are clear to auscultation, Respirations are non-labored. Cardiovascular: Normal rate, Regular rhythm. Gastrointestinal: Soft, Non-tender, Non-distended. Musculoskeletal: No swelling. Integumentary: Warm, Dry. Neurologic: Alert, Oriented, No focal defects. Psychiatric: Cooperative. DIAGNOSTIC RESULTS     EKG: All EKG's are interpreted by the Emergency Department Physician who either signs or Co-signsthis chart in the absence of a cardiologist.    Per my interpretation:    Electrocardiogram (ECG) 1/11/2021 1159  RATE: normal  RHYTHM: normal sinus  AXIS: normal  INTERVALS: normal  ST-T WAVE CHANGES: No evidence of ST segment elevation or T-wave inversion, peak T waves noted  Prior for comparison 3/13/2020    Repeat EKG obtained at 1619 for increasing chest pain, peak T waves are again noted, no interval changes, no ST segment elevation.     RADIOLOGY:   Non-plain filmimages such as CT, Ultrasound and MRI are read by the radiologist. Plain radiographic images are visualized and preliminarily interpreted by the emergency physician with the below findings: Interpretation per the Radiologist below, if available at the time ofthis note:    XR CHEST (2 VW)   Final Result   Mild central vascular congestion. No focal airspace consolidation, sizeable   pleural effusion or pneumothorax.                ED BEDSIDE ULTRASOUND:   Performed by ED Physician - none    LABS:  Labs Reviewed   CBC WITH AUTO DIFFERENTIAL - Abnormal; Notable for the following components:       Result Value    RBC 2.60 (*)     Hemoglobin 8.1 (*)     Hematocrit 25.4 (*)     All other components within normal limits    Narrative:     Performed at:  [address] [employer] [address]   Phone [phone]   COMPREHENSIVE METABOLIC PANEL W/ REFLEX TO MG FOR LOW K - Abnormal; Notable for the following components:    Sodium 135 (*)     Potassium reflex Magnesium 6.6 (*)     Chloride 92 (*)     CO2 18 (*)     Anion Gap 25 (*)     Glucose 140 (*)     BUN 78 (*)     CREATININE 12.9 (*)     GFR Non- 3 (*)     GFR  4 (*)     Calcium 7.7 (*)     Albumin/Globulin Ratio 0.9 (*)     Alkaline Phosphatase 152 (*)     All other components within normal limits    Narrative:     Michelle King tel. [phone],  Chemistry results called to and read back by Ahmet Macias RN, 01/11/2021  14:53, by [facility]  Chemistry results called to and read back by Ahmet Macias RN, 01/11/2021  14:52, by [facility]  Chemistry results called to and read back by Ahmet Macias RN, 01/11/2021  14:25, by Jarrell Paredes  Performed at:  [address] [employer] [address]   Phone [phone]   TROPONIN - Abnormal; Notable for the following components:    Troponin 0.67 (*)     All other components within normal limits    Narrative:     Michelle King tel. [phone],  Chemistry results called to and read back by Ahmet Macias RN, 01/11/2021  14:25, by JAKE  Performed at:  [facility] [address] [place], [place] [address]   Phone [phone]   POCT GLUCOSE - Abnormal; Notable for the following components:    POC Glucose 135 (*)     All other components within normal limits    Narrative:     Performed at:  [facility]  [address] [place], [place] [address]   Phone [phone]   RENAL FUNCTION PANEL   CBC   HEPATITIS B SURFACE ANTIGEN   HEPATITIS B SURFACE ANTIBODY   POCT GLUCOSE    Narrative:     Performed at:  [facility]  [address] [place], [place] [address]   Phone [phone]   POCT GLUCOSE   POCT GLUCOSE       All other labs were within normal range or not returned as of this dictation.     EMERGENCY DEPARTMENT COURSE and DIFFERENTIAL DIAGNOSIS/MDM:   Vitals:    Vitals:    01/11/21 1401 01/11/21 1500 01/11/21 1629 01/11/21 1700   BP: 125/75 (!) 155/79 (!) 155/51 (!) 164/87   Pulse: 87 84 80 68   Resp:   18 18   Temp:    98.7 °F (37.1 °C)   TempSrc:       SpO2:  98% 99%    Weight:    130 lb 4.7 oz (59.1 kg)         Medical decision making: This is a 33-year-old female with history of end-stage renal disease, was last dialyzed last Monday and I went out of town to [place]. In [place] she was called on Friday and told that her potassium was 7 and that she needs to go to the emergency department. She waited until she returned to [place] and was dropped immediately at the hospital.  She normally follows with a Dr. Masha Boone at [facility] but states that this hospital is much closer to home. She is complaining of fatigue and some chest discomfort. On exam, she is well-appearing, afebrile, with normal vital signs. EKG shows peaked T waves without any QRS widening or ischemic abnormality. Calcium gluconate was ordered after obtaining EKG due to changes noted consistent with hyperkalemia. There was a delayed administration of this medication secondary to difficult IV access. I was able to place an ultrasound and IV to the right upper extremity myself at the bedside. Laboratory studies reviewed, troponin is 0.67 but appears to be chronically elevated. Chest x-ray shows vascular congestion without significant effusion. CBC shows chronic anemia. CMP shows potassium 6.6, creatinine 12.9. Further treatment was ordered with insulin, glucose, bicarb, Kayexalate. I spoke with Dr. Lorie Thomas of nephrology who will plan to have his nurse dialyze the patient as soon as she gets her room. To administration of additional treatments, dialysis was ready for the patient and patient was transferred from the emergency department for emergent dialysis. She will be admitted for further management follow this treatment. Patient is updated and agreeable plan of care. Stable at the time of transport. CRITICAL CARE TIME   Total Critical Care time was 34 minutes, excluding separately reportable procedures. There was a high probability of clinically significant/life threatening deterioration in the patient's condition which required my urgent intervention.       CONSULTS:

## 2021-01-11 NOTE — ED NOTES
Obtained lab specimens via venipuncture. Collected and sent to lab. Patient tolerated procedure well. Patient denies needs at this time. Call light within reach.        Yunior Dickson RN  01/11/21 2245

## 2021-01-11 NOTE — ED NOTES
Per Dialysis RN, patient will be ready to go on dialysis within the hour. Per Dr. Aris Muñoz, administer Calcium Gluconate, hold sodium bicarbonate, D-50%, Kayexalate, and Insulin at this time.      Jesica Serna RN  01/11/21 2936

## 2021-01-12 LAB
ALBUMIN SERPL-MCNC: 3.9 G/DL (ref 3.4–5)
ANION GAP SERPL CALCULATED.3IONS-SCNC: 18 MMOL/L (ref 3–16)
BASOPHILS ABSOLUTE: 0.1 K/UL (ref 0–0.2)
BASOPHILS RELATIVE PERCENT: 0.8 %
BUN BLDV-MCNC: 41 MG/DL (ref 7–20)
CALCIUM SERPL-MCNC: 7.4 MG/DL (ref 8.3–10.6)
CHLORIDE BLD-SCNC: 92 MMOL/L (ref 99–110)
CO2: 26 MMOL/L (ref 21–32)
CREAT SERPL-MCNC: 7.4 MG/DL (ref 0.6–1.1)
EKG ATRIAL RATE: 74 BPM
EKG ATRIAL RATE: 78 BPM
EKG DIAGNOSIS: NORMAL
EKG DIAGNOSIS: NORMAL
EKG P AXIS: 40 DEGREES
EKG P AXIS: 40 DEGREES
EKG P-R INTERVAL: 168 MS
EKG P-R INTERVAL: 224 MS
EKG Q-T INTERVAL: 418 MS
EKG Q-T INTERVAL: 424 MS
EKG QRS DURATION: 76 MS
EKG QRS DURATION: 90 MS
EKG QTC CALCULATION (BAZETT): 470 MS
EKG QTC CALCULATION (BAZETT): 476 MS
EKG R AXIS: 21 DEGREES
EKG R AXIS: 33 DEGREES
EKG T AXIS: 62 DEGREES
EKG T AXIS: 66 DEGREES
EKG VENTRICULAR RATE: 74 BPM
EKG VENTRICULAR RATE: 78 BPM
EOSINOPHILS ABSOLUTE: 0.2 K/UL (ref 0–0.6)
EOSINOPHILS RELATIVE PERCENT: 2.7 %
GFR AFRICAN AMERICAN: 7
GFR NON-AFRICAN AMERICAN: 6
GLUCOSE BLD-MCNC: 103 MG/DL (ref 70–99)
GLUCOSE BLD-MCNC: 132 MG/DL (ref 70–99)
GLUCOSE BLD-MCNC: 140 MG/DL (ref 70–99)
GLUCOSE BLD-MCNC: 185 MG/DL (ref 70–99)
GLUCOSE BLD-MCNC: 228 MG/DL (ref 70–99)
HBV SURFACE AB TITR SER: 8.43 MIU/ML
HCT VFR BLD CALC: 26.9 % (ref 36–48)
HEMOGLOBIN: 8.9 G/DL (ref 12–16)
HEPATITIS B SURFACE ANTIGEN INTERPRETATION: NORMAL
LYMPHOCYTES ABSOLUTE: 2.2 K/UL (ref 1–5.1)
LYMPHOCYTES RELATIVE PERCENT: 24.4 %
MCH RBC QN AUTO: 31.2 PG (ref 26–34)
MCHC RBC AUTO-ENTMCNC: 33 G/DL (ref 31–36)
MCV RBC AUTO: 94.8 FL (ref 80–100)
MONOCYTES ABSOLUTE: 0.6 K/UL (ref 0–1.3)
MONOCYTES RELATIVE PERCENT: 6.4 %
NEUTROPHILS ABSOLUTE: 6 K/UL (ref 1.7–7.7)
NEUTROPHILS RELATIVE PERCENT: 65.7 %
PDW BLD-RTO: 14.8 % (ref 12.4–15.4)
PERFORMED ON: ABNORMAL
PHOSPHORUS: 5 MG/DL (ref 2.5–4.9)
PLATELET # BLD: 309 K/UL (ref 135–450)
PMV BLD AUTO: 8.3 FL (ref 5–10.5)
POTASSIUM SERPL-SCNC: 4.9 MMOL/L (ref 3.5–5.1)
RBC # BLD: 2.84 M/UL (ref 4–5.2)
SARS-COV-2, PCR: NOT DETECTED
SODIUM BLD-SCNC: 136 MMOL/L (ref 136–145)
WBC # BLD: 9.1 K/UL (ref 4–11)

## 2021-01-12 PROCEDURE — 90945 DIALYSIS ONE EVALUATION: CPT

## 2021-01-12 PROCEDURE — 6360000002 HC RX W HCPCS: Performed by: INTERNAL MEDICINE

## 2021-01-12 PROCEDURE — 93005 ELECTROCARDIOGRAM TRACING: CPT | Performed by: HOSPITALIST

## 2021-01-12 PROCEDURE — 96372 THER/PROPH/DIAG INJ SC/IM: CPT

## 2021-01-12 PROCEDURE — 6370000000 HC RX 637 (ALT 250 FOR IP): Performed by: INTERNAL MEDICINE

## 2021-01-12 PROCEDURE — 2580000003 HC RX 258: Performed by: INTERNAL MEDICINE

## 2021-01-12 PROCEDURE — 36415 COLL VENOUS BLD VENIPUNCTURE: CPT

## 2021-01-12 PROCEDURE — G0378 HOSPITAL OBSERVATION PER HR: HCPCS

## 2021-01-12 PROCEDURE — 2060000000 HC ICU INTERMEDIATE R&B

## 2021-01-12 PROCEDURE — 87340 HEPATITIS B SURFACE AG IA: CPT

## 2021-01-12 PROCEDURE — 85025 COMPLETE CBC W/AUTO DIFF WBC: CPT

## 2021-01-12 PROCEDURE — U0003 INFECTIOUS AGENT DETECTION BY NUCLEIC ACID (DNA OR RNA); SEVERE ACUTE RESPIRATORY SYNDROME CORONAVIRUS 2 (SARS-COV-2) (CORONAVIRUS DISEASE [COVID-19]), AMPLIFIED PROBE TECHNIQUE, MAKING USE OF HIGH THROUGHPUT TECHNOLOGIES AS DESCRIBED BY CMS-2020-01-R: HCPCS

## 2021-01-12 PROCEDURE — 93010 ELECTROCARDIOGRAM REPORT: CPT | Performed by: INTERNAL MEDICINE

## 2021-01-12 PROCEDURE — 86706 HEP B SURFACE ANTIBODY: CPT

## 2021-01-12 PROCEDURE — 6370000000 HC RX 637 (ALT 250 FOR IP): Performed by: NURSE PRACTITIONER

## 2021-01-12 PROCEDURE — 2500000003 HC RX 250 WO HCPCS: Performed by: NURSE PRACTITIONER

## 2021-01-12 PROCEDURE — 80069 RENAL FUNCTION PANEL: CPT

## 2021-01-12 RX ORDER — CLOPIDOGREL BISULFATE 75 MG/1
75 TABLET ORAL DAILY
Status: DISCONTINUED | OUTPATIENT
Start: 2021-01-12 | End: 2021-01-13 | Stop reason: HOSPADM

## 2021-01-12 RX ORDER — ATORVASTATIN CALCIUM 80 MG/1
80 TABLET, FILM COATED ORAL NIGHTLY
Status: DISCONTINUED | OUTPATIENT
Start: 2021-01-12 | End: 2021-01-13 | Stop reason: HOSPADM

## 2021-01-12 RX ORDER — POLYVINYL ALCOHOL 14 MG/ML
1 SOLUTION/ DROPS OPHTHALMIC PRN
Status: ON HOLD | COMMUNITY
End: 2022-05-17 | Stop reason: CLARIF

## 2021-01-12 RX ADMIN — CLOPIDOGREL BISULFATE 75 MG: 75 TABLET ORAL at 08:07

## 2021-01-12 RX ADMIN — LISINOPRIL 5 MG: 5 TABLET ORAL at 08:08

## 2021-01-12 RX ADMIN — HEPARIN SODIUM 5000 UNITS: 5000 INJECTION INTRAVENOUS; SUBCUTANEOUS at 21:07

## 2021-01-12 RX ADMIN — CALCIUM ACETATE 667 MG: 667 CAPSULE ORAL at 11:21

## 2021-01-12 RX ADMIN — INSULIN LISPRO 1 UNITS: 100 INJECTION, SOLUTION INTRAVENOUS; SUBCUTANEOUS at 11:27

## 2021-01-12 RX ADMIN — INSULIN LISPRO 1 UNITS: 100 INJECTION, SOLUTION INTRAVENOUS; SUBCUTANEOUS at 21:08

## 2021-01-12 RX ADMIN — ATORVASTATIN CALCIUM 80 MG: 80 TABLET, FILM COATED ORAL at 21:07

## 2021-01-12 RX ADMIN — Medication 10 ML: at 08:08

## 2021-01-12 RX ADMIN — HEPARIN SODIUM 5000 UNITS: 5000 INJECTION INTRAVENOUS; SUBCUTANEOUS at 05:49

## 2021-01-12 RX ADMIN — PANTOPRAZOLE SODIUM 40 MG: 40 TABLET, DELAYED RELEASE ORAL at 08:07

## 2021-01-12 RX ADMIN — MIRTAZAPINE 15 MG: 15 TABLET, FILM COATED ORAL at 21:07

## 2021-01-12 RX ADMIN — CALCIUM ACETATE 667 MG: 667 CAPSULE ORAL at 16:53

## 2021-01-12 RX ADMIN — HEPARIN SODIUM 5000 UNITS: 5000 INJECTION INTRAVENOUS; SUBCUTANEOUS at 13:17

## 2021-01-12 RX ADMIN — Medication 10 ML: at 21:07

## 2021-01-12 RX ADMIN — CALCIUM ACETATE 667 MG: 667 CAPSULE ORAL at 08:07

## 2021-01-12 NOTE — CARE COORDINATION
Reviewed chart, unable to meet with patient due to isolation status. Called to patient's room, patient did not answer. SW will attempt to contact patient and assess for needs as time permits. SHAUNA Kohli, ROSALINA, Social Work/Case Management   [phone]  Electronically signed by SHAUNA Kohli LSW on 1/12/2021 at 2:13 PM    Called to patient's room, patient did not answer. SW will continue to attempt to assess for needs as time permits.    SHAUNA Kohli LSW, Social Work/Case Management   [phone]  Electronically signed by SHAUNA Kohli LSW on 1/12/2021 at 4:16 PM

## 2021-01-12 NOTE — PROGRESS NOTES
4 Eyes Skin Assessment     NAME:  Mila Acosta  YOB: 1975  MEDICAL RECORD NUMBER:  [id number]    The patient is being assess for  Admission    I agree that 2 RN's have performed a thorough Head to Toe Skin Assessment on the patient. ALL assessment sites listed below have been assessed. Areas assessed by both nurses:    Head, Face, Ears, Shoulders, Back, Chest, Arms, Elbows, Hands, Sacrum. Buttock, Coccyx, Ischium and Legs. Feet and Heels        Does the Patient have a Wound?  No noted wound(s)       [place] Prevention initiated:  Yes   Wound Care Orders initiated:  No    Pressure Injury (Stage 3,4, Unstageable, DTI, NWPT, and Complex wounds) if present place consult order under [de-identified] No    New and Established Ostomies if present place consult order under : no     Nurse 1 eSignature: Electronically signed by Deja Thompson RN on 1/11/21 at 11:07 PM EST    **SHARE this note so that the co-signing nurse is able to place an eSignature**    Nurse 2 eSignature: Electronically signed by Marisol Haynes RN on 1/11/21 at 11:10 PM EST

## 2021-01-12 NOTE — PLAN OF CARE
Problem: Falls - Risk of:  Goal: Will remain free from falls  Description: Will remain free from falls  Outcome: Ongoing     Problem: Daily Care:  Goal: Daily care needs are met  Description: Daily care needs are met  Outcome: Ongoing     Problem: Discharge Planning:  Goal: Patients continuum of care needs are met  Description: Patients continuum of care needs are met  Outcome: Ongoing

## 2021-01-12 NOTE — PROGRESS NOTES
Hospitalist Progress Note  1/12/2021 11:06 AM    PCP: Bello Miles    [id number]     Date of Admission: 1/11/2021                                                                                                                     HOSPITAL COURSE    Patient demographics:  The patient  Zee Lowry is a 48 y.o. female     Significant past medical history:   Patient Active Problem List   Diagnosis    Depression with suicidal ideation    ESRD on hemodialysis ([facility]    Hyperkalemia    Metabolic acidosis    Essential hypertension    DMII (diabetes mellitus, type 2) ([facility]    Cellulitis    Cellulitis of lower extremity    Erythema nodosum    Chest pain    Failure to thrive in adult    Abnormal stress test    ESRD (end stage renal disease) on dialysis ([facility]    Suicidal ideation    NSTEMI (non-ST elevated myocardial infarction) ([facility]    Vaginal candidiasis    Vaginal bleeding    Vaginal discharge    Atypical chest pain    Severe episode of recurrent major depressive disorder, without psychotic features ([facility]    Anxiety disorder    Cocaine abuse ([facility]    Anemia    Illicit drug use    Hyperkalemia    Peritoneal dialysis status ([facility]    Hyperlipidemia    Chronic focal neurological deficit    Nausea and vomiting    Moderate malnutrition (HCC)    GI bleed    Acute ischemic stroke ([facility]    ESRD needing dialysis ([facility]         Presenting symptoms:  Shortness of breath    Diagnostic workup:      CONSULTS DURING ADMISSION :   IP CONSULT TO NEPHROLOGY  IP CONSULT TO PHARMACY      Patient was diagnosed with:  Hyperkalemia   ESRD needing hemodialysis    Treatment while inpatient:                                                                                         ----------------------------------------------------------      SUBJECTIVE COMPLAINTS- follow up for SOB    Diet: DIET RENAL;      OBJECTIVE:   Patient Active Problem List   Diagnosis    Depression with suicidal ideation  [facility]    Hyperkalemia    Metabolic acidosis    Essential hypertension    DMII (diabetes mellitus, type 2) ([facility])    Cellulitis    Cellulitis of lower extremity    Erythema nodosum    Chest pain    Failure to thrive in adult    Abnormal stress test    ESRD (end stage renal disease) on dialysis ([facility]    Suicidal ideation    NSTEMI (non-ST elevated myocardial infarction) ([facility])    Vaginal candidiasis    Vaginal bleeding    Vaginal discharge    Atypical chest pain    Severe episode of recurrent major depressive disorder, without psychotic features ([facility]    Anxiety disorder    Cocaine abuse ([facility]    Anemia    Illicit drug use    Hyperkalemia    Peritoneal dialysis status ([facility])    Hyperlipidemia    Chronic focal neurological deficit    Nausea and vomiting    Moderate malnutrition ([facility])    GI bleed    Acute ischemic stroke ([facility])    ESRD needing dialysis ([facility])       Allergies  Ferrous sulfate, Cephalexin, Gabapentin, Dicloxacillin, Hydromorphone, Pcn [penicillins], and Sulfa antibiotics    Medications    Scheduled Meds:   clopidogrel  75 mg Oral Daily    atorvastatin  80 mg Oral Nightly    lubiprostone  24 mcg Oral BID WC    mirtazapine  15 mg Oral Nightly    sodium chloride flush  10 mL Intravenous 2 times per day    heparin (porcine)  5,000 Units Subcutaneous 3 times per day    insulin lispro  0-6 Units Subcutaneous TID WC    insulin lispro  0-3 Units Subcutaneous Nightly    lisinopril  5 mg Oral Daily    Calcium Acetate (Phos Binder)  667 mg Oral TID WC    gabapentin  300 mg Oral Once per day on Mon Wed Fri    pantoprazole  40 mg Oral Daily    levETIRAcetam  500 mg Oral Once per day on Mon Wed Fri     Continuous Infusions:   dextrose       PRN Meds:  glucose, dextrose, glucagon (rDNA), dextrose, sodium chloride flush, promethazine **OR** ondansetron, acetaminophen **OR** acetaminophen, heparin (porcine)    Vitals   Vitals /wt No results for input(s): TSHREFLEX in the last 72 hours. No components found for: [id number]  POC GLUCOSE:    Recent Labs     01/11/21  1557 01/11/21  1855 01/12/21  0751   POCGLU 135* 86 103*     No results for input(s): LABA1C in the last 72 hours. Lab Results   Component Value Date    LABA1C 4.8 03/14/2020         ASSESSMENT AND PLAN  Hyperkalemia   peaked T waves changes on EKG, due to noncompliance with hemodialysis   Received hyperkalemia cocktail in the ED  Patient is status post hemodialysis     ESRD   Underwent emergent hemodialysis    Febrile illness  Etiology is not clear  Covid test is negative  Continue to monitor     Recent history of seizures  Continue home Keppra dose  Will need an extra dose of Keppra after hemodialysis     Hypertension  Continue home blood pressure medications     Hypothyroidism  Continue home Synthroid 50 mcg once daily     History of CVA  Continue Plavix  Continue high intensity statin     Type 2 diabetes  Per my chart review her home regimen includes Lantus 6 units nightly, lispro 2 units with meals  Continue low-dose sliding scale insulin for now  Hypoglycemia protocol  Point of glucose checks     History of GERD? Continue home Protonix      Code Status: Full Code        Dispo -cc        The patient and / or the family were informed of the results of any tests, a time was given to answer questions, a plan was proposed and they agreed with plan. Valentina Edouard MD    This note was transcribed using [id number] [employer]. Please disregard any translational errors.

## 2021-01-12 NOTE — PLAN OF CARE
Problem: Falls - Risk of:  Goal: Will remain free from falls  Description: Will remain free from falls  1/12/2021 1024 by Odell Kurtz RN  Outcome: Ongoing     Problem: Falls - Risk of:  Goal: Absence of physical injury  Description: Absence of physical injury  1/12/2021 1024 by Odell Kurtz RN  Outcome: Ongoing     Problem: Infection:  Goal: Will remain free from infection  Description: Will remain free from infection  1/12/2021 1024 by Odell Kurtz RN  Outcome: Ongoing     Problem: Safety:  Goal: Free from accidental physical injury  Description: Free from accidental physical injury  1/12/2021 1024 by Odell Kurtz RN  Outcome: Ongoing     Problem: Safety:  Goal: Free from intentional harm  Description: Free from intentional harm  1/12/2021 1024 by Odell Kurtz RN  Outcome: Ongoing

## 2021-01-12 NOTE — PROGRESS NOTES
[address]                                                                        [address] [facility] [address]                                                                 Mima Neal                                                         Phone Number: [phone]                                                           Fax Number: [phone]                                                             Nephrology Progress Note    Chief Complaint: Feels weak    History of Present Illness: We are following this patient for Hyperkalemia in setting of ESRD - Follows with  Nephrology - has not had HD since last Monday because she was out of town. Ms Letha Cui is a 47 yo female with a past medical history significant for diabetes, hypertension, end-stage renal disease, currently on peritoneal dialysis, follows with  Nephrology, history of FSGS which ultimately led to her end-stage renal disease status  at the age of 10 status post failed kidney transplant after 23 years, GERD, depression, Crohn's disease, remote history of cocaine abuse and CVA, who presented to the Emergency Department after being told to do so because outpatient labs indicated that she had an elevated potassium level. Patient feels somewhat short of breath and weak. Subjective:    Resting in bed; NAD    ROS: + weakness; + SOB; both resolved;  All other ROS negative    Scheduled Meds:   clopidogrel  75 mg Oral Daily    atorvastatin  80 mg Oral Nightly    lubiprostone  24 mcg Oral BID WC    mirtazapine  15 mg Oral Nightly    sodium chloride flush  10 mL Intravenous 2 times per day    heparin (porcine)  5,000 Units Subcutaneous 3 times per day    insulin lispro  0-6 Units Subcutaneous TID WC    insulin lispro  0-3 Units Subcutaneous Nightly    lisinopril  5 mg Oral Daily  Calcium Acetate (Phos Binder)  667 mg Oral TID WC    gabapentin  300 mg Oral Once per day on     pantoprazole  40 mg Oral Daily    levETIRAcetam  500 mg Oral Once per day on         dextrose         PRN Meds:.glucose, dextrose, glucagon (rDNA), dextrose, sodium chloride flush, promethazine **OR** ondansetron, acetaminophen **OR** acetaminophen, heparin (porcine)    Physical Exam:    TEMPERATURE:  Current - Temp: 99.2 °F (37.3 °C); Max - Temp  Av.6 °F (37 °C)  Min: 98 °F (36.7 °C)  Max: 99.2 °F (37.3 °C)  RESPIRATIONS RANGE: Resp  Av.3  Min: 14  Max: 18  PULSE RANGE: Pulse  Av.5  Min: 68  Max: 112  BLOOD PRESSURE RANGE:  Systolic (10KXI), YTZ:562 , Min:108 , CZA:009   ; Diastolic (46REJ), WP, Min:51, Max:94    24HR INTAKE/OUTPUT:      Intake/Output Summary (Last 24 hours) at 2021 1111  Last data filed at 2021 0600  Gross per 24 hour   Intake 740 ml   Output 2763 ml   Net -2023 ml         Patient Vitals for the past 96 hrs (Last 3 readings):   Weight   21 0413 122 lb 2.2 oz (55.4 kg)   21 122 lb 2.2 oz (55.4 kg)   21 125 lb 7.1 oz (56.9 kg)       GENERAL:  In no apparent distress, conversant  HEENT:  Ears and nose appear externally without deformity. MMM. Normocephalic, atraumatic. Eyes:  Anicteric sclera. Moist conjunctiva. No lid lag. Pupils are equal and reactive to light. NECK:  Free range of motion. Supple. No thyromegaly. CHEST:  Clear to auscultation bilaterally with no intercostal retractions. CARDIOVASCULAR:  Regular rate and rhythm. No rubs. ABDOMEN:  Soft, nontender. Bowel sounds present. No organomegaly. EXTREMITIES:  Lower extremities:  No lower extremity edema. No extremity lymphadenopathy. PSYCHIATRIC:  Appropriate affect. Alert and oriented to time, place, and person. NEUROLOGIC:  No asterixis. No focal motor neurological deficits. SKIN:  Normal texture and turgor. No rash. Access: Left IJ TDC    Allergies: Allergies   Allergen Reactions    Ferrous Sulfate Shortness Of Breath    Cephalexin Itching and Swelling    Gabapentin Other (See Comments)     Various adverse reaction    Dicloxacillin Rash    Hydromorphone Itching    Pcn [Penicillins] Rash    Sulfa Antibiotics Rash and Other (See Comments)     Chest pain.       Past Medical History:   Diagnosis Date    Cerebral artery occlusion with cerebral infarction ([facility] 2010    Cocaine abuse ([facility]     Crohn's disease ([facility]     Depression     Diabetes mellitus ([facility]     ESRD (end stage renal disease) ([facility]     HD Tues-Thurs-Sat    GERD (gastroesophageal reflux disease)     Hemodialysis patient ([facility] 2016    peritoneal     Hyperlipidemia     Hypertension     MI, old     Mood disorder ([facility]     Pericarditis     [facility]     Pneumonia     Thyroid disease      Past Surgical History:   Procedure Laterality Date    BACK SURGERY      Tumor removal    CHOLECYSTECTOMY      COLON SURGERY Right     KIDNEY TRANSPLANT  1990    KIDNEY TRANSPLANT      LAPAROSCOPY      right ovary removed and left ovarian cyst removed for endometriosis    OTHER SURGICAL HISTORY      peritoneal dialysis catheter    PARACENTESIS      TUBAL LIGATION      UPPER GASTROINTESTINAL ENDOSCOPY  03/31/2017    UPPER GASTROINTESTINAL ENDOSCOPY N/A 3/25/2019    EGD BIOPSY performed by Ivania Vargas MD at [address] [place] [address] Road History   Problem Relation Age of Onset    Heart Attack Mother     Diabetes Mother     Hypertension Mother     Stroke Father     Diabetes Father     Hypertension Father      Social History     Socioeconomic History    Marital status:      Spouse name: Not on file    Number of children: 2    Years of education: Not on file    Highest education level: Not on file   Occupational History    Occupation: volunteer   Social Needs    Financial resource strain: Not on file    Food insecurity Worry: Not on file     Inability: Not on file    Transportation needs     Medical: Not on file     Non-medical: Not on file   Tobacco Use    Smoking status: Never Smoker    Smokeless tobacco: Never Used   Substance and Sexual Activity    Alcohol use: Not Currently     Alcohol/week: 0.0 standard drinks     Comment: monthly    Drug use: Not Currently    Sexual activity: Yes     Partners: Male   Lifestyle    Physical activity     Days per week: Not on file     Minutes per session: Not on file    Stress: Not on file   Relationships    Social connections     Talks on phone: Not on file     Gets together: Not on file     Attends [employer]al service: Not on file     Active member of club or organization: Not on file     Attends meetings of clubs or organizations: Not on file     Relationship status: Not on file    Intimate partner violence     Fear of current or ex partner: Not on file     Emotionally abused: Not on file     Physically abused: Not on file     Forced sexual activity: Not on file   Other Topics Concern    Not on file   Social History Narrative    Not on file         LAB DATA:    CBC:   Lab Results   Component Value Date    WBC 9.1 01/12/2021    RBC 2.84 01/12/2021    HGB 8.9 01/12/2021    HCT 26.9 01/12/2021    MCV 94.8 01/12/2021    MCH 31.2 01/12/2021    MCHC 33.0 01/12/2021    RDW 14.8 01/12/2021     01/12/2021    MPV 8.3 01/12/2021     BMP:    Lab Results   Component Value Date     01/12/2021    K 4.9 01/12/2021    K 6.6 01/11/2021    CL 92 01/12/2021    CO2 26 01/12/2021    BUN 41 01/12/2021    CREATININE 7.4 01/12/2021    CALCIUM 7.4 01/12/2021    GFRAA 7 01/12/2021    GFRAA 7 09/23/2012    LABGLOM 6 01/12/2021    GLUCOSE 140 01/12/2021     Ionized Calcium:  No results found for: IONCA  Magnesium:    Lab Results   Component Value Date    MG 1.90 03/16/2020     Phosphorus:    Lab Results   Component Value Date    PHOS 5.0 01/12/2021     U/A:    Lab Results Component Value Date    COLORU RED 07/28/2017    PHUR 8.0 07/01/2018    LABCAST All else obscured 10/24/2010    WBCUA 6 07/28/2017    RBCUA 1 07/28/2017    MUCUS All else obscured 10/24/2010    TRICHOMONAS Present 05/31/2011    BACTERIA 1+ 07/28/2017    CLARITYU TURBID 07/28/2017    SPECGRAV 1.020 07/28/2017    LEUKOCYTESUR LARGE 07/28/2017    UROBILINOGEN 0.2 07/28/2017    BILIRUBINUR Negative 07/28/2017    BILIRUBINUR NEGATIVE 06/03/2012    BLOODU LARGE 07/28/2017    GLUCOSEU Negative 07/28/2017    GLUCOSEU 500 06/03/2012    AMORPHOUS All else obscured 10/24/2010         IMPRESSION/RECOMMENDATIONS:      Active Problems:    ESRD needing dialysis ([facility]  Resolved Problems:    * No resolved hospital problems. *    1. Hyperkalemia / ESRD - Resolved with HD 1/11/20  - HD MWF     2. Anemia CKD - JOSEMANUEL with HD    3. CKD-MBD - phosphorus at target    4.  HTN - continue current antihypertensive medication    Ok to d/c from renal standpoint

## 2021-01-12 NOTE — PROGRESS NOTES
Patient complaining of chest tightness. EKG ordered. MD messaged.     Electronically signed by Ambrocio Bryant RN on 1/12/2021 at 11:47 AM

## 2021-01-12 NOTE — PROGRESS NOTES
Patient admitted to room 5269 @ 2052 from dialysis. A&O x4, oriented to surroundings. Falls asleep but easy to arouse. Skin warm and dry. Resp easy and even. In bed, call light in reach.

## 2021-01-13 VITALS
WEIGHT: 109.57 LBS | TEMPERATURE: 97.3 F | OXYGEN SATURATION: 98 % | RESPIRATION RATE: 20 BRPM | BODY MASS INDEX: 17.2 KG/M2 | SYSTOLIC BLOOD PRESSURE: 164 MMHG | HEART RATE: 73 BPM | HEIGHT: 67 IN | DIASTOLIC BLOOD PRESSURE: 70 MMHG

## 2021-01-13 LAB
ANION GAP SERPL CALCULATED.3IONS-SCNC: 17 MMOL/L (ref 3–16)
BUN BLDV-MCNC: 34 MG/DL (ref 7–20)
CALCIUM SERPL-MCNC: 7.7 MG/DL (ref 8.3–10.6)
CHLORIDE BLD-SCNC: 94 MMOL/L (ref 99–110)
CO2: 25 MMOL/L (ref 21–32)
CREAT SERPL-MCNC: 6.6 MG/DL (ref 0.6–1.1)
GFR AFRICAN AMERICAN: 8
GFR NON-AFRICAN AMERICAN: 7
GLUCOSE BLD-MCNC: 105 MG/DL (ref 70–99)
GLUCOSE BLD-MCNC: 114 MG/DL (ref 70–99)
PERFORMED ON: ABNORMAL
POTASSIUM SERPL-SCNC: 4.3 MMOL/L (ref 3.5–5.1)
SODIUM BLD-SCNC: 136 MMOL/L (ref 136–145)

## 2021-01-13 PROCEDURE — 36415 COLL VENOUS BLD VENIPUNCTURE: CPT

## 2021-01-13 PROCEDURE — 6370000000 HC RX 637 (ALT 250 FOR IP): Performed by: HOSPITALIST

## 2021-01-13 PROCEDURE — 2500000003 HC RX 250 WO HCPCS: Performed by: NURSE PRACTITIONER

## 2021-01-13 PROCEDURE — 90935 HEMODIALYSIS ONE EVALUATION: CPT

## 2021-01-13 PROCEDURE — 2580000003 HC RX 258: Performed by: INTERNAL MEDICINE

## 2021-01-13 PROCEDURE — G0378 HOSPITAL OBSERVATION PER HR: HCPCS

## 2021-01-13 PROCEDURE — 6370000000 HC RX 637 (ALT 250 FOR IP): Performed by: INTERNAL MEDICINE

## 2021-01-13 PROCEDURE — 6360000002 HC RX W HCPCS: Performed by: INTERNAL MEDICINE

## 2021-01-13 PROCEDURE — 96372 THER/PROPH/DIAG INJ SC/IM: CPT

## 2021-01-13 PROCEDURE — 80048 BASIC METABOLIC PNL TOTAL CA: CPT

## 2021-01-13 PROCEDURE — 6370000000 HC RX 637 (ALT 250 FOR IP): Performed by: NURSE PRACTITIONER

## 2021-01-13 PROCEDURE — 94760 N-INVAS EAR/PLS OXIMETRY 1: CPT

## 2021-01-13 RX ORDER — LEVETIRACETAM 500 MG/1
500 TABLET ORAL
Qty: 60 TABLET | Refills: 0 | Status: ON HOLD | OUTPATIENT
Start: 2021-01-13 | End: 2022-05-17 | Stop reason: CLARIF

## 2021-01-13 RX ADMIN — LISINOPRIL 5 MG: 5 TABLET ORAL at 08:12

## 2021-01-13 RX ADMIN — PANTOPRAZOLE SODIUM 40 MG: 40 TABLET, DELAYED RELEASE ORAL at 08:12

## 2021-01-13 RX ADMIN — HEPARIN SODIUM 5000 UNITS: 5000 INJECTION INTRAVENOUS; SUBCUTANEOUS at 05:44

## 2021-01-13 RX ADMIN — CLOPIDOGREL BISULFATE 75 MG: 75 TABLET ORAL at 08:12

## 2021-01-13 RX ADMIN — CALCIUM ACETATE 667 MG: 667 CAPSULE ORAL at 08:12

## 2021-01-13 RX ADMIN — Medication 10 ML: at 08:14

## 2021-01-13 RX ADMIN — POLYVINYL ALCOHOL, POVIDONE 1 DROP: .5; .6 LIQUID OPHTHALMIC at 09:19

## 2021-01-13 RX ADMIN — ACETAMINOPHEN 650 MG: 325 TABLET ORAL at 08:12

## 2021-01-13 ASSESSMENT — PAIN SCALES - GENERAL
PAINLEVEL_OUTOF10: 0
PAINLEVEL_OUTOF10: 0

## 2021-01-13 ASSESSMENT — PAIN - FUNCTIONAL ASSESSMENT: PAIN_FUNCTIONAL_ASSESSMENT: ACTIVITIES ARE NOT PREVENTED

## 2021-01-13 ASSESSMENT — PAIN DESCRIPTION - ONSET: ONSET: ON-GOING

## 2021-01-13 ASSESSMENT — PAIN DESCRIPTION - PROGRESSION: CLINICAL_PROGRESSION: GRADUALLY WORSENING

## 2021-01-13 ASSESSMENT — PAIN DESCRIPTION - DESCRIPTORS: DESCRIPTORS: DISCOMFORT

## 2021-01-13 ASSESSMENT — PAIN DESCRIPTION - FREQUENCY: FREQUENCY: CONTINUOUS

## 2021-01-13 NOTE — PROGRESS NOTES
[address]                                                                        [address] [facility] [address]                                                                 Mima Neal                                                         Phone Number: [phone]                                                           Fax Number: [phone]                                                             Nephrology Progress Note    Chief Complaint: Feels weak    History of Present Illness: We are following this patient for Hyperkalemia in setting of ESRD - Follows with  Nephrology - has not had HD since last Monday because she was out of town. Ms Marychuy Paula is a 47 yo female with a past medical history significant for diabetes, hypertension, end-stage renal disease, currently on peritoneal dialysis, follows with  Nephrology, history of FSGS which ultimately led to her end-stage renal disease status  at the age of 10 status post failed kidney transplant after 23 years, GERD, depression, Crohn's disease, remote history of cocaine abuse and CVA, who presented to the Emergency Department after being told to do so because outpatient labs indicated that she had an elevated potassium level. Patient feels somewhat short of breath and weak. Subjective:    Resting in bed; NAD    ROS: + weakness; + SOB; both resolved;  All other ROS negative    Scheduled Meds:   clopidogrel  75 mg Oral Daily    atorvastatin  80 mg Oral Nightly    lubiprostone  24 mcg Oral BID WC    mirtazapine  15 mg Oral Nightly    sodium chloride flush  10 mL Intravenous 2 times per day    heparin (porcine)  5,000 Units Subcutaneous 3 times per day    insulin lispro  0-6 Units Subcutaneous TID WC    insulin lispro  0-3 Units Subcutaneous Nightly    lisinopril  5 mg Oral Daily  Calcium Acetate (Phos Binder)  667 mg Oral TID WC    gabapentin  300 mg Oral Once per day on     pantoprazole  40 mg Oral Daily    levETIRAcetam  500 mg Oral Once per day on         dextrose         PRN Meds:.polyvinyl alcohol-povidone, glucose, dextrose, glucagon (rDNA), dextrose, sodium chloride flush, promethazine **OR** ondansetron, acetaminophen **OR** acetaminophen, heparin (porcine)    Physical Exam:    TEMPERATURE:  Current - Temp: 98.8 °F (37.1 °C); Max - Temp  Av.9 °F (37.2 °C)  Min: 98.2 °F (36.8 °C)  Max: 100 °F (37.8 °C)  RESPIRATIONS RANGE: Resp  Av.7  Min: 16  Max: 18  PULSE RANGE: Pulse  Av.8  Min: 80  Max: 96  BLOOD PRESSURE RANGE:  Systolic (18PQA), GPY:686 , Min:116 , KYX:794   ; Diastolic (66YIX), AYW:35, Min:72, Max:95    24HR INTAKE/OUTPUT:      Intake/Output Summary (Last 24 hours) at 2021 1045  Last data filed at 2021 0932  Gross per 24 hour   Intake 1560 ml   Output 0 ml   Net 1560 ml         Patient Vitals for the past 96 hrs (Last 3 readings):   Weight   21 120 lb 9.5 oz (54.7 kg)   21 122 lb 2.2 oz (55.4 kg)   21 122 lb 2.2 oz (55.4 kg)       GENERAL:  In no apparent distress, conversant  HEENT:  Ears and nose appear externally without deformity. MMM. Normocephalic, atraumatic. Eyes:  Anicteric sclera. Moist conjunctiva. No lid lag. Pupils are equal and reactive to light. NECK:  Free range of motion. Supple. No thyromegaly. CHEST:  Clear to auscultation bilaterally with no intercostal retractions. CARDIOVASCULAR:  Regular rate and rhythm. No rubs. ABDOMEN:  Soft, nontender. Bowel sounds present. No organomegaly. EXTREMITIES:  Lower extremities:  No lower extremity edema. No extremity lymphadenopathy. PSYCHIATRIC:  Appropriate affect. Alert and oriented to time, place, and person. NEUROLOGIC:  No asterixis. No focal motor neurological deficits.  Financial resource strain: Not on file    Food insecurity     Worry: Not on file     Inability: Not on file    Transportation needs     Medical: Not on file     Non-medical: Not on file   Tobacco Use    Smoking status: Never Smoker    Smokeless tobacco: Never Used   Substance and Sexual Activity    Alcohol use: Not Currently     Alcohol/week: 0.0 standard drinks     Comment: monthly    Drug use: Not Currently    Sexual activity: Yes     Partners: Male   Lifestyle    Physical activity     Days per week: Not on file     Minutes per session: Not on file    Stress: Not on file   Relationships    Social connections     Talks on phone: Not on file     Gets together: Not on file     Attends Judaism service: Not on file     Active member of club or organization: Not on file     Attends meetings of clubs or organizations: Not on file     Relationship status: Not on file    Intimate partner violence     Fear of current or ex partner: Not on file     Emotionally abused: Not on file     Physically abused: Not on file     Forced sexual activity: Not on file   Other Topics Concern    Not on file   Social History Narrative    Not on file         LAB DATA:    CBC:   Lab Results   Component Value Date    WBC 9.1 01/12/2021    RBC 2.84 01/12/2021    HGB 8.9 01/12/2021    HCT 26.9 01/12/2021    MCV 94.8 01/12/2021    MCH 31.2 01/12/2021    MCHC 33.0 01/12/2021    RDW 14.8 01/12/2021     01/12/2021    MPV 8.3 01/12/2021     BMP:    Lab Results   Component Value Date     01/12/2021    K 4.9 01/12/2021    K 6.6 01/11/2021    CL 92 01/12/2021    CO2 26 01/12/2021    BUN 41 01/12/2021    CREATININE 7.4 01/12/2021    CALCIUM 7.4 01/12/2021    GFRAA 7 01/12/2021    GFRAA 7 09/23/2012    LABGLOM 6 01/12/2021    GLUCOSE 140 01/12/2021     Ionized Calcium:  No results found for: IONCA  Magnesium:    Lab Results   Component Value Date    MG 1.90 03/16/2020     Phosphorus:    Lab Results   Component Value Date PHOS 5.0 01/12/2021     U/A:    Lab Results   Component Value Date    COLORU RED 07/28/2017    PHUR 8.0 07/01/2018    LABCAST All else obscured 10/24/2010    WBCUA 6 07/28/2017    RBCUA 1 07/28/2017    MUCUS All else obscured 10/24/2010    TRICHOMONAS Present 05/31/2011    BACTERIA 1+ 07/28/2017    CLARITYU TURBID 07/28/2017    SPECGRAV 1.020 07/28/2017    LEUKOCYTESUR LARGE 07/28/2017    UROBILINOGEN 0.2 07/28/2017    BILIRUBINUR Negative 07/28/2017    BILIRUBINUR NEGATIVE 06/03/2012    BLOODU LARGE 07/28/2017    GLUCOSEU Negative 07/28/2017    GLUCOSEU 500 06/03/2012    AMORPHOUS All else obscured 10/24/2010         IMPRESSION/RECOMMENDATIONS:      Active Problems:    ESRD needing dialysis ([facility]  Resolved Problems:    * No resolved hospital problems. *    1. Hyperkalemia / ESRD - Resolved with HD 1/11/20  - HD MWF - HD later today then ok to d/c    2. Anemia CKD - JOSEMANUEL with HD    3. CKD-MBD - phosphorus at target    4.  HTN - continue current antihypertensive medication    Ok to d/c from renal standpoint

## 2021-01-13 NOTE — FLOWSHEET NOTE
3 HOURS   [address] MLS REMOVED    PRE WT 51.4  POST WT 49.7  DW 55.5  REFUSES TO STAND FOR WT     L CVC  GOOD FUNCTION 250  BFR    NO MEDICATION GIVEN  MWF US RENAL WILVER TERESATED TX WELL        01/13/21 1147 01/13/21 1452   Treatment   Time On 1151  --    Time Off  --  1451   Observations & Evaluations   Level of Consciousness Alert (0) Alert (0)   Vital Signs   BP (!) 156/91 (!) 164/70   Temp 98.8 °F (37.1 °C) 97.3 °F (36.3 °C)   Pulse 71 73   Resp 20 20   Weight 113 lb 5.1 oz (51.4 kg) 109 lb 9.1 oz (49.7 kg)   Weight Method Bed scale Bed scale   Percent Weight Change -6.03 -3.31   Pain Assessment   Pain Assessment 0-10 0-10   Pain Level 0 0   Post-Hemodialysis Assessment   Post-Treatment Procedures  --  Blood returned;Catheter capped, clamped and heparinized x 2 ports   Machine Disinfection Process  --  Acid/Vinegar Clean;Heat Disinfect; Exterior Machine Disinfection   Rinseback Volume (ml)  --  400 ml   Total Liters Processed (l/min)  --  42.9 l/min   Dialyzer Clearance  --  Lightly streaked   Duration of Treatment (minutes)  --  180 minutes   NET Removed (ml)  --  1500 ml   Tolerated Treatment  --  Good   Physician Notified?  --  Yes

## 2021-01-13 NOTE — PROGRESS NOTES
75 yr old with recent admission for bilateral SDHs R>L (no intervention at the time) now readmitted with increasing size of the SDH and mental status change.    CTH (6/22) - Enlargement of right subdural hematoma with associated worsening localized mass effect and leftward midline shift, the latter of which now resulting in subfalcine herniation.  No new extra-axial hemorrhage or fluid collection.    -- Admitted to [facility].  -- NSGY following, appreciate recs.  -- S/p R craniotomy with SD drain in place.  -- Post op CT stable with reduced midline shift and interval expansion of the ventricle.  -- SBP <160. Cardene as needed.  -- HOB flat.  -- Neuro checks q1  -- PT/OT   D/c order received, pt verbalized agreement to d/c home. D/c instructions prepared and given to pt, pt VU. Medication information packet given r/t NEW and/or CHANGED, pt VU. Pt belongings gathered, IV and telemetry box removed without complication. Disposition is home, taken to lobby via wheelchair with RN.

## 2021-01-13 NOTE — PLAN OF CARE
Problem: Falls - Risk of:  Goal: Will remain free from falls  Description: Will remain free from falls  Outcome: Ongoing     Problem: Infection:  Goal: Will remain free from infection  Description: Will remain free from infection  Outcome: Ongoing     Problem: Daily Care:  Goal: Daily care needs are met  Description: Daily care needs are met  Outcome: Ongoing     Problem: Skin Integrity:  Goal: Skin integrity will stabilize  Description: Skin integrity will stabilize  Outcome: Ongoing

## 2021-01-13 NOTE — CARE COORDINATION
INITIAL CASE MANAGEMENT ASSESSMENT     Reviewed chart, spoke with patient to assess possible discharge needs. Explained Case Management role/services. Living Situation: Patient lives in an apartment with her significant other. 8 steps to enter with a railing; patient reported no issues with the steps. ADLs: Independent prior to admission. DME: Elva Tan    PT/OT Recs: Not ordered     Active Services: No active services     Transportation: Patient's significant other provides transportation. Medical transport through insurance with [employer]     Medications: [facility] on [address]   Patient verified insurance and reported no issues obtaining medications. PCP: new female PCP with RAEANN Coyne      HD/PD: Monday, Wednesday, Friday at [address] [facility] [phone]    PLAN/COMMENTS:  Patient expressed concerns with transportation to   [facility] dsicussion completed with patient. SW/CM provided contact information for patient or family to call with any questions. SW/CM will follow and assist as needed. SHAUNA Resendiz, [facility] [employer]  [phone]  Electronically signed by SHAUNA Resendiz LSW on 1/13/2021 at 9:59 AM    Per RN, patient has dialysis scheduled for 12PM, 3 hour run. SW will contact [email or web address] #[phone] to schedule transportation.    5:00PM Transport  Trip Number: [id number]    SHAUNA Resendiz LSW, Social Work/Case Management   [phone]  Electronically signed by SHAUNA Resendiz LSW on 1/13/2021 at 11:14 AM

## 2021-01-14 NOTE — ACP (ADVANCE CARE PLANNING)
ADVANCED CARE PLANNING    Name:Miryam Dumont       :  1975              MRN:  [id number]      Purpose of Encounter: Advanced care planning in light of ESRD  Parties in attendance: :Richa Dinh, Bryant Gilbert MD,   Patient Decisional Capacity: Yes    Subjective/Patient Story: Patient understands that the functional status continues to deteriorate. Patient No longer wishes to continue with the resuscitative measures in case  Of Cardiopulmonary arrest          Goals of Care Determinations: Patient wishes Not to use  the life support measures,   In case of cardiac arrest.      Code Status: At this time patient wishes to be DNR CCA    Time Spent on Advanced Planning Documents: > 15 minutes    Advanced Care Planning Documents: Completed advances directives have been completed ---No        Electronically signed by Bryant Gilbert MD on 2021 at 11:29 PM  Thank you Sandra Dealinh for the opportunity to be involved in this patient's care. If you have any questions or concerns please feel free to contact me at [phone].

## 2021-01-14 NOTE — DISCHARGE SUMMARY
Hospital Medicine Discharge Summary      Patient ID: Clint Pereyra [id number]     Patient's PCP: Karol Crowder    Admit Date: 1/11/2021     Discharge Date: 1/13/2021      Admitting Physician: Teddy Mattson MD    Discharge Physician: Trini Bejarano MD     Discharge Diagnoses: Active Hospital Problems    Diagnosis Date Noted    ESRD needing dialysis ([facility] [address] [N18.6, Z99.2] 01/11/2021         The patient was seen and examined on the day of discharge and this discharge summary is in conjunction with any daily progress note from day of discharge.     HOSPITAL COURSE       Patient demographics:  The patient  Umm Hicks is a 48 y.o. female      Significant past medical history:       Patient Active Problem List   Diagnosis    Depression with suicidal ideation    ESRD on hemodialysis ([address]    Hyperkalemia    Metabolic acidosis    Essential hypertension    DMII (diabetes mellitus, type 2) ([address]    Cellulitis    Cellulitis of lower extremity    Erythema nodosum    Chest pain    Failure to thrive in adult    Abnormal stress test    ESRD (end stage renal disease) on dialysis ([address]    Suicidal ideation    NSTEMI (non-ST elevated myocardial infarction) ([address]    Vaginal candidiasis    Vaginal bleeding    Vaginal discharge    Atypical chest pain    Severe episode of recurrent major depressive disorder, without psychotic features ([address]    Anxiety disorder    Cocaine abuse ([address]    Anemia    Illicit drug use    Hyperkalemia    Peritoneal dialysis status ([address]    Hyperlipidemia    Chronic focal neurological deficit    Nausea and vomiting    Moderate malnutrition (HCC)    GI bleed    Acute ischemic stroke ([address]    ESRD needing dialysis ([address]            Presenting symptoms:  Shortness of breath     Diagnostic workup:        CONSULTS DURING ADMISSION :   IP CONSULT TO NEPHROLOGY  IP CONSULT TO PHARMACY        Patient was diagnosed with:  Hyperkalemia   ESRD needing hemodialysis    Treatment while inpatient:  48years old -American female with medical history significant for end-stage renal disease, hyperparathyroidism and status post parathyroidectomy. Patient also has a history of stroke and diabetes mellitus and hypertension. History of right hemicolectomy due to ischemia anxiety depression and hypothyroidism. Pt presented to the emergency room with worsening shortness of breath. Patient was diagnosed with fluid overload and hyperkalemia. Currently patient is on peritoneal dialysis and she follows at [facility] nephrology. Nephrology was consulted and patient underwent hemodialysis on Tuesday and Wednesday.     Patient also spiked a low-grade temperature and that delayed patient's discharge from the hospital  Patient's fever improved and no etiology was established. Patient was ruled out for COVID-19. Discharge Condition:  stable     Discharged to:  Home     Activity:   as tolerated: Follow Up: Follow-up with PCP in 1-2 weeks              Labs:  For convenience and continuity at follow-up the following most recent labs are provided:      CBC:   Lab Results   Component Value Date    WBC 9.1 01/12/2021    HGB 8.9 01/12/2021    HCT 26.9 01/12/2021     01/12/2021       RENAL:   Lab Results   Component Value Date     01/13/2021    K 4.3 01/13/2021    K 6.6 01/11/2021    CL 94 01/13/2021    CO2 25 01/13/2021    BUN 34 01/13/2021    CREATININE 6.6 01/13/2021           Discharge Medications:    Maynor Orellana   Home Medication Instructions [id number]    Printed on:01/13/21 8496   Medication Information                      b complex vitamins capsule  TAKE 1 TABLET BY MOUTH ONE TIME A DAY             Calcium Acetate, Phos Binder, 667 MG CAPS  Take 667 mg by mouth 3 times daily (with meals)             darbepoetin hina-polysorbate (ARANESP, ALBUMIN FREE,) 100 MCG/0.5ML SOSY injection Inject 200 mcg into the skin once a week Las t dose was 1 week ago             gabapentin (NEURONTIN) 300 MG capsule  Take 300 mg by mouth See Admin Instructions. Take after dialysis              levETIRAcetam (KEPPRA) 500 MG tablet  Take 1 tablet by mouth three times a week After dialysis on Mon/Wed/Fri             lisinopril (PRINIVIL;ZESTRIL) 5 MG tablet  Take 5 mg by mouth daily             lubiprostone (AMITIZA) 24 MCG capsule  Take 24 mcg by mouth 2 times daily (with meals)              mirtazapine (REMERON) 15 MG tablet  Take 1 tablet by mouth nightly             NIFEdipine (PROCARDIA XL) 60 MG extended release tablet  Take 60 mg by mouth daily             ondansetron (ZOFRAN) 4 MG tablet  Take 4 mg by mouth every 8 hours as needed for Nausea or Vomiting             pantoprazole (PROTONIX) 40 MG tablet  Take 40 mg by mouth daily             polyvinyl alcohol (LIQUIFILM TEARS) 1.4 % ophthalmic solution  Place 1 drop into both eyes as needed             pregabalin (LYRICA) 75 MG capsule  Take 1 capsule by mouth 2 times daily for 30 days. Time Spent on discharge is more than 30 min in the examination, evaluation, counseling and review of medications and discharge plan. Signed:  Tam Valdez MD   1/13/2021      Thank you Junie Culver for the opportunity to be involved in this patient's care. If you have any questions or concerns please feel free to contact me at [phone]. This note was transcribed using [id number] [employer]. Please disregard any translational errors.

## 2021-01-20 ENCOUNTER — HOSPITAL ENCOUNTER (EMERGENCY)
Age: 46
Discharge: HOME | End: 2021-01-20
Attending: EMERGENCY MEDICINE
Payer: MEDICAID

## 2021-01-20 ENCOUNTER — APPOINTMENT (OUTPATIENT)
Dept: GENERAL RADIOLOGY | Age: 46
End: 2021-01-20
Payer: MEDICAID

## 2021-01-20 VITALS
RESPIRATION RATE: 12 BRPM | DIASTOLIC BLOOD PRESSURE: 68 MMHG | HEART RATE: 73 BPM | BODY MASS INDEX: 18.04 KG/M2 | OXYGEN SATURATION: 98 % | SYSTOLIC BLOOD PRESSURE: 105 MMHG | HEIGHT: 68 IN | TEMPERATURE: 97.2 F | WEIGHT: 119 LBS

## 2021-01-20 DIAGNOSIS — R55 SYNCOPE AND COLLAPSE: Primary | ICD-10-CM

## 2021-01-20 DIAGNOSIS — S42.031A CLOSED DISPLACED FRACTURE OF ACROMIAL END OF RIGHT CLAVICLE, INITIAL ENCOUNTER: ICD-10-CM

## 2021-01-20 LAB
A/G RATIO: 1 (ref 1.1–2.2)
ABO/RH: NORMAL
ALBUMIN SERPL-MCNC: 4 G/DL (ref 3.4–5)
ALP BLD-CCNC: 143 U/L (ref 40–129)
ALT SERPL-CCNC: 15 U/L (ref 10–40)
ANION GAP SERPL CALCULATED.3IONS-SCNC: 23 MMOL/L (ref 3–16)
ANTIBODY SCREEN: NORMAL
APTT: 33.6 SEC (ref 24.2–36.2)
AST SERPL-CCNC: 14 U/L (ref 15–37)
BASOPHILS ABSOLUTE: 0 K/UL (ref 0–0.2)
BASOPHILS RELATIVE PERCENT: 0.3 %
BILIRUB SERPL-MCNC: <0.2 MG/DL (ref 0–1)
BUN BLDV-MCNC: 58 MG/DL (ref 7–20)
CALCIUM SERPL-MCNC: 7.4 MG/DL (ref 8.3–10.6)
CHLORIDE BLD-SCNC: 86 MMOL/L (ref 99–110)
CO2: 23 MMOL/L (ref 21–32)
CREAT SERPL-MCNC: 10.1 MG/DL (ref 0.6–1.1)
EOSINOPHILS ABSOLUTE: 0.1 K/UL (ref 0–0.6)
EOSINOPHILS RELATIVE PERCENT: 1.2 %
GFR AFRICAN AMERICAN: 5
GFR NON-AFRICAN AMERICAN: 4
GLOBULIN: 4.1 G/DL
GLUCOSE BLD-MCNC: 346 MG/DL (ref 70–99)
HCT VFR BLD CALC: 27.2 % (ref 36–48)
HEMOGLOBIN: 8.6 G/DL (ref 12–16)
INR BLD: 1.11 (ref 0.86–1.14)
LYMPHOCYTES ABSOLUTE: 1.6 K/UL (ref 1–5.1)
LYMPHOCYTES RELATIVE PERCENT: 17 %
MAGNESIUM: 2.1 MG/DL (ref 1.8–2.4)
MCH RBC QN AUTO: 31.1 PG (ref 26–34)
MCHC RBC AUTO-ENTMCNC: 31.4 G/DL (ref 31–36)
MCV RBC AUTO: 99.1 FL (ref 80–100)
MONOCYTES ABSOLUTE: 0.3 K/UL (ref 0–1.3)
MONOCYTES RELATIVE PERCENT: 3.6 %
NEUTROPHILS ABSOLUTE: 7.3 K/UL (ref 1.7–7.7)
NEUTROPHILS RELATIVE PERCENT: 77.9 %
PDW BLD-RTO: 15.8 % (ref 12.4–15.4)
PLATELET # BLD: 334 K/UL (ref 135–450)
PMV BLD AUTO: 8.4 FL (ref 5–10.5)
POTASSIUM REFLEX MAGNESIUM: 3.9 MMOL/L (ref 3.5–5.1)
PRO-BNP: 5782 PG/ML (ref 0–124)
PROTHROMBIN TIME: 12.9 SEC (ref 10–13.2)
RBC # BLD: 2.75 M/UL (ref 4–5.2)
SODIUM BLD-SCNC: 132 MMOL/L (ref 136–145)
TOTAL PROTEIN: 8.1 G/DL (ref 6.4–8.2)
TROPONIN: 0.6 NG/ML
WBC # BLD: 9.4 K/UL (ref 4–11)

## 2021-01-20 PROCEDURE — 86850 RBC ANTIBODY SCREEN: CPT

## 2021-01-20 PROCEDURE — 83880 ASSAY OF NATRIURETIC PEPTIDE: CPT

## 2021-01-20 PROCEDURE — 96374 THER/PROPH/DIAG INJ IV PUSH: CPT

## 2021-01-20 PROCEDURE — 93005 ELECTROCARDIOGRAM TRACING: CPT | Performed by: EMERGENCY MEDICINE

## 2021-01-20 PROCEDURE — 85025 COMPLETE CBC W/AUTO DIFF WBC: CPT

## 2021-01-20 PROCEDURE — 85610 PROTHROMBIN TIME: CPT

## 2021-01-20 PROCEDURE — 86901 BLOOD TYPING SEROLOGIC RH(D): CPT

## 2021-01-20 PROCEDURE — 83735 ASSAY OF MAGNESIUM: CPT

## 2021-01-20 PROCEDURE — 6360000002 HC RX W HCPCS: Performed by: EMERGENCY MEDICINE

## 2021-01-20 PROCEDURE — 80053 COMPREHEN METABOLIC PANEL: CPT

## 2021-01-20 PROCEDURE — 71045 X-RAY EXAM CHEST 1 VIEW: CPT

## 2021-01-20 PROCEDURE — 85730 THROMBOPLASTIN TIME PARTIAL: CPT

## 2021-01-20 PROCEDURE — 84484 ASSAY OF TROPONIN QUANT: CPT

## 2021-01-20 PROCEDURE — 99285 EMERGENCY DEPT VISIT HI MDM: CPT

## 2021-01-20 PROCEDURE — 86900 BLOOD TYPING SEROLOGIC ABO: CPT

## 2021-01-20 PROCEDURE — 73000 X-RAY EXAM OF COLLAR BONE: CPT

## 2021-01-20 RX ORDER — MORPHINE SULFATE 2 MG/ML
2 INJECTION, SOLUTION INTRAMUSCULAR; INTRAVENOUS ONCE
Status: COMPLETED | OUTPATIENT
Start: 2021-01-20 | End: 2021-01-20

## 2021-01-20 RX ORDER — OXYCODONE HYDROCHLORIDE AND ACETAMINOPHEN 5; 325 MG/1; MG/1
1 TABLET ORAL EVERY 6 HOURS PRN
Qty: 12 TABLET | Refills: 0 | Status: SHIPPED | OUTPATIENT
Start: 2021-01-20 | End: 2021-01-23

## 2021-01-20 RX ADMIN — MORPHINE SULFATE 2 MG: 2 INJECTION, SOLUTION INTRAMUSCULAR; INTRAVENOUS at 20:14

## 2021-01-20 ASSESSMENT — PAIN SCALES - GENERAL
PAINLEVEL_OUTOF10: 10
PAINLEVEL_OUTOF10: 6
PAINLEVEL_OUTOF10: 10

## 2021-01-20 ASSESSMENT — PAIN DESCRIPTION - LOCATION: LOCATION: SHOULDER

## 2021-01-20 ASSESSMENT — PAIN - FUNCTIONAL ASSESSMENT: PAIN_FUNCTIONAL_ASSESSMENT: 0-10

## 2021-01-20 ASSESSMENT — PAIN DESCRIPTION - ORIENTATION: ORIENTATION: RIGHT

## 2021-01-21 ENCOUNTER — TELEPHONE (OUTPATIENT)
Dept: ORTHOPEDIC SURGERY | Age: 46
End: 2021-01-21

## 2021-01-21 LAB
EKG ATRIAL RATE: 74 BPM
EKG ATRIAL RATE: 76 BPM
EKG DIAGNOSIS: NORMAL
EKG DIAGNOSIS: NORMAL
EKG P AXIS: 48 DEGREES
EKG P AXIS: 56 DEGREES
EKG P-R INTERVAL: 174 MS
EKG P-R INTERVAL: 174 MS
EKG Q-T INTERVAL: 446 MS
EKG Q-T INTERVAL: 448 MS
EKG QRS DURATION: 84 MS
EKG QRS DURATION: 86 MS
EKG QTC CALCULATION (BAZETT): 495 MS
EKG QTC CALCULATION (BAZETT): 504 MS
EKG R AXIS: 47 DEGREES
EKG R AXIS: 52 DEGREES
EKG T AXIS: 61 DEGREES
EKG T AXIS: 69 DEGREES
EKG VENTRICULAR RATE: 74 BPM
EKG VENTRICULAR RATE: 76 BPM

## 2021-01-21 PROCEDURE — 93010 ELECTROCARDIOGRAM REPORT: CPT | Performed by: INTERNAL MEDICINE

## 2021-01-21 NOTE — ED PROVIDER NOTES
[address] [facility]      Pt Name: Shira Butt  MRN: [id number]  [place] 1975  Date of evaluation: 1/20/2021  Provider: Marisol West MD    CHIEF COMPLAINT       Chief Complaint   Patient presents with    Loss of Consciousness     Dialysis told her the hemaglobin was low and calcium was high    Shoulder Injury     right         HISTORY OF PRESENT ILLNESS   (Location/Symptom, Timing/Onset,Context/Setting, Quality, Duration, Modifying Factors, Severity)  Note limiting factors. Shira Butt is a 48 y.o. female who presents to the emergency department with a history of end-stage for evaluation after syncopal event last night. Patient has a history of end-stage renal disease, she was recently admitted to the hospital for hyperkalemia after missing a week of treatments, discharged last Thursday. She states that she did go to dialysis last Friday and Monday, but missed dialysis yesterday. She states over the weekend while she was cleaning her house she did intermittently feel lightheaded while walking around. She reports that last night she was watching a movie, stood up to get more popcorn and felt very lightheaded and syncopized. Patient denies head trauma, she denies any headaches or neck pain since this occurred. She does report pain of her right shoulder, which she believes is where the main area of impact was. She denies any numbness weakness in her arms or legs. Denies any chest or abdominal pain. Patient does report that she feels very weak today. She states that when she called dialysis this afternoon after missing her session they told her that her on her recent blood work her hemoglobin was low and her calcium was high and that she should go to the emergency department. NursingNotes were reviewed.     REVIEW OF SYSTEMS    (2-9 systems for level 4, 10 or more for level 5) Constitutional: No fever or chills. Fatigue and intermittent lightheadedness with standing. Eye: No visual disturbances. No eye pain. Ear/Nose/Mouth/Throat: No nasal congestion. No sore throat. Respiratory: No cough, No shortness of breath, No sputum production. Cardiovascular: No chest pain. No palpitations. Gastrointestinal: No abdominal pain. No nausea or vomiting  Genitourinary: No dysuria. No hematuria. Hematology/Lymphatics: No bleeding or bruising tendency. Immunologic: No malaise. No swollen glands. Musculoskeletal: No back pain. Right shoulder pain. Integumentary: No rash. No abrasions. Neurologic: No headache. No focal numbness or weakness.       PAST MEDICAL HISTORY     Past Medical History:   Diagnosis Date    Cerebral artery occlusion with cerebral infarction ([facility] [address] 2010    Cocaine abuse ([facility] [address]     Crohn's disease ([facility] [address]     Depression     Diabetes mellitus ([facility] [address]     ESRD (end stage renal disease) ([facility] [address]     HD Tues-Thurs-Sat    GERD (gastroesophageal reflux disease)     Hemodialysis patient ([facility] [address] 2016    peritoneal     Hyperlipidemia     Hypertension     MI, old     Mood disorder ([facility] [address]     Pericarditis     Peritonitis ([address]     Pneumonia     Thyroid disease          SURGICALHISTORY       Past Surgical History:   Procedure Laterality Date    BACK SURGERY      Tumor removal    CHOLECYSTECTOMY      COLON SURGERY Right     KIDNEY TRANSPLANT  1990    KIDNEY TRANSPLANT      LAPAROSCOPY      right ovary removed and left ovarian cyst removed for endometriosis    OTHER SURGICAL HISTORY      peritoneal dialysis catheter    PARACENTESIS      TUBAL LIGATION      UPPER GASTROINTESTINAL ENDOSCOPY  03/31/2017    UPPER GASTROINTESTINAL ENDOSCOPY N/A 3/25/2019    [facility]in[facility]       Previous Medications    B COMPLEX VITAMINS CAPSULE    TAKE 1 TABLET BY MOUTH ONE TIME A DAY CALCIUM ACETATE, PHOS BINDER, 667 MG CAPS    Take 667 mg by mouth 3 times daily (with meals)    DARBEPOETIN TOMAS-POLYSORBATE (ARANESP, ALBUMIN FREE,) 100 MCG/0.5ML SOSY INJECTION    Inject 200 mcg into the skin once a week Las t dose was 1 week ago    GABAPENTIN (NEURONTIN) 300 MG CAPSULE    Take 300 mg by mouth See Admin Instructions. Take after dialysis     LEVETIRACETAM (KEPPRA) 500 MG TABLET    Take 1 tablet by mouth three times a week After dialysis on Mon/Wed/Fri    LISINOPRIL (PRINIVIL;ZESTRIL) 5 MG TABLET    Take 5 mg by mouth daily    LUBIPROSTONE (AMITIZA) 24 MCG CAPSULE    Take 24 mcg by mouth 2 times daily (with meals)     MIRTAZAPINE (REMERON) 15 MG TABLET    Take 1 tablet by mouth nightly    NIFEDIPINE (PROCARDIA XL) 60 MG EXTENDED RELEASE TABLET    Take 60 mg by mouth daily    ONDANSETRON (ZOFRAN) 4 MG TABLET    Take 4 mg by mouth every 8 hours as needed for Nausea or Vomiting    PANTOPRAZOLE (PROTONIX) 40 MG TABLET    Take 40 mg by mouth daily    POLYVINYL ALCOHOL (LIQUIFILM TEARS) 1.4 % OPHTHALMIC SOLUTION    Place 1 drop into both eyes as needed    PREGABALIN (LYRICA) 75 MG CAPSULE    Take 1 capsule by mouth 2 times daily for 30 days.        ALLERGIES     Ferrous sulfate, Cephalexin, Gabapentin, Dicloxacillin, Hydromorphone, Pcn [penicillins], and Sulfa antibiotics    FAMILY HISTORY       Family History   Problem Relation Age of Onset    Heart Attack Mother     Diabetes Mother     Hypertension Mother     Stroke Father     Diabetes Father     Hypertension Father           SOCIAL HISTORY       Social History     Socioeconomic History    Marital status:      Spouse name: None    Number of children: 2    Years of education: None    Highest education level: None   Occupational History    Occupation: volunteer   Social Needs    Financial resource strain: None    Food insecurity     Worry: None     Inability: None    Transportation needs     Medical: None     Non-medical: None Tobacco Use    Smoking status: Never Smoker    Smokeless tobacco: Never Used   Substance and Sexual Activity    Alcohol use: Not Currently     Alcohol/week: 0.0 standard drinks     Comment: monthly    Drug use: Not Currently    Sexual activity: Yes     Partners: Male   Lifestyle    Physical activity     Days per week: None     Minutes per session: None    Stress: None   Relationships    Social connections     Talks on phone: None     Gets together: None     Attends Denominational service: None     Active member of club or organization: None     Attends meetings of clubs or organizations: None     Relationship status: None    Intimate partner violence     Fear of current or ex partner: None     Emotionally abused: None     Physically abused: None     Forced sexual activity: None   Other Topics Concern    None   Social History Narrative    None       SCREENINGS             PHYSICAL EXAM    (up to 7 for level 4, 8 or more for level 5)     ED Triage Vitals [01/20/21 1845]   BP Temp Temp Source Pulse Resp SpO2 Height Weight   127/71 97.1 °F (36.2 °C) Temporal 79 16 100 % 5' 7.5\" (1.715 m) 119 lb (54 kg)       General: Alert and oriented, No acute distress. Eye: Normal conjunctiva. Pupils equal and reactive. HENT: Oral mucosa is moist.  Respiratory: Lungs are clear to auscultation, Respirations are non-labored. Cardiovascular: Normal rate, Regular rhythm. Gastrointestinal: Soft, Non-tender, Non-distended. Musculoskeletal: No swelling. Integumentary: Warm, Dry. Neurologic: Alert, Oriented, No focal defects. Psychiatric: Cooperative.     DIAGNOSTIC RESULTS     EKG: All EKG's are interpreted by the Emergency Department Physician who either signs or Co-signsthis chart in the absence of a cardiologist.    Per my interpretation:    Electrocardiogram (ECG) 1/20/2021 1907   RATE: normal  RHYTHM: normal sinus  AXIS: normal  INTERVALS: normal  ST-T WAVE CHANGES: No evidence of ST segment elevation or T-wave inversion Prior for comparison    RADIOLOGY:   Non-plain filmimages such as CT, Ultrasound and MRI are read by the radiologist. Plain radiographic images are visualized and preliminarily interpreted by the emergency physician with the below findings:      Interpretation per the Radiologist below, if available at the time ofthis note:    XR CHEST PORTABLE    (Results Pending)   XR SHOULDER RIGHT (MIN 2 VIEWS)    (Results Pending)         ED BEDSIDE ULTRASOUND:   Performed by ED Physician - none    LABS:  Labs Reviewed   CBC WITH AUTO DIFFERENTIAL   COMPREHENSIVE METABOLIC PANEL W/ REFLEX TO MG FOR LOW K   TROPONIN   BRAIN NATRIURETIC PEPTIDE   MAGNESIUM   PROTIME-INR   APTT   TYPE AND SCREEN       All other labs were within normal range or not returned as of this dictation. EMERGENCY DEPARTMENT COURSE and DIFFERENTIAL DIAGNOSIS/MDM:   Vitals:    Vitals:    01/20/21 1845   BP: 127/71   Pulse: 79   Resp: 16   Temp: 97.1 °F (36.2 °C)   TempSrc: Temporal   SpO2: 100%   Weight: 119 lb (54 kg)   Height: 5' 7.5\" (1.715 m)         Medical decision making:  ***    CRITICAL CARE TIME   Total Critical Care time was 0 minutes, excluding separately reportable procedures. There was a high probability of clinically significant/life threatening deterioration in the patient's condition which required my urgent intervention. CONSULTS:  None    PROCEDURES:  Unless otherwise noted below, none         FINAL IMPRESSION    No diagnosis found. DISPOSITION/PLAN   DISPOSITION        PATIENT REFERRED TO:  No follow-up provider specified.     DISCHARGE MEDICATIONS:  New Prescriptions    No medications on file          (Please note that portions of this note were completed with a voice recognition program.Efforts were made to edit the dictations but occasionally words are mis-transcribed.)    Brianna Roman MD (electronically signed)  Attending Emergency Physician

## 2021-01-21 NOTE — ED TRIAGE NOTES
Pt arrived to ED via private vehicle for a complaint of a fall. Pt states that she may have passed out and fell onto her right shoulder. Pt was supposed to go to dialysis this morning but she slept in. When she called, dialysis said that H&H was low and that her calcium was high. Pt denies chest pain, shortness of breath, dizziness, or headaches. VS noted and stable. Patient A&Ox4. Respirations easy and unlabored. Skin warm and dry and appropriate for ethnicity. No acute distress noted at this time. Patient placed on continuous telemetry and pulse ox upon arrival to room.

## 2021-01-21 NOTE — ED PROVIDER NOTES
[address] [facility]      Pt Name: Aram Teran  MRN: [id number]  [place] 1975  Date of evaluation: 1/20/2021  Provider: Junior Dariela MD    CHIEF COMPLAINT       Chief Complaint   Patient presents with    Loss of Consciousness     Dialysis told her the hemaglobin was low and calcium was high    Shoulder Injury     right         HISTORY OF PRESENT ILLNESS   (Location/Symptom, Timing/Onset,Context/Setting, Quality, Duration, Modifying Factors, Severity)  Note limiting factors. Aram Teran is a 48 y.o. female who presents to the emergency department for evaluation of abnormal blood work as well as a syncopal event last night. Patient reports that she has been feeling somewhat lightheaded intermittently for the past few days, particularly with cleaning and things like that. She states that last night she was watching a movie and then stood up and felt very lightheaded and fell over. She states that she was only unconscious for several seconds. She denies any head trauma and has not had headaches, blurred vision, nausea or vomiting, focal numbness or weakness since this event. She is complaining of right shoulder pain and states that she believes she hit the shoulder when she fell. She was supposed to go to dialysis this morning but slept late and missed her appointment. When she called dialysis this afternoon to try to reschedule her appointment, they told her that her recent blood work showed a low hemoglobin and high calcium level, and told her to go to the emergency department. Patient otherwise denies any chest pain, abdominal pain, and has no other complaints besides right anterior shoulder pain at time of my assessment. NursingNotes were reviewed. REVIEW OF SYSTEMS    (2-9 systems for level 4, 10 or more for level 5)       Constitutional: No fever or chills. Eye: No visual disturbances.  No eye polyvinyl alcohol (LIQUIFILM TEARS) 1.4 % ophthalmic solution Place 1 drop into both eyes as neededHistorical Med      gabapentin (NEURONTIN) 300 MG capsule Take 300 mg by mouth See Admin Instructions. Take after dialysis Historical Med      lisinopril (PRINIVIL;ZESTRIL) 5 MG tablet Take 5 mg by mouth dailyHistorical Med      ondansetron (ZOFRAN) 4 MG tablet Take 4 mg by mouth every 8 hours as needed for Nausea or VomitingHistorical Med      NIFEdipine (PROCARDIA XL) 60 MG extended release tablet Take 60 mg by mouth dailyHistorical Med      pantoprazole (PROTONIX) 40 MG tablet Take 40 mg by mouth dailyHistorical Med      Calcium Acetate, Phos Binder, 667 MG CAPS Take 667 mg by mouth 3 times daily (with meals)Historical Med      b complex vitamins capsule TAKE 1 TABLET BY MOUTH ONE TIME A DAYHistorical Med      pregabalin (LYRICA) 75 MG capsule Take 1 capsule by mouth 2 times daily for 30 days. , Disp-60 capsule, R-0Print      mirtazapine (REMERON) 15 MG tablet Take 1 tablet by mouth nightly, Disp-30 tablet, R-3Print      lubiprostone (AMITIZA) 24 MCG capsule Take 24 mcg by mouth 2 times daily (with meals) Historical Med      darbepoetin hina-polysorbate (ARANESP, ALBUMIN FREE,) 100 MCG/0.5ML SOSY injection Inject 200 mcg into the skin once a week Las t dose was 1 week agoHistorical Med             ALLERGIES     Ferrous sulfate, Cephalexin, Gabapentin, Dicloxacillin, Hydromorphone, Pcn [penicillins], and Sulfa antibiotics    FAMILY HISTORY       Family History   Problem Relation Age of Onset    Heart Attack Mother     Diabetes Mother     Hypertension Mother     Stroke Father     Diabetes Father     Hypertension Father           SOCIAL HISTORY       Social History     Socioeconomic History    Marital status:      Spouse name: None    Number of children: 2    Years of education: None    Highest education level: None   Occupational History    Occupation: volunteer   Social Needs    Financial resource strain: None    Food insecurity     Worry: None     Inability: None    Transportation needs     Medical: None     Non-medical: None   Tobacco Use    Smoking status: Never Smoker    Smokeless tobacco: Never Used   Substance and Sexual Activity    Alcohol use: Not Currently     Alcohol/week: 0.0 standard drinks     Comment: monthly    Drug use: Not Currently    Sexual activity: Yes     Partners: Male   Lifestyle    Physical activity     Days per week: None     Minutes per session: None    Stress: None   Relationships    Social connections     Talks on phone: None     Gets together: None     Attends [employer] service: None     Active member of club or organization: None     Attends meetings of clubs or organizations: None     Relationship status: None    Intimate partner violence     Fear of current or ex partner: None     Emotionally abused: None     Physically abused: None     Forced sexual activity: None   Other Topics Concern    None   Social History Narrative    None       SCREENINGS             PHYSICAL EXAM    (up to 7 for level 4, 8 or more for level 5)     ED Triage Vitals [01/20/21 1845]   BP Temp Temp Source Pulse Resp SpO2 Height Weight   127/71 97.1 °F (36.2 °C) Temporal 79 16 100 % 5' 7.5\" (1.715 m) 119 lb (54 kg)       General: Alert and oriented, No acute distress. Eye: Normal conjunctiva. Pupils equal and reactive. HENT: Oral mucosa is moist.  No facial or scalp hematomas or lacerations. No cervical spinal tenderness to palpation or pain with neck movement. Respiratory: Lungs are clear to auscultation, Respirations are non-labored. Cardiovascular: Normal rate, Regular rhythm. No chest wall tenderness. Gastrointestinal: Soft, Non-tender, Non-distended. Musculoskeletal: No deformity to the right shoulder, reproducible tenderness over the anterior shoulder and distal clavicle. Normal radial pulse in bilateral arms, sensation motor function intact in the hands.   No other extremity tenderness, deformity, or swelling. No midline spinal tenderness or step-off. Integumentary: Warm, Dry. No other bruising or abrasions noted. Neurologic: Alert, Oriented, No focal defects. Normal speech and cranial nerve exam.  Motor and sensation intact in all extremities. Psychiatric: Cooperative. DIAGNOSTIC RESULTS     EKG: All EKG's are interpreted by the Emergency Department Physician who either signs or Co-signsthis chart in the absence of a cardiologist.    Per my interpretation:    Electrocardiogram (ECG) 1/20/2021 1907  RATE: normal  RHYTHM: normal sinus  AXIS: normal  INTERVALS: QTc 504 ms, calculated manually, 405 ms  ST-T WAVE CHANGES: No evidence of ST segment elevation or T-wave inversion  Prior for comparison 1/11/2021    Repeat EKG at 2107, unchanged, no ST abnormality    RADIOLOGY:   Non-plain filmimages such as CT, Ultrasound and MRI are read by the radiologist. Plain radiographic images are visualized and preliminarily interpreted by the emergency physician with the below findings:      Interpretation per the Radiologist below, if available at the time ofthis note:    XR CLAVICLE RIGHT   Final Result   1. Acute displaced right clavicular fracture. XR CHEST PORTABLE   Final Result   1. Acute displaced right clavicular fracture.                ED BEDSIDE ULTRASOUND:   Performed by ED Physician - none    LABS:  Labs Reviewed   CBC WITH AUTO DIFFERENTIAL - Abnormal; Notable for the following components:       Result Value    RBC 2.75 (*)     Hemoglobin 8.6 (*)     Hematocrit 27.2 (*)     RDW 15.8 (*)     All other components within normal limits    Narrative:     Performed at:  [address] [address]   Phone [phone]   COMPREHENSIVE METABOLIC PANEL W/ REFLEX TO MG FOR LOW K - Abnormal; Notable for the following components:    Sodium 132 (*)     Chloride 86 (*)     Anion Gap 23 (*)     Glucose 346 (*) BUN 58 (*)     CREATININE 10.1 (*)     GFR Non- 4 (*)     GFR  5 (*)     Calcium 7.4 (*)     Albumin/Globulin Ratio 1.0 (*)     Alkaline Phosphatase 143 (*)     AST 14 (*)     All other components within normal limits    Narrative:     Cheri Hilliard tel. [phone],  Chemistry results called to and read back by Osito Castillo RN. , 01/20/2021  20:29, by [facility]  Performed at:  [facility]  [address] [facility] [address] [place] [address]   Phone [phone]   TROPONIN - Abnormal; Notable for the following components:    Troponin 0.60 (*)     All other components within normal limits    Narrative:     Cheri Hilliard tel. [phone],  Chemistry results called to and read back by Osito Castillo RN. , 01/20/2021  20:29, by [facility]  Performed at:  [facility]  [address], [place] [address]   Phone [phone]   BRAIN NATRIURETIC PEPTIDE - Abnormal; Notable for the following components:    Pro-BNP 5,782 (*)     All other components within normal limits    Narrative:     Cheri Hilliard tel. [phone],  Chemistry results called to and read back by Osito Castillo RN. , 01/20/2021  20:29, by [facility]  Performed at:  [facility]  [address], [place] [address]   Phone [phone]   MAGNESIUM    Narrative:     Cheri Hilliard tel. [phone],  Chemistry results called to and read back by Osito Castillo RN. , 01/20/2021  20:29, by [facility]  Performed at:  [facility]  [address], [place] [address]   Phone [phone]   PROTIME-INR    Narrative:     Performed at:  [facility]  [address] [place] [address]   Phone [phone]   APTT    Narrative:     Performed at:  [facility]  [address] [facility] [place] [address]   Phone [phone]   Levine, Susan. \Hospital Has a New Name and Outlook.\"" Narrative:     Performed at:  [facility]  [address]   Phone [phone]       All other labs were within normal range or not returned as of this dictation. EMERGENCY DEPARTMENT COURSE and DIFFERENTIAL DIAGNOSIS/MDM:   Vitals:    Vitals:    01/20/21 2011 01/20/21 2013 01/20/21 2116 01/20/21 2221   BP: 123/65  117/61 105/68   Pulse: 77  80 73   Resp: 19 18 22 12   Temp:    97.2 °F (36.2 °C)   TempSrc:    Oral   SpO2: 99% 100% 100% 98%   Weight:       Height:             Medical decision making: This is a 39-year-old female with history of end-stage renal disease on dialysis who presents for evaluation of right shoulder pain in the setting of a syncopal event last night, also reporting that her dialysis nurse had that she had low hemoglobin and high calcium levels. On exam here she is alert, well-appearing, with normal vital signs. She denies any head trauma or recurrent episodes of syncope or near syncope since this occurred last night. She complains of right shoulder pain has tenderness in this area, but otherwise no traumatic findings on physical exam.  Normal neurologic exam.  X-rays of the chest and right shoulder are obtained and noted an acute distal right clavicular fracture. Patient was treated with morphine in the emergency department and given prescription for Percocet, PDMP was reviewed prior to prescribing narcotic medication. She is placed in a sling and swath and referred to orthopedics. Otherwise, in terms of her syncopal event, consider anemia, metabolic disturbance, orthostasis, dehydration, ACS, cardiac arrhythmia. CBC shows a hemoglobin of 8.6, but this appears to be baseline for the patient. CMP shows reassuring electrolytes, creatinine is 10.1 with BUN of 58, likely because patient missed her dialysis today but no indication for emergent dialysis based on electrolytes.   Calcium is 7.4, but again this appears to be baseline for the patient. EKG shows no evidence of ischemic changes. QTC was read by the machine is prolonged, but when manually calculated as 405 ms. Initial troponin is 0.60, which is again at the patient's baseline, troponin values since 7/2/18 have ranged from 0.49-0.68, this is essentially negative for this patient. BNP chronically elevated as well, edema on chest x-ray. Magnesium is normal.  Coags are normal.  The statics were negative. We did observe the patient in the emergency department and obtain a repeat EKG which is unchanged. Given patient's syncopal work-up is essentially negative, all findings being at her baseline, no significant indication for admission, patient would prefer outpatient follow-up as well. She is instructed to return to the emergency department if she develops any recurrent lightheadedness or syncope or any associated symptoms such as chest pain, shortness of breath, abdominal pain, focal neurologic symptoms. Otherwise, recommended to call her dialysis center to try to get a treatment tomorrow, and follow-up within the next 1 day with her primary care provider. Patient is understanding, agreeable with plan of care, discharged in stable condition. CRITICAL CARE TIME   Total Critical Care time was 0 minutes, excluding separately reportable procedures. There was a high probability of clinically significant/life threatening deterioration in the patient's condition which required my urgent intervention. CONSULTS:  None    PROCEDURES:  Unless otherwise noted below, none         FINAL IMPRESSION      1. Syncope and collapse    2.  Closed displaced fracture of acromial end of right clavicle, initial encounter          DISPOSITION/PLAN   DISPOSITION Decision To Discharge 01/20/2021 08:48:54 PM      PATIENT REFERRED TO:  Livia Ogden [address] [place] [address]  [phone]    Go in 1 day      Dialysis    Go in 1 day      Faustine Remedies, MD  [address] [address]  [phone]    Schedule an appointment as soon as possible for a visit in 3 days        DISCHARGE MEDICATIONS:  Discharge Medication List as of 1/20/2021 10:12 PM      START taking these medications    Details   oxyCODONE-acetaminophen (PERCOCET) 5-325 MG per tablet Take 1 tablet by mouth every 6 hours as needed for Pain for up to 3 days. Intended supply: 3 days.  Take lowest dose possible to manage pain, Disp-12 tablet, R-0Print                (Please note that portions of this note were completed with a voice recognition program.Efforts were made to edit the dictations but occasionally words are mis-transcribed.)    Sharad Velázquez MD (electronically signed)  Attending Emergency Physician        Shraad Velázquez MD  01/20/21 [address]6021 Cassandra Esposito MD  01/27/21 9195

## 2021-01-21 NOTE — ED NOTES
D/C: Order noted for d/c. Pt confirmed d/c paperwork 1 rx have correct name. Discharge and education instructions reviewed with patient. Teach-back successful. Pt verbalized understanding and signed d/c papers. Pt denied questions at this time. No acute distress noted. Patient instructed to follow-up as noted - return to emergency department if symptoms worsen. Patient verbalized understanding. Discharged per EDMD with discharge instructions. Pt discharged to private vehicle. Patient stable upon departure. Thanked patient for choosing [facility] for care. Provider aware of patient pain at time of discharge.        Souleymane Diehl RN  01/20/21 3246

## 2021-01-21 NOTE — DISCHARGE INSTR - COC
Continuity of Care Form    Patient Name: Danuta Barney   :  1975  MRN:  [id number]    Admit date:  2021  Discharge date:  ***    Code Status Order: Prior   Advance Directives:     Admitting Physician:  No admitting provider for patient encounter. PCP: Prabhakar George    Discharging Nurse: [facility] Unit/Room#: 029/C-29  Discharging Unit Phone Number: ***    Emergency Contact:   Extended Emergency Contact Information  Primary Emergency Contact: Filomena Del Real [address] Phone: [phone]  Relation: Brother/Sister  Secondary Emergency Contact: surjit esquivel  [place] Phone: [phone]  Mobile Phone: [phone]  Relation: Other    Past Surgical History:  Past Surgical History:   Procedure Laterality Date    BACK SURGERY      Tumor removal    CHOLECYSTECTOMY      COLON SURGERY Right     KIDNEY TRANSPLANT      KIDNEY TRANSPLANT      LAPAROSCOPY      right ovary removed and left ovarian cyst removed for endometriosis    OTHER SURGICAL HISTORY      peritoneal dialysis catheter    PARACENTESIS      TUBAL LIGATION      UPPER GASTROINTESTINAL ENDOSCOPY  2017    UPPER GASTROINTESTINAL ENDOSCOPY N/A 3/25/2019    EGD BIOPSY performed by Lisa Baxter MD at [facility] ENDOSCOPY       Immunization History: There is no immunization history on file for this patient.     Active Problems:  Patient Active Problem List   Diagnosis Code    Depression with suicidal ideation F32.9, R39.200    ESRD on hemodialysis ([facility] N18.6, Z99.2    Hyperkalemia F79.8    Metabolic acidosis L18.8    Essential hypertension I10    DMII (diabetes mellitus, type 2) ([address] E11.9    Cellulitis L03.90    Cellulitis of lower extremity L03.119    Erythema nodosum L52    Chest pain R07.9    Failure to thrive in adult R62.7    Abnormal stress test R94.39    ESRD (end stage renal disease) on dialysis ([facility] [address] [facility] N18.6, Z99.2    Suicidal ideation R45.851    NSTEMI (non-ST elevated myocardial infarction) ([facility] I21.4    Vaginal candidiasis B37.3    Vaginal bleeding N93.9    Vaginal discharge N89.8    Atypical chest pain R07.89    Severe episode of recurrent major depressive disorder, without psychotic features ([facility] F33.2    Anxiety disorder F41.9    Cocaine abuse ([facility]) F14.10    Anemia D11.8    Illicit drug use H61.27    Hyperkalemia E87.5    Peritoneal dialysis status ([facility] Z99.2    Hyperlipidemia E78.5    Chronic focal neurological deficit R29.818    Nausea and vomiting R11.2    Moderate malnutrition ([facility]) E44.0    GI bleed K92.2    Acute ischemic stroke ([facility]) I63.9    ESRD needing dialysis ([facility]) N18.6, Z99.2       Isolation/Infection:   Isolation          No Isolation        Patient Infection Status     Infection Onset Added Last Indicated Last Indicated By Review Planned Expiration Resolved Resolved By    None active    Resolved    COVID-19 Rule Out 01/11/21 01/11/21 01/12/21 COVID-19 (Ordered)   01/12/21 Rule-Out Test Resulted          Nurse Assessment:  Last Vital Signs: /68   Pulse 73   Temp 97.2 °F (36.2 °C) (Oral)   Resp 12   Ht 5' 7.5\" (1.715 m)   Wt 119 lb (54 kg)   SpO2 98%   BMI 18.36 kg/m²     Last documented pain score (0-10 scale): Pain Level: 8  Last Weight:   Wt Readings from Last 1 Encounters:   01/20/21 119 lb (54 kg)     Mental Status:  {IP PT MENTAL STATUS:15966}    IV Access:  { LY IV ACCESS:266920213}    Nursing Mobility/ADLs:  Walking   {[facility] DME XQIE:705054251}  Transfer  {[facility] DME TORIE:892479371}  Bathing  {[facility] DME GRQY:692802292}  Dressing  {[facility] DME ZCIV:889545442}  Toileting  {[facility] DME HTRC:614591402}  Feeding  {[facility] DME LWSR:433896208}  Med Admin  {[facility] DME NJEC:096956363}  Med Delivery   { LY MED Delivery:399326212}    Wound Care Documentation and Therapy:  Wound Foot Right R 5th toe (Active)   Number of days:        Wound Toe (Comment  which one) Right (Active)   Number of days:        Wound Foot Left;Upper L inner ankle (Active) Number of days:        Wound Foot Left (Active)   Number of days:         Elimination:  Continence:   · Bowel: {YES / QI:49266}  · Bladder: {YES / FX:01570}  Urinary Catheter: {Urinary Catheter:206155995}   Colostomy/Ileostomy/Ileal Conduit: {YES / NF:76490}       Date of Last BM: ***  No intake or output data in the 24 hours ending 21 2300  No intake/output data recorded.     Safety Concerns:     [address] [facility] Safety Concerns:159779896}    Impairments/Disabilities:      [address] [facility] Impairments/Disabilities:585707363}    Nutrition Therapy:  Current Nutrition Therapy:   [address] [facility] Diet List:819311897}    Routes of Feeding: {CHP DME Other Feedings:778084096}  Liquids: {Slp liquid thickness:76661}  Daily Fluid Restriction: {CHP DME Yes amt example:103692358}  Last Modified Barium Swallow with Video (Video Swallowing Test): {Done Not Done FPII:287694327}    Treatments at the Time of Hospital Discharge:   Respiratory Treatments: ***  Oxygen Therapy:  {Therapy; copd oxygen:49666}  Ventilator:    { CC Vent IGKD:602207927}    Rehab Therapies: {THERAPEUTIC INTERVENTION:3214628766}  Weight Bearing Status/Restrictions: [address]  Weight Bearin}  Other Medical Equipment (for information only, NOT a DME order):  {EQUIPMENT:805291903}  Other Treatments: ***    Patient's personal belongings (please select all that are sent with patient):  {[facility] DME Belongings:519749811}    RN SIGNATURE:  {Esignature:357361865}    CASE MANAGEMENT/SOCIAL WORK SECTION    Inpatient Status Date: ***    Readmission Risk Assessment Score:  Readmission Risk              Risk of Unplanned Readmission:        0           Discharging to Facility/ Agency   · Name:   · Address:  · Phone:  · Fax:    Dialysis Facility (if applicable)   · Name:  · Address:  · Dialysis Schedule:  · Phone:  · Fax:    / signature: {Esignature:303526213}    PHYSICIAN SECTION    Prognosis: {Prognosis:1951794429}    Condition at Discharge: [address] Patient Condition:651766501}    Rehab Potential (if transferring to Rehab): {Prognosis:2617972757}    Recommended Labs or Other Treatments After Discharge: ***    Physician Certification: I certify the above information and transfer of Nivia Alcaraz  is necessary for the continuing treatment of the diagnosis listed and that she requires {Admit to Appropriate Level of Care:02874} for {GREATER/LESS:577053300} 30 days.      Update Admission H&P: {CHP DME Changes in REHJJ:061080868}    PHYSICIAN SIGNATURE:  {Esignature:159394506}

## 2021-04-14 ENCOUNTER — APPOINTMENT (OUTPATIENT)
Dept: GENERAL RADIOLOGY | Age: 46
End: 2021-04-14
Payer: MEDICAID

## 2021-04-14 ENCOUNTER — HOSPITAL ENCOUNTER (EMERGENCY)
Age: 46
Discharge: HOME | End: 2021-04-14
Attending: EMERGENCY MEDICINE
Payer: MEDICAID

## 2021-04-14 VITALS
HEART RATE: 84 BPM | OXYGEN SATURATION: 100 % | SYSTOLIC BLOOD PRESSURE: 111 MMHG | DIASTOLIC BLOOD PRESSURE: 91 MMHG | HEIGHT: 67 IN | TEMPERATURE: 98.3 F | WEIGHT: 130 LBS | BODY MASS INDEX: 20.4 KG/M2 | RESPIRATION RATE: 14 BRPM
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
## 2021-04-14 DIAGNOSIS — S93.402A SPRAIN OF LEFT ANKLE, UNSPECIFIED LIGAMENT, INITIAL ENCOUNTER: Primary | ICD-10-CM

## 2021-04-14 DIAGNOSIS — S93.601A SPRAIN OF RIGHT FOOT, INITIAL ENCOUNTER: ICD-10-CM

## 2021-04-14 DIAGNOSIS — S93.401A SPRAIN OF RIGHT ANKLE, UNSPECIFIED LIGAMENT, INITIAL ENCOUNTER: ICD-10-CM

## 2021-04-14 PROCEDURE — 73610 X-RAY EXAM OF ANKLE: CPT

## 2021-04-14 PROCEDURE — 73630 X-RAY EXAM OF FOOT: CPT

## 2021-04-14 PROCEDURE — 6360000002 HC RX W HCPCS: Performed by: EMERGENCY MEDICINE

## 2021-04-14 PROCEDURE — 96372 THER/PROPH/DIAG INJ SC/IM: CPT

## 2021-04-14 PROCEDURE — 99284 EMERGENCY DEPT VISIT MOD MDM: CPT

## 2021-04-14 RX ORDER — KETOROLAC TROMETHAMINE 30 MG/ML
30 INJECTION, SOLUTION INTRAMUSCULAR; INTRAVENOUS ONCE
Status: COMPLETED | OUTPATIENT
Start: 2021-04-14 | End: 2021-04-14

## 2021-04-14 RX ORDER — IBUPROFEN 800 MG/1
800 TABLET ORAL 3 TIMES DAILY PRN
Qty: 15 TABLET | Refills: 0 | Status: SHIPPED | OUTPATIENT
Start: 2021-04-14 | End: 2021-04-14 | Stop reason: SINTOL

## 2021-04-14 RX ADMIN — KETOROLAC TROMETHAMINE 30 MG: 30 INJECTION, SOLUTION INTRAMUSCULAR at 08:53

## 2021-04-14 ASSESSMENT — PAIN DESCRIPTION - LOCATION: LOCATION: ANKLE

## 2021-04-14 ASSESSMENT — PAIN SCALES - GENERAL
PAINLEVEL_OUTOF10: 10
PAINLEVEL_OUTOF10: 0

## 2021-04-14 ASSESSMENT — PAIN DESCRIPTION - ONSET: ONSET: SUDDEN

## 2021-04-14 ASSESSMENT — PAIN DESCRIPTION - DESCRIPTORS: DESCRIPTORS: ACHING

## 2021-04-14 NOTE — ED PROVIDER NOTES
[address] [facility]      Pt Name: Ameena Oliver  MRN: [id number]  Danagfdejon 1975  Date of evaluation: 4/14/2021  Provider: Chico Andrea, [address] [place] [address]  Chief Complaint   Patient presents with   Dean Winkler out of chair       I wore personal protective equipment when I was in the room the entire time. This includes gloves, N95 mask, face shield, and a glove over my stethoscope for protection. HPI  Ameena Oliver is a 48 y.o. female who presents with pain in her bilateral ankles and feet started on Monday when she stood up out of a chair and fell. She had been sleeping in the chair when she got up she lost her balance and fell. She denies any other injuries. She describes her pain is 10/10 in her bilateral feet. She has a history of cocaine abuse. She is also diabetic. Ambulation makes it worse and rest makes it better. She describes the pain is sharp and severe. She denies any previous fractures. She states she cannot walk secondary to the pain. REVIEW OF SYSTEMS  All systems negative except as noted in the HPI. Reviewed Nurses' notes and concur. No LMP recorded. Patient has had a hysterectomy.     PAST MEDICAL HISTORY  Past Medical History:   Diagnosis Date    Cerebral artery occlusion with cerebral infarction ([address] 2010    Cocaine abuse ([address]     Crohn's disease ([address]     Depression     Diabetes mellitus ([address]     ESRD (end stage renal disease) ([address]     HD Tues-Thurs-Sat    GERD (gastroesophageal reflux disease)     Hemodialysis patient ([address] 2016    peritoneal     Hyperlipidemia     Hypertension     MI, old     Mood disorder ([address]     Pericarditis     Peritonitis ([address]     Pneumonia     Thyroid disease        FAMILY HISTORY  Family History   Problem Relation Age of Onset    Heart Attack Mother     Diabetes Mother     Hypertension Mother     Stroke Father     Diabetes Father    [facility] Hypertension Father        SOCIAL HISTORY   reports that she has never smoked. She has never used smokeless tobacco. She reports previous alcohol use. She reports previous drug use. SURGICAL HISTORY  Past Surgical History:   Procedure Laterality Date    BACK SURGERY      Tumor removal    CHOLECYSTECTOMY      COLON SURGERY Right     KIDNEY TRANSPLANT  1990    KIDNEY TRANSPLANT      LAPAROSCOPY      right ovary removed and left ovarian cyst removed for endometriosis    OTHER SURGICAL HISTORY      peritoneal dialysis catheter    PARACENTESIS      TUBAL LIGATION      UPPER GASTROINTESTINAL ENDOSCOPY  03/31/2017    UPPER GASTROINTESTINAL ENDOSCOPY N/A 3/25/2019    EGD BIOPSY performed by Elpidio Couch MD at [place] [address]  Current Outpatient Rx   Medication Sig Dispense Refill    levETIRAcetam (KEPPRA) 500 MG tablet Take 1 tablet by mouth three times a week After dialysis on Mon/Wed/Fri 60 tablet 0    polyvinyl alcohol (LIQUIFILM TEARS) 1.4 % ophthalmic solution Place 1 drop into both eyes as needed      gabapentin (NEURONTIN) 300 MG capsule Take 300 mg by mouth See Admin Instructions. Take after dialysis       lisinopril (PRINIVIL;ZESTRIL) 5 MG tablet Take 5 mg by mouth daily      ondansetron (ZOFRAN) 4 MG tablet Take 4 mg by mouth every 8 hours as needed for Nausea or Vomiting      NIFEdipine (PROCARDIA XL) 60 MG extended release tablet Take 60 mg by mouth daily      pantoprazole (PROTONIX) 40 MG tablet Take 40 mg by mouth daily      Calcium Acetate, Phos Binder, 667 MG CAPS Take 667 mg by mouth 3 times daily (with meals)      b complex vitamins capsule TAKE 1 TABLET BY MOUTH ONE TIME A DAY      pregabalin (LYRICA) 75 MG capsule Take 1 capsule by mouth 2 times daily for 30 days.  60 capsule 0    mirtazapine (REMERON) 15 MG tablet Take 1 tablet by mouth nightly 30 tablet 3    lubiprostone (AMITIZA) 24 MCG capsule Take 24 mcg by mouth 2 times daily (with meals)       darbepoetin hina-polysorbate (ARANESP, ALBUMIN FREE,) 100 MCG/0.5ML SOSY injection Inject 200 mcg into the skin once a week Las t dose was 1 week ago         ALLERGIES  Allergies   Allergen Reactions    Ferrous Sulfate Shortness Of Breath    Cephalexin Itching and Swelling    Dicloxacillin Rash    Pcn [Penicillins] Rash    Sulfa Antibiotics Rash and Other (See Comments)     Chest pain. PHYSICAL EXAM  VITAL SIGNS: BP (!) 111/91   Pulse 84   Temp 98.3 °F (36.8 °C) (Oral)   Resp 14   Ht 5' 7\" (1.702 m)   Wt 130 lb (59 kg)   SpO2 100%   BMI 20.36 kg/m²   Constitutional: Well-developed, well-nourished, appears normal, nontoxic, activity: Resting comfortably on the cart, no obvious pain until her feet and ankles are palpated. Skin: Warm, Dry, No erythema, No rash. no Lacerations, no Abrasions. Back: No tenderness, Full range of motion, No scoliosis. Extremities: neurovascular intact, no deformity, no swelling, no erythema, no lacerations noted, no amputations, no cyanosis, no mottling, no ecchymosis, moderate diffuse tenderness of right ankle and right foot. There is moderate diffuse tenderness of the left ankle. There is no tenderness of her left foot. There is no deformity of either foot or ankle and there is no swelling or ecchymosis and no objective evidence of injury or fall, capillary refill less than 2 seconds. Musculoskeletal: Good range of motion in all other major joints except those described above. Neurologic: Alert & oriented x 3, Normal motor function, Normal sensory function, No focal deficits noted. Psychiatric: Anxious, Judgment normal, Mood normal, no confusion. LABORATORY  Labs Reviewed - No data to display      RADIOLOGY/PROCEDURES  I personally reviewed the images for this case. XR FOOT RIGHT (MIN 3 VIEWS)   Final Result   No acute fracture of the left ankle, right ankle, or right foot.       Middle phalanx and portion of the distal phalanx of the 5th digit are absent since the previous exam. Margins appear fairly smooth and this likely is   postsurgical.      Mild thickening of the Achilles tendon shadow on the left measuring up to 1.2   cm which can be seen in tendinosis. XR ANKLE LEFT (MIN 3 VIEWS)   Final Result   No acute fracture of the left ankle, right ankle, or right foot. Middle phalanx and portion of the distal phalanx of the 5th digit are absent   since the previous exam. Margins appear fairly smooth and this likely is   postsurgical.      Mild thickening of the Achilles tendon shadow on the left measuring up to 1.2   cm which can be seen in tendinosis. XR ANKLE RIGHT (MIN 3 VIEWS)   Final Result   No acute fracture of the left ankle, right ankle, or right foot. Middle phalanx and portion of the distal phalanx of the 5th digit are absent   since the previous exam. Margins appear fairly smooth and this likely is   postsurgical.      Mild thickening of the Achilles tendon shadow on the left measuring up to 1.2   cm which can be seen in tendinosis. NARCOTIC REVIEW      COURSE & MEDICAL DECISION MAKING  Pertinent Imaging studies reviewed. (See chart for details)    Vitals:    04/14/21 0722 04/14/21 0900 04/14/21 0915 04/14/21 0930   BP: (!) 140/122 (!) 157/83 (!) 142/81 (!) 111/91   Pulse: 90   84   Resp: 16   14   Temp: 98.3 °F (36.8 °C)      TempSrc: Oral      SpO2: 96%  100% 100%   Weight: 130 lb (59 kg)      Height: 5' 7\" (1.702 m)          Medications   ketorolac (TORADOL) injection 30 mg (30 mg Intramuscular Given 4/14/21 0853)       Discharge Medication List as of 4/14/2021  9:50 AM            SEP-1 CORE MEASURE DATA  Exclusion criteria: the patient is NOT to be included for sepsis due to: Infection is not suspected x-rays are negative for fracture or dislocation. She was treated for sprain of her left ankle and right foot. She was instructed to follow with her doctor in 3 to 5 days and return if any problems.   Patient remained stable in the ED. The patient's blood pressure was found to be elevated according to CMS/Medicare and the Affordable Care Act/ObamaCare criteria. Elevated blood pressure could occur because of pain or anxiety or other reasons and does not mean that they need to have their blood pressure treated or medications otherwise adjusted. However, this could also be a sign that they will need to have their blood pressure treated or medications changed. The patient was instructed to follow up closely with their personal physician to have their blood pressure rechecked. The patient was instructed to take a list of recent blood pressure readings to their next visit with their personal physician. See discharge instructions for specific medications, discharge information, and treatments. They were verbally instructed to return to emergency if any problems. (This chart has been completed using [address]. Although attempts have been made to ensure accuracy, words and/or phrases may not be transcribed as intended.)    Patient requested pain medicines at the time of their exam.    Tetanus vaccination status reviewed: tetanus re-vaccination not indicated. IMPRESSION(S):  1. Sprain of left ankle, unspecified ligament, initial encounter    2. Sprain of right ankle, unspecified ligament, initial encounter    3. Sprain of right foot, initial encounter    4. Fall from standing, initial encounter        ? Recheck Times: 4714    Diagnostic considerations include but are not limited to: Arterial Injury/Ischemia, Fracture, Dislocation, Infection, Compartment Syndrome, Neurologic Deficit/Injury.          Yanira Ritchie DO  04/16/21 8977

## 2021-08-04 ENCOUNTER — APPOINTMENT (OUTPATIENT)
Dept: GENERAL RADIOLOGY | Age: 46
End: 2021-08-04
Payer: MEDICAID

## 2021-08-04 ENCOUNTER — HOSPITAL ENCOUNTER (EMERGENCY)
Age: 46
Discharge: HOME | End: 2021-08-04
Attending: EMERGENCY MEDICINE
Payer: MEDICAID

## 2021-08-04 VITALS
SYSTOLIC BLOOD PRESSURE: 144 MMHG | TEMPERATURE: 98.1 F | WEIGHT: 133.16 LBS | HEART RATE: 82 BPM | BODY MASS INDEX: 20.18 KG/M2 | HEIGHT: 68 IN | DIASTOLIC BLOOD PRESSURE: 82 MMHG | OXYGEN SATURATION: 98 % | RESPIRATION RATE: 16 BRPM

## 2021-08-04 DIAGNOSIS — L29.9 PRURITIC DISORDER: Primary | ICD-10-CM

## 2021-08-04 DIAGNOSIS — Z99.2 ESRD ON DIALYSIS (HCC): ICD-10-CM

## 2021-08-04 DIAGNOSIS — N18.6 ESRD ON DIALYSIS (HCC): ICD-10-CM

## 2021-08-04 LAB
ALBUMIN SERPL-MCNC: 4.4 G/DL (ref 3.4–5)
ALP BLD-CCNC: 131 U/L (ref 40–129)
ALT SERPL-CCNC: 41 U/L (ref 10–40)
ANION GAP SERPL CALCULATED.3IONS-SCNC: 18 MMOL/L (ref 3–16)
AST SERPL-CCNC: 33 U/L (ref 15–37)
BASOPHILS ABSOLUTE: 0 K/UL (ref 0–0.2)
BASOPHILS RELATIVE PERCENT: 0.5 %
BILIRUB SERPL-MCNC: 0.3 MG/DL (ref 0–1)
BILIRUBIN DIRECT: <0.2 MG/DL (ref 0–0.3)
BILIRUBIN, INDIRECT: ABNORMAL MG/DL (ref 0–1)
BUN BLDV-MCNC: 69 MG/DL (ref 7–20)
CALCIUM SERPL-MCNC: 8.4 MG/DL (ref 8.3–10.6)
CHLORIDE BLD-SCNC: 86 MMOL/L (ref 99–110)
CO2: 27 MMOL/L (ref 21–32)
CREAT SERPL-MCNC: 9.5 MG/DL (ref 0.6–1.1)
EKG ATRIAL RATE: 77 BPM
EKG DIAGNOSIS: NORMAL
EKG P AXIS: 26 DEGREES
EKG P-R INTERVAL: 182 MS
EKG Q-T INTERVAL: 430 MS
EKG QRS DURATION: 68 MS
EKG QTC CALCULATION (BAZETT): 486 MS
EKG R AXIS: 11 DEGREES
EKG T AXIS: 49 DEGREES
EKG VENTRICULAR RATE: 77 BPM
EOSINOPHILS ABSOLUTE: 0.2 K/UL (ref 0–0.6)
EOSINOPHILS RELATIVE PERCENT: 2.3 %
GFR AFRICAN AMERICAN: 5
GFR NON-AFRICAN AMERICAN: 4
GLUCOSE BLD-MCNC: 233 MG/DL (ref 70–99)
HCT VFR BLD CALC: 39.4 % (ref 36–48)
HEMOGLOBIN: 12.6 G/DL (ref 12–16)
LYMPHOCYTES ABSOLUTE: 2.3 K/UL (ref 1–5.1)
LYMPHOCYTES RELATIVE PERCENT: 31 %
MCH RBC QN AUTO: 31.2 PG (ref 26–34)
MCHC RBC AUTO-ENTMCNC: 32.1 G/DL (ref 31–36)
MCV RBC AUTO: 97.2 FL (ref 80–100)
MONOCYTES ABSOLUTE: 0.6 K/UL (ref 0–1.3)
MONOCYTES RELATIVE PERCENT: 8.2 %
NEUTROPHILS ABSOLUTE: 4.3 K/UL (ref 1.7–7.7)
NEUTROPHILS RELATIVE PERCENT: 58 %
PDW BLD-RTO: 16.9 % (ref 12.4–15.4)
PLATELET # BLD: 260 K/UL (ref 135–450)
PMV BLD AUTO: 7.9 FL (ref 5–10.5)
POTASSIUM REFLEX MAGNESIUM: 5 MMOL/L (ref 3.5–5.1)
RBC # BLD: 4.06 M/UL (ref 4–5.2)
SODIUM BLD-SCNC: 131 MMOL/L (ref 136–145)
TOTAL PROTEIN: 8 G/DL (ref 6.4–8.2)
WBC # BLD: 7.4 K/UL (ref 4–11)

## 2021-08-04 PROCEDURE — 71045 X-RAY EXAM CHEST 1 VIEW: CPT

## 2021-08-04 PROCEDURE — 80048 BASIC METABOLIC PNL TOTAL CA: CPT

## 2021-08-04 PROCEDURE — 6370000000 HC RX 637 (ALT 250 FOR IP): Performed by: PHYSICIAN ASSISTANT

## 2021-08-04 PROCEDURE — 93010 ELECTROCARDIOGRAM REPORT: CPT | Performed by: INTERNAL MEDICINE

## 2021-08-04 PROCEDURE — 80076 HEPATIC FUNCTION PANEL: CPT

## 2021-08-04 PROCEDURE — 96374 THER/PROPH/DIAG INJ IV PUSH: CPT

## 2021-08-04 PROCEDURE — 93005 ELECTROCARDIOGRAM TRACING: CPT | Performed by: PHYSICIAN ASSISTANT

## 2021-08-04 PROCEDURE — 85025 COMPLETE CBC W/AUTO DIFF WBC: CPT

## 2021-08-04 PROCEDURE — 6360000002 HC RX W HCPCS: Performed by: PHYSICIAN ASSISTANT

## 2021-08-04 PROCEDURE — 99284 EMERGENCY DEPT VISIT MOD MDM: CPT

## 2021-08-04 RX ORDER — HYDROXYZINE PAMOATE 25 MG/1
25 CAPSULE ORAL ONCE
Status: COMPLETED | OUTPATIENT
Start: 2021-08-04 | End: 2021-08-04

## 2021-08-04 RX ORDER — HYDROXYZINE PAMOATE 25 MG/1
25 CAPSULE ORAL 2 TIMES DAILY PRN
Qty: 30 CAPSULE | Refills: 0 | Status: SHIPPED | OUTPATIENT
Start: 2021-08-04 | End: 2021-08-18

## 2021-08-04 RX ORDER — METOCLOPRAMIDE HYDROCHLORIDE 5 MG/ML
5 INJECTION INTRAMUSCULAR; INTRAVENOUS ONCE
Status: COMPLETED | OUTPATIENT
Start: 2021-08-04 | End: 2021-08-04

## 2021-08-04 RX ORDER — HYDROXYZINE PAMOATE 25 MG/1
25 CAPSULE ORAL 2 TIMES DAILY PRN
Qty: 30 CAPSULE | Refills: 0 | Status: SHIPPED | OUTPATIENT
Start: 2021-08-04 | End: 2021-08-04 | Stop reason: SDUPTHER

## 2021-08-04 RX ADMIN — METOCLOPRAMIDE HYDROCHLORIDE 5 MG: 5 INJECTION INTRAMUSCULAR; INTRAVENOUS at 11:37

## 2021-08-04 RX ADMIN — HYDROXYZINE PAMOATE 25 MG: 25 CAPSULE ORAL at 10:40

## 2021-08-04 ASSESSMENT — ENCOUNTER SYMPTOMS
SHORTNESS OF BREATH: 0
COUGH: 0
COLOR CHANGE: 0
VOMITING: 0
NAUSEA: 0

## 2021-08-04 NOTE — ED NOTES
Patient states the itching is better after administration of PO Vistaril, see MAR. Patient appears to be resting comfortably, asking about discharge plans. Awaiting CXR results.       Bhavik Ortega RN  08/04/21 2614

## 2021-08-04 NOTE — ED PROVIDER NOTES
I have personally performed a face to face diagnostic evaluation on this patient. I have fully participated in the care of this patient. I have reviewed and agree with all pertinent clinical information including history, physical exam, diagnostic tests, and the plan. HPI: Jonatan Montes presented with uncontrollable itching for over 2 months. Patient has ESRD on hemodialysis. Missed her dialysis session today. No other symptoms. JIMMIE alerted me that the patient has a elevated BUN and creatinine. Patient with potassium of 5. No signs of fluid overload at this time. Chief Complaint   Patient presents with    Pruritis     \"uncontrollable itching\"      Review of Systems: See JIMMIE note  Vital Signs: BP (!) 130/91   Pulse 81   Temp 97.9 °F (36.6 °C) (Oral)   Resp 20   Ht 5' 7.5\" (1.715 m)   Wt 133 lb 2.5 oz (60.4 kg)   SpO2 98%   BMI 20.55 kg/m²     Alert 48 y.o. female who does not appear toxic or acutely ill  HENT: Atraumatic, oral mucosa moist  Neck: Grossly normal ROM  Chest/Lungs: respiratory effort normal   Abdomen: Soft nontender  Extremities: Bilateral radial pulses normal  Musculoskeletal: Grossly normal ROM  Skin: No palor or diaphoresis    Medical Decision Making and Plan:  Pertinent Labs & Imaging studies reviewed. (See JIMMIE chart for details)  I agree with assessment and plan. Patient potentially to have dialysis today with our dialysis unit. At this time obtain EKG    EKG Interpretation    Interpreted by emergency department physician    Rhythm: normal sinus   Rate: normal  Axis: normal  Ectopy: none  Conduction: normal  ST Segments: no acute change  T Waves: no acute change  Q Waves: none    Clinical Impression: no acute changes    No signs of QRS prolongation or changes concerning for emergent dialysis.   Will speak to team to see if patient can have dialysis today otherwise we have made an appointment for her tomorrow at 6:30 AM.  See JIMMIE note for further details    John Flanagan MD Priya Jiang MD  08/04/21 4939

## 2021-08-04 NOTE — ED PROVIDER NOTES
[address] [facility]        Pt Name: Jane Ross  MRN: [id number]  [place] 1975  Date of evaluation: 8/4/2021  Provider: MILES Vega  PCP: Areli Hinton  Note Started: 9:38 AM EDT        I have seen and evaluated this patient with my supervising physician Hector Stubbs MD.    [address] [place] [address]       Chief Complaint   Patient presents with    Pruritis     \"uncontrollable itching\"       HISTORY OF PRESENT ILLNESS   (Location, Timing/Onset, Context/Setting, Quality, Duration, Modifying Factors, Severity, Associated Signs and Symptoms)  Note limiting factors. Chief Complaint: Itching     Jane Ross is a 48 y.o. female who presents to the ED with complaints of itching. She states it has been present for the last 2 months, but has become more unbearable. She denies any rashes associated with it. She has no chest pain, shortness of breath, difficulty breathing. She does have ESRD and is on dialysis, M/W/F, last tx was Monday. She has tried benadryl and claritin with no improvement to symptoms. She has tried taking warm showers, which do not seem to help alleviate or make her symptoms any worse. She states she has been told this is related to her kidney disease. She has no further complaints at this time. Nursing Notes were all reviewed and agreed with or any disagreements were addressed in the HPI. REVIEW OF SYSTEMS    (2-9 systems for level 4, 10 or more for level 5)     Review of Systems   Constitutional: Negative for chills and fever. Respiratory: Negative for cough and shortness of breath. Cardiovascular: Negative for chest pain and palpitations. Gastrointestinal: Negative for nausea and vomiting. Skin: Negative for color change and pallor. Itching, all over   Neurological: Negative for weakness, light-headedness, numbness and headaches. Positives and Pertinent negatives as per HPI.  Except as noted above in the ROS, all other systems were reviewed and negative. PAST MEDICAL HISTORY     Past Medical History:   Diagnosis Date    Cerebral artery occlusion with cerebral infarction ([facility] [address] 2010    Cocaine abuse ([facility]     Crohn's disease ([facility]     Depression     Diabetes mellitus ([facility] [address] [facility]     ESRD (end stage renal disease) ([facility] [address] [facility]     HD Tues-Thurs-Sat    GERD (gastroesophageal reflux disease)     Hemodialysis patient ([facility] [address] [facility] 2016    peritoneal     Hyperlipidemia     Hypertension     MI, old     Mood disorder ([facility]     Pericarditis     Peritonitis ([facility]     Pneumonia     Thyroid disease          SURGICAL HISTORY     Past Surgical History:   Procedure Laterality Date    BACK SURGERY      Tumor removal    CHOLECYSTECTOMY      COLON SURGERY Right     KIDNEY TRANSPLANT  1990    KIDNEY TRANSPLANT      LAPAROSCOPY      right ovary removed and left ovarian cyst removed for endometriosis    OTHER SURGICAL HISTORY      peritoneal dialysis catheter    PARACENTESIS      TUBAL LIGATION      UPPER GASTROINTESTINAL ENDOSCOPY  03/31/2017    UPPER GASTROINTESTINAL ENDOSCOPY N/A 3/25/2019    EGD BIOPSY performed by Petra Schwab, MD at [facility] Medication List as of 8/4/2021  1:13 PM      CONTINUE these medications which have NOT CHANGED    Details   levETIRAcetam (KEPPRA) 500 MG tablet Take 1 tablet by mouth three times a week After dialysis on Mon/Wed/Fri, Disp-60 tablet, R-0Print      polyvinyl alcohol (LIQUIFILM TEARS) 1.4 % ophthalmic solution Place 1 drop into both eyes as neededHistorical Med      gabapentin (NEURONTIN) 300 MG capsule Take 300 mg by mouth See Admin Instructions.  Take after dialysis Historical Med      lisinopril (PRINIVIL;ZESTRIL) 5 MG tablet Take 5 mg by mouth dailyHistorical Med      ondansetron (ZOFRAN) 4 MG tablet Take 4 mg by mouth every 8 hours as needed for Nausea or VomitingHistorical Med      NIFEdipine (PROCARDIA XL) 60 MG extended release tablet Take 60 mg by mouth dailyHistorical Med      pantoprazole (PROTONIX) 40 MG tablet Take 40 mg by mouth dailyHistorical Med      Calcium Acetate, Phos Binder, 667 MG CAPS Take 667 mg by mouth 3 times daily (with meals)Historical Med      b complex vitamins capsule TAKE 1 TABLET BY MOUTH ONE TIME A DAYHistorical Med      pregabalin (LYRICA) 75 MG capsule Take 1 capsule by mouth 2 times daily for 30 days. , Disp-60 capsule, R-0Print      mirtazapine (REMERON) 15 MG tablet Take 1 tablet by mouth nightly, Disp-30 tablet, R-3Print      lubiprostone (AMITIZA) 24 MCG capsule Take 24 mcg by mouth 2 times daily (with meals) Historical Med      darbepoetin hina-polysorbate (ARANESP, ALBUMIN FREE,) 100 MCG/0.5ML SOSY injection Inject 200 mcg into the skin once a week Las t dose was 1 week agoHistorical Med               ALLERGIES     Ferrous sulfate, Cephalexin, Dicloxacillin, Pcn [penicillins], and Sulfa antibiotics    FAMILYHISTORY       Family History   Problem Relation Age of Onset    Heart Attack Mother     Diabetes Mother     Hypertension Mother     Stroke Father     Diabetes Father     Hypertension Father           SOCIAL HISTORY       Social History     Tobacco Use    Smoking status: Never Smoker    Smokeless tobacco: Never Used   Vaping Use    Vaping Use: Never used   Substance Use Topics    Alcohol use: Not Currently     Alcohol/week: 0.0 standard drinks     Comment: monthly    Drug use: Not Currently       SCREENINGS             PHYSICAL EXAM    (up to 7 for level 4, 8 or more for level 5)     ED Triage Vitals [08/04/21 0827]   BP Temp Temp Source Pulse Resp SpO2 Height Weight   (!) 143/90 97.9 °F (36.6 °C) Oral 81 20 100 % 5' 7.5\" (1.715 m) 133 lb 2.5 oz (60.4 kg)       Physical Exam  Vitals and nursing note reviewed. Constitutional:       General: She is not in acute distress. Appearance: Normal appearance. She is well-developed.  She is not ill-appearing, toxic-appearing or diaphoretic. HENT:      Head: Normocephalic and atraumatic. Eyes:      Conjunctiva/sclera: Conjunctivae normal.      Pupils: Pupils are equal, round, and reactive to light. Cardiovascular:      Rate and Rhythm: Normal rate and regular rhythm. Pulmonary:      Effort: Pulmonary effort is normal. No respiratory distress. Breath sounds: Normal breath sounds. Abdominal:      General: Bowel sounds are normal. There is no distension. Palpations: Abdomen is soft. Tenderness: There is no abdominal tenderness. Musculoskeletal:      Cervical back: Normal range of motion and neck supple. Skin:     General: Skin is warm and dry. Coloration: Skin is not pale. Findings: No lesion or rash. Neurological:      General: No focal deficit present. Mental Status: She is alert and oriented to person, place, and time. Mental status is at baseline. Psychiatric:         Behavior: Behavior normal. Behavior is cooperative. Thought Content:  Thought content normal.         DIAGNOSTIC RESULTS   LABS:    Labs Reviewed   CBC WITH AUTO DIFFERENTIAL - Abnormal; Notable for the following components:       Result Value    RDW 16.9 (*)     All other components within normal limits    Narrative:     Performed at:  [facility]  [address] [facility] [address]   Phone [phone]   BASIC METABOLIC PANEL W/ REFLEX TO MG FOR LOW K - Abnormal; Notable for the following components:    Sodium 131 (*)     Chloride 86 (*)     Anion Gap 18 (*)     Glucose 233 (*)     BUN 69 (*)     CREATININE 9.5 (*)     GFR Non- 4 (*)     GFR  5 (*)     All other components within normal limits    Narrative:     Campbell [facility] tel. [phone],  Chemistry results called to and read back by Alea Paul, 08/04/2021 10:13,  by [facility]  Performed at:  [facility]  [address] [employer] [address]   Phone [phone]   HEPATIC FUNCTION PANEL - Abnormal; Notable for the following components:    Alkaline Phosphatase 131 (*)     ALT 41 (*)     All other components within normal limits    Narrative:     Campbell Stanford tel. [phone],  Chemistry results called to and read back by Alea Paul, 08/04/2021 10:13,  by [facility]  Performed at:  [address] [address]   Phone [phone]       When ordered only abnormal lab results are displayed. All other labs were within normal range or not returned as of this dictation. EKG: When ordered, EKG's are interpreted by the Emergency Department Physician in the absence of a cardiologist.  Please see their note for interpretation of EKG. RADIOLOGY:   Non-plain film images such as CT, Ultrasound and MRI are read by the radiologist. Plain radiographic images are visualized and preliminarily interpreted by the ED Provider with the below findings:    Interpretation per the Radiologist below, if available at the time of this note:    XR CHEST PORTABLE   Final Result   No radiographic evidence of acute pulmonary disease. No results found.         PROCEDURES   Unless otherwise noted below, none     Procedures    CRITICAL CARE TIME   N/A    CONSULTS:  IP CONSULT TO NEPHROLOGY      EMERGENCY DEPARTMENT COURSE and DIFFERENTIAL DIAGNOSIS/MDM:   Vitals:    Vitals:    08/04/21 1101 08/04/21 1146 08/04/21 1217 08/04/21 1300   BP: 135/76 (!) 153/85 (!) 144/94 (!) 144/82   Pulse:    82   Resp:    16   Temp:    98.1 °F (36.7 °C)   TempSrc:    Oral   SpO2:    98%   Weight:       Height:           Patient was given the following medications:  Medications   hydrOXYzine (VISTARIL) capsule 25 mg (25 mg Oral Given 8/4/21 1040)   metoclopramide (REGLAN) injection 5 mg (5 mg Intravenous Given 8/4/21 1137)           ED COURSE & MEDICAL DECISION MAKING    - The patient presented to the ER with complaints of all over itching x 2 months. Vital signs were reviewed. Exam with WDWN female in no acute distress. Labs ordered. - Pertinent Labs & Imaging studies reviewed. (See chart for details)   -  Patient seen and evaluated in the emergency department. -  Triage and nursing notes reviewed and incorporated. -  Old chart records reviewed and incorporated. -   I have seen and evaluated this patient with my supervising physician Ara Elias MD.  -  Differential diagnosis includes: electrolyte disturbance, bed bugs, contact dermatitis, other  -  Work-up included:  See above  -  ED treatment included:  hydroxyzine, Reglan  - Consults: Nephrology - they did not call back and the patient needed to leave. I was able to call her dialysis center,  [facility], and arrange for dialysis for her tomorrow at 6:30 AM.  -  Results discussed with patient and/or family. Labs show no leukocytosis or concerning anemia. Metabolic panel with sodium 131, CO2 27, anion gap 18, glucose 233, BUN 69, creatinine 9.5. CXR with no acute process. Please see attending physician's note for EKG interpretation. Patient feels improved. At this time, we recommend discharge, as the patient is stable for outpatient follow-up. The patient and/or family is agreeable with plan of care and disposition.  -  Disposition:   Home in stable condition, close interval follow-up at the dialysis center tomorrow morning at 6:30 AM  - Critical Care: 0 minutes      FINAL IMPRESSION      1. Pruritic disorder    2.  ESRD on dialysis [facility])          DISPOSITION/PLAN   DISPOSITION        PATIENT REFERRED TO:  Beckie Cockayne  [address], [facility], [address] [facility]  [phone]    Schedule an appointment as soon as possible for a visit       Kunal Garcias MD  [address]  [address] [facility] [place] [address]    Schedule an appointment as soon as possible for a visit       [facility] [address] [facility] [employer]  [phone]    If symptoms worsen      DISCHARGE MEDICATIONS:  Discharge Medication List as of 8/4/2021  1:13 PM          DISCONTINUED MEDICATIONS:  Discharge Medication List as of 8/4/2021  1:13 PM                 (Please note that portions of this note were completed with a voice recognition program.  Efforts were made to edit the dictations but occasionally words are mis-transcribed.)    MILES Rodriguez (electronically signed)            Mario Stallworth, [address]  08/04/21 1336

## 2021-08-04 NOTE — ED TRIAGE NOTES
Pt here with itching to body x months, worse over last few days. Pt admits to being seen in past for same.  \"it usually goes away for awhile but the past few days it has come back worse\"

## 2021-08-04 NOTE — ED NOTES
Patient still c/o of itching all over, Gaby Chappell at bedside. Order placed for PO Vistaril, see MAR. Gaby Chappell spoke to patient about getting HD tx today since itching is probably d/t uremia and she is a MWF HD patient, patient states she would not be able to have transportation today, but would be able to go to a HD tx tomorrow if she would be able to have transportation set us. Gaby Abreu to call dialysis center to see if patient can have treatment tomorrow.       Des Acosta, RN  08/04/21 1118

## 2022-01-22 ENCOUNTER — APPOINTMENT (OUTPATIENT)
Dept: GENERAL RADIOLOGY | Age: 47
End: 2022-01-22
Payer: MEDICAID

## 2022-01-22 ENCOUNTER — HOSPITAL ENCOUNTER (EMERGENCY)
Age: 47
Discharge: HOME | End: 2022-01-23
Attending: EMERGENCY MEDICINE
Payer: MEDICAID

## 2022-01-22 DIAGNOSIS — M79.652 LEFT THIGH PAIN: Primary | ICD-10-CM

## 2022-01-22 LAB
ANION GAP SERPL CALCULATED.3IONS-SCNC: 19 MMOL/L (ref 3–16)
BASOPHILS ABSOLUTE: 0.1 K/UL (ref 0–0.2)
BASOPHILS RELATIVE PERCENT: 0.6 %
BUN BLDV-MCNC: 41 MG/DL (ref 7–20)
CALCIUM SERPL-MCNC: 8.1 MG/DL (ref 8.3–10.6)
CHLORIDE BLD-SCNC: 88 MMOL/L (ref 99–110)
CO2: 26 MMOL/L (ref 21–32)
CREAT SERPL-MCNC: 8.8 MG/DL (ref 0.6–1.1)
EOSINOPHILS ABSOLUTE: 0.2 K/UL (ref 0–0.6)
EOSINOPHILS RELATIVE PERCENT: 3 %
GFR AFRICAN AMERICAN: 6
GFR NON-AFRICAN AMERICAN: 5
GLUCOSE BLD-MCNC: 381 MG/DL (ref 70–99)
HCT VFR BLD CALC: 33.1 % (ref 36–48)
HEMOGLOBIN: 11 G/DL (ref 12–16)
LYMPHOCYTES ABSOLUTE: 2.1 K/UL (ref 1–5.1)
LYMPHOCYTES RELATIVE PERCENT: 25.2 %
MCH RBC QN AUTO: 31.4 PG (ref 26–34)
MCHC RBC AUTO-ENTMCNC: 33.3 G/DL (ref 31–36)
MCV RBC AUTO: 94.3 FL (ref 80–100)
MONOCYTES ABSOLUTE: 0.5 K/UL (ref 0–1.3)
MONOCYTES RELATIVE PERCENT: 6.3 %
NEUTROPHILS ABSOLUTE: 5.4 K/UL (ref 1.7–7.7)
NEUTROPHILS RELATIVE PERCENT: 64.9 %
PDW BLD-RTO: 15.6 % (ref 12.4–15.4)
PLATELET # BLD: 209 K/UL (ref 135–450)
PMV BLD AUTO: 8.8 FL (ref 5–10.5)
POTASSIUM REFLEX MAGNESIUM: ABNORMAL MMOL/L (ref 3.5–5.1)
RBC # BLD: 3.51 M/UL (ref 4–5.2)
REASON FOR REJECTION: NORMAL
REJECTED TEST: NORMAL
SODIUM BLD-SCNC: 133 MMOL/L (ref 136–145)
WBC # BLD: 8.3 K/UL (ref 4–11)

## 2022-01-22 PROCEDURE — 85652 RBC SED RATE AUTOMATED: CPT

## 2022-01-22 PROCEDURE — 36415 COLL VENOUS BLD VENIPUNCTURE: CPT

## 2022-01-22 PROCEDURE — 85025 COMPLETE CBC W/AUTO DIFF WBC: CPT

## 2022-01-22 PROCEDURE — 96374 THER/PROPH/DIAG INJ IV PUSH: CPT

## 2022-01-22 PROCEDURE — 73552 X-RAY EXAM OF FEMUR 2/>: CPT

## 2022-01-22 PROCEDURE — 80048 BASIC METABOLIC PNL TOTAL CA: CPT

## 2022-01-22 PROCEDURE — 99285 EMERGENCY DEPT VISIT HI MDM: CPT

## 2022-01-22 PROCEDURE — 96372 THER/PROPH/DIAG INJ SC/IM: CPT

## 2022-01-22 PROCEDURE — 96375 TX/PRO/DX INJ NEW DRUG ADDON: CPT

## 2022-01-22 PROCEDURE — 6360000002 HC RX W HCPCS: Performed by: EMERGENCY MEDICINE

## 2022-01-22 RX ORDER — MORPHINE SULFATE 2 MG/ML
2 INJECTION, SOLUTION INTRAMUSCULAR; INTRAVENOUS ONCE
Status: COMPLETED | OUTPATIENT
Start: 2022-01-22 | End: 2022-01-22

## 2022-01-22 RX ADMIN — MORPHINE SULFATE 2 MG: 2 INJECTION, SOLUTION INTRAMUSCULAR; INTRAVENOUS at 22:41

## 2022-01-22 ASSESSMENT — PAIN DESCRIPTION - PAIN TYPE
TYPE: ACUTE PAIN
TYPE: ACUTE PAIN

## 2022-01-22 ASSESSMENT — ENCOUNTER SYMPTOMS
SHORTNESS OF BREATH: 0
ABDOMINAL PAIN: 0

## 2022-01-22 ASSESSMENT — PAIN DESCRIPTION - ORIENTATION: ORIENTATION: LEFT

## 2022-01-22 ASSESSMENT — PAIN DESCRIPTION - LOCATION
LOCATION: LEG
LOCATION: LEG

## 2022-01-22 ASSESSMENT — PAIN SCALES - GENERAL
PAINLEVEL_OUTOF10: 10
PAINLEVEL_OUTOF10: 10
PAINLEVEL_OUTOF10: 7

## 2022-01-22 ASSESSMENT — PAIN DESCRIPTION - DESCRIPTORS: DESCRIPTORS: ACHING

## 2022-01-22 ASSESSMENT — PAIN DESCRIPTION - ONSET: ONSET: PROGRESSIVE

## 2022-01-22 ASSESSMENT — PAIN DESCRIPTION - FREQUENCY: FREQUENCY: CONTINUOUS

## 2022-01-23 VITALS
DIASTOLIC BLOOD PRESSURE: 96 MMHG | BODY MASS INDEX: 19.62 KG/M2 | WEIGHT: 125 LBS | RESPIRATION RATE: 18 BRPM | TEMPERATURE: 97.9 F | HEIGHT: 67 IN | SYSTOLIC BLOOD PRESSURE: 176 MMHG | HEART RATE: 75 BPM | OXYGEN SATURATION: 98 %

## 2022-01-23 LAB
C-REACTIVE PROTEIN: 16.6 MG/L (ref 0–5.1)
POTASSIUM REFLEX MAGNESIUM: 4.8 MMOL/L (ref 3.5–5.1)
POTASSIUM SERPL-SCNC: 5 MMOL/L (ref 3.5–5.1)
SEDIMENTATION RATE, ERYTHROCYTE: 66 MM/HR (ref 0–20)
TOTAL CK: 123 U/L (ref 26–192)

## 2022-01-23 PROCEDURE — 82550 ASSAY OF CK (CPK): CPT

## 2022-01-23 PROCEDURE — 6370000000 HC RX 637 (ALT 250 FOR IP): Performed by: EMERGENCY MEDICINE

## 2022-01-23 PROCEDURE — 86140 C-REACTIVE PROTEIN: CPT

## 2022-01-23 PROCEDURE — 84132 ASSAY OF SERUM POTASSIUM: CPT

## 2022-01-23 RX ORDER — OXYCODONE HYDROCHLORIDE AND ACETAMINOPHEN 5; 325 MG/1; MG/1
1 TABLET ORAL ONCE
Status: COMPLETED | OUTPATIENT
Start: 2022-01-23 | End: 2022-01-23

## 2022-01-23 RX ORDER — CYCLOBENZAPRINE HCL 10 MG
10 TABLET ORAL 3 TIMES DAILY PRN
Qty: 10 TABLET | Refills: 0 | Status: SHIPPED | OUTPATIENT
Start: 2022-01-23 | End: 2022-02-02

## 2022-01-23 RX ORDER — OXYCODONE HYDROCHLORIDE AND ACETAMINOPHEN 5; 325 MG/1; MG/1
1 TABLET ORAL EVERY 6 HOURS PRN
Qty: 10 TABLET | Refills: 0 | Status: SHIPPED | OUTPATIENT
Start: 2022-01-23 | End: 2022-01-26

## 2022-01-23 RX ORDER — LIDOCAINE 50 MG/G
1 PATCH TOPICAL DAILY
Qty: 10 PATCH | Refills: 0 | Status: ON HOLD | OUTPATIENT
Start: 2022-01-23 | End: 2022-05-17 | Stop reason: CLARIF

## 2022-01-23 RX ADMIN — INSULIN HUMAN 5 UNITS: 100 INJECTION, SOLUTION PARENTERAL at 00:03

## 2022-01-23 RX ADMIN — OXYCODONE HYDROCHLORIDE AND ACETAMINOPHEN 1 TABLET: 5; 325 TABLET ORAL at 01:27

## 2022-01-23 ASSESSMENT — ENCOUNTER SYMPTOMS
SHORTNESS OF BREATH: 0
CONSTIPATION: 0
EYE DISCHARGE: 0
VOMITING: 0
ABDOMINAL PAIN: 0
COUGH: 0
COLOR CHANGE: 0
EYE ITCHING: 0

## 2022-01-23 ASSESSMENT — PAIN SCALES - GENERAL: PAINLEVEL_OUTOF10: 7

## 2022-01-23 NOTE — ED NOTES
Pt resting comfortably on ed cot, siderails up x 2, significant other at bedside.  Respirations are easy, even and unlabored      Soha Al RN  01/22/22 5549

## 2022-01-23 NOTE — ED PROVIDER NOTES
I PERSONALLY SAW THE PATIENT AND PERFORMED A SUBSTANTIVE PORTION OF THE VISIT INCLUDING ALL ASPECTS OF THE MEDICAL DECISION MAKING PROCESS. [address]      Pt Name: Emir Carpenter  MRN: [id number]  [place] 1975  Date of evaluation: 1/22/2022  Provider: Lesley Summers MD    CHIEF COMPLAINT       Chief Complaint   Patient presents with    Leg Pain     left leg pain x 2 weeks. no known injury        HISTORY OF PRESENT ILLNESS    Emir Carpenter is a 50 y.o. female who presents to the emergency department with leg pain. Patient endorses left thigh pain. Has been going on for 2 weeks. States the outer portion of her left thigh hurts. 7-10 achy nature. Denies trauma. Denies any injury. Denies redness or swelling. Denies weakness or sensory issues. Never happened before. No other associated symptoms. Nursing Notes were reviewed. Including nursing noted for FM, Surgical History, Past Medical History, Social History, vitals, and allergies; agree with all. REVIEW OF SYSTEMS       Review of Systems   Constitutional: Negative for diaphoresis and unexpected weight change. HENT: Negative for congestion and dental problem. Eyes: Negative for discharge and itching. Respiratory: Negative for cough and shortness of breath. Cardiovascular: Negative for chest pain and leg swelling. Gastrointestinal: Negative for abdominal pain, constipation and vomiting. Endocrine: Negative for cold intolerance and heat intolerance. Genitourinary: Negative for vaginal bleeding, vaginal discharge and vaginal pain. Musculoskeletal: Positive for arthralgias. Negative for neck pain and neck stiffness. Skin: Negative for color change and pallor. Neurological: Negative for tremors and weakness. Psychiatric/Behavioral: Negative for agitation and behavioral problems.        Except as noted above the remainder of the review of systems was reviewed and negative. PAST MEDICAL HISTORY     Past Medical History:   Diagnosis Date    Cerebral artery occlusion with cerebral infarction ([facility] 2010    Cocaine abuse ([facility]     Crohn's disease ([facility]     Depression     Diabetes mellitus ([facility]     ESRD (end stage renal disease) ([facility]     HD Tues-Thurs-Sat    GERD (gastroesophageal reflux disease)     Hemodialysis patient ([facility] 2016    peritoneal     Hyperlipidemia     Hypertension     MI, old     Mood disorder ([facility]     Pericarditis     Peritonitis ([facility]     Pneumonia     Thyroid disease        SURGICAL HISTORY       Past Surgical History:   Procedure Laterality Date    BACK SURGERY      Tumor removal    CHOLECYSTECTOMY      COLON SURGERY Right     KIDNEY TRANSPLANT  1990    KIDNEY TRANSPLANT      LAPAROSCOPY      right ovary removed and left ovarian cyst removed for endometriosis    OTHER SURGICAL HISTORY      peritoneal dialysis catheter    PARACENTESIS      TUBAL LIGATION      UPPER GASTROINTESTINAL ENDOSCOPY  03/31/2017    UPPER GASTROINTESTINAL ENDOSCOPY N/A 3/25/2019    EGD BIOPSY performed by Anali Vang MD at [address] [facility]       Previous Medications    B COMPLEX VITAMINS CAPSULE    TAKE 1 TABLET BY MOUTH ONE TIME A DAY    CALCIUM ACETATE, PHOS BINDER, 667 MG CAPS    Take 667 mg by mouth 3 times daily (with meals)    DARBEPOETIN TOMAS-POLYSORBATE (ARANESP, ALBUMIN FREE,) 100 MCG/0.5ML SOSY INJECTION    Inject 200 mcg into the skin once a week Las t dose was 1 week ago    GABAPENTIN (NEURONTIN) 300 MG CAPSULE    Take 300 mg by mouth See Admin Instructions.  Take after dialysis     LEVETIRACETAM (KEPPRA) 500 MG TABLET    Take 1 tablet by mouth three times a week After dialysis on Mon/Wed/Fri    LUBIPROSTONE (AMITIZA) 24 MCG CAPSULE    Take 24 mcg by mouth 2 times daily (with meals)     MIRTAZAPINE (REMERON) 15 MG TABLET    Take 1 tablet by mouth nightly    NIFEDIPINE (PROCARDIA XL) 60 MG EXTENDED RELEASE TABLET    Take 60 mg by mouth daily    ONDANSETRON (ZOFRAN) 4 MG TABLET    Take 4 mg by mouth every 8 hours as needed for Nausea or Vomiting    PANTOPRAZOLE (PROTONIX) 40 MG TABLET    Take 40 mg by mouth daily    POLYVINYL ALCOHOL (LIQUIFILM TEARS) 1.4 % OPHTHALMIC SOLUTION    Place 1 drop into both eyes as needed    PREGABALIN (LYRICA) 75 MG CAPSULE    Take 1 capsule by mouth 2 times daily for 30 days. ALLERGIES     Ferrous sulfate, Cephalexin, Dicloxacillin, Pcn [penicillins], and Sulfa antibiotics    FAMILY HISTORY        Family History   Problem Relation Age of Onset    Heart Attack Mother     Diabetes Mother     Hypertension Mother     Stroke Father     Diabetes Father     Hypertension Father        SOCIAL HISTORY       Social History     Socioeconomic History    Marital status:      Spouse name: None    Number of children: 2    Years of education: None    Highest education level: None   Occupational History    Occupation: volunteer   Tobacco Use    Smoking status: Never Smoker    Smokeless tobacco: Never Used   Vaping Use    Vaping Use: Never used   Substance and Sexual Activity    Alcohol use: Not Currently     Alcohol/week: 0.0 standard drinks     Comment: monthly    Drug use: Not Currently    Sexual activity: Yes     Partners: Male   Other Topics Concern    None   Social History Narrative    None     Social Determinants of Health     Financial Resource Strain:     Difficulty of Paying Living Expenses: Not on file   Food Insecurity:     Worried About Running Out of Food in the Last Year: Not on file    Doreen of Food in the Last Year: Not on file   Transportation Needs:     Lack of Transportation (Medical): Not on file    Lack of Transportation (Non-Medical):  Not on file   Physical Activity:     Days of Exercise per Week: Not on file    Minutes of Exercise per Session: Not on file   Stress:     Feeling of Stress : Not on file   Social Connections:     Frequency of Communication with Friends and Family: Not on file    Frequency of Social Gatherings with Friends and Family: Not on file    Attends Islam Services: Not on file    Active Member of Clubs or Organizations: Not on file    Attends Club or Organization Meetings: Not on file    Marital Status: Not on file   Intimate Partner Violence:     Fear of Current or Ex-Partner: Not on file    Emotionally Abused: Not on file    Physically Abused: Not on file    Sexually Abused: Not on file   Housing Stability:     Unable to Pay for Housing in the Last Year: Not on file    Number of [place] in the Last Year: Not on file    Unstable Housing in the Last Year: Not on file       PHYSICAL EXAM       ED Triage Vitals [01/22/22 2145]   BP Temp Temp Source Pulse Resp SpO2 Height Weight   (!) 159/93 98.1 °F (36.7 °C) Oral 84 18 95 % 5' 7\" (1.702 m) 125 lb (56.7 kg)       Physical Exam  Vitals and nursing note reviewed. Constitutional:       General: She is not in acute distress. Appearance: She is well-developed. She is not ill-appearing, toxic-appearing or diaphoretic. HENT:      Head: Normocephalic and atraumatic. Right Ear: External ear normal.      Left Ear: External ear normal.   Eyes:      General:         Right eye: No discharge. Left eye: No discharge. Conjunctiva/sclera: Conjunctivae normal.      Pupils: Pupils are equal, round, and reactive to light. Cardiovascular:      Rate and Rhythm: Normal rate and regular rhythm. Heart sounds: No murmur heard. Pulmonary:      Effort: Pulmonary effort is normal. No respiratory distress. Breath sounds: Normal breath sounds. No wheezing or rales. Abdominal:      General: Bowel sounds are normal. There is no distension. Palpations: Abdomen is soft. There is no mass. Tenderness: There is no abdominal tenderness. There is no guarding or rebound.    Genitourinary:     Comments: Deferred  Musculoskeletal: General: No deformity. Normal range of motion. Cervical back: Normal range of motion and neck supple. Comments: No tenderness to palpation. Soft compartments. 5 out of 5  strength and 5 out of 5 leg strength. Neurovascular intact. Strong distal pulses present   Skin:     General: Skin is warm. Findings: No erythema or rash. Neurological:      Mental Status: She is alert and oriented to person, place, and time. She is not disoriented. Cranial Nerves: No cranial nerve deficit. Motor: No atrophy or abnormal muscle tone. Coordination: Coordination normal.   Psychiatric:         Behavior: Behavior normal.         Thought Content:  Thought content normal.         DIAGNOSTIC RESULTS     EKG: All EKG's are interpreted by the Emergency Department Physician who either signs or Co-signs this chart in the absence of acardiologist.    None     RADIOLOGY:   Non-plain film images such as CT, Ultrasoundand MRI are read by the radiologist. Plain radiographic images are visualized and preliminarily interpreted by the emergency physician with the below findings:    X-ray negative    ED BEDSIDE ULTRASOUND:   Performed by ED Physician - none    LABS:  Labs Reviewed   CBC WITH AUTO DIFFERENTIAL - Abnormal; Notable for the following components:       Result Value    RBC 3.51 (*)     Hemoglobin 11.0 (*)     Hematocrit 33.1 (*)     RDW 15.6 (*)     All other components within normal limits    Narrative:     Performed at:  [address] [address]   Phone [phone]   BASIC METABOLIC PANEL W/ REFLEX TO MG FOR LOW K - Abnormal; Notable for the following components:    Sodium 133 (*)     Chloride 88 (*)     Anion Gap 19 (*)     Glucose 381 (*)     BUN 41 (*)     CREATININE 8.8 (*)     GFR Non- 5 (*)     GFR  6 (*)     Calcium 8.1 (*)     All other components within normal limits    Narrative:     Jaren Gan [place] [employer] [phone],  Chemistry results called to and read back by Severa Skill, 01/22/2022 23:51, by  [facility]  Grossly hemolyzed  Performed at:  [address] [facility] [address]   Phone [phone]   SEDIMENTATION RATE - Abnormal; Notable for the following components:    Sed Rate 66 (*)     All other components within normal limits    Narrative:     Bhakti Jama tel. [phone],  Rejected Test Name/Called to:cecilia morrell, 01/22/2022 23:52, by [facility]  Performed at:  [address] [facility] [address]   Phone [phone]   C-REACTIVE PROTEIN - Abnormal; Notable for the following components:    CRP 16.6 (*)     All other components within normal limits    Narrative:     recollect specimen, hemolysis  Performed at:  [address] [facility] [address]   Phone [phone]    Narrative:     Bhakti Jama tel. [phone],  Rejected Test Name/Called to:cecilia morrell, 01/22/2022 23:52, by [facility]  Performed at:  [address] [facility] [address]   Phone [phone]   POTASSIUM W/ REFLEX TO MAGNESIUM    Narrative:     recollect specimen, hemolysis  Performed at:  [address] [facility] [address]   Phone [phone]   CK    Narrative:     recollect specimen, hemolysis  Performed at:  [address] [facility] [address]   Phone [phone]   POTASSIUM    Narrative:     Performed at:  [facility]  [address] [place] [address] [facility] [address]   Phone [phone]       All other labs were withinnormal range or not returned as of this dictation.     EMERGENCY DEPARTMENT COURSE and DIFFERENTIAL DIAGNOSIS/MDM:     PMH, Surgical Hx, FH, Social Hx reviewed by myself (ETOH usage, Tobacco usage, Drug usage reviewed by myself, no pertinent Hx)- No Pertinent Hx     Old records were reviewed by me     MDM 61-year-old with leg pain. Soft compartments. Musculoskeletal appears to be. Ultrasound ordered outpatient. No risk factors for DVT. Low Wells criteria. Short course of pain medicine. OARRS report reviewed. Outpatient follow-up  Labs-reassuring  Diagnostic findings x-ray negative  Medications analgesics  Consults orthopedic outpatient  Disposition Home    I estimate there is LOW risk for Sepsis, MI, Stroke, Tamponade, PTX, Toxicity or other life threatening etiology thus I consider the discharge disposition reasonable. The patient is at low risk for mortality based on demographic, history and clinical factors. Given the best available information and clinical assessment, I estimate the risk of hospitalization to be greater than risk of treatment at home. I have explained to the patient that the risk could rapidly change, given precautions for return and instructions. Explained to patient that the risk for mortality is low based on demographic, history and clinical factors. I discussed with patient the results of evaluation in the ED, diagnosis, care, and prognosis. The plan is to discharge to home. Patient is in agreement with plan and questions have been answered. I also discussed with patient the reasons which may require a return visit and the importance of follow-up care. The patient is well-appearing, nontoxic, and improved at the time of discharge. Patient agrees to call to arrange follow-up care as directed. Patient understands to return immediately for worsening/change in symptoms. I PERSONALLY SAW THE PATIENT AND PERFORMED A SUBSTANTIVE PORTION OF THE VISIT INCLUDING ALL ASPECTS OF THE MEDICAL DECISION MAKING PROCESS.     CRITICAL CARE TIME   Total Critical Caretime was 21 minutes, excluding separately reportable procedures. There was a high probability of clinically significant/life threatening deterioration in the patient's condition which required my urgent intervention. PROCEDURES:  Unlessotherwise noted below, none    FINAL IMPRESSION      1. Left thigh pain          DISPOSITION/PLAN   DISPOSITION Decision To Discharge 01/23/2022 01:02:30 AM    PATIENT REFERRED TO:  PCP          Akila Bernard MD  [facility] [address]  [phone]            DISCHARGE MEDICATIONS:  New Prescriptions    CYCLOBENZAPRINE (FLEXERIL) 10 MG TABLET    Take 1 tablet by mouth 3 times daily as needed for Muscle spasms    LIDOCAINE (LIDODERM) 5 %    Place 1 patch onto the skin daily 12 hours on, 12 hours off. OXYCODONE-ACETAMINOPHEN (PERCOCET) 5-325 MG PER TABLET    Take 1 tablet by mouth every 6 hours as needed for Pain for up to 3 days. Intended supply: 3 days.  Take lowest dose possible to manage pain          (Please note that portions ofthis note were completed with a voice recognition program.  Efforts were made to edit the dictations but occasionally words are mis-transcribed.)    Lisa Marquez MD(electronically signed)  Attending Emergency Physician          Lisa Marquez MD  01/23/22 6540

## 2022-01-23 NOTE — ED NOTES
Bed: C-27  Expected date: 1/22/22  Expected time: 9:26 PM  Means of arrival: Walk In  Comments:  Leg pain     Jalen Jett RN  01/22/22 4832

## 2022-01-23 NOTE — ED PROVIDER NOTES
[address]      Pt Name: Gama Ramirez  MRN: [id number]  [place] 1975  Date of evaluation: 1/22/2022  Provider: Amna Riuz MD    CHIEF COMPLAINT       Chief Complaint   Patient presents with    Leg Pain     left leg pain x 2 weeks. no known injury          HISTORY OF PRESENT ILLNESS   (Location/Symptom, Timing/Onset, Context/Setting, Quality, Duration, Modifying Factors, Severity)  Note limiting factors. Gama Ramirez is a 50 y.o. female who presents to the emergency department with left anterior thigh pain. HPI    This is a 70-year-old -American female end-stage renal disease on hemodialysis who presents with about 2 weeks of anterior left thigh pain. The pain is dull achy really no other aggravating relieving factors and is specifically localized to the anterior left thigh. She denies any history of trauma no fevers chills no sign of swelling with this    Nursing Notes were reviewed. REVIEW OF SYSTEMS    (2-9 systems for level 4, 10 or more for level 5)     Review of Systems   Constitutional: Negative for chills and fever. Respiratory: Negative for shortness of breath. Cardiovascular: Negative for chest pain. Gastrointestinal: Negative for abdominal pain. Musculoskeletal: Positive for myalgias. Negative for joint swelling. All other systems reviewed and are negative. Except as noted above the remainder of the review of systems was reviewed and negative.        PAST MEDICAL HISTORY     Past Medical History:   Diagnosis Date    Cerebral artery occlusion with cerebral infarction [facility] [address] 2010    Cocaine abuse ([facility]     Crohn's disease ([facility]     Depression     Diabetes mellitus ([facility] [facility]     ESRD (end stage renal disease) ([facility] [address] [facility]     HD Tues-Thurs-Sat    GERD (gastroesophageal reflux disease)     Hemodialysis patient ([facility] [address] 2016    peritoneal     Hyperlipidemia     Hypertension     MI, old     [facility] disorder ([facility]     Pericarditis     Peritonitis ([facility] [address] [facility]     Pneumonia     Thyroid disease          SURGICAL HISTORY       Past Surgical History:   Procedure Laterality Date    BACK SURGERY      Tumor removal    CHOLECYSTECTOMY      COLON SURGERY Right     KIDNEY TRANSPLANT  1990    KIDNEY TRANSPLANT      LAPAROSCOPY      right ovary removed and left ovarian cyst removed for endometriosis    OTHER SURGICAL HISTORY      peritoneal dialysis catheter    PARACENTESIS      TUBAL LIGATION      UPPER GASTROINTESTINAL ENDOSCOPY  03/31/2017    UPPER GASTROINTESTINAL ENDOSCOPY N/A 3/25/2019    EGD BIOPSY performed by Charito Gregory MD at [address]        Previous Medications    B COMPLEX VITAMINS CAPSULE    TAKE 1 TABLET BY MOUTH ONE TIME A DAY    CALCIUM ACETATE, PHOS BINDER, 667 MG CAPS    Take 667 mg by mouth 3 times daily (with meals)    DARBEPOETIN TOMAS-POLYSORBATE (ARANESP, ALBUMIN FREE,) 100 MCG/0.5ML SOSY INJECTION    Inject 200 mcg into the skin once a week Las t dose was 1 week ago    GABAPENTIN (NEURONTIN) 300 MG CAPSULE    Take 300 mg by mouth See Admin Instructions. Take after dialysis     LEVETIRACETAM (KEPPRA) 500 MG TABLET    Take 1 tablet by mouth three times a week After dialysis on Mon/Wed/Fri    LUBIPROSTONE (AMITIZA) 24 MCG CAPSULE    Take 24 mcg by mouth 2 times daily (with meals)     MIRTAZAPINE (REMERON) 15 MG TABLET    Take 1 tablet by mouth nightly    NIFEDIPINE (PROCARDIA XL) 60 MG EXTENDED RELEASE TABLET    Take 60 mg by mouth daily    ONDANSETRON (ZOFRAN) 4 MG TABLET    Take 4 mg by mouth every 8 hours as needed for Nausea or Vomiting    PANTOPRAZOLE (PROTONIX) 40 MG TABLET    Take 40 mg by mouth daily    POLYVINYL ALCOHOL (LIQUIFILM TEARS) 1.4 % OPHTHALMIC SOLUTION    Place 1 drop into both eyes as needed    PREGABALIN (LYRICA) 75 MG CAPSULE    Take 1 capsule by mouth 2 times daily for 30 days.        ALLERGIES     Ferrous sulfate, Cephalexin, Dicloxacillin, Pcn [penicillins], and Sulfa antibiotics    FAMILY HISTORY       Family History   Problem Relation Age of Onset    Heart Attack Mother     Diabetes Mother     Hypertension Mother     Stroke Father     Diabetes Father     Hypertension Father           SOCIAL HISTORY       Social History     Socioeconomic History    Marital status:      Spouse name: None    Number of children: 2    Years of education: None    Highest education level: None   Occupational History    Occupation: volunteer   Tobacco Use    Smoking status: Never Smoker    Smokeless tobacco: Never Used   Vaping Use    Vaping Use: Never used   Substance and Sexual Activity    Alcohol use: Not Currently     Alcohol/week: 0.0 standard drinks     Comment: monthly    Drug use: Not Currently    Sexual activity: Yes     Partners: Male   Other Topics Concern    None   Social History Narrative    None     Social Determinants of Health     Financial Resource Strain:     Difficulty of Paying Living Expenses: Not on file   Food Insecurity:     Worried About Running Out of Food in the Last Year: Not on file    Doreen of Food in the Last Year: Not on file   Transportation Needs:     Lack of Transportation (Medical): Not on file    Lack of Transportation (Non-Medical):  Not on file   Physical Activity:     Days of Exercise per Week: Not on file    Minutes of Exercise per Session: Not on file   Stress:     Feeling of Stress : Not on file   Social Connections:     Frequency of Communication with Friends and Family: Not on file    Frequency of Social Gatherings with Friends and Family: Not on file    Attends Yazidi Services: Not on file    Active Member of Clubs or Organizations: Not on file    Attends Club or Organization Meetings: Not on file    Marital Status: Not on file   Intimate Partner Violence:     Fear of Current or Ex-Partner: Not on file    Emotionally Abused: Not on file    Physically Abused: Not on file    Sexually Abused: Not on file   Housing Stability:     Unable to Pay for Housing in the Last Year: Not on file    Number of Places Lived in the Last Year: Not on file    Unstable Housing in the Last Year: Not on file       SCREENINGS         Haines Coma Scale  Eye Opening: Spontaneous  Best Verbal Response: Oriented  Best Motor Response: Obeys commands  Haines Coma Scale Score: 15                     CIWA Assessment  BP: (!) 183/86  Pulse: 65                 PHYSICAL EXAM    (up to 7 for level 4, 8 or more for level 5)     ED Triage Vitals [01/22/22 2145]   BP Temp Temp Source Pulse Resp SpO2 Height Weight   (!) 159/93 98.1 °F (36.7 °C) Oral 84 18 95 % 5' 7\" (1.702 m) 125 lb (56.7 kg)       Physical Exam  Constitutional:       General: She is not in acute distress. Appearance: Normal appearance. HENT:      Head: Normocephalic and atraumatic. Right Ear: Tympanic membrane and ear canal normal.      Left Ear: Tympanic membrane and ear canal normal.      Nose: Nose normal.      Mouth/Throat:      Mouth: Mucous membranes are moist.      Pharynx: No oropharyngeal exudate. Eyes:      Extraocular Movements: Extraocular movements intact. Pupils: Pupils are equal, round, and reactive to light. Cardiovascular:      Rate and Rhythm: Normal rate and regular rhythm. Heart sounds: Murmur heard. No friction rub. No gallop. Pulmonary:      Effort: Pulmonary effort is normal.      Breath sounds: Normal breath sounds. Abdominal:      General: Bowel sounds are normal.      Palpations: Abdomen is soft. Tenderness: There is no abdominal tenderness. Musculoskeletal:         General: Normal range of motion. Cervical back: Normal range of motion and neck supple. Right upper leg: Normal.      Left upper leg: Tenderness present. No swelling, edema, deformity, lacerations or bony tenderness. Legs:    Skin:     General: Skin is warm and dry.       Capillary Refill: Capillary refill takes less than 2 seconds. Neurological:      General: No focal deficit present. Mental Status: She is alert and oriented to person, place, and time. Psychiatric:         Mood and Affect: Mood normal.         DIAGNOSTIC RESULTS     EKG: All EKG's are interpreted by the Emergency Department Physician who either signs or Co-signs this chart in the absence of a cardiologist.        RADIOLOGY:   Non-plain film images such as CT, Ultrasound and MRI are read by the radiologist. Plain radiographic images are visualized and preliminarily interpreted by the emergency physician with the below findings:        Interpretation per the Radiologist below, if available at the time of this note:    XR FEMUR LEFT (MIN 2 VIEWS)   Final Result   No acute osseous abnormality. ED BEDSIDE ULTRASOUND:   Performed by ED Physician - none    LABS:  Labs Reviewed   CBC WITH AUTO DIFFERENTIAL   BASIC METABOLIC PANEL W/ REFLEX TO MG FOR LOW K   SEDIMENTATION RATE   C-REACTIVE PROTEIN   CK       All other labs were within normal range or not returned as of this dictation. EMERGENCY DEPARTMENT COURSE and DIFFERENTIAL DIAGNOSIS/MDM:   Vitals:    Vitals:    01/22/22 2145 01/22/22 2257   BP: (!) 159/93 (!) 183/86   Pulse: 84 65   Resp: 18 18   Temp: 98.1 °F (36.7 °C)    TempSrc: Oral    SpO2: 95% 92%   Weight: 125 lb (56.7 kg)    Height: 5' 7\" (1.702 m)        Of history and physical exam performed. I reviewed her past medical past surgical social family history. Appropriate analgesia in the emergency department a 12 point review of systems was done and is otherwise negative listed as noted above. Her care is transferred 11 PM that of the oncoming ED physician    MDM      REASSESSMENT            None    PROCEDURES:  Unless otherwise noted below, none     Procedures        FINAL IMPRESSION      1.  Left thigh pain          DISPOSITION/PLAN   DISPOSITION        PATIENT REFERRED TO:  No follow-up provider specified. DISCHARGE MEDICATIONS:  New Prescriptions    No medications on file     Controlled Substances Monitoring:     No flowsheet data found.     (Please note that portions of this note were completed with a voice recognition program.  Efforts were made to edit the dictations but occasionally words are mis-transcribed.)    Sukh Ibarra MD (electronically signed)  Attending Emergency Physician         Sukh Ibarra MD  01/22/22 1926

## 2022-05-16 ENCOUNTER — HOSPITAL ENCOUNTER (EMERGENCY)
Age: 47
Discharge: ANOTHER HOSPITAL | End: 2022-05-16
Attending: EMERGENCY MEDICINE
Payer: MEDICAID

## 2022-05-16 ENCOUNTER — HOSPITAL ENCOUNTER (INPATIENT)
Age: 47
LOS: 4 days | Discharge: HOME | DRG: 045 | End: 2022-05-20
Attending: INTERNAL MEDICINE | Admitting: INTERNAL MEDICINE
Payer: MEDICAID

## 2022-05-16 ENCOUNTER — APPOINTMENT (OUTPATIENT)
Dept: CT IMAGING | Age: 47
End: 2022-05-16
Payer: MEDICAID

## 2022-05-16 ENCOUNTER — APPOINTMENT (OUTPATIENT)
Dept: CT IMAGING | Age: 47
DRG: 045 | End: 2022-05-16
Attending: INTERNAL MEDICINE
Payer: MEDICAID

## 2022-05-16 ENCOUNTER — APPOINTMENT (OUTPATIENT)
Dept: MRI IMAGING | Age: 47
DRG: 045 | End: 2022-05-16
Attending: INTERNAL MEDICINE
Payer: MEDICAID

## 2022-05-16 VITALS
BODY MASS INDEX: 23.11 KG/M2 | DIASTOLIC BLOOD PRESSURE: 89 MMHG | HEART RATE: 94 BPM | RESPIRATION RATE: 16 BRPM | WEIGHT: 147.27 LBS | SYSTOLIC BLOOD PRESSURE: 153 MMHG | TEMPERATURE: 98.1 F | OXYGEN SATURATION: 98 % | HEIGHT: 67 IN

## 2022-05-16 DIAGNOSIS — R51.9 SUDDEN ONSET OF SEVERE HEADACHE: ICD-10-CM

## 2022-05-16 DIAGNOSIS — I62.9 INTRACRANIAL HEMORRHAGE (HCC): Primary | ICD-10-CM

## 2022-05-16 LAB
ANION GAP SERPL CALCULATED.3IONS-SCNC: 18 MMOL/L (ref 3–16)
APTT: 33.2 SEC (ref 26.2–38.6)
BASOPHILS ABSOLUTE: 0.1 K/UL (ref 0–0.2)
BASOPHILS RELATIVE PERCENT: 0.9 %
BUN BLDV-MCNC: 37 MG/DL (ref 7–20)
CALCIUM SERPL-MCNC: 9 MG/DL (ref 8.3–10.6)
CHLORIDE BLD-SCNC: 93 MMOL/L (ref 99–110)
CO2: 21 MMOL/L (ref 21–32)
CREAT SERPL-MCNC: 7.4 MG/DL (ref 0.6–1.1)
EKG ATRIAL RATE: 88 BPM
EKG DIAGNOSIS: NORMAL
EKG P AXIS: 49 DEGREES
EKG P-R INTERVAL: 164 MS
EKG Q-T INTERVAL: 430 MS
EKG QRS DURATION: 76 MS
EKG QTC CALCULATION (BAZETT): 520 MS
EKG R AXIS: 42 DEGREES
EKG T AXIS: 75 DEGREES
EKG VENTRICULAR RATE: 88 BPM
EOSINOPHILS ABSOLUTE: 0.2 K/UL (ref 0–0.6)
EOSINOPHILS RELATIVE PERCENT: 2.4 %
GFR AFRICAN AMERICAN: 7
GFR NON-AFRICAN AMERICAN: 6
GLUCOSE BLD-MCNC: 216 MG/DL (ref 70–99)
GLUCOSE BLD-MCNC: 250 MG/DL (ref 70–99)
HCT VFR BLD CALC: 34.7 % (ref 36–48)
HEMOGLOBIN: 11.3 G/DL (ref 12–16)
INR BLD: 0.94 (ref 0.88–1.12)
LYMPHOCYTES ABSOLUTE: 1.9 K/UL (ref 1–5.1)
LYMPHOCYTES RELATIVE PERCENT: 19.5 %
MCH RBC QN AUTO: 31.6 PG (ref 26–34)
MCHC RBC AUTO-ENTMCNC: 32.5 G/DL (ref 31–36)
MCV RBC AUTO: 97.3 FL (ref 80–100)
MONOCYTES ABSOLUTE: 0.7 K/UL (ref 0–1.3)
MONOCYTES RELATIVE PERCENT: 7.8 %
NEUTROPHILS ABSOLUTE: 6.6 K/UL (ref 1.7–7.7)
NEUTROPHILS RELATIVE PERCENT: 69.4 %
PDW BLD-RTO: 14.3 % (ref 12.4–15.4)
PERFORMED ON: ABNORMAL
PLATELET # BLD: 217 K/UL (ref 135–450)
PMV BLD AUTO: 8 FL (ref 5–10.5)
POTASSIUM REFLEX MAGNESIUM: 4.5 MMOL/L (ref 3.5–5.1)
PROTHROMBIN TIME: 10.6 SEC (ref 9.9–12.7)
RBC # BLD: 3.57 M/UL (ref 4–5.2)
SARS-COV-2, NAAT: NOT DETECTED
SODIUM BLD-SCNC: 132 MMOL/L (ref 136–145)
TROPONIN: 0.17 NG/ML
WBC # BLD: 9.5 K/UL (ref 4–11)

## 2022-05-16 PROCEDURE — 80048 BASIC METABOLIC PNL TOTAL CA: CPT

## 2022-05-16 PROCEDURE — 6360000004 HC RX CONTRAST MEDICATION: Performed by: STUDENT IN AN ORGANIZED HEALTH CARE EDUCATION/TRAINING PROGRAM

## 2022-05-16 PROCEDURE — 6360000002 HC RX W HCPCS: Performed by: INTERNAL MEDICINE

## 2022-05-16 PROCEDURE — 93005 ELECTROCARDIOGRAM TRACING: CPT | Performed by: PHYSICIAN ASSISTANT

## 2022-05-16 PROCEDURE — 70496 CT ANGIOGRAPHY HEAD: CPT

## 2022-05-16 PROCEDURE — 2000000000 HC ICU R&B

## 2022-05-16 PROCEDURE — 2580000003 HC RX 258: Performed by: STUDENT IN AN ORGANIZED HEALTH CARE EDUCATION/TRAINING PROGRAM

## 2022-05-16 PROCEDURE — 70450 CT HEAD/BRAIN W/O DYE: CPT

## 2022-05-16 PROCEDURE — 2580000003 HC RX 258: Performed by: INTERNAL MEDICINE

## 2022-05-16 PROCEDURE — 85610 PROTHROMBIN TIME: CPT

## 2022-05-16 PROCEDURE — 4A03X5D MEASUREMENT OF ARTERIAL FLOW, INTRACRANIAL, EXTERNAL APPROACH: ICD-10-PCS | Performed by: RADIOLOGY

## 2022-05-16 PROCEDURE — 70551 MRI BRAIN STEM W/O DYE: CPT

## 2022-05-16 PROCEDURE — 96374 THER/PROPH/DIAG INJ IV PUSH: CPT

## 2022-05-16 PROCEDURE — 6360000002 HC RX W HCPCS: Performed by: PHYSICIAN ASSISTANT

## 2022-05-16 PROCEDURE — 99285 EMERGENCY DEPT VISIT HI MDM: CPT

## 2022-05-16 PROCEDURE — 93010 ELECTROCARDIOGRAM REPORT: CPT | Performed by: INTERNAL MEDICINE

## 2022-05-16 PROCEDURE — 85730 THROMBOPLASTIN TIME PARTIAL: CPT

## 2022-05-16 PROCEDURE — 87635 SARS-COV-2 COVID-19 AMP PRB: CPT

## 2022-05-16 PROCEDURE — 6360000002 HC RX W HCPCS: Performed by: STUDENT IN AN ORGANIZED HEALTH CARE EDUCATION/TRAINING PROGRAM

## 2022-05-16 PROCEDURE — 6370000000 HC RX 637 (ALT 250 FOR IP): Performed by: PHYSICIAN ASSISTANT

## 2022-05-16 PROCEDURE — 36556 INSERT NON-TUNNEL CV CATH: CPT

## 2022-05-16 PROCEDURE — 2500000003 HC RX 250 WO HCPCS

## 2022-05-16 PROCEDURE — 99223 1ST HOSP IP/OBS HIGH 75: CPT | Performed by: INTERNAL MEDICINE

## 2022-05-16 PROCEDURE — 85025 COMPLETE CBC W/AUTO DIFF WBC: CPT

## 2022-05-16 PROCEDURE — 36415 COLL VENOUS BLD VENIPUNCTURE: CPT

## 2022-05-16 PROCEDURE — 6370000000 HC RX 637 (ALT 250 FOR IP): Performed by: STUDENT IN AN ORGANIZED HEALTH CARE EDUCATION/TRAINING PROGRAM

## 2022-05-16 PROCEDURE — 84484 ASSAY OF TROPONIN QUANT: CPT

## 2022-05-16 RX ORDER — PANTOPRAZOLE SODIUM 40 MG/1
40 TABLET, DELAYED RELEASE ORAL DAILY
Status: DISCONTINUED | OUTPATIENT
Start: 2022-05-17 | End: 2022-05-20 | Stop reason: HOSPADM

## 2022-05-16 RX ORDER — INSULIN LISPRO 100 [IU]/ML
0-12 INJECTION, SOLUTION INTRAVENOUS; SUBCUTANEOUS
Status: DISCONTINUED | OUTPATIENT
Start: 2022-05-16 | End: 2022-05-20 | Stop reason: HOSPADM

## 2022-05-16 RX ORDER — ACETAMINOPHEN 500 MG
1000 TABLET ORAL ONCE
Status: COMPLETED | OUTPATIENT
Start: 2022-05-16 | End: 2022-05-16

## 2022-05-16 RX ORDER — NIFEDIPINE 60 MG/1
60 TABLET, EXTENDED RELEASE ORAL DAILY
Status: DISCONTINUED | OUTPATIENT
Start: 2022-05-17 | End: 2022-05-19

## 2022-05-16 RX ORDER — HYDRALAZINE HYDROCHLORIDE 20 MG/ML
10 INJECTION INTRAMUSCULAR; INTRAVENOUS ONCE
Status: COMPLETED | OUTPATIENT
Start: 2022-05-16 | End: 2022-05-16

## 2022-05-16 RX ORDER — ONDANSETRON 4 MG/1
4 TABLET, ORALLY DISINTEGRATING ORAL EVERY 8 HOURS PRN
Status: DISCONTINUED | OUTPATIENT
Start: 2022-05-16 | End: 2022-05-20 | Stop reason: HOSPADM

## 2022-05-16 RX ORDER — ACETAMINOPHEN 325 MG/1
650 TABLET ORAL EVERY 4 HOURS PRN
Status: DISCONTINUED | OUTPATIENT
Start: 2022-05-16 | End: 2022-05-20 | Stop reason: HOSPADM

## 2022-05-16 RX ORDER — INSULIN LISPRO 100 [IU]/ML
0-6 INJECTION, SOLUTION INTRAVENOUS; SUBCUTANEOUS NIGHTLY
Status: DISCONTINUED | OUTPATIENT
Start: 2022-05-16 | End: 2022-05-20 | Stop reason: HOSPADM

## 2022-05-16 RX ORDER — GABAPENTIN 100 MG/1
100 CAPSULE ORAL 2 TIMES DAILY
Status: DISCONTINUED | OUTPATIENT
Start: 2022-05-16 | End: 2022-05-20 | Stop reason: HOSPADM

## 2022-05-16 RX ORDER — LABETALOL HYDROCHLORIDE 5 MG/ML
10 INJECTION, SOLUTION INTRAVENOUS EVERY 10 MIN PRN
Status: DISCONTINUED | OUTPATIENT
Start: 2022-05-16 | End: 2022-05-20 | Stop reason: HOSPADM

## 2022-05-16 RX ORDER — OXYCODONE HYDROCHLORIDE AND ACETAMINOPHEN 5; 325 MG/1; MG/1
1 TABLET ORAL EVERY 4 HOURS PRN
Status: DISCONTINUED | OUTPATIENT
Start: 2022-05-16 | End: 2022-05-16

## 2022-05-16 RX ORDER — LABETALOL HYDROCHLORIDE 5 MG/ML
INJECTION, SOLUTION INTRAVENOUS
Status: COMPLETED
Start: 2022-05-16 | End: 2022-05-16

## 2022-05-16 RX ORDER — DEXTROSE MONOHYDRATE 50 MG/ML
100 INJECTION, SOLUTION INTRAVENOUS PRN
Status: DISCONTINUED | OUTPATIENT
Start: 2022-05-16 | End: 2022-05-20 | Stop reason: HOSPADM

## 2022-05-16 RX ORDER — ONDANSETRON 2 MG/ML
4 INJECTION INTRAMUSCULAR; INTRAVENOUS EVERY 6 HOURS PRN
Status: DISCONTINUED | OUTPATIENT
Start: 2022-05-16 | End: 2022-05-20 | Stop reason: HOSPADM

## 2022-05-16 RX ADMIN — ACETAMINOPHEN 650 MG: 325 TABLET ORAL at 16:17

## 2022-05-16 RX ADMIN — ACETAMINOPHEN 1000 MG: 500 TABLET ORAL at 13:00

## 2022-05-16 RX ADMIN — DESMOPRESSIN ACETATE 20.04 MCG: 4 SOLUTION INTRAVENOUS at 18:45

## 2022-05-16 RX ADMIN — LABETALOL HYDROCHLORIDE 5 MG: 5 INJECTION, SOLUTION INTRAVENOUS at 15:31

## 2022-05-16 RX ADMIN — INSULIN LISPRO 3 UNITS: 100 INJECTION, SOLUTION INTRAVENOUS; SUBCUTANEOUS at 20:59

## 2022-05-16 RX ADMIN — GABAPENTIN 100 MG: 100 CAPSULE ORAL at 21:02

## 2022-05-16 RX ADMIN — INSULIN LISPRO 4 UNITS: 100 INJECTION, SOLUTION INTRAVENOUS; SUBCUTANEOUS at 17:20

## 2022-05-16 RX ADMIN — IOPAMIDOL 80 ML: 755 INJECTION, SOLUTION INTRAVENOUS at 18:01

## 2022-05-16 RX ADMIN — ACETAMINOPHEN 650 MG: 325 TABLET ORAL at 21:02

## 2022-05-16 RX ADMIN — LEVETIRACETAM 500 MG: 100 INJECTION, SOLUTION, CONCENTRATE INTRAVENOUS at 17:35

## 2022-05-16 RX ADMIN — HYDRALAZINE HYDROCHLORIDE 10 MG: 20 INJECTION INTRAMUSCULAR; INTRAVENOUS at 13:02

## 2022-05-16 ASSESSMENT — PAIN SCALES - GENERAL
PAINLEVEL_OUTOF10: 3
PAINLEVEL_OUTOF10: 9
PAINLEVEL_OUTOF10: 3
PAINLEVEL_OUTOF10: 9
PAINLEVEL_OUTOF10: 10
PAINLEVEL_OUTOF10: 4

## 2022-05-16 ASSESSMENT — PAIN - FUNCTIONAL ASSESSMENT
PAIN_FUNCTIONAL_ASSESSMENT: ACTIVITIES ARE NOT PREVENTED
PAIN_FUNCTIONAL_ASSESSMENT: 0-10
PAIN_FUNCTIONAL_ASSESSMENT: ACTIVITIES ARE NOT PREVENTED
PAIN_FUNCTIONAL_ASSESSMENT: ACTIVITIES ARE NOT PREVENTED

## 2022-05-16 ASSESSMENT — PAIN DESCRIPTION - DESCRIPTORS
DESCRIPTORS: ACHING

## 2022-05-16 ASSESSMENT — PAIN DESCRIPTION - ONSET
ONSET: ON-GOING

## 2022-05-16 ASSESSMENT — ENCOUNTER SYMPTOMS
SORE THROAT: 0
SHORTNESS OF BREATH: 0
NAUSEA: 1
ABDOMINAL PAIN: 0
VOMITING: 0
NAUSEA: 1
BACK PAIN: 0

## 2022-05-16 ASSESSMENT — PAIN DESCRIPTION - LOCATION
LOCATION: HEAD

## 2022-05-16 ASSESSMENT — PAIN DESCRIPTION - FREQUENCY
FREQUENCY: CONTINUOUS

## 2022-05-16 ASSESSMENT — PAIN DESCRIPTION - PAIN TYPE
TYPE: ACUTE PAIN

## 2022-05-16 ASSESSMENT — PAIN DESCRIPTION - ORIENTATION
ORIENTATION: ANTERIOR
ORIENTATION: ANTERIOR

## 2022-05-16 NOTE — ED PROVIDER NOTES
[address] [facility]      Pt Name: Zeno Hodgkin  MRN: [id number]  [place] 1975  Date of evaluation: 5/16/2022  Provider: Bouchra Wade PA-C    This patient was seen and evaluated by the attending physician Chelsea Schirmer, MD.    CHIEF COMPLAINT      Chief Complaint: Headache      CRITICAL CARE TIME   Total Critical Care time was 45 minutes, excluding separately reportable procedures. There was a high probability of clinically significant/life threatening deterioration in the patient's condition which required my urgent intervention. HISTORYOF PRESENT ILLNESS  (Location/Symptom, Timing/Onset, Context/Setting, Quality, Duration, Modifying Factors, Severity.)   Zeno Hodgkin is a 50 y.o. female who presents to the emergency department complaining of a headache. This headache onset suddenly about 30 minutes prior to her arriving to the ER. She says she was just finishing up dialysis at home when she had about 40 minutes left when she developed the headache. She describes a throbbing bilateral frontal headache that is rated 10 out of 10. It is constant. Nothing has made it better or worse. It has stayed the same intensity. She has had some nausea with it but no vomiting. She reports a little bit of photophobia, denies vision changes. No neck stiffness or neck pain. No fever. She denies any focal numbness or weakness. She does have a prior history of stroke and says she has some residual balance issues but this has not been an issue today. She is not anticoagulated. She does take Keppra and already took her morning dose. When her headache was severe she checked her blood pressure and found it to be in the 170s over 110s. She called her nurse who advised her to come to the ER for possible stroke. Patient has history of intracranial hemorrhage in the past from head trauma.       Nursing Notes were reviewed and I agree.    REVIEW OF SYSTEMS    (2-9 systems for level 4, 10 or more forlevel 5)     Review of Systems   Constitutional: Negative for chills and fever. HENT: Negative for sore throat. Respiratory: Negative for shortness of breath. Cardiovascular: Negative for chest pain. Gastrointestinal: Positive for nausea. Negative for abdominal pain and vomiting. Genitourinary: Negative for difficulty urinating and dysuria. Musculoskeletal: Positive for gait problem (from prior stroke, \"balance issue\"). Negative for back pain. Skin: Negative for rash. Neurological: Positive for headaches. Negative for weakness, light-headedness and numbness. Psychiatric/Behavioral: The patient is not nervous/anxious. All other systems reviewed and are negative. Positives and Pertinent negatives as per HPI. Except as noted above the remainder of the review of systems was reviewed and negative.        PAST MEDICALHISTORY         Diagnosis Date    Cerebral artery occlusion with cerebral infarction ([facility] [address] 2010    Cocaine abuse ([facility] [address]     Crohn's disease ([facility] [address]     Depression     Diabetes mellitus ([address]     ESRD (end stage renal disease) ([facility] [address]     [facility]    GERD (gastroesophageal reflux disease)     Hemodialysis patient ([facility] [address] 2016    peritoneal     Hyperlipidemia     Hypertension     MI, old     Mood disorder ([address]     Pericarditis     Peritonitis ([facility] [address]     Pneumonia     Thyroid disease        SURGICAL HISTORY           Procedure Laterality Date    BACK SURGERY      Tumor removal    CHOLECYSTECTOMY      COLON SURGERY Right     KIDNEY TRANSPLANT  1990    KIDNEY TRANSPLANT      LAPAROSCOPY      right ovary removed and left ovarian cyst removed for endometriosis    OTHER SURGICAL HISTORY      peritoneal dialysis catheter    PARACENTESIS      TUBAL LIGATION      UPPER GASTROINTESTINAL ENDOSCOPY  03/31/2017    UPPER GASTROINTESTINAL ENDOSCOPY N/A 3/25/2019    EGD BIOPSY performed by Bimal Walters MD Frankie at [address]       Previous Medications    B COMPLEX VITAMINS CAPSULE    TAKE 1 TABLET BY MOUTH ONE TIME A DAY    CALCIUM ACETATE, PHOS BINDER, 667 MG CAPS    Take 667 mg by mouth 3 times daily (with meals)    DARBEPOETIN TOMAS-POLYSORBATE (ARANESP, ALBUMIN FREE,) 100 MCG/0.5ML SOSY INJECTION    Inject 200 mcg into the skin once a week Las t dose was 1 week ago    GABAPENTIN (NEURONTIN) 300 MG CAPSULE    Take 300 mg by mouth See Admin Instructions. Take after dialysis     LEVETIRACETAM (KEPPRA) 500 MG TABLET    Take 1 tablet by mouth three times a week After dialysis on Mon/Wed/Fri    LIDOCAINE (LIDODERM) 5 %    Place 1 patch onto the skin daily 12 hours on, 12 hours off. LUBIPROSTONE (AMITIZA) 24 MCG CAPSULE    Take 24 mcg by mouth 2 times daily (with meals)     MIRTAZAPINE (REMERON) 15 MG TABLET    Take 1 tablet by mouth nightly    NIFEDIPINE (PROCARDIA XL) 60 MG EXTENDED RELEASE TABLET    Take 60 mg by mouth daily    ONDANSETRON (ZOFRAN) 4 MG TABLET    Take 4 mg by mouth every 8 hours as needed for Nausea or Vomiting    PANTOPRAZOLE (PROTONIX) 40 MG TABLET    Take 40 mg by mouth daily    POLYVINYL ALCOHOL (LIQUIFILM TEARS) 1.4 % OPHTHALMIC SOLUTION    Place 1 drop into both eyes as needed    PREGABALIN (LYRICA) 75 MG CAPSULE    Take 1 capsule by mouth 2 times daily for 30 days. ALLERGIES     Ferrous sulfate, Cephalexin, Dicloxacillin, Pcn [penicillins], and Sulfa antibiotics    FAMILY HISTORY           Problem Relation Age of Onset    Heart Attack Mother     Diabetes Mother     Hypertension Mother     Stroke Father     Diabetes Father     Hypertension Father      Family Status   Relation Name Status    Mother      Father          SOCIAL HISTORY    reports that she has never smoked. She has never used smokeless tobacco. She reports previous alcohol use. She reports previous drug use.     PHYSICAL EXAM    (up to 7 for level 4, 8 or more for level 5)     ED Triage Vitals [22 1055]   BP Temp Temp Source Pulse Resp SpO2 Height Weight   (!) 159/94 98.2 °F (36.8 °C) Oral 93 16 97 % 5' 7\" (1.702 m) 147 lb 4.3 oz (66.8 kg)       Physical Exam  Vitals and nursing note reviewed. Constitutional:       General: She is not in acute distress. Appearance: She is well-developed. HENT:      Head: Normocephalic and atraumatic. Eyes:      Extraocular Movements: Extraocular movements intact. Pupils: Pupils are equal, round, and reactive to light. Cardiovascular:      Rate and Rhythm: Normal rate and regular rhythm. Heart sounds: Normal heart sounds. Pulmonary:      Effort: Pulmonary effort is normal. No respiratory distress. Breath sounds: Normal breath sounds. Abdominal:      Palpations: Abdomen is soft. Tenderness: There is no abdominal tenderness. Musculoskeletal:         General: Normal range of motion. Cervical back: Neck supple. Skin:     General: Skin is warm and dry. Neurological:      General: No focal deficit present. Mental Status: She is alert and oriented to person, place, and time. Mental status is at baseline. Comments: NIHSS 0, no facial droop, no pronator drift, CN intact, normal finger to nose, normal strength and sensation throughout   Psychiatric:         Behavior: Behavior normal.            DIAGNOSTIC RESULTS     When ordered, EKGs are interpreted by the Emergency Department Physician in the absence of a cardiologist. Please see their note for interpretation of EKG    EK-lead EKG interpreted as normal sinus rhythm with a rate of 88 bpm. QT prolongation. No ST elevation or depression on my review. Please see Dr. Hyde Payment note for full interpretation. When compared with EKG dated 21, no significant change found.      RADIOLOGY:         Interpretation per the Radiologist below, if available at the time of this note:    CT HEAD WO CONTRAST   Final Result   Increased density is seen in the occipital horn of the left lateral   ventricle, new compared to prior. Finding is suspicious for a small amount   of intraventricular hemorrhage. Encephalomalacia posterior left parietal lobe at the site of prior hemorrhage. Results discussed with with LUDIVINA FRANCIS by Kimberli Riley. Nyasia Chin MD at 11:56   am on 5/16/2022             LABS:  Labs Reviewed   COVID-19, RAPID   CBC WITH AUTO DIFFERENTIAL   BASIC METABOLIC PANEL W/ REFLEX TO MG FOR LOW K   TROPONIN   PROTIME-INR   APTT       When ordered, only abnormal lab results are displayed. All other labs are within normal range or not returned as of the dictation. EMERGENCY DEPARTMENT COURSE and DIFFERENTIAL DIAGNOSIS/MDM:   Vitals:    Vitals:    05/16/22 1055 05/16/22 1215 05/16/22 1230 05/16/22 1245   BP: (!) 159/94 (!) 168/94 (!) 173/88 (!) 171/94   Pulse: 93 89 81 88   Resp: 16 18 16 19   Temp: 98.2 °F (36.8 °C)      TempSrc: Oral      SpO2: 97%  98% 99%   Weight: 147 lb 4.3 oz (66.8 kg)      Height: 5' 7\" (1.702 m)          I saw this patient with Dr. rEich Mckeon who spent face-to-face time with the patient and agreed with my assessment and plan. The patient was stable and nontoxic appearing. Patient has a prior history of intracranial hemorrhage and prior strokes but she today has the focal complaint of headache. There is no deficit on my exam.  CT was performed and this shows intracranial hemorrhage in the ventricle. I discussed the case with the neurosurgery NP at [facility] who asked that the patient's blood pressure remain below 921 systolic. He would like the patient transferred to [facility] neuro ICU given the location of the bleed in the ventricle. He would like any updates if the patient has any neurologic decline or abnormal labs. I also discussed the case with the hospitalist Dr. Stephen Cope who has accepted the patient for transfer. Transportation is being arranged. At this time we are still awaiting labs.  10 mg IV hydralazine has been ordered secondary to /94. PROCEDURES:  None    FINAL IMPRESSION      1. Intracranial hemorrhage ([address]    2. Sudden onset of severe headache          DISPOSITION/PLAN   DISPOSITION Decision To Transfer 05/16/2022 12:37:46 PM      PATIENT REFERRED TO:  No follow-up provider specified.     MEDICATIONS:  New Prescriptions    No medications on file       (Please note that portions of this note were completed with a voice recognition program.  Efforts were made toedit the dictations but occasionally words are mis-transcribed.)    FE Dejesus PA-C  05/16/22 7591

## 2022-05-16 NOTE — ED NOTES
Pt being transferred to [facility] neuro ICU rm [id number]. [facility] to transport pt, eta 1400. Report called to receiving PRISCILLA Padilla at [facility]. neuro ICU, all questions answered. Pt updated on plan of care and is in agreement with transfer, EMTALA form signed by pt. Pt documents and belongings to be sent with transport to give to receiving PRISCILLA Padilla.        Aryan Gaffney RN  05/16/22 0106

## 2022-05-16 NOTE — H&P
ICU HISTORY AND 2025 [facility] Day: 1  ICU Day: 1                                                        Code:Full Code  Admit Date: 5/16/2022  PCP: No primary care provider on file. CC: Headache    HISTORY OF PRESENT ILLNESS:   49-year-old female with PMH of HTN, CVA event x2, ESRD on HD presented to the [facility] ED with headache. Patient reported with a finishing up her HD at home with about 40 minutes remaining when she developed headache. He described the headache as a throbbing, frontal, 10/10 in intensity associated with nausea, lightheadedness and vertigo (room spinning sensation) with no aggravating/relieving factors. Patient denied any vomiting, vision changes, neck stiffness/neck pain, fevers. Denies any focal numbness/weakness. She checked her blood pressure during this event and was found to be 179/110. She called her dialysis nurse who advised her to come to the ER. Of note, patient has history of prior stroke with some residual balance issues. She takes Keppra 500 twice daily and 3 times daily during days of her dialysis (MWF). At [facility] ED, vitals afebrile 98.2, pulse 93, /94, RR 16, SPO2 97% on RA. No focal deficits on examination. CT head without contrast revealed increased density in the occipital horn of the left lateral ventricle new compared to prior. Findings suspicious for small amount of intraventricular hemorrhage. Encephalomalacia posterior to the left parietal lobe at the site of prior hemorrhage. Neurosurgery was consulted who suggested patient to be transferred to [facility]. for further management.       PAST HISTORY:     Past Medical History:   Diagnosis Date    Cerebral artery occlusion with cerebral infarction ([facility] [address] 2010    Cocaine abuse ([facility] [address]     Crohn's disease ([facility] [address]     Depression     Diabetes mellitus ([address]     ESRD (end stage renal disease) ([address]     HD Tues-Thurs-Sat    GERD (gastroesophageal reflux disease)     Hemodialysis patient [facility]) 2016    peritoneal     Hyperlipidemia     Hypertension     MI, old     Mood disorder ([facility] [address]     Pericarditis     Peritonitis ([facility] [address]     Pneumonia     Thyroid disease        Past Surgical History:   Procedure Laterality Date    BACK SURGERY      Tumor removal    CHOLECYSTECTOMY      COLON SURGERY Right     KIDNEY TRANSPLANT  1990    KIDNEY TRANSPLANT      LAPAROSCOPY      right ovary removed and left ovarian cyst removed for endometriosis    OTHER SURGICAL HISTORY      peritoneal dialysis catheter    PARACENTESIS      TUBAL LIGATION      UPPER GASTROINTESTINAL ENDOSCOPY  03/31/2017    UPPER GASTROINTESTINAL ENDOSCOPY N/A 3/25/2019    EGD BIOPSY performed by Mindi Galindo MD at [address]:   The patient lives at home with boyfriend    Alcohol:  Illicit drugs: Yes  Tobacco:      Family History:  Family History   Problem Relation Age of Onset    Heart Attack Mother     Diabetes Mother     Hypertension Mother     Stroke Father     Diabetes Father     Hypertension Father        MEDICATIONS:     No current facility-administered medications on file prior to encounter. Current Outpatient Medications on File Prior to Encounter   Medication Sig Dispense Refill    lidocaine (LIDODERM) 5 % Place 1 patch onto the skin daily 12 hours on, 12 hours off. 10 patch 0    [DISCONTINUED] ibuprofen (ADVIL;MOTRIN) 800 MG tablet Take 1 tablet by mouth 3 times daily as needed for Pain 15 tablet 0    levETIRAcetam (KEPPRA) 500 MG tablet Take 1 tablet by mouth three times a week After dialysis on Mon/Wed/Fri 60 tablet 0    polyvinyl alcohol (LIQUIFILM TEARS) 1.4 % ophthalmic solution Place 1 drop into both eyes as needed      gabapentin (NEURONTIN) 300 MG capsule Take 300 mg by mouth See Admin Instructions.  Take after dialysis       ondansetron (ZOFRAN) 4 MG tablet Take 4 mg by mouth every 8 hours as needed for Nausea or Vomiting      NIFEdipine (PROCARDIA XL) 60 MG extended release tablet Take 60 mg by mouth daily      pantoprazole (PROTONIX) 40 MG tablet Take 40 mg by mouth daily      Calcium Acetate, Phos Binder, 667 MG CAPS Take 667 mg by mouth 3 times daily (with meals)      b complex vitamins capsule TAKE 1 TABLET BY MOUTH ONE TIME A DAY      pregabalin (LYRICA) 75 MG capsule Take 1 capsule by mouth 2 times daily for 30 days. 60 capsule 0    mirtazapine (REMERON) 15 MG tablet Take 1 tablet by mouth nightly 30 tablet 3    lubiprostone (AMITIZA) 24 MCG capsule Take 24 mcg by mouth 2 times daily (with meals)       darbepoetin hina-polysorbate (ARANESP, ALBUMIN FREE,) 100 MCG/0.5ML SOSY injection Inject 200 mcg into the skin once a week Las t dose was 1 week ago           Scheduled Meds:   levetiracetam  500 mg IntraVENous Q24H    gabapentin  300 mg Oral See Admin Instructions    NIFEdipine  60 mg Oral Daily    pantoprazole  40 mg Oral Daily      Continuous Infusions:  PRN Meds:acetaminophen, ondansetron **OR** ondansetron, labetalol    Allergies: Allergies   Allergen Reactions    Ferrous Sulfate Shortness Of Breath    Cephalexin Itching and Swelling    Dicloxacillin Rash    Pcn [Penicillins] Rash    Sulfa Antibiotics Rash and Other (See Comments)     Chest pain. REVIEW OF SYSTEMS:       History obtained from the patient    Review of Systems   Gastrointestinal: Positive for nausea. Neurological: Positive for headaches. Negative for tremors, seizures, syncope, speech difficulty, weakness, light-headedness and numbness. All other systems reviewed and are negative. PHYSICAL EXAM:       Vitals: BP (!) 159/78 Comment: 5 mg labetolol given  Pulse 82   Resp 13   SpO2 97%     I/O:  No intake or output data in the 24 hours ending 05/16/22 1610  No intake/output data recorded. No intake/output data recorded. Physical Examination:     Physical Exam  Constitutional:       Appearance: Normal appearance.    HENT:      Head: Normocephalic and atraumatic. Nose: Nose normal.      Mouth/Throat:      Mouth: Mucous membranes are moist.   Eyes:      Extraocular Movements: Extraocular movements intact. Conjunctiva/sclera: Conjunctivae normal.      Pupils: Pupils are equal, round, and reactive to light. Cardiovascular:      Rate and Rhythm: Normal rate and regular rhythm. Pulses: Normal pulses. Heart sounds: Normal heart sounds. Pulmonary:      Effort: Pulmonary effort is normal.      Breath sounds: Normal breath sounds. Abdominal:      General: Bowel sounds are normal.      Palpations: Abdomen is soft. Musculoskeletal:         General: Normal range of motion. Cervical back: Normal range of motion. Skin:     General: Skin is warm. Neurological:      General: No focal deficit present. Mental Status: She is alert and oriented to person, place, and time. Gait: Gait abnormal.   Psychiatric:         Mood and Affect: Mood normal.         Behavior: Behavior normal.         Thought Content: Thought content normal.         Judgment: Judgment normal.             No results for input(s): PHART, SOY5UGI, PO2ART in the last 72 hours. DATA:       Labs:  CBC:   Recent Labs     05/16/22  1255   WBC 9.5   HGB 11.3*   HCT 34.7*          BMP:   Recent Labs     05/16/22  1255   *   K 4.5   CL 93*   CO2 21   BUN 37*   CREATININE 7.4*   GLUCOSE 216*     LFT's: No results for input(s): AST, ALT, ALB, BILITOT, ALKPHOS in the last 72 hours. Troponin:   Recent Labs     05/16/22  1255   TROPONINI 0.17*     BNP:No results for input(s): BNP in the last 72 hours. ABGs: No results for input(s): PHART, BYI9FTF, PO2ART in the last 72 hours. INR:   Recent Labs     05/16/22  1255   INR 0.94       U/A:No results for input(s): NITRITE, COLORU, PHUR, LABCAST, WBCUA, RBCUA, MUCUS, TRICHOMONAS, YEAST, BACTERIA, CLARITYU, SPECGRAV, LEUKOCYTESUR, UROBILINOGEN, BILIRUBINUR, BLOODU, GLUCOSEU, AMORPHOUS in the last 72 hours.     Invalid input(s): KETONESU    No orders to display       EKG:   Echo:  Micro:     ASSESSMENT AND PLAN:   Oly Delarosa is a 50 y.o. female, who was admitted for the following concern:      Intraventricular hemorrhage  - CT head without contrast revealed increased density in the occipital horn of the left lateral ventricle new compared to prior. Findings suspicious for small amount of intraventricular hemorrhage.   Encephalomalacia posterior to the left parietal lobe at the site of prior hemorrhage.  - Every hour neurochecks  - Keep HOB elevated  - Keep SBP <160 mmHg  - labetalol 10 mg every 4 hours as needed  - SLP  - Follow-up on stability CT head without contrast  - Follow-up on CTA head and neck with without contrast  - Neurosurgery consulted, appreciate recs  - Keppra 500 twice daily  - Avoid AP and AC for at least 2 weeks    ESRD on HD MWF  - Patient follows a nephrologist at [facility], does home hemodialysis  - Consult nephrology, appreciate recs    Related to QTC  - EKG revealing QTC of [address] [facility]  - Monitor QTC on telemetry            Code Status:Full Code  FEN: N.p.o., SLP  PPX: Protonix  DISPO: ICU     This patient has been staffed and discussed with Katiana Gamino MD.   -----------------------------  Apurva Rivera MD, PGY-1  5/16/2022  4:10 PM

## 2022-05-16 NOTE — CONSULTS
ICU HISTORY AND 2025 [facility] Day:   ICU Day:                                                          Code:Limited  Admit Date: 5/16/2022  PCP: No primary care provider on file. CC: Headache     HISTORY OF PRESENT ILLNESS:   49-year-old female with PMH of HTN, CVA event x2, ESRD on HD presented to the [facility] ED with headache. Patient reported with a finishing up her HD at home with about 40 minutes remaining when she developed headache. He described the headache as a throbbing, frontal, 10/10 in intensity associated with nausea, lightheadedness and vertigo (room spinning sensation) with no aggravating/relieving factors. Patient denied any vomiting, vision changes, neck stiffness/neck pain, fevers. Denies any focal numbness/weakness. She checked her blood pressure during this event and was found to be 179/110. She called her dialysis nurse who advised her to come to the ER. Of note, patient has history of prior stroke with some residual balance issues. She takes Keppra 500 twice daily and 3 times daily during days of her dialysis (MWF).    At [facility] ED, vitals afebrile 98.2, pulse 93, /94, RR 16, SPO2 97% on RA. No focal deficits on examination. CT head without contrast revealed increased density in the occipital horn of the left lateral ventricle new compared to prior. Findings suspicious for small amount of intraventricular hemorrhage. Encephalomalacia posterior to the left parietal lobe at the site of prior hemorrhage.   Neurosurgery was consulted who suggested patient to be transferred to [facility], [facility]. for further management.       PAST HISTORY:     Past Medical History:   Diagnosis Date    Cerebral artery occlusion with cerebral infarction [facility]) 2010    [facility] abuse [facility] [address]     Crohn's disease ([facility] [address] [facility]     Depression     Diabetes mellitus ([facility] [address]     ESRD (end stage renal disease) ([facility] [address]     HD Tues-Thurs-Sat    GERD (gastroesophageal reflux disease)  Hemodialysis patient [facility]) 2016    peritoneal     Hyperlipidemia     Hypertension     MI, old     [facility] disorder [facility] [address]     Pericarditis     Peritonitis ([facility] [address]     Pneumonia     Thyroid disease        Past Surgical History:   Procedure Laterality Date    BACK SURGERY      Tumor removal    CHOLECYSTECTOMY      COLON SURGERY Right     KIDNEY TRANSPLANT  1990    KIDNEY TRANSPLANT      LAPAROSCOPY      right ovary removed and left ovarian cyst removed for endometriosis    OTHER SURGICAL HISTORY      peritoneal dialysis catheter    PARACENTESIS      TUBAL LIGATION      UPPER GASTROINTESTINAL ENDOSCOPY  03/31/2017    UPPER GASTROINTESTINAL ENDOSCOPY N/A 3/25/2019    EGD BIOPSY performed by Mindi Galindo MD at [address]:   The patient lives at home. Alcohol:  Illicit drugs: Hx of cocaine use   Tobacco:        Family History:  Family History   Problem Relation Age of Onset    Heart Attack Mother     Diabetes Mother     Hypertension Mother     Stroke Father     Diabetes Father     Hypertension Father        MEDICATIONS:     No current facility-administered medications on file prior to encounter. Current Outpatient Medications on File Prior to Encounter   Medication Sig Dispense Refill    lidocaine (LIDODERM) 5 % Place 1 patch onto the skin daily 12 hours on, 12 hours off. (Patient not taking: Reported on 5/16/2022) 10 patch 0    [DISCONTINUED] ibuprofen (ADVIL;MOTRIN) 800 MG tablet Take 1 tablet by mouth 3 times daily as needed for Pain 15 tablet 0    levETIRAcetam (KEPPRA) 500 MG tablet Take 1 tablet by mouth three times a week After dialysis on Mon/Wed/Fri 60 tablet 0    polyvinyl alcohol (LIQUIFILM TEARS) 1.4 % ophthalmic solution Place 1 drop into both eyes as needed      gabapentin (NEURONTIN) 300 MG capsule Take 100 mg by mouth 2 times daily.  Take after dialysis       ondansetron (ZOFRAN) 4 MG tablet Take 4 mg by mouth every 8 hours as needed for Nausea or Vomiting      NIFEdipine (PROCARDIA XL) 60 MG extended release tablet Take 60 mg by mouth daily      pantoprazole (PROTONIX) 40 MG tablet Take 40 mg by mouth daily      Calcium Acetate, Phos Binder, 667 MG CAPS Take 667 mg by mouth 3 times daily (with meals)      b complex vitamins capsule TAKE 1 TABLET BY MOUTH ONE TIME A DAY      pregabalin (LYRICA) 75 MG capsule Take 1 capsule by mouth 2 times daily for 30 days. 60 capsule 0    mirtazapine (REMERON) 15 MG tablet Take 1 tablet by mouth nightly 30 tablet 3    lubiprostone (AMITIZA) 24 MCG capsule Take 24 mcg by mouth 2 times daily (with meals)       darbepoetin hina-polysorbate (ARANESP, ALBUMIN FREE,) 100 MCG/0.5ML SOSY injection Inject 200 mcg into the skin once a week Las t dose was 1 week ago           Scheduled Meds:   levetiracetam  500 mg IntraVENous Q24H    gabapentin  100 mg Oral BID    [START ON 5/17/2022] NIFEdipine  60 mg Oral Daily    [START ON 5/17/2022] pantoprazole  40 mg Oral Daily    insulin lispro  0-12 Units SubCUTAneous TID WC    insulin lispro  0-6 Units SubCUTAneous Nightly      Continuous Infusions:   dextrose       PRN Meds:acetaminophen, ondansetron **OR** ondansetron, labetalol, glucose, dextrose bolus **OR** dextrose bolus, glucagon (rDNA), dextrose    Allergies: Allergies   Allergen Reactions    Ferrous Sulfate Shortness Of Breath    Cephalexin Itching and Swelling    Dicloxacillin Rash    Pcn [Penicillins] Rash    Sulfa Antibiotics Rash and Other (See Comments)     Chest pain. REVIEW OF SYSTEMS:       History obtained from ED notes and the patient       Review of Systems   Gastrointestinal: Positive for nausea. Neurological: Positive for headaches. Negative for tremors, seizures, syncope, speech difficulty, weakness, light-headedness and numbness. All other systems reviewed and are negative.     PHYSICAL EXAM:       Vitals: BP (!) 141/83   Pulse 86   Temp 98.5 °F (36.9 °C) (Oral)   Resp 16   SpO2 BILITOT, ALKPHOS in the last 72 hours. Troponin:   Recent Labs     05/16/22  1255   TROPONINI 0.17*     BNP:No results for input(s): BNP in the last 72 hours. ABGs: No results for input(s): PHART, OTD4DWD, PO2ART in the last 72 hours. INR:   Recent Labs     05/16/22  1255   INR 0.94       U/A:No results for input(s): NITRITE, COLORU, PHUR, LABCAST, WBCUA, RBCUA, MUCUS, TRICHOMONAS, YEAST, BACTERIA, CLARITYU, SPECGRAV, LEUKOCYTESUR, UROBILINOGEN, BILIRUBINUR, BLOODU, GLUCOSEU, AMORPHOUS in the last 72 hours. Invalid input(s): Azell Govern    CT head without contrast    (Results Pending)   CTA HEAD W WO CONTRAST    (Results Pending)       EKG:   Normal sinus rhythm  Prolonged QTAbnormal   Echo:  Micro:     ASSESSMENT AND PLAN:   Aayush Wynne is a 50 y.o. female, who was was admitted for the following concern:        Intraventricular hemorrhage  - CT head without contrast revealed increased density in the occipital horn of the left lateral ventricle new compared to prior. Findings suspicious for small amount of intraventricular hemorrhage.   Encephalomalacia posterior to the left parietal lobe at the site of prior hemorrhage.  - Every hour neurochecks  - Keep HOB elevated  - Keep SBP <160 mmHg  - labetalol 10 mg every 4 hours as needed  - SLP  - Follow-up on stability CT head without contrast  - Follow-up on CTA head and neck with without contrast  - Neurosurgery consulted, appreciate recs  - Keppra 500 twice daily  - Avoid AP and AC for at least 2 weeks     ESRD on HD MWF  - Patient follows a nephrologist [facility], does home hemodialysis  - Consult nephrology, appreciate recs     Related to QTC  - EKG revealing QTC of [address]  - Monitor QTC on telemetry    # Hx of cocaine use   -UDS ordered as patient has a Hx of cocaine use,  Avoid B-blockers due to Hx of cocaine but depends on last use.        Code Status:Full Code  FEN: N.p.o., SLP  PPX: Protonix  DISPO: ICU     This patient has been staffed and discussed with Dr. Jessica Webb    -----------------------------  Alfonzo Avilez MD, PGY-II  5/16/2022  4:44 PM

## 2022-05-16 NOTE — CONSULTS
NEUROSURGERY Kobe Stern  [id number]   1975 5/16/2022    Requesting physician: Emma Whitehead MD    Reason for consultation: IVH    History of present illness: Patient is a 50 y.o. female w/ PMH of HTN, ESRD on HD, hemorrhagic infarct who presented on 5/16/2022 to OSH ED with headache during her HD session. She had 40 minutes remaining in her dialysis session when she developed a severe bifronal headache. She rated it a 10/10 in severity, she had associated nausea and dizziness. She denies focal weakness, changes in vision or other neurologic deficits. She checked her blood pressure during this event and was found to be 179/110. She called her dialysis nurse who advised her to come to the ER. Of note, patient has history of prior left parietal hemorrhagic infarct in 2020 and per patient she has some residual balance issues. CT head revealed an area of hyperdensity in the occipital horn of the left lateral ventricle, concern for possible IVH. Patient does take Plavix daily, she is unsure exactly why she is on it but believes it is for \"cirtculation issues\". She thinks she has been taking it for about 1 year. Patient was transferred to [facility] for neurosurgical evaluation. ROS:   GENERAL:  Denies fever or recent illness.  Denies weight changes   EYES:  Denies vision change or diplopia  EARS:  Denies hearing loss  CARDIAC:  Denies chest pain  RESPIRATORY:  Denies shortness of breath  SKIN:  Denies rash or lesions   HEM:  Denies excessive bruising  PSYCH:  Denies anxiety or depression  NEURO:  + headache, denies numbness or tingling or lateralizing weakness   :  Denies urinary difficulty  GI: Denies nausea, vomiting, diarrhea or constipation  MUSCULOSKELETAL:  No arthralgias    Allergies   Allergen Reactions    Ferrous Sulfate Shortness Of Breath    Cephalexin Itching and Swelling    Dicloxacillin Rash    Pcn [Penicillins] Rash    Sulfa Antibiotics Rash and Other (See Comments)     Chest pain.       Past Medical History:   Diagnosis Date    Cerebral artery occlusion with cerebral infarction ([facility] 2010    Cocaine abuse ([facility]     Crohn's disease ([facility]     Depression     Diabetes mellitus ([facility]     ESRD (end stage renal disease) ([facility]     HD Tues-Thurs-Sat    GERD (gastroesophageal reflux disease)     Hemodialysis patient ([facility] 2016    peritoneal     Hyperlipidemia     Hypertension     MI, old     Mood disorder ([facility]     Pericarditis     Peritonitis ([facility]     Pneumonia     Thyroid disease         Past Surgical History:   Procedure Laterality Date    BACK SURGERY      Tumor removal    CHOLECYSTECTOMY      COLON SURGERY Right     KIDNEY TRANSPLANT  1990    KIDNEY TRANSPLANT      LAPAROSCOPY      right ovary removed and left ovarian cyst removed for endometriosis    OTHER SURGICAL HISTORY      peritoneal dialysis catheter    PARACENTESIS      TUBAL LIGATION      UPPER GASTROINTESTINAL ENDOSCOPY  03/31/2017    UPPER GASTROINTESTINAL ENDOSCOPY N/A 3/25/2019    EGD BIOPSY performed by Qian Covarrubias MD at [facility] History     Occupational History    Occupation: volunteer   Tobacco Use    Smoking status: Never Smoker    Smokeless tobacco: Never Used   Vaping Use    Vaping Use: Never used   Substance and Sexual Activity    Alcohol use: Not Currently     Alcohol/week: 0.0 standard drinks     Comment: monthly    Drug use: Not Currently    Sexual activity: Yes     Partners: Male        Family History   Problem Relation Age of Onset    Heart Attack Mother     Diabetes Mother     Hypertension Mother     Stroke Father     Diabetes Father     Hypertension Father         No outpatient medications have been marked as taking for the 5/16/22 encounter [facility] Encounter). No current facility-administered medications for this encounter.      Current Outpatient Medications   Medication Sig Dispense Refill    lidocaine (LIDODERM) 5 % Place 1 patch onto the skin daily 12 hours on, 12 hours off. 10 patch 0    levETIRAcetam (KEPPRA) 500 MG tablet Take 1 tablet by mouth three times a week After dialysis on Mon/Wed/Fri 60 tablet 0    polyvinyl alcohol (LIQUIFILM TEARS) 1.4 % ophthalmic solution Place 1 drop into both eyes as needed      gabapentin (NEURONTIN) 300 MG capsule Take 300 mg by mouth See Admin Instructions. Take after dialysis       ondansetron (ZOFRAN) 4 MG tablet Take 4 mg by mouth every 8 hours as needed for Nausea or Vomiting      NIFEdipine (PROCARDIA XL) 60 MG extended release tablet Take 60 mg by mouth daily      pantoprazole (PROTONIX) 40 MG tablet Take 40 mg by mouth daily      Calcium Acetate, Phos Binder, 667 MG CAPS Take 667 mg by mouth 3 times daily (with meals)      b complex vitamins capsule TAKE 1 TABLET BY MOUTH ONE TIME A DAY      pregabalin (LYRICA) 75 MG capsule Take 1 capsule by mouth 2 times daily for 30 days. 60 capsule 0    mirtazapine (REMERON) 15 MG tablet Take 1 tablet by mouth nightly 30 tablet 3    lubiprostone (AMITIZA) 24 MCG capsule Take 24 mcg by mouth 2 times daily (with meals)       darbepoetin hina-polysorbate (ARANESP, ALBUMIN FREE,) 100 MCG/0.5ML SOSY injection Inject 200 mcg into the skin once a week Las t dose was 1 week ago        Objective: There were no vitals taken for this visit. Physical Exam:  Patient seen and examined   General: Well developed. Alert and cooperative in no acute distress. HENT: atraumatic, neck supple  Eyes: Optic discs: Not tested  Pulmonary: unlabored respiratory effort  Cardiovascular:  Warm well perfused.  No peripheral edema  Gastrointestinal: abdomen soft, NT, ND    Neurologic Exam:  Neurological:  Mental Status: Awake, alert, oriented x 4  Attention: Intact  Language: No aphasia or dysarthria noted  Sensation: Intact to all extremities to light touch  Coordination: Intact    Cranial Nerves:  Cranial Nerves:  II: Visual acuity not tested, denies new visual changes / diplopia  III, IV, VI: PERRL, 3 mm bilaterally, EOMI, no nystagmus noted  V: Facial sensation intact bilaterally to touch  VII: Face symmetric  VIII: Hearing intact bilaterally to spoken voice  IX: Palate movement equal bilaterally  XI: Shoulder shrug equal bilaterally  XII: Tongue midline    Musculoskeletal:   Gait: Not tested   Assist devices: None   Tone: normal   Motor strength:    Right  Left    Right  Left    Deltoid  4 5  Hip Flex  4 5   Biceps  4 5  Knee Extensors  5 5   Triceps  4 5  Knee Flexors  5 5   Wrist Ext  5 5  Ankle Dorsiflex. 5 5   Wrist Flex  5 5  Ankle Plantarflex. 5 5   Handgrip  5 5  Ext Napoleon Longus  5 5   Thumb Ext  5 5           Radiological Findings:  CT head wo contrast  5/16/2022  Impression Increased density is seen in the occipital horn of the left lateral ventricle, new compared to prior. Finding is suspicious for a small amount of intraventricular hemorrhage. Encephalomalacia posterior left parietal lobe at the site of prior hemorrhage. CT head without contrast    (Results Pending)   CTA HEAD W WO CONTRAST    (Results Pending)       Labs  No results for input(s): NA, POTASSIUM, CL, CO2, BUN, CREATININE, GLUCOSE, CA, ALB, PHOS, MG in the last 72 hours. No results for input(s): WBC, RBC, HEMOGLOBIN, PTT, INR in the last 72 hours.     Invalid input(s): HEMATOCRIT, PLATELETS, PROA, PT, PTTA      Patient Active Problem List    Diagnosis Date Noted    Atypical chest pain     [facility]STEMI (non-ST elevated myocardial infarction) [facility]     ESRD needing dialysis ([facility] 01/11/2021    Acute ischemic stroke ([facility] 03/13/2020    GI bleed 05/03/2019    Moderate malnutrition (HCC) 03/27/2019    Nausea and vomiting 03/24/2019    Peritoneal dialysis status ([facility] 01/03/2019    Hyperlipidemia 01/03/2019    Chronic focal neurological deficit 01/03/2019    Hyperkalemia 07/01/2018    Anemia 81/75/0671    Illicit drug use 45/31/6085    Severe episode of recurrent major depressive disorder, without psychotic features ([facility]     Anxiety disorder     [facility]     Vaginal discharge     Vaginal bleeding 07/27/2017    Vaginal candidiasis 07/17/2017    Abnormal stress test     ESRD (end stage renal disease) on dialysis ([facility]     Suicidal ideation     Chest pain 07/04/2017    Failure to thrive in adult 07/04/2017    Cellulitis 04/22/2016    Cellulitis of lower extremity     Erythema nodosum     Depression with suicidal ideation 01/15/2016    ESRD on hemodialysis ([facility] 01/15/2016    Hyperkalemia 17/47/7357    Metabolic acidosis 54/62/4073    Essential hypertension 01/15/2016    DMII (diabetes mellitus, type 2) ([facility] 01/15/2016       Assessment:  49 yo female admitted with severe headache during her HD session. CT head revealed an area of hyperdensity in the occipital horn of the left lateral ventricle, concern for possible IVH. GCS 13-15 = Score 0  Volume (mL) less than 30 = Score 0  Infratentorial Origin No = Score 0  IVH Yes = Score 1  Age (years) less than 80 = Score 0  ICH Score: 1       Plan:        1. No emergent neurosurgical intervention indicated        2. Neurologic exams frequency:  - ICU: Q1H until otherwise directed  3. For change in exam MUST contact neurosurgery team along with critical care or primary team  4. Follow up head CT at 1700 for stability, CTA head w wo contrast at that time to r/o vascular lesion  5. Maintain SBP <160; If PRN med insufficient, then may start Nicardipine infusion  6. Keep Plt >100k & INR <1.4  7. Hold all anticoagulation & antiplatelet for 2 weeks  8. SCDs for DVT prophylaxis  9. Cerebral edema prophylaxis:  - Keep HOB >30 degrees  - NO dextrose in IVF's or in IV drips  - Keep sodium WNL  10. Seizure prophylaxis: Keppra 500 mg BID  11. Advance diet / activity per primary team  12. Thanks for consult.  Please call w/ questions or decline in mental status       DISPO-ICU   TREY Owens-CNP  Neurosurgery Nurse

## 2022-05-16 NOTE — CONSULTS
Initial Pulmonary & Critical Care Consult Note      Reason for Consult: IVH  Requesting Physician: Dr Kvng Croft:   [facility]:                The patient is a 50 y.o. female with significant past medical history of hemorrhagic CVA from trauma, Crohn's disease, history of cocaine use, diabetes, ESRD on HD TTS, hypertension, hyperlipidemia and PVD with stents on Plavix was transferred from [facility] for further management of left lateral IVH. Patient presented to [facility] complaining of a headache that abruptly started 30 minutes prior. She was finishing up dialysis when this occurred. She describes it as a throbbing bilateral frontal headache that was the worst of her life. She had some associated nausea but no vomiting and no vision changes.   She denies any recent trauma    Past Medical History:      Diagnosis Date    Cerebral artery occlusion with cerebral infarction ([address] 2010    Cocaine abuse ([address]     Crohn's disease ([address]     Depression     Diabetes mellitus ([address]     ESRD (end stage renal disease) ([address]     HD Tues-Thurs-Sat    GERD (gastroesophageal reflux disease)     Hemodialysis patient ([address] 2016    peritoneal     Hyperlipidemia     Hypertension     MI, old     Mood disorder ([address]     Pericarditis     Peritonitis ([address]     Pneumonia     Thyroid disease       Past Surgical History:        Procedure Laterality Date    BACK SURGERY      Tumor removal    CHOLECYSTECTOMY      COLON SURGERY Right     KIDNEY TRANSPLANT  1990    KIDNEY TRANSPLANT      LAPAROSCOPY      right ovary removed and left ovarian cyst removed for endometriosis    OTHER SURGICAL HISTORY      peritoneal dialysis catheter    PARACENTESIS      TUBAL LIGATION      UPPER GASTROINTESTINAL ENDOSCOPY  03/31/2017    UPPER GASTROINTESTINAL ENDOSCOPY N/A 3/25/2019    EGD BIOPSY performed by Yanci Rodriguez MD at [facility] ENDOSCOPY     Current Medications:     levetiracetam  500 mg IntraVENous Q24H    gabapentin  300 mg Oral See Admin Instructions    NIFEdipine  60 mg Oral Daily    pantoprazole  40 mg Oral Daily        Social History:    Does not smoke or use alcohol    Family History:   Diabetes  Hypertension  CVA  CAD    REVIEW OF SYSTEMS:    CONSTITUTIONAL:  negative for fevers, chills, diaphoresis, activity change, appetite change, fatigue, night sweats and unexpected weight change. EYES:  negative for blurred vision, eye discharge, visual disturbance and icterus  HEENT:  negative for hearing loss, tinnitus, ear drainage, sinus pressure, nasal congestion, epistaxis and snoring  RESPIRATORY:  See HPI  CARDIOVASCULAR:  negative for chest pain, palpitations, exertional chest pressure/discomfort, edema, syncope  GASTROINTESTINAL:  negative for vomiting, diarrhea, constipation, blood in stool and abdominal pain  GENITOURINARY:  negative for frequency, dysuria, urinary incontinence, decreased urine volume, and hematuria  HEMATOLOGIC/LYMPHATIC:  negative for easy bruising, bleeding and lymphadenopathy  ALLERGIC/IMMUNOLOGIC:  negative for recurrent infections, angioedema, anaphylaxis and drug reactions  ENDOCRINE:  negative for weight changes and diabetic symptoms including polyuria, polydipsia and polyphagia  MUSCULOSKELETAL:  negative for  pain in all extremities, joint swelling, decreased range of motion in all extremities and muscle weakness  NEUROLOGICAL:  negative for  slurred speech, unilateral weakness  PSYCHIATRIC/BEHAVIORAL: negative for hallucinations, behavioral problems, confusion and agitation.      Objective:   PHYSICAL EXAM:      VITALS:  BP (!) 159/78 Comment: 5 mg labetolol given  Pulse 82   Resp 13   SpO2 97%   24HR INTAKE/OUTPUT:  No intake or output data in the 24 hours ending 22 1621  CURRENT PULSE OXIMETRY:  SpO2: 97 %  24HR PULSE OXIMETRY RANGE:  SpO2  Av.7 %  Min: 97 %  Max: 99 %    CONSTITUTIONAL:  awake, alert, cooperative, no apparent distress, and appears stated 08/04/2021    PROT 7.9 09/23/2012    BILITOT 0.3 08/04/2021    BILIDIR <0.2 08/04/2021    IBILI see below 08/04/2021     INR 0.94    Radiology Review:  All pertinent images / reports were reviewed as a part of this visit. Head CT reveals the following:  FINDINGS:   BRAIN/VENTRICLES: Ventricles are midline in position.  Encephalomalacia is   seen in the posterior left parietal lobe is seen.  Remote tiny infarct is   seen in the right caudate head.  .  Calcifications are seen along the falx. There is intracranial atherosclerosis.       There is a crescentic area of increased density seen inferiorly in the   occipital horn of the left lateral ventricle.  This is new compared to prior.       ORBITS: The visualized portion of the orbits demonstrate no acute abnormality.       SINUSES: No significant mastoid opacification noted.  No air-fluid level seen   in the sinuses.       SOFT TISSUES/SKULL:  No acute abnormality of the visualized skull or soft   tissues.  Calvarium appears somewhat dense which may be due to renal   osteodystrophy.           Impression   Increased density is seen in the occipital horn of the left lateral   ventricle, new compared to prior.  Finding is suspicious for a small amount   of intraventricular hemorrhage.       Encephalomalacia posterior left parietal lobe at the site of prior hemorrhage. Echo: March 2020  Summary   Normal left ventricle size, wall thickness, and systolic function with an   estimated ejection fraction of 55-60%. No regional wall motion abnormalities   are seen. Diastolic filling parameters suggests normal diastolic function. Mild tricuspid regurgitation. The right ventricle is normal in size and function. IVC size is normal (<2.1 cm) but collapses < 50% with respiration consistent   with elevated RA pressure (8 mmHg). Estimated pulmonary artery systolic pressure is at 36 mmHg assuming a right   atrial pressure of 8 mmHg.    Positive bubble study for a small PFO.      Assessment       1. IVH  2. PVD on Plavix  3. ESRD on HD  4. HTN  5. Hx cocaine use  6. DM    Plan    1.  6-hour interval CT with CTA around 5:30 PM  2. Every hour neurochecks  3. Goal SBP < 160  4. Continue Keppra 500 mg PO BID  5. DDAVP 0.3 mcg/kg x 1 for ICH on plavix  6. Urine tox screen  7. Continue nifedipine extended release 60 mg  8. Neurosurgery consulted  9.   Start diet    Sharron Perrin

## 2022-05-16 NOTE — PROGRESS NOTES
Keppra ordered for patient. This medication is renally eliminated. Will change to 500 mg daily per renal dose adjustment policy. Provider to add supplemental dose on dialysis days, waiting on neurology consult/recommendation. Estimated Creatinine Clearance: 9 mL/min (A) (based on SCr of 7.4 mg/dL [facility])). Pharmacy will continue to monitor renal function and adjust dose as necessary. Please call with any questions. Thanks!    Umesh Cortés, PharmD  Main Pharmacy: [id number]

## 2022-05-16 NOTE — PROGRESS NOTES
Stroke Admission    I agree as the admission nurse that I have completed a thorough stroke assessment and completed the admission on the patient. ALL assessment areas listed below have been addressed and completed. Presentation: Hemorrhagic    Handoff assessment completed with EMS. Current NIHSS 1.     [x]   Education Assessment  [x]   Individualized Stroke/TIA Education template added, including patient specific risk factors: Hypertension, Lack of exercise, Diabetes and Personal history of previous Stroke/TIA  [x]   Individualized Stroke/TIA Care Plan template added  [x]   Bedside swallow screen completed using the [facility] Protocol, and documented PRIOR to any PO meds, food or drink: Pass  [x]   VTE Prophylaxis: SCDs ordered/addressed; SCDs: Off           (As a reminder, ASA, Plavix and TPA are not VTE prophylaxis.)  [x]   Stroke education booklet given, and education initiated with patient and/or caregiver      Nurse eSignature: Electronically signed by Milan Reeves RN on 5/16/22 at 7:50 PM EDT

## 2022-05-16 NOTE — CONSULTS
Clinical Pharmacy Progress Note    All IVs in NS - Management by Pharmacy    Consult Date(s): 5/16/22  Consulting Provider(s): Dr. Carmen Rene / Plan    ICH - All IVs in Normal Saline   Drips will be adjusted to normal saline as appropriate based on compatibility, in an effort to avoid fluid shifts, as D5W is osmotically active.  The following intermittent IV drips / infusions have been adjusted to saline:  o Levetiracetam  o Famotidine   The following medications must remain in D5W due to incompatibility with normal saline:  o Amiodarone Infusion  o Amphotericin  o Mycophenolate  o Nitroprusside  o Penicillin G Potassium   Note: Patient may have dextrose ordered as part of hypoglycemia treatment protocol.  Total IV fluid delivered to patient over last 24 hrs: TBD after 24 hrs   RPh will follow daily to ensure all IVPBs / drips are in NS where possible. Thank you for consulting Pharmacy!     Quintin RuanoD, [facility]  Wireless: [phone]  5/16/2022 3:33 PM

## 2022-05-16 NOTE — ED TRIAGE NOTES
Pt arrives to triage with c/o headache that started 30 min ago. States she does dialysis at home 4 days a week.  Called today and they told her to come in to ER due to high BP.

## 2022-05-16 NOTE — PROGRESS NOTES
4 Eyes Admission Assessment     I agree as the admission nurse that 2 RN's have performed a thorough Head to Toe Skin Assessment on the patient. ALL assessment sites listed below have been assessed on admission. Areas assessed by both nurses: Randy and Terell  [x]   Head, Face, and Ears   [x]   Shoulders, Back, and Chest   Vas cath L chest.     [x]   Arms, Elbows, and Hands   [x]   Coccyx, Sacrum, and Ischium  [x]   Legs, Feet, and Heels        Does the Patient have Skin Breakdown?   No         Devonte Prevention initiated:  No   Wound Care Orders initiated:  No      WOC nurse consulted for Pressure Injury (Stage 3,4, Unstageable, DTI, NWPT, and Complex wounds) or Devonte score 18 or lower:  No      Nurse 1 eSignature: Electronically signed by Margaret Hernandez RN on 5/16/22 at 7:43 PM EDT    **SHARE this note so that the co-signing nurse is able to place an eSignature**    Nurse 2 eSignature: {Esignature:[id number]}

## 2022-05-16 NOTE — ED PROVIDER NOTES
Attending Supervisory Note/Shared Visit   I have personally performed a face to face diagnostic evaluation on this patient. I have reviewed the mid-levels findings and agree. History and Exam by me shows an alert black female no acute distress. She presents today with sudden onset of a severe headache about 30 minutes prior to arrival.  Patient is a home hemodialysis patient. Onset was while doing her hemodialysis. She has had previous strokes x2. She does have some residual balance problems. She denies any visual problems. No speech difficulty. No extremity weakness numbness or tingling. Patient had nausea but no vomiting. Headache is frontal in location. HEENT: Atraumatic. Pupils equal round reactive. Extraocular movements are intact. No facial asymmetry. Heart: Regular rate and rhythm. No murmurs or gallops noted. Lungs: Breath sounds equal bilaterally and clear. Abdomen: Soft, nondistended, nontender. Neuro: Awake, alert, oriented. Symmetrical reactive pupils. Intact extraocular movements. No facial asymmetry. Symmetrical motor function. No drift. Speech is clear and understandable. No dysarthria or expressive aphasia. CT head: Increased density is seen in the occipital horn of the left lateral ventricle new compared to prior. Findings suspicious for a small amount of intraventricular hemorrhage. Encephalomalacia posterior to the left parietal lobe at the site of prior hemorrhage. EKG: Normal sinus rhythm, rate of 88, age-indeterminate septal infarct, prolonged QT. Rhythm strip shows a sinus rhythm with a rate of 88, NV interval 164 ms, QRS 76 ms with a QTC of 520 ms with no other ectopy as interpreted by me. This compared to an EKG dated 8/4/2021 the prolonged QT is new, no other significant change noted. Patient awake alert and oriented. She has no neurologic deficits. Her blood pressure is 159/94.   Neurosurgery will be consulted regarding disposition and treatment plan.    Per Camden Gayle CNP covering for Neurosurgery patient will be transferred to [facility] for observation and further evaluation.       (Please note that portions of this note were completed with a voice recognition program.  Efforts were made to edit the dictations but occasionally words are mis-transcribed.)    Jen Mariscal MD  Attending Emergency Physician        Jennifer Enamorado MD  05/16/22 1233

## 2022-05-17 PROBLEM — I63.432 CEREBROVASCULAR ACCIDENT (CVA) DUE TO EMBOLISM OF LEFT POSTERIOR CEREBRAL ARTERY (HCC): Status: ACTIVE | Noted: 2022-05-16

## 2022-05-17 PROBLEM — I61.9 INTRACEREBRAL HEMORRHAGE, NONTRAUMATIC (HCC): Status: ACTIVE | Noted: 2022-05-17

## 2022-05-17 LAB
ANION GAP SERPL CALCULATED.3IONS-SCNC: 17 MMOL/L (ref 3–16)
BASOPHILS ABSOLUTE: 0 K/UL (ref 0–0.2)
BASOPHILS RELATIVE PERCENT: 0.9 %
BUN BLDV-MCNC: 48 MG/DL (ref 7–20)
CALCIUM SERPL-MCNC: 7.9 MG/DL (ref 8.3–10.6)
CHLORIDE BLD-SCNC: 98 MMOL/L (ref 99–110)
CO2: 20 MMOL/L (ref 21–32)
CREAT SERPL-MCNC: 9 MG/DL (ref 0.6–1.1)
EKG ATRIAL RATE: 85 BPM
EKG DIAGNOSIS: NORMAL
EKG P AXIS: 47 DEGREES
EKG P-R INTERVAL: 178 MS
EKG Q-T INTERVAL: 440 MS
EKG QRS DURATION: 72 MS
EKG QTC CALCULATION (BAZETT): 523 MS
EKG R AXIS: 37 DEGREES
EKG T AXIS: 58 DEGREES
EKG VENTRICULAR RATE: 85 BPM
EOSINOPHILS ABSOLUTE: 0.2 K/UL (ref 0–0.6)
EOSINOPHILS RELATIVE PERCENT: 3.5 %
GFR AFRICAN AMERICAN: 6
GFR NON-AFRICAN AMERICAN: 5
GLUCOSE BLD-MCNC: 145 MG/DL (ref 70–99)
GLUCOSE BLD-MCNC: 154 MG/DL (ref 70–99)
GLUCOSE BLD-MCNC: 158 MG/DL (ref 70–99)
GLUCOSE BLD-MCNC: 217 MG/DL (ref 70–99)
GLUCOSE BLD-MCNC: 239 MG/DL (ref 70–99)
GLUCOSE BLD-MCNC: 273 MG/DL (ref 70–99)
GLUCOSE BLD-MCNC: 282 MG/DL (ref 70–99)
HCT VFR BLD CALC: 29.3 % (ref 36–48)
HCT VFR BLD CALC: 33 % (ref 36–48)
HEMOGLOBIN: 9.4 G/DL (ref 12–16)
HEMOGLOBIN: 9.9 G/DL (ref 12–16)
LV EF: 63 %
LVEF MODALITY: NORMAL
LYMPHOCYTES ABSOLUTE: 1.6 K/UL (ref 1–5.1)
LYMPHOCYTES RELATIVE PERCENT: 29.2 %
MCH RBC QN AUTO: 31.1 PG (ref 26–34)
MCH RBC QN AUTO: 31.5 PG (ref 26–34)
MCHC RBC AUTO-ENTMCNC: 29.9 G/DL (ref 31–36)
MCHC RBC AUTO-ENTMCNC: 32.1 G/DL (ref 31–36)
MCV RBC AUTO: 105.6 FL (ref 80–100)
MCV RBC AUTO: 96.9 FL (ref 80–100)
MONOCYTES ABSOLUTE: 0.6 K/UL (ref 0–1.3)
MONOCYTES RELATIVE PERCENT: 10.2 %
NEUTROPHILS ABSOLUTE: 3.2 K/UL (ref 1.7–7.7)
NEUTROPHILS RELATIVE PERCENT: 56.2 %
PDW BLD-RTO: 14 % (ref 12.4–15.4)
PDW BLD-RTO: 15.7 % (ref 12.4–15.4)
PERFORMED ON: ABNORMAL
PLATELET # BLD: 200 K/UL (ref 135–450)
PLATELET # BLD: 73 K/UL (ref 135–450)
PLATELET SLIDE REVIEW: ADEQUATE
PMV BLD AUTO: 8.1 FL (ref 5–10.5)
PMV BLD AUTO: 9.4 FL (ref 5–10.5)
POTASSIUM SERPL-SCNC: 6.2 MMOL/L (ref 3.5–5.1)
RBC # BLD: 3.02 M/UL (ref 4–5.2)
RBC # BLD: 3.12 M/UL (ref 4–5.2)
SODIUM BLD-SCNC: 135 MMOL/L (ref 136–145)
WBC # BLD: 4.9 K/UL (ref 4–11)
WBC # BLD: 5.6 K/UL (ref 4–11)

## 2022-05-17 PROCEDURE — 93005 ELECTROCARDIOGRAM TRACING: CPT | Performed by: NURSE PRACTITIONER

## 2022-05-17 PROCEDURE — 85027 COMPLETE CBC AUTOMATED: CPT

## 2022-05-17 PROCEDURE — 93010 ELECTROCARDIOGRAM REPORT: CPT | Performed by: INTERNAL MEDICINE

## 2022-05-17 PROCEDURE — 6370000000 HC RX 637 (ALT 250 FOR IP): Performed by: STUDENT IN AN ORGANIZED HEALTH CARE EDUCATION/TRAINING PROGRAM

## 2022-05-17 PROCEDURE — 90935 HEMODIALYSIS ONE EVALUATION: CPT | Performed by: INTERNAL MEDICINE

## 2022-05-17 PROCEDURE — 80048 BASIC METABOLIC PNL TOTAL CA: CPT

## 2022-05-17 PROCEDURE — C8929 TTE W OR WO FOL WCON,DOPPLER: HCPCS

## 2022-05-17 PROCEDURE — 6370000000 HC RX 637 (ALT 250 FOR IP): Performed by: NURSE PRACTITIONER

## 2022-05-17 PROCEDURE — 83036 HEMOGLOBIN GLYCOSYLATED A1C: CPT

## 2022-05-17 PROCEDURE — 99223 1ST HOSP IP/OBS HIGH 75: CPT | Performed by: INTERNAL MEDICINE

## 2022-05-17 PROCEDURE — 2500000003 HC RX 250 WO HCPCS: Performed by: INTERNAL MEDICINE

## 2022-05-17 PROCEDURE — 36415 COLL VENOUS BLD VENIPUNCTURE: CPT

## 2022-05-17 PROCEDURE — 6360000002 HC RX W HCPCS: Performed by: STUDENT IN AN ORGANIZED HEALTH CARE EDUCATION/TRAINING PROGRAM

## 2022-05-17 PROCEDURE — 2000000000 HC ICU R&B

## 2022-05-17 PROCEDURE — 85025 COMPLETE CBC W/AUTO DIFF WBC: CPT

## 2022-05-17 PROCEDURE — APPNB60 APP NON BILLABLE TIME 46-60 MINS: Performed by: NURSE PRACTITIONER

## 2022-05-17 PROCEDURE — 99232 SBSQ HOSP IP/OBS MODERATE 35: CPT | Performed by: INTERNAL MEDICINE

## 2022-05-17 PROCEDURE — 99223 1ST HOSP IP/OBS HIGH 75: CPT | Performed by: PSYCHIATRY & NEUROLOGY

## 2022-05-17 PROCEDURE — 90935 HEMODIALYSIS ONE EVALUATION: CPT

## 2022-05-17 PROCEDURE — 92610 EVALUATE SWALLOWING FUNCTION: CPT

## 2022-05-17 RX ORDER — SODIUM CITRATE 4 % (5 ML)
5 SYRINGE (ML) MISCELLANEOUS PRN
Status: DISCONTINUED | OUTPATIENT
Start: 2022-05-17 | End: 2022-05-20 | Stop reason: HOSPADM

## 2022-05-17 RX ORDER — LEVOTHYROXINE SODIUM 0.05 MG/1
50 TABLET ORAL DAILY
Status: DISCONTINUED | OUTPATIENT
Start: 2022-05-17 | End: 2022-05-20 | Stop reason: HOSPADM

## 2022-05-17 RX ORDER — LEVETIRACETAM 500 MG/1
500 TABLET ORAL PRN
Status: DISCONTINUED | OUTPATIENT
Start: 2022-05-17 | End: 2022-05-20 | Stop reason: HOSPADM

## 2022-05-17 RX ORDER — MIRTAZAPINE 30 MG/1
30 TABLET, FILM COATED ORAL NIGHTLY
COMMUNITY

## 2022-05-17 RX ORDER — LEVOTHYROXINE SODIUM 0.05 MG/1
50 TABLET ORAL DAILY
COMMUNITY

## 2022-05-17 RX ORDER — INSULIN GLARGINE 100 [IU]/ML
7 INJECTION, SOLUTION SUBCUTANEOUS NIGHTLY
COMMUNITY

## 2022-05-17 RX ORDER — CLOPIDOGREL BISULFATE 75 MG/1
75 TABLET ORAL DAILY
COMMUNITY

## 2022-05-17 RX ORDER — HYDROXYZINE HYDROCHLORIDE 25 MG/1
25 TABLET, FILM COATED ORAL EVERY 8 HOURS PRN
COMMUNITY

## 2022-05-17 RX ORDER — LOSARTAN POTASSIUM 50 MG/1
50 TABLET ORAL DAILY
Status: ON HOLD | COMMUNITY
End: 2022-05-20 | Stop reason: SDUPTHER

## 2022-05-17 RX ORDER — INSULIN ASPART 100 [IU]/ML
7 INJECTION, SOLUTION INTRAVENOUS; SUBCUTANEOUS
COMMUNITY

## 2022-05-17 RX ORDER — LOSARTAN POTASSIUM 50 MG/1
50 TABLET ORAL DAILY
Status: DISCONTINUED | OUTPATIENT
Start: 2022-05-18 | End: 2022-05-19

## 2022-05-17 RX ORDER — LEVETIRACETAM 500 MG/1
500 TABLET ORAL 2 TIMES DAILY
Status: DISCONTINUED | OUTPATIENT
Start: 2022-05-17 | End: 2022-05-20 | Stop reason: HOSPADM

## 2022-05-17 RX ORDER — ATORVASTATIN CALCIUM 80 MG/1
80 TABLET, FILM COATED ORAL DAILY
COMMUNITY

## 2022-05-17 RX ORDER — FERRIC CITRATE 210 MG/1
1 TABLET, COATED ORAL
COMMUNITY

## 2022-05-17 RX ORDER — LEVETIRACETAM 500 MG/1
500 TABLET ORAL
COMMUNITY

## 2022-05-17 RX ADMIN — HYDROMORPHONE HYDROCHLORIDE 0.25 MG: 1 INJECTION, SOLUTION INTRAMUSCULAR; INTRAVENOUS; SUBCUTANEOUS at 14:12

## 2022-05-17 RX ADMIN — PANTOPRAZOLE SODIUM 40 MG: 40 TABLET, DELAYED RELEASE ORAL at 06:34

## 2022-05-17 RX ADMIN — NIFEDIPINE 60 MG: 60 TABLET, FILM COATED, EXTENDED RELEASE ORAL at 08:49

## 2022-05-17 RX ADMIN — INSULIN LISPRO 6 UNITS: 100 INJECTION, SOLUTION INTRAVENOUS; SUBCUTANEOUS at 18:17

## 2022-05-17 RX ADMIN — GABAPENTIN 100 MG: 100 CAPSULE ORAL at 21:07

## 2022-05-17 RX ADMIN — LEVETIRACETAM 500 MG: 500 TABLET, FILM COATED ORAL at 13:12

## 2022-05-17 RX ADMIN — INSULIN LISPRO 2 UNITS: 100 INJECTION, SOLUTION INTRAVENOUS; SUBCUTANEOUS at 08:49

## 2022-05-17 RX ADMIN — INSULIN LISPRO 4 UNITS: 100 INJECTION, SOLUTION INTRAVENOUS; SUBCUTANEOUS at 12:22

## 2022-05-17 RX ADMIN — LEVOTHYROXINE SODIUM 50 MCG: 50 TABLET ORAL at 13:12

## 2022-05-17 RX ADMIN — INSULIN LISPRO 1 UNITS: 100 INJECTION, SOLUTION INTRAVENOUS; SUBCUTANEOUS at 21:07

## 2022-05-17 RX ADMIN — GABAPENTIN 100 MG: 100 CAPSULE ORAL at 08:49

## 2022-05-17 RX ADMIN — ACETAMINOPHEN 650 MG: 325 TABLET ORAL at 07:15

## 2022-05-17 RX ADMIN — ASPIRIN 325 MG: 325 TABLET, COATED ORAL at 13:13

## 2022-05-17 RX ADMIN — ACETAMINOPHEN 650 MG: 325 TABLET ORAL at 18:30

## 2022-05-17 RX ADMIN — LEVETIRACETAM 500 MG: 500 TABLET, FILM COATED ORAL at 21:07

## 2022-05-17 RX ADMIN — Medication 5 ML: at 10:08

## 2022-05-17 ASSESSMENT — PAIN SCALES - GENERAL
PAINLEVEL_OUTOF10: 3
PAINLEVEL_OUTOF10: 6
PAINLEVEL_OUTOF10: 5
PAINLEVEL_OUTOF10: 2
PAINLEVEL_OUTOF10: 0
PAINLEVEL_OUTOF10: 7
PAINLEVEL_OUTOF10: 4
PAINLEVEL_OUTOF10: 6
PAINLEVEL_OUTOF10: 6

## 2022-05-17 ASSESSMENT — PAIN DESCRIPTION - DESCRIPTORS
DESCRIPTORS: ACHING

## 2022-05-17 ASSESSMENT — PAIN - FUNCTIONAL ASSESSMENT
PAIN_FUNCTIONAL_ASSESSMENT: ACTIVITIES ARE NOT PREVENTED

## 2022-05-17 ASSESSMENT — PAIN DESCRIPTION - LOCATION
LOCATION: HEAD

## 2022-05-17 ASSESSMENT — PAIN DESCRIPTION - PAIN TYPE
TYPE: ACUTE PAIN

## 2022-05-17 ASSESSMENT — PAIN DESCRIPTION - ORIENTATION
ORIENTATION: ANTERIOR
ORIENTATION: ANTERIOR;MID
ORIENTATION: ANTERIOR
ORIENTATION: ANTERIOR;MID

## 2022-05-17 ASSESSMENT — PAIN DESCRIPTION - FREQUENCY
FREQUENCY: INTERMITTENT

## 2022-05-17 ASSESSMENT — PAIN DESCRIPTION - ONSET
ONSET: SUDDEN
ONSET: GRADUAL
ONSET: SUDDEN

## 2022-05-17 NOTE — CARE COORDINATION
Case Management Assessment           Initial Evaluation                Date / Time of Evaluation: 5/17/2022 10:13 AM                 Assessment Completed by: Everett Fernandez RN    Patient Name: Avinash Dinh     YOB: 1975  Diagnosis: Intracranial hemorrhage ([facility] [I62.9]  Intracerebral hemorrhage, nontraumatic ([facility] [I61.9]     Date / Time: 5/16/2022  3:12 PM    Patient Admission Status: Inpatient    If patient is discharged prior to next notation, then this note serves as note for discharge by case management.      Current PCP: RAE[facility] at [facility] Patient: Yes    Chart Reviewed: Yes  Patient/ Family Interviewed: Yes    Initial assessment completed at bedside with: Met with pt at the bedside    Emergency Contacts:  Extended Emergency Contact Information  Primary Emergency Contact: Jena Maldonado  Address: patient wants this person removed   [address] Phone: [phone]  Relation: Brother/Sister  Secondary Emergency Contact: surjit esquivel  [place] Phone: [phone]  Mobile Phone: [phone]  Relation: Other    Advance Directives:   Code Status: Limited    Financial  Payor: Radha Henao / Plan: Kelly Thomas / Product Type: *No Product type* /     Pre-cert required for SNF: Yes    Pharmacy    [address] [phone] [facility] [address] [facility] [address] [phone] - f [phone]  [address] [facility]  Phone: [phone] Fax: [phone]    [address] [id number] - [address] [phone] - f [phone]  [address] [place] [address]  Phone: [phone] Fax: [address] [place] [phone] - f [phone]  [address] [facility] [address]  Phone: [phone] Fax: [address], [address] [place] [address] [phone] - [address]  Phone: [phone] Fax: [phone]    [address] [facility] [address], Tyesha Mondragon [facility] [phone] [facility] [phone]  [address] [address]  Phone: [phone] Fax: [phone]    ADLS Independent with self care and functional mobility. Utilizes a rollator  Support Systems:  significant there, family    [place]: apartment with significant other    Plans to RETURN to current housing: Yes    Christiano Colin  Currently ACTIVE with [address]: No    Durable Medical Equipment  Equipment: rollator    Dialysis  Active with HD/PD prior to admission: Yes    HD Center:  Home Dialysis    DISCHARGE PLAN:  Disposition: Plan to return home. Open to [address] [place] [address] for discharge: Family v Daphney Welsh    The Patient and/or patient representative Hari Duke and her family were provided with a choice of provider and agrees with the discharge plan Yes    Freedom of choice list was provided with basic dialogue that supports the patient's individualized plan of care/goals and shares the quality data associated with the providers.  Yes    Tanmay Coelho RN  [facility]  Case Management Department  [phone]

## 2022-05-17 NOTE — PROGRESS NOTES
D/w Dr. Jazz Gilbert results of neuroimaging. No acute hemorrhage felt to be present. Hyperdensity in remote infarcts are favored to be related to cortical laminar necrosis. Will discuss further with Dr. Solitario Harris and neurosurgery. Continue to hold antiplatelet given thrombocytopenia. Ricardo Records NP  Neurology    Addendum   Repeat platelet count 049  Reviewed case with Dr. Solitario Harris & Neurosurgery - ok to start aspirin. Will order.

## 2022-05-17 NOTE — CONSULTS
Clinical Pharmacy Progress Note    All IVs in NS - Management by Pharmacy    Consult Date(s): 5/16/22  Consulting Provider(s): Dr. Nila Cunha / Plan    [facility] - All IVs in Normal Saline   Drips will be adjusted to normal saline as appropriate based on compatibility, in an effort to avoid fluid shifts, as D5W is osmotically active.  The following intermittent IV drips / infusions have been adjusted to saline:  o Levetiracetam (already in NS)   The following medications must remain in D5W due to incompatibility with normal saline:  o Amiodarone Infusion  o Amphotericin  o Mycophenolate  o Nitroprusside  o Penicillin G Potassium   Note: Patient may have dextrose ordered as part of hypoglycemia treatment protocol.  Total IV fluid delivered to patient over last 24 hrs: 150 mL   RPh will follow daily to ensure all IVPBs / drips are in NS where possible. Thank you for consulting Pharmacy!     Elvira Reyes, PharmD  PGY-1 Pharmacy Resident  [id number]  5/17/2022 9:33 AM

## 2022-05-17 NOTE — PROGRESS NOTES
NEUROSURGERY PROGRESS NOTE    5/17/2022 9:58 AM                               Ashley Dumont                      LOS: 1 day               Subjective: Patient sitting up in bed upon entering the room. No acute events overnight. Patient c/o headache and blurry vision on right side of her visual field. Physical Exam:  Patient seen and examined    Vitals:    05/17/22 0900   BP: 138/85   Pulse: 80   Resp: 12   Temp:    SpO2:      GCS:  4 - Opens eyes on own  5 - Alert and oriented  6 - Follows simple motor commands  General: Well developed. Alert and cooperative in no acute distress. HENT: atraumatic, neck supple  Eyes: Optic discs: Not tested  Pulmonary: unlabored respiratory effort  Cardiovascular:  Warm well perfused. No peripheral edema  Gastrointestinal: abdomen soft, NT, ND    Neurological:  Mental Status: Awake, alert, oriented x 4, speech clear and appropriate  Attention: Intact  Language: No aphasia or dysarthria noted  Sensation: Intact to all extremities to light touch  Coordination: Intact    Cranial Nerves:  II: Blurry vision on right side of visual field  III, IV, VI: PERRL, 3 mm bilaterally, EOMI, no nystagmus noted  V: Facial sensation intact bilaterally to touch  VII: Face symmetric  VIII: Hearing intact bilaterally to spoken voice  IX: Palate movement equal bilaterally  XI: Shoulder shrug equal bilaterally  XII: Tongue midline    Musculoskeletal:   Gait: Not tested   Assist devices: None   Tone: Normal  Motor strength:    Right  Left    Right  Left    Deltoid  4 5  Hip Flex  5 5   Biceps  5 5  Knee Extensors  5 5   Triceps  5 5  Knee Flexors  5 5   Wrist Ext  5 5  Ankle Dorsiflex. 5 5   Wrist Flex  5 5  Ankle Plantarflex. 5 5   Handgrip  5 5  Ext Napoleon Longus  5 5   Thumb Ext  5 5         Radiological Findings:  CT HEAD WO CONTRAST  Result Date: 5/16/2022  Increased density is seen in the occipital horn of the left lateral ventricle, new compared to prior.   Finding is suspicious for a small amount of intraventricular hemorrhage. Encephalomalacia posterior left parietal lobe at the site of prior hemorrhage. CTA HEAD W WO CONTRAST  Result Date: 5/16/2022  1. Area of hyperdensity adjacent to the occipital horn of the left lateral ventricle is again noted. This is favored to be extraventricular in location and may be related to hemorrhage or mineralization within a prior stroke. 2. Additional remote infarct in the left parietal lobe. 3. High-grade stenosis/near occlusion of the left posterior M2 division which is new from March 2020. MRI BRAIN WO CONTRAST  Result Date: 5/17/2022  1. Moderate acute infarct in the left posterior inferior cerebellum. The left occipital lobe infarct is new from 3/13/2020.  2. Remote infarcts with areas of remote hemorrhagic staining, mineralization, or cortical laminar necrosis in the left parietal and occipital lobes corresponding to hyperdensity on recent CT. No intracranial hemorrhage. 3. Mild atrophy and chronic small vessel ischemic change.       Labs:  Recent Labs     05/17/22  0555   WBC 4.9   HGB 9.9*   HCT 33.0*   PLT 73*       Recent Labs     05/17/22  0555   *   K 6.2*   CL 98*   CO2 20*   BUN 48*   CREATININE 9.0*   GLUCOSE 282*   CALCIUM 7.9*       Recent Labs     05/16/22  1255   PROTIME 10.6   INR 0.94   APTT 33.2       Patient Active Problem List    Diagnosis Date Noted    Atypical chest pain     NSTEMI (non-ST elevated myocardial infarction) ([address]     Intracerebral hemorrhage, nontraumatic ([address] 05/17/2022    Intracranial hemorrhage ([address] 05/16/2022    ESRD needing dialysis ([address] 01/11/2021    Acute ischemic stroke ([address] 03/13/2020    GI bleed 05/03/2019    Moderate malnutrition (HCC) 03/27/2019    Nausea and vomiting 03/24/2019    Peritoneal dialysis status ([address] 01/03/2019    Hyperlipidemia 01/03/2019    Chronic focal neurological deficit 01/03/2019    Hyperkalemia 07/01/2018    Anemia 75/15/3429    Illicit drug use 48/76/2314  Severe episode of recurrent major depressive disorder, without psychotic features ([facility]     Anxiety disorder     Cocaine abuse ([facility]     Vaginal discharge     Vaginal bleeding 07/27/2017    Vaginal candidiasis 07/17/2017    Abnormal stress test     ESRD (end stage renal disease) on dialysis ([facility]     Suicidal ideation     Chest pain 07/04/2017    Failure to thrive in adult 07/04/2017    Cellulitis 04/22/2016    Cellulitis of lower extremity     Erythema nodosum     Depression with suicidal ideation 01/15/2016    ESRD on hemodialysis ([facility] 01/15/2016    Hyperkalemia 61/31/2831    Metabolic acidosis 96/42/3801    Essential hypertension 01/15/2016    DMII (diabetes mellitus, type 2) ([facility] 01/15/2016       Assessment:  Patient is a 50 y.o. female with severe headache during her HD session. CT head revealed an area of hyperdensity in the occipital horn of the left lateral ventricle, concern for possible IVH. MRI Brain confirmed hyperdensity is remote hemorrhagic staining, mineralization, or cortical laminar necrosis, but patient does have new infarcts in left occipital lobe and left posterior inferior cerebellum. Plan:  1. Neurologic exams frequency: Per Neurology recommendations  2. For change in exam MUST contact neurosurgery team along with critical care or primary team  3. IVH vs Laminar Necrosis:  - MRI Brain revealed areas of remote hemorrhagic staining, mineralization, or cortical laminar necrosis in the left parietal and occipital lobes corresponding to hyperdensity on recent CT. 4. Stroke:  - MRI Brain revealed moderate acute infarct in the left posterior inferior cerebellum  - CTA Head/Neck showed high-grade stenosis/near occlusion of the left posterior M2 division, which was reviewed by [facility] via viz. AI and no intervention recommended. - Neurology following for stroke w/u, appreciate recs  5.  Cerebral edema:  - Keep HOB >30 degrees  - Keep Na >135  - NO dextrose in IVF's or in IV drips  - NO Decadron  - If central venous access is needed please use subclavian vs femoral - No IJ as this can decrease venous return and worsen cerebral edema  6. Mobility: PT/OT as tolerates  7. Diet: Advance as tolerates  8. DVT Prophylaxis: SCD's & OK to start chemoprophylaxis 24 hours after stable scan  9. Appreciate critical care team assistance in management  10. Will sign off. Please call with any questions or decline in neurological status    DISPO: Dispo timing to be determined by primary team once patient is medically stable for discharge. Patient was seen and examined with Dr. Linette Camargo who agrees with above assessment and plan.      Electronically signed by: TREY Ontiveros CNP, APRN-CNP, 5/17/2022 9:58 AM  [phone]

## 2022-05-17 NOTE — CONSULTS
Neurology / Neurocritical Care Consult Note    Sabrina Abbott MD is requesting this consult. Reason for Consult: IVH  Admission Chief Complaint: headache  History of Present Illness     Kali Olguin is a 50 y.o. y/o female with a history of stroke (2020, on Plavix), peripheral vascular disease, ESRD, hypertension, hyperlipidemia, diabetes who presents from home with severe headache, vertigo, nausea, and diplopia which started suddenly yesterday (5/16). Her symptoms have continued to improve but have not subsided. Nothing has made her symptoms worse. She tells me tylenol has taken the edge off of her headache. BP at the time of her symptom onset was 179/110 mmHg. She reports her HTN and DM are well controlled. BP runs around 140/80 mmHg at home and last A1c was 6.5%. She reports she uses cocaine but last use was 1 month ago. She does not smoke. She has never been diagnosed with CAD, CHF, or atrial fibrillation. Family history notable for stroke in both her parents (in their 46s) but no siblings with stroke. She had a left parietal ischemic stroke in 2020 and at the time a 3 mm thrombus was identified in the proximal M2. A PFO was identified on TTE but later SY showed no atrial level shunt. Symptoms at the time included mild aphasia and right upper limb myoclonus. MRI at the time also showed distal T2 changes suggestive of embolic strokes. REVIEW OF SYSTEMS:   Constitutional- No weight loss or fevers   Eyes- No diplopia. No photophobia. Ears/nose/throat- No dysphagia. No Dysarthria   Cardiovascular- No palpitations. No chest pain   Respiratory- No dyspnea. No Cough   Gastrointestinal- No Abdominal pain. No Vomiting. Genitourinary- No incontinence. No urinary retention   Musculoskeletal- No myalgia. No arthralgia   Skin- No rash. No easy bruising. Psychiatric- No depression. No anxiety   Endocrine- No diabetes. No thyroid issues. Hematologic- No bleeding difficulty.  No fatigue Neurologic- No weakness, no aphasia     Past Medical, Surgical, Family, and Social History   PAST MEDICAL HISTORY:  Past Medical History:   Diagnosis Date    Cerebral artery occlusion with cerebral infarction ([facility] 2010    Cocaine abuse ([facility]     Crohn's disease ([facility]     Depression     Diabetes mellitus ([facility]     ESRD (end stage renal disease) ([facility]     HD Tues-Thurs-Sat    GERD (gastroesophageal reflux disease)     Hemodialysis patient ([facility] 2016    peritoneal     Hyperlipidemia     Hypertension     MI, old     Mood disorder ([facility]     Pericarditis     Peritonitis ([facility]     Pneumonia     Thyroid disease      SURGICAL HISTORY:  Past Surgical History:   Procedure Laterality Date    BACK SURGERY      Tumor removal    CHOLECYSTECTOMY      COLON SURGERY Right     KIDNEY TRANSPLANT  1990    KIDNEY TRANSPLANT      LAPAROSCOPY      right ovary removed and left ovarian cyst removed for endometriosis    OTHER SURGICAL HISTORY      peritoneal dialysis catheter    PARACENTESIS      TUBAL LIGATION      UPPER GASTROINTESTINAL ENDOSCOPY  03/31/2017    UPPER GASTROINTESTINAL ENDOSCOPY N/A 3/25/2019    EGD BIOPSY performed by Ayah Del Rosario MD at [place] [address].:  Family history non-contributory  Family History   Problem Relation Age of Onset    Heart Attack Mother     Diabetes Mother     Hypertension Mother     Stroke Father     Diabetes Father     Hypertension Father      Social History     Tobacco History     Smoking Status  Never Smoker    Smokeless Tobacco Use  Never Used          Alcohol History     Alcohol Use Status  Not Currently Drinks/Week  0 Standard drinks or equivalent per week Amount  0.0 standard drinks of alcohol/wk Comment  monthly          Drug Use     Drug Use Status  Not Currently          Sexual Activity     Sexually Active  Yes Partners  Male              Allergies & Outpatient Medications   ALLERGIES:  Allergies   Allergen Reactions    Ferrous Sulfate Shortness Of Breath    Cephalexin Itching and Swelling    Dicloxacillin Rash    Pcn [Penicillins] Rash    Sulfa Antibiotics Rash and Other (See Comments)     Chest pain. HOME MEDICATIONS:  Current Discharge Medication List      CONTINUE these medications which have NOT CHANGED    Details   lidocaine (LIDODERM) 5 % Place 1 patch onto the skin daily 12 hours on, 12 hours off. Qty: 10 patch, Refills: 0      levETIRAcetam (KEPPRA) 500 MG tablet Take 1 tablet by mouth three times a week After dialysis on Mon/Wed/Fri  Qty: 60 tablet, Refills: 0      polyvinyl alcohol (LIQUIFILM TEARS) 1.4 % ophthalmic solution Place 1 drop into both eyes as needed      gabapentin (NEURONTIN) 300 MG capsule Take 100 mg by mouth 2 times daily. Take after dialysis       ondansetron (ZOFRAN) 4 MG tablet Take 4 mg by mouth every 8 hours as needed for Nausea or Vomiting      NIFEdipine (PROCARDIA XL) 60 MG extended release tablet Take 60 mg by mouth daily      pantoprazole (PROTONIX) 40 MG tablet Take 40 mg by mouth daily      Calcium Acetate, Phos Binder, 667 MG CAPS Take 667 mg by mouth 3 times daily (with meals)      b complex vitamins capsule TAKE 1 TABLET BY MOUTH ONE TIME A DAY      pregabalin (LYRICA) 75 MG capsule Take 1 capsule by mouth 2 times daily for 30 days.   Qty: 60 capsule, Refills: 0    Associated Diagnoses: Neuropathy      mirtazapine (REMERON) 15 MG tablet Take 1 tablet by mouth nightly  Qty: 30 tablet, Refills: 3      lubiprostone (AMITIZA) 24 MCG capsule Take 24 mcg by mouth 2 times daily (with meals)       darbepoetin hina-polysorbate (ARANESP, ALBUMIN FREE,) 100 MCG/0.5ML SOSY injection Inject 200 mcg into the skin once a week Las t dose was 1 week ago           Physical Exam   PHYSICAL EXAM:  Vitals:    05/17/22 0600 05/17/22 0644 05/17/22 0700 05/17/22 0730   BP: (!) 142/79 (!) 157/86 (!) 168/88    Pulse: 76 78 71 80   Resp: 14 21 18 20   Temp:  98.2 °F (36.8 °C)     TempSrc:       SpO2: 98% 98% 98%    Weight:  156 lb 8.4 oz (71 kg)         General: Alert, no distress, well-nourished  Neurologic  Mental status: alert  orientation to person, place, time, situation   Attention intact as able to attend well to the exam and able to read analog clock   Language fluent in conversation   Comprehension intact; follows simple commands and 2 step commands crossing midline    Cranial nerves:   CN2: Visual fields full w/o extinction on confrontational testing; Unable to visualize fundi   CN 3,4,6: Pupils equal and reactive to light, extraocular muscles intact  CN5: Facial sensation symmetric   CN7: Face symmetric without dysarthria  CN8: Hearing symmetric to spoken voice  CN9: Palate elevated symmetrically  CN11: Traps full strength on shoulder shrug  CN12: Tongue midline with protrusion    Motor Exam:   R  L    Deltoid 4  5   Biceps 5 5   Triceps 5 5   Wrist extension  5 5   Interossei 5 5      R  L    Hip flexion  5  5   Hip extension  5 5   Knee flexion  5 5   Knee extension  5 5   Ankle dorsiflexion  5 5   Ankle plantar flexion  5 5     Deep tendon reflexes: +1 and normal throughout    Sensory: light touch intact and symmetric in all 4 extremities. No sensory extinction on bilateral simultaneous stimulation  Cerebellar/coordination: finger nose finger normal without ataxia  Tone: normal in all 4 extremities  Gait: deferred for safety     OTHER SYSTEMS:  Cardiovascular: Warm, appears well perfused   Respiratory: Easy, non-labored respiratory pattern   Abdominal: Abdomen is without distention   Extremities: Upper and lower extremities are atraumatic in appearance without deformity. No swelling or erythema. Diagnostic Testing Results   IMAGES:  Images personally reviewed and agree w/ radiology interpretation. Head CT w/o Contrast:  Increased density is seen in the occipital horn of the left lateral   ventricle, new compared to prior.  Finding is suspicious for a small amount   of intraventricular hemorrhage.     Encephalomalacia posterior left parietal lobe at the site of prior hemorrhage. CTA of Head / Neck w/ Contrast:  1. Area of hyperdensity adjacent to the occipital horn of the left lateral ventricle is again noted. This is favored to be extraventricular in location and may be related to hemorrhage or mineralization within a prior stroke. 2. Additional remote infarct in the left parietal lobe. 3. High-grade stenosis/near occlusion of the left posterior M2 division which is new from March 2020. MRI Brain w/o Contrast:  Moderate acute infarct in the left posterior inferior cerebellum.       Remote infarcts with areas of remote hemorrhagic staining, mineralization, or cortical laminar necrosis in the left parietal and occipital lobes corresponding to hyperdensity on recent CT. The left occipital lobe infarct is new from 3/13/2020. No    intracranial hemorrhage.       Mild atrophy and chronic small vessel ischemic change. EKG: NSR    Echo 2020   Summary   Normal left ventricle size, wall thickness, and systolic function with an   estimated ejection fraction of 55-60%. No regional wall motion abnormalities   are seen. Diastolic filling parameters suggests normal diastolic function. Mild tricuspid regurgitation. The right ventricle is normal in size and function. IVC size is normal (<2.1 cm) but collapses < 50% with respiration consistent   with elevated RA pressure (8 mmHg). Estimated pulmonary artery systolic pressure is at 36 mmHg assuming a right   atrial pressure of 8 mmHg. Positive bubble study for a small PFO. SY 2020  - Left ventricle:  The cavity size is normal. Wall thickness is normal. Systolic function was normal.   - Right ventricle: Systolic function was normal.   - Left atrium: There is no evidence of a thrombus in the atrial cavity or appendage.   - Right atrium: There is no evidence of a thrombus in the atrial cavity or appendage.   - Atrial septum: Agitated saline

## 2022-05-17 NOTE — PROGRESS NOTES
Neuro exam remains intact and stable. Pt does complain of cloudy/blurred vision in R visual field at times but very mild. Pt still complains of anterior headache. Pt starting to become more hypertensive towards this evening which correlates w/ headache. Pt received HD this AM. 1L taken off. Pt tolerated well. Pt has remained afebrile.      Electronically signed by Margaret Hernandez RN on 5/17/2022 at 6:46 PM

## 2022-05-17 NOTE — FLOWSHEET NOTE
Copy of tx shets faxed to Pt's [address] [place] [address] @ [phone]. Lumens were dwelled w/ Citrate 4% solution. Treatment time: 180 min    Net UF: 1000 mls    Pre weight: 71 kg  Post weight: 70kg  EDW: Unk    Access used: LCW TDC  Access function: Good blood flow    Medications or blood products given: None    Regular outpatient schedule: [facility]  -  4 x wk    Summary of response to treatment: Pt tolerated tx well.     Copy of dialysis treatment record placed in chart, to be scanned into EMR.       05/17/22 0644 05/17/22 1008   Vital Signs   BP (!) 157/86 134/78   Temp 98.2 °F (36.8 °C) 98.7 °F (37.1 °C)   Pulse 78 93   Resp 21 17   Weight 156 lb 8.4 oz (71 kg) 154 lb 5.2 oz (70 kg)   Weight Method Bed scale Bed scale   Post-Hemodialysis Assessment   Total Liters Processed (l/min)  --  50.9 l/min   Hemodialysis Intake (ml)  --  400 ml   Hemodialysis Output (ml)  --  1400 ml   NET Removed (ml)  --  1000 ml   Tolerated Treatment  --  Good

## 2022-05-17 NOTE — CONSULTS
Nephrology Consult Note                                                                                                                                                                                                                                                                                                                                                               Office : [phone]     Fax :[phone]              Patient's Name: Roberts Hammans  10:45 AM  5/17/2022    Reason for Consult:  ESRD on HD  Requesting Physician:  No primary care provider on file. Chief Complaint: Headache    HPI:  Roberts Hammans is a 50 y.o.  female with MHx significant for ESRD on HD, FSGS, s/p renal txp, HTN, PFO (echo 3/13/20), HLD, PVD, prior CVAs (on Keppra) with residual balance issues, T2DM, and hypothyroidism, who presented to [facility] ED on 5/16/2022 with a headache which started during her home HD session. Has never experienced this during dialysis before. Describes it as throbbing, frontal, pressure-like, sudden onset; the worst she's ever experienced; accompanied by lightheadedness and dizziness. At the time of onset, BP was 179/110, and then took home BP meds w/o resolution of the HA. Denies LOC, vision changes, vomiting. On workup in the ED, CT head w/o contrast demonstrated findings concerning for IVH. She was transferred to [facility] and admitted to the ICU on 5/16 for neurosurgical evaluation and management. Interval History:  MRI w/o contrast today demonstrates acute infarct in L posterior inferior cerebellum, L occipital lobe infarct, and no intracranial hemorrhage.     Seen and examined bedside.    - Endorses ongoing frontal, pressure-like HA; and new \"cloudy\" vision  - Denies lightheadedness, dizziness, nausea, vomiting    - pt seen on HD will well         Past Medical History:   Diagnosis Date    Acquired hypothyroidism     Cerebral artery occlusion with cerebral infarction ([facility] 2010  [facility]aine abuse [facility]     Crohn's disease ([facility]     Depression     Diabetes mellitus ([facility]     ESRD (end stage renal disease) ([facility] [facility]     HD - any 4 days of the week pt chooses    GERD (gastroesophageal reflux disease)     Hemodialysis patient ([facility] [facility] 2016    peritoneal     Hyperlipidemia     Hypertension     Ischemic stroke ([facility] [address] 03/13/2020    left parietal lobe, with hemorrhagic conversion    MI, old     Mood disorder ([facility]     Pericarditis     Peripheral vascular disease ([facility] [facility]     Peritonitis ([facility] [facility]     Pneumonia     Thyroid disease        Past Surgical History:   Procedure Laterality Date    BACK SURGERY      Tumor removal    CHOLECYSTECTOMY      COLON SURGERY Right     KIDNEY TRANSPLANT  1990    KIDNEY TRANSPLANT      LAPAROSCOPY      right ovary removed and left ovarian cyst removed for endometriosis    OTHER SURGICAL HISTORY      peritoneal dialysis catheter    PARACENTESIS      TUBAL LIGATION      UPPER GASTROINTESTINAL ENDOSCOPY  03/31/2017    UPPER GASTROINTESTINAL ENDOSCOPY N/A 3/25/2019    EGD BIOPSY performed by Rick Sandra MD at [address] History   Problem Relation Age of Onset    Heart Attack Mother     Diabetes Mother     Hypertension Mother     Stroke Father     Diabetes Father     Hypertension Father         reports that she has never smoked. She has never used smokeless tobacco. She reports previous alcohol use. She reports previous drug use.     Allergies:  Ferrous sulfate, Cephalexin, Dicloxacillin, Pcn [penicillins], and Sulfa antibiotics    Current Medications:    sodium citrate 4 % injection 5 mL, PRN  perflutren lipid microspheres (DEFINITY) injection 1.65 mg, ONCE PRN  acetaminophen (TYLENOL) tablet 650 mg, Q4H PRN  ondansetron (ZOFRAN-ODT) disintegrating tablet 4 mg, Q8H PRN   Or  ondansetron (ZOFRAN) injection 4 mg, Q6H PRN  levETIRAcetam (KEPPRA) 500 mg in sodium chloride 0.9 % 100 mL IVPB, Q24H  [Held by provider] labetalol for input(s): Shailesh Alegreing, GLUCOSEU, BILIRUBINUR, Moore Malachi, BLOODU, PHUR, PROTEINU, UROBILINOGEN, NITRU, LEUKOCYTESUR, Cassandria Bernal in the last 72 hours. Urine Microscopic: No results for input(s): LABCAST, BACTERIA, COMU, HYALCAST, WBCUA, RBCUA, EPIU in the last 72 hours. Urine Culture: No results for input(s): LABURIN in the last 72 hours. Urine Chemistry: No results for input(s): Gucci Meter, PROTEINUR, NAUR in the last 72 hours. IMAGING:  MRI BRAIN WO CONTRAST   Final Result      Moderate acute infarct in the left posterior inferior cerebellum. Remote infarcts with areas of remote hemorrhagic staining, mineralization, or cortical laminar necrosis in the left parietal and occipital lobes corresponding to hyperdensity on recent CT. The left occipital lobe infarct is new from 3/13/2020. No    intracranial hemorrhage. Mild atrophy and chronic small vessel ischemic change. CTA HEAD W WO CONTRAST   Final Result      1. Area of hyperdensity adjacent to the occipital horn of the left lateral ventricle is again noted. This is favored to be extraventricular in location and may be related to hemorrhage or mineralization within a prior stroke. 2. Additional remote infarct in the left parietal lobe. 3. High-grade stenosis/near occlusion of the left posterior M2 division which is new from March 2020. Assessment   50 y.o. AA female with MHx of ESRD on HD, FSGS, s/p renal txp, HTN, PFO (echo 3/13/20), HLD, PVD, prior CVAs (on Keppra) with residual balance issues, T2DM, and hypothyroidism who was admitted to the ICU on 5/16/2022 for neurosurgical evaluation and management of a possible IVH in left lateral ventricle. 1. ESRD on HD - anuric  - Home HD 4 days/wk  - Pt reports compliance with home HD and meds  - Dry wt: 147 lb    2. HTN    3. Anemia    4. Acid-base/electrolyte imbalance     5.  S/p renal txp      Plan  - need good BP control   - HD 4 days a week   - No

## 2022-05-17 NOTE — PLAN OF CARE
Problem: Discharge Planning  Goal: Discharge to home or other facility with appropriate resources  5/17/2022 1840 by Miguel García RN  Outcome: Progressing     Problem: Chronic Conditions and Co-morbidities  Goal: Patient's chronic conditions and co-morbidity symptoms are monitored and maintained or improved  5/17/2022 1840 by Miguel García RN  Outcome: Progressing     Problem: Pain  Goal: Verbalizes/displays adequate comfort level or baseline comfort level  5/17/2022 1840 by Miguel García RN  Outcome: Progressing

## 2022-05-17 NOTE — PROGRESS NOTES
Lab contacted for 2nd time to collect 9am labs. RNs unable to obtain.      Electronically signed by Sada Tarango RN on 5/17/2022 at 1:20 PM

## 2022-05-17 NOTE — PROGRESS NOTES
ICU Progress Note    Admit Date: 5/16/2022  Day: 2  Vent Day: None  IV Access:Peripheral  IV Fluids:None  Vasopressors:None                Antibiotics: None  Diet: ADULT DIET; Regular    CC: headache    Interval history:   NAEON. Patient reports having headache this morning, denies blurry vision, nausea or vomiting. Has appetite. Receiving dialysis today. BP well controlled with nifedipine. She states that she takes keppra due to a seizure that occurred due to hypocalcemia following thyroid surgery. HPI:     49-year-old female with PMH of HTN, CVA event x2, ESRD on HD presented to the [facility] ED with headache.  Patient reported with a finishing up her HD at home with about 40 minutes remaining when she developed headache.  He described the headache as a throbbing, frontal, 10/10 in intensity associated with nausea, lightheadedness and vertigo (room spinning sensation) with no aggravating/relieving factors.  Patient denied any vomiting, vision changes, neck stiffness/neck pain, fevers.      Denies any focal numbness/weakness.  She checked her blood pressure during this event and was found to be 179/110.  She called her dialysis nurse who advised her to come to the ER. Of note, patient has history of prior stroke with some residual balance issues.  She takes Keppra 500 twice daily and 3 times daily during days of her dialysis (MWF).    At [facility] ED, vitals afebrile 98.2, pulse 93, /94, RR 16, SPO2 97% on RA.  No focal deficits on examination.  CT head without contrast revealed increased density in the occipital horn of the left lateral ventricle new compared to prior.  Findings suspicious for small amount of intraventricular hemorrhage.  Encephalomalacia posterior to the left parietal lobe at the site of prior hemorrhage.  Neurosurgery was consulted who suggested patient to be transferred to [facility]. for further management.     Medications:     Scheduled Meds:   levetiracetam  500 mg IntraVENous Q24H    gabapentin  100 mg Oral BID    NIFEdipine  60 mg Oral Daily    pantoprazole  40 mg Oral Daily    insulin lispro  0-12 Units SubCUTAneous TID     insulin lispro  0-6 Units SubCUTAneous Nightly     Continuous Infusions:   dextrose       PRN Meds:sodium citrate, acetaminophen, ondansetron **OR** ondansetron, [Held by provider] labetalol, glucose, dextrose bolus **OR** dextrose bolus, glucagon (rDNA), dextrose    Objective:   Vitals:   T-max:  Patient Vitals for the past 8 hrs:   BP Temp Temp src Pulse Resp SpO2 Weight   05/17/22 0700 (!) 168/88 -- -- 71 18 98 % --   05/17/22 0644 (!) 157/86 98.2 °F (36.8 °C) -- 78 21 98 % 156 lb 8.4 oz (71 kg)   05/17/22 0600 (!) 142/79 -- -- 76 14 98 % --   05/17/22 0500 133/67 -- -- 72 15 95 % 156 lb 4.9 oz (70.9 kg)   05/17/22 0400 132/73 98.2 °F (36.8 °C) Oral 76 14 97 % --   05/17/22 0300 122/79 -- -- 78 14 97 % --   05/17/22 0200 125/64 -- -- 80 14 94 % --   05/17/22 0100 123/70 -- -- 78 14 96 % --   05/17/22 0000 (!) 117/96 -- -- 83 20 100 % --       Intake/Output Summary (Last 24 hours) at 5/17/2022 0732  Last data filed at 5/16/2022 2200  Gross per 24 hour   Intake 145.83 ml   Output --   Net 145.83 ml       Review of Systems    Physical Exam  Constitutional:       Appearance: Normal appearance. HENT:      Head: Normocephalic and atraumatic. Eyes:      Extraocular Movements: Extraocular movements intact. Cardiovascular:      Rate and Rhythm: Normal rate and regular rhythm. Pulses: Normal pulses. Heart sounds: Normal heart sounds. Pulmonary:      Effort: Pulmonary effort is normal.      Breath sounds: Normal breath sounds. Abdominal:      General: Abdomen is flat. Bowel sounds are normal.      Palpations: Abdomen is soft. Musculoskeletal:      Right lower leg: No edema. Left lower leg: No edema. Neurological:      General: No focal deficit present. Mental Status: She is alert and oriented to person, place, and time.       Coordination: Coordination normal.           LABS:    CBC:   Recent Labs     05/16/22  1255 05/17/22  0555   WBC 9.5 4.9   HGB 11.3* 9.9*   HCT 34.7* 33.0*    73*   MCV 97.3 105.6*     Renal:    Recent Labs     05/16/22  1255 05/17/22  0555   * 135*   K 4.5 6.2*   CL 93* 98*   CO2 21 20*   BUN 37* 48*   CREATININE 7.4* 9.0*   GLUCOSE 216* 282*   CALCIUM 9.0 7.9*   ANIONGAP 18* 17*     Hepatic: No results for input(s): AST, ALT, BILITOT, BILIDIR, PROT, LABALBU, ALKPHOS in the last 72 hours. Troponin:   Recent Labs     05/16/22  1255   TROPONINI 0.17*     BNP: No results for input(s): BNP in the last 72 hours. Lipids: No results for input(s): CHOL, HDL in the last 72 hours. Invalid input(s): LDLCALCU, TRIGLYCERIDE  ABGs:  No results for input(s): PHART, PTG0FYT, PO2ART, NWN5GXU, BEART, THGBART, P7UBQQBQ, NOI9LWV in the last 72 hours. INR:   Recent Labs     05/16/22  1255   INR 0.94     Lactate: No results for input(s): LACTATE in the last 72 hours. Cultures:  -----------------------------------------------------------------  RAD:   MRI BRAIN WO CONTRAST   Final Result      Moderate acute infarct in the left posterior inferior cerebellum. Remote infarcts with areas of remote hemorrhagic staining, mineralization, or cortical laminar necrosis in the left parietal and occipital lobes corresponding to hyperdensity on recent CT. The left occipital lobe infarct is new from 3/13/2020. No    intracranial hemorrhage. Mild atrophy and chronic small vessel ischemic change. CTA HEAD W WO CONTRAST   Final Result      1. Area of hyperdensity adjacent to the occipital horn of the left lateral ventricle is again noted. This is favored to be extraventricular in location and may be related to hemorrhage or mineralization within a prior stroke. 2. Additional remote infarct in the left parietal lobe. 3. High-grade stenosis/near occlusion of the left posterior M2 division which is new from March 2020. Assessment/Plan:     Intraventricular hemorrhage  - CT head without contrast revealed increased density in the occipital horn of the left lateral ventricle new compared to prior.  Findings suspicious for small amount of intraventricular hemorrhage.  Encephalomalacia posterior to the left parietal lobe at the site of prior hemorrhage.  - Every hour neurochecks  - Keep HOB elevated  - Keep SBP <160 mmHg  - labetalol 10 mg every 4 hours as needed  - Nifedipine 60 mg PO qdaily  - SLP  - Follow-up on stability CT head without contrast  - Follow-up on CTA head and neck with without contrast  - Neurosurgery consulted, appreciate recs  - Keppra 500 twice daily  - Avoid AP and AC for at least 2 weeks  - Follow up A1C      ESRD on HD MWF  - Patient follows a nephrologist at , does home hemodialysis  - Consult nephrology, appreciate recs      Code Status: Limited  FEN: Reg  PPX: SCD  DISPO: ICU    Ady Mays MD, PGY-1  05/17/22  7:32 AM    This patient will be staffed and discussed with Dr Donice Bence. Pulm/CC    Patient seen and examined. I agree with Dr. Nini Vigil history, physical, lab findings, assessment and plan. MRI brain     Impression       Moderate acute infarct in the left posterior inferior cerebellum.       Remote infarcts with areas of remote hemorrhagic staining, mineralization, or cortical laminar necrosis in the left parietal and occipital lobes corresponding to hyperdensity on recent CT. The left occipital lobe infarct is new from 3/13/2020. No    intracranial hemorrhage.       Mild atrophy and chronic small vessel ischemic change. Assessment       1. Cerebellar CVA -hyperdensity on recent CT was laminar necrosis and not hemorrhage  2. PVD on Plavix  3. ESRD on HD  4. HTN  5. Cocaine use  6.  DM     Plan     Continue close neurologic following in ICU given cerebellar infarct  Serum tox screen pending  Same antiphospholipid lipid antibody  Start aspirin 3 and 25 mg daily  Continue Yesi Jacobsen MD

## 2022-05-17 NOTE — CONSULTS
Clinical Pharmacy Progress Note  Medication History     Admit Date: 5/16/2022    List of of current medications patient is taking is complete. Home Medication list in EPIC updated to reflect changes noted below. Source of information: Chart Review, Dispsene History, Patient Interview    Patient's home pharmacy: [facility] #[id number] ([phone])     Changes made to medication list:   Medications removed: (include reason, ex: therapy completed, inactive medication)   B- Complex   Lidocaine   Amitiza   Lyrica   Liquidfilm   Zofran  Medications added:    Lipitor   5F-DICLOFENAC3/GABAPENTIN6/LIDOCAINE2/PRILOCA   Plavix   Diclofenac   Auryxia   Hydrocortisone   Lantus   Novolog   Synthroid   Hydroxazine  Medication doses adjusted:    Keppra   Mirtazapine   Nifedipine  Other notes:    Medication List now complete, Resident notified of changes. Current Facility-Administered Medications on File Prior to Encounter   Medication Dose Route Frequency Provider Last Rate Last Admin    [COMPLETED] hydrALAZINE (APRESOLINE) injection 10 mg  10 mg IntraVENous Once Brianne Reynolds PA-C   10 mg at 05/16/22 1302    [COMPLETED] acetaminophen (TYLENOL) tablet 1,000 mg  1,000 mg Oral Once Brianne Reynolds PA-C   1,000 mg at 05/16/22 1300     Current Outpatient Medications on File Prior to Encounter   Medication Sig Dispense Refill    levETIRAcetam (KEPPRA) 500 MG tablet Take 500 mg by mouth Take 500mg BID and an 500 mg on HD days      mirtazapine (REMERON) 30 MG tablet Take 30 mg by mouth nightly      insulin glargine (LANTUS) 100 UNIT/ML injection vial Inject 7 Units into the skin nightly      insulin aspart (NOVOLOG) 100 UNIT/ML injection vial Inject 7 Units into the skin 3 times daily (before meals)      losartan (COZAAR) 50 MG tablet Take 50 mg by mouth daily      Ferric Citrate (AURYXIA) 1  MG(Fe) TABS Take 1 tablet by mouth Take with meals and Snacks, Max of 5 tablets per day.       clopidogrel

## 2022-05-17 NOTE — PLAN OF CARE
Pt remains in ICU on room air. Alert and oriented x4. Q1h neuro checks maintained. Bilateral weakness throughout. MRI completed overnight. Hemodynamically stable overnight. Pt spoke on phone with family.      Problem: Discharge Planning  Goal: Discharge to home or other facility with appropriate resources  5/17/2022 0543 by Dom Jarrett RN  Outcome: Progressing  5/16/2022 1951 by Sharron Varela RN  Outcome: Progressing     Problem: Pain  Goal: Verbalizes/displays adequate comfort level or baseline comfort level  5/17/2022 0543 by Dom Jarrett RN  Outcome: Progressing  5/16/2022 1951 by Sharron Varela RN  Outcome: Progressing     Problem: Chronic Conditions and Co-morbidities  Goal: Patient's chronic conditions and co-morbidity symptoms are monitored and maintained or improved  5/17/2022 0543 by Dom Jarrett RN  Outcome: Progressing  5/16/2022 1951 by Sharron Varela RN  Outcome: Progressing

## 2022-05-17 NOTE — PROGRESS NOTES
Language Pathology  Facility/Department: [facility] ICU   CLINICAL BEDSIDE SWALLOW EVALUATION  Speech, language, cognitive screen  Discharge     NAME: Roberts Hammans  : 1975  MRN: [id number]    ADMISSION DATE: 2022  ADMITTING DIAGNOSIS: has Depression with suicidal ideation; ESRD on hemodialysis ([address] Hyperkalemia; Metabolic acidosis; Essential hypertension; DMII (diabetes mellitus, type 2) ([address] Cellulitis; Cellulitis of lower extremity; Erythema nodosum; Chest pain; Failure to thrive in adult; Abnormal stress test; ESRD (end stage renal disease) on dialysis ([address] Suicidal ideation; NSTEMI (non-ST elevated myocardial infarction) ([address] Vaginal candidiasis; Vaginal bleeding; Vaginal discharge; Atypical chest pain; Severe episode of recurrent major depressive disorder, without psychotic features ([address] Anxiety disorder; Cocaine abuse ([facility] Anemia; Illicit drug use; Hyperkalemia; Peritoneal dialysis status ([address] Hyperlipidemia; Chronic focal neurological deficit; Nausea and vomiting; Moderate malnutrition ([address] GI bleed; Acute ischemic stroke [facility]); ESRD needing dialysis ([facility] [address] Intracranial hemorrhage ([facility] [address] and Intracerebral hemorrhage, nontraumatic (HCC) on their problem list.  ONSET DATE: 22    Recent Chest Xray/CT of Chest:    not performed this visit     MRI brain 22  Impression       Moderate acute infarct in the left posterior inferior cerebellum.       Remote infarcts with areas of remote hemorrhagic staining, mineralization, or cortical laminar necrosis in the left parietal and occipital lobes corresponding to hyperdensity on recent CT. The left occipital lobe infarct is new from 3/13/2020.  No    intracranial hemorrhage.       Mild atrophy and chronic small vessel ischemic change         Date of Eval: 2022  Evaluating Therapist: MEHRDAD Stein    Current Diet level:  Current Diet : Regular      Primary Complaint  Patient Complaint: pt is without around spoon or drawing liquid up a straw. Pt had no difficulty with mastication with solids. There was no anterior spillage or oral residue noted with any consistency. Pt demonstrated no s/s aspiration with any consistency presented. Pt able to initiate swallow without difficulty with laryngeal movement observed. Vocal quality remained clear throughout. No coughing, throat clearing or choking observed with any consistency. Pt consumed 3oz water uninterrupted by straw without difficulty. pt reports preference for drinking from a straw. Dysphagia Diagnosis: Swallow function appears [facility]  Dysphagia Outcome Severity Scale: Level 7: Normal in all situations     Treatment Plan  Requires SLP Intervention: No  Duration of Treatment: n/a  D/C Recommendations: No follow up therapy recommended post discharge       Recommended Diet and Intervention    recommend- regular diet with thin liquids-Make NPO if s/s aspiration emerge and alert SLP       Recommended Form of Meds: PO          Compensatory Swallowing Strategies  Compensatory Swallowing Strategies : Upright as possible for all oral intake    Treatment/Goals   n/a    General  Chart Reviewed: Yes  Behavior/Cognition: Alert; Cooperative;Pleasant mood  Respiratory Status: Room air  Communication Observation: Functional  Follows Directions: Complex  Dentition: Adequate  Patient Positioning: Upright in bed  Baseline Vocal Quality: Normal  Volitional Cough: Strong  Prior Dysphagia History: pt reports she drinks from a straw at all times           Vision/Hearing  Vision  Vision: Within Functional Limits  Hearing  Hearing: Within functional limits            Prognosis  Individuals consulted  Consulted and agree with results and recommendations: Patient;RN  RN Name: Zak Burger  Patient Education: Role of SLP  Patient Education Response: Verbalizes understanding  Safety Devices in place: Yes  Type of devices: Call light within reach             Therapy Time  SLP Individual Minutes  Time In: 5943  Time Out: 1000  Minutes: 18              Pt's goal: pt denied difficulty with swallowing and communication so did not state goal       Plan:  Communication, cognition and swallowing appear to be [facility]. Pt discharged from Speech Therapy services   Recommended diet: regular with thin liquids -Make NPO if s/s aspiration emerge and alert SL  Total treatment time:18  Pt's discharge plan:to home   Discharge Plan: no further follow up indicated   Discussed with RN, Randy  Needs within reach.        [facility] [place] [address] [facility]  Pg # [phone]  This document will serve as a discharge summary if pt discharge before next treatment   session

## 2022-05-17 NOTE — CONSULTS
NEUROSURGERY Ely Bernheim  [id number]   1975 5/17/2022    Requesting physician: MARQUIS [facility], MD    Reason for consultation: IVH    History of present illness: Patient is a 50 y.o. female w/ PMH of HTN, ESRD on HD, hemorrhagic infarct who presented on 5/17/2022 to OSH ED with headache during her HD session. She had 40 minutes remaining in her dialysis session when she developed a severe bifronal headache. She rated it a 10/10 in severity, she had associated nausea and dizziness. She denies focal weakness, changes in vision or other neurologic deficits. She checked her blood pressure during this event and was found to be 179/110. She called her dialysis nurse who advised her to come to the ER. Of note, patient has history of prior left parietal hemorrhagic infarct in 2020 and per patient she has some residual balance issues. CT head revealed an area of hyperdensity in the occipital horn of the left lateral ventricle, concern for possible IVH. Patient does take Plavix daily, she is unsure exactly why she is on it but believes it is for \"cirtculation issues\". She thinks she has been taking it for about 1 year. Patient was transferred to [facility] for neurosurgical evaluation. ROS:   GENERAL:  Denies fever or recent illness.  Denies weight changes   EYES:  Denies vision change or diplopia  EARS:  Denies hearing loss  CARDIAC:  Denies chest pain  RESPIRATORY:  Denies shortness of breath  SKIN:  Denies rash or lesions   HEM:  Denies excessive bruising  PSYCH:  Denies anxiety or depression  NEURO:  + headache, denies numbness or tingling or lateralizing weakness   :  Denies urinary difficulty  GI: Denies nausea, vomiting, diarrhea or constipation  MUSCULOSKELETAL:  No arthralgias    Allergies   Allergen Reactions    Ferrous Sulfate Shortness Of Breath    Cephalexin Itching and Swelling    Dicloxacillin Rash    Pcn [Penicillins] Rash    Sulfa Antibiotics Rash and Other (See Comments)     Chest pain.       Past Medical History:   Diagnosis Date    Acquired hypothyroidism     Cerebral artery occlusion with cerebral infarction ([address] 2010    Cocaine abuse ([address]     Crohn's disease ([address]     Depression     Diabetes mellitus ([address]     ESRD (end stage renal disease) ([address]     HD - any 4 days of the week pt chooses    GERD (gastroesophageal reflux disease)     Hemodialysis patient ([address] 2016    peritoneal     Hyperlipidemia     Hypertension     Ischemic stroke ([address] 03/13/2020    left parietal lobe, with hemorrhagic conversion    MI, old     Mood disorder ([address]     Pericarditis     Peripheral vascular disease ([address]     Peritonitis ([address]     Pneumonia     Thyroid disease         Past Surgical History:   Procedure Laterality Date    BACK SURGERY      Tumor removal    CHOLECYSTECTOMY      COLON SURGERY Right     KIDNEY TRANSPLANT  1990    KIDNEY TRANSPLANT      LAPAROSCOPY      right ovary removed and left ovarian cyst removed for endometriosis    OTHER SURGICAL HISTORY      peritoneal dialysis catheter    PARACENTESIS      TUBAL LIGATION      UPPER GASTROINTESTINAL ENDOSCOPY  03/31/2017    UPPER GASTROINTESTINAL ENDOSCOPY N/A 3/25/2019    EGD BIOPSY performed by Qian Covarrubias MD at [address] [place] [address] History     Occupational History    Occupation: volunteer   Tobacco Use    Smoking status: Never Smoker    Smokeless tobacco: Never Used   Vaping Use    Vaping Use: Never used   Substance and Sexual Activity    Alcohol use: Not Currently     Alcohol/week: 0.0 standard drinks     Comment: monthly    Drug use: Not Currently    Sexual activity: Yes     Partners: Male        Family History   Problem Relation Age of Onset    Heart Attack Mother     Diabetes Mother     Hypertension Mother     Stroke Father     Diabetes Father     Hypertension Father         Outpatient Medications Marked as Taking for the 5/16/22 encounter [facility] Encounter)   Medication Sig Dispense Refill    levETIRAcetam (KEPPRA) 500 MG tablet Take 500 mg by mouth Take 500mg BID and an 500 mg on HD days      mirtazapine (REMERON) 30 MG tablet Take 30 mg by mouth nightly      insulin glargine (LANTUS) 100 UNIT/ML injection vial Inject 7 Units into the skin nightly      insulin aspart (NOVOLOG) 100 UNIT/ML injection vial Inject 7 Units into the skin 3 times daily (before meals)      losartan (COZAAR) 50 MG tablet Take 50 mg by mouth daily      Ferric Citrate (AURYXIA) 1  MG(Fe) TABS Take 1 tablet by mouth Take with meals and Snacks, Max of 5 tablets per day.  clopidogrel (PLAVIX) 75 MG tablet Take 75 mg by mouth daily      diclofenac sodium (VOLTAREN) 1 % GEL Apply topically 4 times daily as needed for Pain      atorvastatin (LIPITOR) 80 MG tablet Take 80 mg by mouth daily      levothyroxine (SYNTHROID) 50 MCG tablet Take 50 mcg by mouth Daily      hydrocortisone 2.5 % cream Apply topically 2 times daily Apply topically 2 times daily.       NONFORMULARY 5F-DICLOFENAC3/GABAPENTIN6/LIDOCAINE2/PRILOCA  Apply 1-2 grams 3-4 times a day as needed for foot pain      hydrOXYzine (ATARAX) 25 MG tablet Take 25 mg by mouth every 8 hours as needed for Itching        Current Facility-Administered Medications   Medication Dose Route Frequency Provider Last Rate Last Admin    sodium citrate 4 % injection 5 mL  5 mL IntraCATHeter PRN Magdy Roberts MD   5 mL at 05/17/22 1008    perflutren lipid microspheres (DEFINITY) injection 1.65 mg  1.5 mL IntraVENous ONCE PRN TREY Alaniz CNP        [START ON 5/18/2022] losartan (COZAAR) tablet 50 mg  50 mg Oral Daily Abbe Alfredo MD        aspirin EC tablet 325 mg  325 mg Oral Daily TREY Alaniz CNP   325 mg at 05/17/22 1313    levothyroxine (SYNTHROID) tablet 50 mcg  50 mcg Oral Daily Antony Mckay MD   50 mcg at 05/17/22 1312    levETIRAcetam (KEPPRA) tablet 500 mg  500 mg Oral BID Bryan Schaffer MD   500 mg at 05/17/22 1312    levETIRAcetam (KEPPRA) tablet 500 mg  500 mg Oral PRN Rosas Villa MD        acetaminophen (TYLENOL) tablet 650 mg  650 mg Oral Q4H PRN Maria Victoria Estrada MD   650 mg at 05/17/22 0715    ondansetron (ZOFRAN-ODT) disintegrating tablet 4 mg  4 mg Oral Q8H PRN Maria Victoria Estrada MD        Or    ondansetron (ZOFRAN) injection 4 mg  4 mg IntraVENous Q6H PRN Maria Victoria Estrada MD        [Held by provider] labetalol (NORMODYNE;TRANDATE) injection 10 mg  10 mg IntraVENous Q10 Min PRN Maria Victoria Estrada MD        gabapentin (NEURONTIN) capsule 100 mg  100 mg Oral BID Maria Victoria Estrada MD   100 mg at 05/17/22 0849    NIFEdipine (PROCARDIA XL) extended release tablet 60 mg  60 mg Oral Daily Maria Victoria Estrada MD   60 mg at 05/17/22 0849    pantoprazole (PROTONIX) tablet 40 mg  40 mg Oral Daily Maria Victoria Estrada MD   40 mg at 05/17/22 0634    insulin lispro (1 Unit Dial) 0-12 Units  0-12 Units SubCUTAneous TID WC Maria Victoria Estrada MD   4 Units at 05/17/22 1222    insulin lispro (1 Unit Dial) 0-6 Units  0-6 Units SubCUTAneous Nightly Maria Victoria Estrada MD   3 Units at 05/16/22 2059    glucose chewable tablet 16 g  4 tablet Oral PRN Maria Victoria Estrada MD        dextrose bolus 10% 125 mL  125 mL IntraVENous PRN Maria Victoria Estrada MD        Or    dextrose bolus 10% 250 mL  250 mL IntraVENous PRN Maria Victoria Estrada MD        glucagon (rDNA) injection 1 mg  1 mg IntraMUSCular PRN Maria Victoria Estrada MD        dextrose 5 % solution  100 mL/hr IntraVENous PRN Maria Victoria Estrada MD          Objective:  BP (!) 163/95   Pulse 97   Temp 98.7 °F (37.1 °C)   Resp 21   Wt 154 lb 5.2 oz (70 kg)   SpO2 99%   BMI 24.17 kg/m²     Physical Exam:  Patient seen and examined   General: Well developed. Alert and cooperative in no acute distress. HENT: atraumatic, neck supple  Eyes: Optic discs: Not tested  Pulmonary: unlabored respiratory effort  Cardiovascular:  Warm well perfused.  No peripheral edema  Gastrointestinal: abdomen soft, NT, ND    Neurologic Exam:  Neurological:  Mental Status: Awake, alert, oriented x 4  Attention: Intact  Language: No aphasia or dysarthria noted  Sensation: Intact to all extremities to light touch  Coordination: Intact    Cranial Nerves:  Cranial Nerves:  II: Visual acuity not tested, denies new visual changes / diplopia  III, IV, VI: PERRL, 3 mm bilaterally, EOMI, no nystagmus noted  V: Facial sensation intact bilaterally to touch  VII: Face symmetric  VIII: Hearing intact bilaterally to spoken voice  IX: Palate movement equal bilaterally  XI: Shoulder shrug equal bilaterally  XII: Tongue midline    Musculoskeletal:   Gait: Not tested   Assist devices: None   Tone: normal   Motor strength:    Right  Left    Right  Left    Deltoid  4 5  Hip Flex  4 5   Biceps  4 5  Knee Extensors  5 5   Triceps  4 5  Knee Flexors  5 5   Wrist Ext  5 5  Ankle Dorsiflex. 5 5   Wrist Flex  5 5  Ankle Plantarflex. 5 5   Handgrip  5 5  Ext Napoleon Longus  5 5   Thumb Ext  5 5           Radiological Findings:    I personally reviewed the patient's imaging which consists of a CT head dated 5/16/2022. This demonstrates an asymmetric hyperdensity within the left occipital horn of the lateral ventricle, concerning for small amount of IVH. Previous left parietal ischemic event is notes with associated encephalomalacia. I also reviewed the MRI which demonstrated a previous left parietal stroke with new ischemic changes in the right cerebellar hemisphere and new occipital horn IVH.      Labs  Recent Labs     05/16/22  1255 05/17/22  0555   * 135*   CL 93* 98*   CO2 21 20*   BUN 37* 48*   CREATININE 7.4* 9.0*   GLUCOSE 216* 282*     Recent Labs     05/16/22  1255 05/17/22  0555 05/17/22  0655   WBC 9.5 4.9 5.6   RBC 3.57* 3.12* 3.02*   INR 0.94  --   --          Patient Active Problem List    Diagnosis Date Noted    Atypical chest pain     [facility]STEMI (non-ST elevated myocardial infarction) [facility]     Intracerebral hemorrhage, nontraumatic ([facility] 05/17/2022    Intracranial hemorrhage (HCC) 05/16/2022    [email or web address] 01/11/2021    Acute ischemic stroke ([facility] 03/13/2020    GI bleed 05/03/2019    Moderate malnutrition (HCC) 03/27/2019    Nausea and vomiting 03/24/2019    Peritoneal dialysis status ([facility] 01/03/2019    Hyperlipidemia 01/03/2019    Chronic focal neurological deficit 01/03/2019    Hyperkalemia 07/01/2018    Anemia 40/17/4101    Illicit drug use 62/12/3045    Severe episode of recurrent major depressive disorder, without psychotic features ([email or web address]     Anxiety disorder     Cocaine abuse ([email or web address]     Vaginal discharge     Vaginal bleeding 07/27/2017    Vaginal candidiasis 07/17/2017    Abnormal stress test     ESRD (end stage renal disease) on dialysis ([facility]     Suicidal ideation     Chest pain 07/04/2017    Failure to thrive in adult 07/04/2017    Cellulitis 04/22/2016    Cellulitis of lower extremity     Erythema nodosum     Depression with suicidal ideation 01/15/2016    ESRD on hemodialysis ([facility] 01/15/2016    Hyperkalemia 06/92/3751    Metabolic acidosis 28/89/0127    Essential hypertension 01/15/2016    DMII (diabetes mellitus, type 2) ([facility] 01/15/2016       Assessment:  49 yo female admitted with severe headache during her HD session. Imaging demonstrates IVH in left occipital region and new ischemic changes in the left cerebellar hemisphere. GCS 13-15 = Score 0  Volume (mL) less than 30 = Score 0  Infratentorial Origin No = Score 0  IVH Yes = Score 1  Age (years) less than 80 = Score 0  ICH Score: 1       Plan:        1. No neurosurgical intervention indicated        2. Neurologic exams frequency:  - ICU: Q1H until otherwise directed  3. Neurology consultation for intracranial stenosis and new stroke  4. Maintain SBP <160; If PRN med insufficient, then may start Nicardipine infusion  5. Keep Plt >100k & INR <1.4  6. Hold all anticoagulation & antiplatelet for 2 weeks  7. SCDs for DVT prophylaxis  8.  Cerebral edema prophylaxis:  - Keep HOB >30 degrees  10. No role for seizure ppx  11. Thanks for consult.  Please call w/ questions or decline in mental status       Pamela Rausch MD, PhD  [address], [address], [address] [phone] (c), [phone] (o)

## 2022-05-18 LAB
ANION GAP SERPL CALCULATED.3IONS-SCNC: 17 MMOL/L (ref 3–16)
BUN BLDV-MCNC: 38 MG/DL (ref 7–20)
CALCIUM SERPL-MCNC: 7.8 MG/DL (ref 8.3–10.6)
CHLORIDE BLD-SCNC: 98 MMOL/L (ref 99–110)
CHOLESTEROL, FASTING: 188 MG/DL (ref 0–199)
CO2: 20 MMOL/L (ref 21–32)
CREAT SERPL-MCNC: 7.1 MG/DL (ref 0.6–1.1)
ESTIMATED AVERAGE GLUCOSE: 191.5 MG/DL
GFR AFRICAN AMERICAN: 7
GFR NON-AFRICAN AMERICAN: 6
GLUCOSE BLD-MCNC: 159 MG/DL (ref 70–99)
GLUCOSE BLD-MCNC: 179 MG/DL (ref 70–99)
GLUCOSE BLD-MCNC: 194 MG/DL (ref 70–99)
GLUCOSE BLD-MCNC: 272 MG/DL (ref 70–99)
GLUCOSE BLD-MCNC: 317 MG/DL (ref 70–99)
HBA1C MFR BLD: 8.3 %
HCT VFR BLD CALC: 29.7 % (ref 36–48)
HDLC SERPL-MCNC: 37 MG/DL (ref 40–60)
HEMOGLOBIN: 9.4 G/DL (ref 12–16)
HOMOCYSTEINE: 21 UMOL/L (ref 0–10)
LDL CHOLESTEROL CALCULATED: 101 MG/DL
MCH RBC QN AUTO: 30.9 PG (ref 26–34)
MCHC RBC AUTO-ENTMCNC: 31.8 G/DL (ref 31–36)
MCV RBC AUTO: 97.1 FL (ref 80–100)
PDW BLD-RTO: 14.2 % (ref 12.4–15.4)
PERFORMED ON: ABNORMAL
PLATELET # BLD: 178 K/UL (ref 135–450)
PMV BLD AUTO: 8.5 FL (ref 5–10.5)
RBC # BLD: 3.06 M/UL (ref 4–5.2)
SODIUM BLD-SCNC: 135 MMOL/L (ref 136–145)
TRIGLYCERIDE, FASTING: 248 MG/DL (ref 0–150)
VLDLC SERPL CALC-MCNC: 50 MG/DL
WBC # BLD: 7.4 K/UL (ref 4–11)

## 2022-05-18 PROCEDURE — 6370000000 HC RX 637 (ALT 250 FOR IP): Performed by: STUDENT IN AN ORGANIZED HEALTH CARE EDUCATION/TRAINING PROGRAM

## 2022-05-18 PROCEDURE — 6370000000 HC RX 637 (ALT 250 FOR IP): Performed by: INTERNAL MEDICINE

## 2022-05-18 PROCEDURE — 83090 ASSAY OF HOMOCYSTEINE: CPT

## 2022-05-18 PROCEDURE — 99232 SBSQ HOSP IP/OBS MODERATE 35: CPT | Performed by: INTERNAL MEDICINE

## 2022-05-18 PROCEDURE — 36415 COLL VENOUS BLD VENIPUNCTURE: CPT

## 2022-05-18 PROCEDURE — 80048 BASIC METABOLIC PNL TOTAL CA: CPT

## 2022-05-18 PROCEDURE — 86147 CARDIOLIPIN ANTIBODY EA IG: CPT

## 2022-05-18 PROCEDURE — 99233 SBSQ HOSP IP/OBS HIGH 50: CPT | Performed by: NURSE PRACTITIONER

## 2022-05-18 PROCEDURE — 6370000000 HC RX 637 (ALT 250 FOR IP): Performed by: NURSE PRACTITIONER

## 2022-05-18 PROCEDURE — 85027 COMPLETE CBC AUTOMATED: CPT

## 2022-05-18 PROCEDURE — 80061 LIPID PANEL: CPT

## 2022-05-18 PROCEDURE — 86146 BETA-2 GLYCOPROTEIN ANTIBODY: CPT

## 2022-05-18 PROCEDURE — 2500000003 HC RX 250 WO HCPCS: Performed by: STUDENT IN AN ORGANIZED HEALTH CARE EDUCATION/TRAINING PROGRAM

## 2022-05-18 PROCEDURE — 94761 N-INVAS EAR/PLS OXIMETRY MLT: CPT

## 2022-05-18 PROCEDURE — 83036 HEMOGLOBIN GLYCOSYLATED A1C: CPT

## 2022-05-18 PROCEDURE — 99233 SBSQ HOSP IP/OBS HIGH 50: CPT | Performed by: INTERNAL MEDICINE

## 2022-05-18 PROCEDURE — 2000000000 HC ICU R&B

## 2022-05-18 RX ORDER — LABETALOL HYDROCHLORIDE 5 MG/ML
10 INJECTION, SOLUTION INTRAVENOUS EVERY 6 HOURS PRN
Status: DISCONTINUED | OUTPATIENT
Start: 2022-05-18 | End: 2022-05-20 | Stop reason: HOSPADM

## 2022-05-18 RX ADMIN — GABAPENTIN 100 MG: 100 CAPSULE ORAL at 08:57

## 2022-05-18 RX ADMIN — INSULIN LISPRO 2 UNITS: 100 INJECTION, SOLUTION INTRAVENOUS; SUBCUTANEOUS at 14:04

## 2022-05-18 RX ADMIN — LEVETIRACETAM 500 MG: 500 TABLET, FILM COATED ORAL at 20:30

## 2022-05-18 RX ADMIN — ACETAMINOPHEN 650 MG: 325 TABLET ORAL at 20:08

## 2022-05-18 RX ADMIN — LABETALOL HYDROCHLORIDE 10 MG: 5 INJECTION, SOLUTION INTRAVENOUS at 12:21

## 2022-05-18 RX ADMIN — NIFEDIPINE 60 MG: 60 TABLET, FILM COATED, EXTENDED RELEASE ORAL at 08:57

## 2022-05-18 RX ADMIN — LEVETIRACETAM 500 MG: 500 TABLET, FILM COATED ORAL at 08:57

## 2022-05-18 RX ADMIN — ACETAMINOPHEN 650 MG: 325 TABLET ORAL at 14:11

## 2022-05-18 RX ADMIN — INSULIN LISPRO 2 UNITS: 100 INJECTION, SOLUTION INTRAVENOUS; SUBCUTANEOUS at 18:40

## 2022-05-18 RX ADMIN — LEVOTHYROXINE SODIUM 50 MCG: 50 TABLET ORAL at 06:52

## 2022-05-18 RX ADMIN — LOSARTAN POTASSIUM 50 MG: 50 TABLET, FILM COATED ORAL at 08:57

## 2022-05-18 RX ADMIN — INSULIN LISPRO 2 UNITS: 100 INJECTION, SOLUTION INTRAVENOUS; SUBCUTANEOUS at 08:55

## 2022-05-18 RX ADMIN — GABAPENTIN 100 MG: 100 CAPSULE ORAL at 20:30

## 2022-05-18 RX ADMIN — ONDANSETRON 4 MG: 4 TABLET, ORALLY DISINTEGRATING ORAL at 14:12

## 2022-05-18 RX ADMIN — PANTOPRAZOLE SODIUM 40 MG: 40 TABLET, DELAYED RELEASE ORAL at 06:52

## 2022-05-18 RX ADMIN — INSULIN LISPRO 4 UNITS: 100 INJECTION, SOLUTION INTRAVENOUS; SUBCUTANEOUS at 20:12

## 2022-05-18 RX ADMIN — ASPIRIN 325 MG: 325 TABLET, COATED ORAL at 08:57

## 2022-05-18 RX ADMIN — ACETAMINOPHEN 650 MG: 325 TABLET ORAL at 04:40

## 2022-05-18 ASSESSMENT — PAIN - FUNCTIONAL ASSESSMENT
PAIN_FUNCTIONAL_ASSESSMENT: ACTIVITIES ARE NOT PREVENTED

## 2022-05-18 ASSESSMENT — PAIN DESCRIPTION - DESCRIPTORS
DESCRIPTORS: ACHING

## 2022-05-18 ASSESSMENT — PAIN DESCRIPTION - PAIN TYPE
TYPE: ACUTE PAIN

## 2022-05-18 ASSESSMENT — PAIN DESCRIPTION - LOCATION
LOCATION: HEAD

## 2022-05-18 ASSESSMENT — PAIN DESCRIPTION - ORIENTATION
ORIENTATION: ANTERIOR

## 2022-05-18 ASSESSMENT — PAIN DESCRIPTION - ONSET
ONSET: ON-GOING
ONSET: GRADUAL
ONSET: ON-GOING
ONSET: ON-GOING

## 2022-05-18 ASSESSMENT — PAIN SCALES - GENERAL
PAINLEVEL_OUTOF10: 5
PAINLEVEL_OUTOF10: 10
PAINLEVEL_OUTOF10: 5
PAINLEVEL_OUTOF10: 5
PAINLEVEL_OUTOF10: 6
PAINLEVEL_OUTOF10: 3
PAINLEVEL_OUTOF10: 0
PAINLEVEL_OUTOF10: 5
PAINLEVEL_OUTOF10: 5
PAINLEVEL_OUTOF10: 7
PAINLEVEL_OUTOF10: 5

## 2022-05-18 ASSESSMENT — PAIN DESCRIPTION - FREQUENCY
FREQUENCY: CONTINUOUS
FREQUENCY: INTERMITTENT
FREQUENCY: CONTINUOUS

## 2022-05-18 NOTE — PROGRESS NOTES
ICU Progress Note    Admit Date: 5/16/2022  Day: 3  Vent Day: None  IV Access:Peripheral  IV Fluids:None  Vasopressors:None                Antibiotics: None  Diet: ADULT DIET; Regular; Low Potassium (Less than 3000 mg/day); Low Phosphorus (Less than 1000 mg); 1200 ml    CC: headache    Interval history:   -NAEON. Received dose if dilaudid yesterday afternoon. BP controlled in range of 150s. Losartan 50 restarted. -1.4L removed from dialysis. Net -1L. Echo w/ bubble study failed to demonstrate evidence of shunting. HPI:     64-year-old female with PMH of HTN, CVA event x2, ESRD on HD presented to the [facility] ED with headache.  Patient reported with a finishing up her HD at home with about 40 minutes remaining when she developed headache.  He described the headache as a throbbing, frontal, 10/10 in intensity associated with nausea, lightheadedness and vertigo (room spinning sensation) with no aggravating/relieving factors.  Patient denied any vomiting, vision changes, neck stiffness/neck pain, fevers.      Denies any focal numbness/weakness.  She checked her blood pressure during this event and was found to be 179/110.  She called her dialysis nurse who advised her to come to the ER. Of note, patient has history of prior stroke with some residual balance issues.  She takes Keppra 500 twice daily and 3 times daily during days of her dialysis (MWF).    At [facility] ED, vitals afebrile 98.2, pulse 93, /94, RR 16, SPO2 97% on RA.  No focal deficits on examination.  CT head without contrast revealed increased density in the occipital horn of the left lateral ventricle new compared to prior.  Findings suspicious for small amount of intraventricular hemorrhage.  Encephalomalacia posterior to the left parietal lobe at the site of prior hemorrhage.  Neurosurgery was consulted who suggested patient to be transferred to [facility] for further management.     Medications:     Scheduled Meds:   losartan  50 mg Oral Daily  aspirin  325 mg Oral Daily    levothyroxine  50 mcg Oral Daily    levETIRAcetam  500 mg Oral BID    gabapentin  100 mg Oral BID    NIFEdipine  60 mg Oral Daily    pantoprazole  40 mg Oral Daily    insulin lispro  0-12 Units SubCUTAneous TID     insulin lispro  0-6 Units SubCUTAneous Nightly     Continuous Infusions:   dextrose       PRN Meds:sodium citrate, perflutren lipid microspheres, levETIRAcetam, acetaminophen, ondansetron **OR** ondansetron, [Held by provider] labetalol, glucose, dextrose bolus **OR** dextrose bolus, glucagon (rDNA), dextrose    Objective:   Vitals:   T-max:  Patient Vitals for the past 8 hrs:   BP Temp Temp src Pulse Resp SpO2 Weight   05/18/22 0700 (!) 153/88 -- -- 73 15 97 % --   05/18/22 0600 (!) 152/88 -- -- 75 15 -- --   05/18/22 0500 (!) 150/85 -- -- 75 15 -- 155 lb 10.3 oz (70.6 kg)   05/18/22 0400 (!) 146/80 98 °F (36.7 °C) Oral 79 17 99 % --   05/18/22 0300 132/69 -- -- 78 15 92 % --   05/18/22 0200 (!) 152/84 -- -- 80 13 -- --   05/18/22 0100 134/84 -- -- 86 15 -- --   05/18/22 0000 (!) 151/85 97.9 °F (36.6 °C) Oral 89 16 93 % --       Intake/Output Summary (Last 24 hours) at 5/18/2022 0759  Last data filed at 5/17/2022 1008  Gross per 24 hour   Intake 400 ml   Output 1400 ml   Net -1000 ml       Review of Systems    Physical Exam  Constitutional:       Appearance: Normal appearance. HENT:      Head: Normocephalic and atraumatic. Eyes:      Extraocular Movements: Extraocular movements intact. Cardiovascular:      Rate and Rhythm: Normal rate and regular rhythm. Pulses: Normal pulses. Heart sounds: Normal heart sounds. Pulmonary:      Effort: Pulmonary effort is normal.      Breath sounds: Normal breath sounds. Abdominal:      General: Abdomen is flat. Bowel sounds are normal.      Palpations: Abdomen is soft. Musculoskeletal:      Right lower leg: No edema. Left lower leg: No edema. Neurological:      General: No focal deficit present. Mental Status: She is alert and oriented to person, place, and time. Coordination: Coordination normal.           LABS:    CBC:   Recent Labs     05/17/22  0555 05/17/22  0655 05/18/22  0549   WBC 4.9 5.6 7.4   HGB 9.9* 9.4* 9.4*   HCT 33.0* 29.3* 29.7*   PLT 73* 200 178   .6* 96.9 97.1     Renal:    Recent Labs     05/16/22  1255 05/17/22  0555   * 135*   K 4.5 6.2*   CL 93* 98*   CO2 21 20*   BUN 37* 48*   CREATININE 7.4* 9.0*   GLUCOSE 216* 282*   CALCIUM 9.0 7.9*   ANIONGAP 18* 17*     Hepatic: No results for input(s): AST, ALT, BILITOT, BILIDIR, PROT, LABALBU, ALKPHOS in the last 72 hours. Troponin:   Recent Labs     05/16/22  1255   TROPONINI 0.17*     BNP: No results for input(s): BNP in the last 72 hours. Lipids: No results for input(s): CHOL, HDL in the last 72 hours. Invalid input(s): LDLCALCU, TRIGLYCERIDE  ABGs:  No results for input(s): PHART, CSB0KMG, PO2ART, JQH8VAX, BEART, THGBART, S4KCRGXZ, AWK6DJW in the last 72 hours. INR:   Recent Labs     05/16/22  1255   INR 0.94     Lactate: No results for input(s): LACTATE in the last 72 hours. Cultures:  -----------------------------------------------------------------  RAD:   MRI BRAIN WO CONTRAST   Final Result      Moderate acute infarct in the left posterior inferior cerebellum. Remote infarcts with areas of remote hemorrhagic staining, mineralization, or cortical laminar necrosis in the left parietal and occipital lobes corresponding to hyperdensity on recent CT. The left occipital lobe infarct is new from 3/13/2020. No    intracranial hemorrhage. Mild atrophy and chronic small vessel ischemic change. CTA HEAD W WO CONTRAST   Final Result      1. Area of hyperdensity adjacent to the occipital horn of the left lateral ventricle is again noted. This is favored to be extraventricular in location and may be related to hemorrhage or mineralization within a prior stroke.    2. Additional remote infarct in the left parietal lobe. 3. High-grade stenosis/near occlusion of the left posterior M2 division which is new from March 2020. Assessment/Plan:     Cortical necrosis  Left cerebellar infarction;  Previously suspected hemorrhage, imaging findings thought to be more consistent with cortical necrosis. CT head without contrast revealed increased density in the occipital horn of the left lateral ventricle new compared to prior.  Findings suspicious for small amount of intraventricular hemorrhage.  Encephalomalacia posterior to the left parietal lobe at the site of prior hemorrhage.  - Every hour neurochecks  - Keep HOB elevated  - Keep SBP <160 mmHg  -  daily  - Losartan 50 mg PO qdaily  - Nifedipine 60 mg PO qdaily  - SLP  - Follow-up on stability CT head without contrast  - Follow-up on CTA head and neck with without contrast  - Neurosurgery consulted, appreciate recs  - Keppra 500 twice daily  - Follow up A1C      ESRD on HD MWF  Secondary to FSGS, had transplant in past. Patient follows a nephrologist at , does home hemodialysis  - Consult nephrology, appreciate recs    Hypothyroidism  -Continue levothyroxine 50 mcg      Code Status: Limited  FEN: Reg  PPX: SCD  DISPO: ICU    Viviann Kawasaki, MD, PGY-1  05/18/22  7:59 AM    This patient will be staffed and discussed with Dr Deshaun Hsu. Pulm/CC     Patient seen and examined. I agree with Dr. Ava Garcia history, physical, lab findings, assessment and plan. Patient doing well. Neurologically intact     MRI brain      Impression       Moderate acute infarct in the left posterior inferior cerebellum.       Remote infarcts with areas of remote hemorrhagic staining, mineralization, or cortical laminar necrosis in the left parietal and occipital lobes corresponding to hyperdensity on recent CT. The left occipital lobe infarct is new from 3/13/2020.  No    intracranial hemorrhage.       Mild atrophy and chronic small vessel ischemic change.            Assessment     1. Cerebellar CVA -hyperdensity on recent CT was laminar necrosis and not hemorrhage  2. PVD on Plavix  3. ESRD on HD  4. HTN  5. Cocaine use  6.  DM     Plan     Continue losartan 50 mg daily and Procardia XL 60 mg daily  Serum tox screen pending   Antiphospholipid lipid antibody pending  Continue aspirin 325 mg daily  Continue Keppra    Okay to transfer to [address] [place] [address]  Will sign off     Deon Officer MD

## 2022-05-18 NOTE — PROGRESS NOTES
Hospitalist Progress Note      PCP: No primary care provider on file. Date of Admission: 5/16/2022    Chief Complaint: Headache    Hospital Course:   Fabián Larry 50 y.o. female  - with hx of ESRD on doing home dialysis, GERD, Type 2 DM, mood disorder, hypothyroidism, PVD, prior CVA with residual balance issues  - was undergoing HD when started to have HA, which was described as sudden onset, throbbing, frontal, 10/10 in intensity associated with nausea, lightheadedness and vertigo (room spinning sensation). No reported vision changes. Checked her /110, called her HD RN and recommended to come to ED. Work up in the ED with CT head without contrast revealed increased density in the occipital horn of the left lateral ventricle concerning for ICH. Transferred to [facility] for NS evaluation and management. MRI Brain confirmed hyperdensity is remote hemorrhagic staining, mineralization, or cortical laminar necrosis, but patient does have new infarcts in left occipital lobe and left posterior inferior cerebellum. Images were reviewed by NS and no intervention was recommended. After images were reviewed by neuroimaging  No acute hemorrhage felt to be present. Hyperdensity in remote infarcts are favored to be related to cortical laminar necrosis.    Patient was started on  mg   Echo with no evidence of shunting        Subjective: Patient c/o forehead hurting and right eye foggy vision  Says @ home her blood pressure was 170 or above, and she was complaint with HD and doing ti Monday/Tuesday/Wednesday and then Saturday or sunday    Medications:  Reviewed    Infusion Medications    dextrose       Scheduled Medications    losartan  50 mg Oral Daily    aspirin  325 mg Oral Daily    levothyroxine  50 mcg Oral Daily    levETIRAcetam  500 mg Oral BID    gabapentin  100 mg Oral BID    NIFEdipine  60 mg Oral Daily    pantoprazole  40 mg Oral Daily    insulin lispro  0-12 Units SubCUTAneous TID WC    insulin lispro  0-6 Units SubCUTAneous Nightly     PRN Meds: labetalol, sodium citrate, perflutren lipid microspheres, levETIRAcetam, acetaminophen, ondansetron **OR** ondansetron, [Held by provider] labetalol, glucose, dextrose bolus **OR** dextrose bolus, glucagon (rDNA), dextrose    No intake or output data in the 24 hours ending 05/18/22 1244    Physical Exam Performed:    BP (!) 179/107   Pulse 78   Temp 98 °F (36.7 °C) (Oral)   Resp 19   Wt 155 lb 10.3 oz (70.6 kg)   SpO2 97%   BMI 24.38 kg/m²     General appearance: No apparent distress, appears stated age and cooperative. HEENT: Pupils equal, round, and reactive to light. Conjunctivae/corneas clear. Neck: Supple, with full range of motion. No jugular venous distention. Trachea midline. Respiratory:  Normal respiratory effort. Clear to auscultation, bilaterally without Rales/Wheezes/Rhonchi. Cardiovascular: Regular rate and rhythm with normal S1/S2 without murmurs, rubs or gallops. Abdomen: Soft, non-tender, non-distended with normal bowel sounds. Musculoskeletal: No clubbing, cyanosis or edema bilaterally. Full range of motion without deformity. Skin: Skin color, texture, turgor normal.  No rashes or lesions. Neurologic:  Right sided double vision otherwise no without any focal sensory/motor deficits.  Cranial nerves: II-XII intact, grossly non-focal.  Psychiatric: Alert and oriented, thought content appropriate, normal insight  Capillary Refill: Brisk,< 3 seconds   Peripheral Pulses: +2 palpable, equal bilaterally       Labs:   Recent Labs     05/17/22  0555 05/17/22  0655 05/18/22  0549   WBC 4.9 5.6 7.4   HGB 9.9* 9.4* 9.4*   HCT 33.0* 29.3* 29.7*   PLT 73* 200 178     Recent Labs     05/16/22  1255 05/17/22  0555 05/18/22  0549   * 135* 135*   K 4.5 6.2*  --    CL 93* 98* 98*   CO2 21 20* 20*   BUN 37* 48* 38*   CREATININE 7.4* 9.0* 7.1*   CALCIUM 9.0 7.9* 7.8*     No results for input(s): AST, ALT, BILIDIR, BILITOT, ALKPHOS in the last 72 hours. Recent Labs     05/16/22  1255   INR 0.94     Recent Labs     05/16/22  1255   TROPONINI 0.17*       Urinalysis:      Lab Results   Component Value Date    NITRU Negative 07/28/2017    WBCUA 6 07/28/2017    BACTERIA 1+ 07/28/2017    RBCUA 1 07/28/2017    BLOODU LARGE 07/28/2017    SPECGRAV 1.020 07/28/2017    GLUCOSEU Negative 07/28/2017    GLUCOSEU 500 06/03/2012       Radiology:  MRI BRAIN WO CONTRAST   Final Result      Moderate acute infarct in the left posterior inferior cerebellum. Remote infarcts with areas of remote hemorrhagic staining, mineralization, or cortical laminar necrosis in the left parietal and occipital lobes corresponding to hyperdensity on recent CT. The left occipital lobe infarct is new from 3/13/2020. No    intracranial hemorrhage. Mild atrophy and chronic small vessel ischemic change. CTA HEAD W WO CONTRAST   Final Result      1. Area of hyperdensity adjacent to the occipital horn of the left lateral ventricle is again noted. This is favored to be extraventricular in location and may be related to hemorrhage or mineralization within a prior stroke. 2. Additional remote infarct in the left parietal lobe. 3. High-grade stenosis/near occlusion of the left posterior M2 division which is new from March 2020.               Assessment/Plan:    Active Hospital Problems    Diagnosis Date Noted    Cerebrovascular accident (CVA) due to embolism of left posterior cerebral artery ([facility] [place] [address] [I63.432] 05/16/2022     Priority: Medium    Hyperlipidemia [E78.5] 01/03/2019    Cocaine abuse ([facility] [address] [F14.10]     ESRD on hemodialysis ([facility] [address] [N18.6, Z99.2] 01/15/2016    Essential hypertension [I10] 01/15/2016     Left occipital lobe and left posterior inferior cerebellar infarction  Continue neuro checks, ok to downgrade to q 4 hrs  If change in neuro status repeat CT head wo contrast  Continue ASA  Better control of BP  Need to stop cocaine, neuro recs reviewed, hypercoag balbir was sent    Uncontrolled Hypertension  - should aim to bring SBP < 160 during hospitalization  She needs to closely follow up with PCP/Nephrology as outpatient  Should certainly STOP Cocaine    High-grade stenosis/near occlusion of the left posterior M2 division  - no acute NS intervention  - Continue ASA, Statin    ESRD on HD @ home 4x/week, continue HD and fluid removal per Nephrology    Hypothyroidism. Continue levothyroixine    GI Px: protonix  DVT Prophylaxis: SCD  Diet: ADULT DIET; Regular; Low Potassium (Less than 3000 mg/day);  Low Phosphorus (Less than 1000 mg); 1200 ml  Code Status: Limited    PT/OT Eval Status: ordered  Dispo - ok to transfer to [address] [place] [address] Juwan Aviles MD  Internal Medicine Hospitalist

## 2022-05-18 NOTE — PROGRESS NOTES
Office : [phone]     Fax :[phone]         Renal Progress Note  Subjective:   Admit Date: 5/16/2022     HPI: Vero Sethi is a 50 y.o.  female with MHx significant for ESRD on HD, FSGS, s/p renal txp, HTN, PFO (echo 3/13/20), HLD, PVD, prior CVAs (on Keppra) with residual balance issues, T2DM, and hypothyroidism, who presented to [facility] ED on 5/16/2022 with a headache which started during her home HD session. Has never experienced this during dialysis before. Describes it as throbbing, frontal, pressure-like, sudden onset; the worst she's ever experienced; accompanied by lightheadedness and dizziness. At the time of onset, BP was 179/110, and then took home BP meds w/o resolution of the HA. Denies LOC, vision changes, vomiting. On workup in the ED, CT head w/o contrast demonstrated findings concerning for IVH. She was transferred to [facility] and admitted to the ICU on 5/16 for neurosurgical evaluation      Interval History:   Seen and examined with fiance at the bedside.    - Endorses ongoing frontal, pressure-like HA; and ongoing \"cloudy\" vision in right eye  - Denies lightheadedness, dizziness, nausea, vomiting, diarrhea     - Echo (5/17):  LVEF 55-60%, GLS -33%, diastolic fnc indeterminate, no e/o PFO  - HD:  well-tolerated yesterday  - BP:  elevated this morning      Objective:   DIET:  ADULT DIET; Regular; Low Potassium (Less than 3000 mg/day);  Low Phosphorus (Less than 1000 mg); 1200 ml    MEDICATION:  Scheduled:    losartan  50 mg Oral Daily    aspirin  325 mg Oral Daily    levothyroxine  50 mcg Oral Daily    levETIRAcetam  500 mg Oral BID    gabapentin  100 mg Oral BID    NIFEdipine  60 mg Oral Daily    pantoprazole  40 mg Oral Daily    insulin lispro  0-12 Units SubCUTAneous TID     insulin lispro  0-6 Units SubCUTAneous Nightly       Continuous Infusions:    dextrose         LABS:  CBC:   Recent Labs     05/17/22  0555 05/17/22  0655 05/18/22  0549   WBC 4.9 5.6 7.4   HGB 9.9* 9.4* 9.4*   PLT 73* 200 178     BMP:    Recent Labs     05/16/22  1255 05/17/22  0555 05/18/22  0549   * 135* 135*   K 4.5 6.2*  --    CL 93* 98* 98*   CO2 21 20* 20*   BUN 37* 48* 38*   CREATININE 7.4* 9.0* 7.1*   GLUCOSE 216* 282* 179*     Ca/Mg/Phos:   Recent Labs     05/16/22  1255 05/17/22  0555 05/18/22  0549   CALCIUM 9.0 7.9* 7.8*     Hepatic: No results for input(s): AST, ALT, ALB, BILITOT, ALKPHOS in the last 72 hours. Troponin:   Recent Labs     05/16/22  1255   TROPONINI 0.17*     BNP: No results for input(s): BNP in the last 72 hours. Lipids: No results for input(s): CHOL, TRIG, HDL, LDLCALC, LABVLDL in the last 72 hours. ABGs: No results for input(s): PHART, PO2ART, TVI0BEX in the last 72 hours. INR:   Recent Labs     05/16/22  1255   INR 0.94     UA:No results for input(s): Keshia Solum, GLUCOSEU, BILIRUBINUR, [place], BLOODU, PHUR, PROTEINU, UROBILINOGEN, NITRU, LEUKOCYTESUR, [place] in the last 72 hours. Urine Microscopic: No results for input(s): LABCAST, BACTERIA, COMU, HYALCAST, WBCUA, RBCUA, EPIU in the last 72 hours. Urine Culture: No results for input(s): LABURIN in the last 72 hours. Urine Chemistry: No results for input(s): Velna Overlie, PROTEINUR, NAUR in the last 72 hours.     VITALS:   BP (!) 179/107   Pulse 78   Temp 98 °F (36.7 °C) (Oral)   Resp 19   Wt 155 lb 10.3 oz (70.6 kg)   SpO2 97%   BMI 24.38 kg/m²    Wt Readings from Last 3 Encounters:   05/18/22 155 lb 10.3 oz (70.6 kg)   05/16/22 147 lb 4.3 oz (66.8 kg)   01/22/22 125 lb (56.7 kg)        24HR INTAKE/OUTPUT:  No intake or output data in the 24 hours ending 05/18/22 1101      PHYSICAL EXAM:  Constitutional:  Alert, pleasant female seen sitting upright in bedside chair. HEENT:  NCAT. EOMI. Scleral icterus. Neck:  No JVD. Cardiovascular:  RRR. S1, S2 normal. No m/r/g. Radial pulses 2+. Respiratory:  Normal WOB. CTAB. Abdomen:  +bs, soft, nt, nd  Ext:  No LE edema. Skin:  Warm, dry. Normal turgor. Psychiatric:  Cooperative. Normal speech and behavior. IMAGING:  MRI BRAIN WO CONTRAST   Final Result      Moderate acute infarct in the left posterior inferior cerebellum. Remote infarcts with areas of remote hemorrhagic staining, mineralization, or cortical laminar necrosis in the left parietal and occipital lobes corresponding to hyperdensity on recent CT. The left occipital lobe infarct is new from 3/13/2020. No    intracranial hemorrhage. Mild atrophy and chronic small vessel ischemic change. CTA HEAD W WO CONTRAST   Final Result      1. Area of hyperdensity adjacent to the occipital horn of the left lateral ventricle is again noted. This is favored to be extraventricular in location and may be related to hemorrhage or mineralization within a prior stroke. 2. Additional remote infarct in the left parietal lobe. 3. High-grade stenosis/near occlusion of the left posterior M2 division which is new from March 2020. Assessment and Plan:     50 y.o. AA female with MHx of ESRD on HD, FSGS, s/p renal txp, HTN, PFO (echo 3/13/20), HLD, PVD, prior CVAs (on Keppra) with residual balance issues, T2DM, and hypothyroidism who was admitted to the ICU on 5/16/2022 for neurosurgical evaluation and management of a possible IVH in left lateral ventricle.        1. ESRD on HD - anuric  - Home HD 4 days/wk  - Pt reports compliance with home HD and meds  - Dry wt: 147 lb     2. HTN     3. Anemia     4. Acid-base/electrolyte imbalance      5.  S/p renal txp        Plan  - need good BP control   - HD in am   - HD 4 days a week   - No heparin with HD for now   - Low phosphorous, low potassium diet  - Monitor electrolytes  - Daily weight   - Monitor I/Os      Tabatha Wills, MS4 - Acting as a scribe      [facility] - [phone]  Fax - [phone]

## 2022-05-18 NOTE — PROGRESS NOTES
Contacted Dr. Westbrook.  B/P 86/51 manually.  Pt asymptomatic.  Per provider, obtain CVP and SvO2 readings.   NEUROLOGY / NEUROCRITICAL CARE PROGRESS NOTE       Patient Name: Carissa Green YOB: 1975   Sex: Female Age: 50 yrs     CC / Reason for Consult: Headache; IVH; stroke    Interval Hx / Changes over last 24 hours:     Brain imaging discussion with neurocritical care team and Dr. Lia Butt (radiology) yesterday. No acute hemorrhage felt to be present. Hyperdensity in remote infarcts are favored to be related to cortical laminar necrosis. ROS: headache 7/10    HISTORY   Admission HPI:   Carissa Green is a 50 y.o. y/o female with a history of stroke (2020, on Plavix), peripheral vascular disease, ESRD, hypertension, hyperlipidemia, diabetes who presents from home with severe headache, vertigo, nausea, and diplopia which started suddenly yesterday (5/16). Her symptoms have continued to improve but have not subsided. Nothing has made her symptoms worse. She tells me tylenol has taken the edge off of her headache. BP at the time of her symptom onset was 179/110 mmHg. She reports her HTN and DM are well controlled. BP runs around 140/80 mmHg at home and last A1c was 6.5%. She reports she uses cocaine but last use was 1 month ago. She does not smoke. She has never been diagnosed with CAD, CHF, or atrial fibrillation. Family history notable for stroke in both her parents (in their 46s) but no siblings with stroke. She had a left parietal ischemic stroke in 2020 and at the time a 3 mm thrombus was identified in the proximal M2. A PFO was identified on TTE but later SY showed no atrial level shunt. Symptoms at the time included mild aphasia and right upper limb myoclonus. MRI at the time also showed distal T2 changes suggestive of embolic strokes.            PMH Past Medical History:   Diagnosis Date    Acquired hypothyroidism     Cerebral artery occlusion with cerebral infarction [facility]) 2010    [facility]     Crohn's disease ([facility]     Depression     Diabetes mellitus ([facility]     ESRD (end stage renal disease) ([facility]     HD - any 4 days of the week pt chooses    GERD (gastroesophageal reflux disease)     Hemodialysis patient ([facility] 2016    peritoneal     Hyperlipidemia     Hypertension     Ischemic stroke ([facility] 03/13/2020    left parietal lobe, with hemorrhagic conversion    MI, old     Mood disorder ([facility]     Pericarditis     Peripheral vascular disease ([facility]     Peritonitis ([facility]     Pneumonia     Thyroid disease       Allergies Allergies   Allergen Reactions    Ferrous Sulfate Shortness Of Breath    Cephalexin Itching and Swelling    Dicloxacillin Rash    Pcn [Penicillins] Rash    Sulfa Antibiotics Rash and Other (See Comments)     Chest pain. Diet ADULT DIET; Regular; Low Potassium (Less than 3000 mg/day); Low Phosphorus (Less than 1000 mg); 1200 ml   Isolation No active isolations     CURRENT SCHEDULED MEDICATIONS   Inpatient Medications   losartan, 50 mg, Oral, Daily    aspirin, 325 mg, Oral, Daily    levothyroxine, 50 mcg, Oral, Daily    levETIRAcetam, 500 mg, Oral, BID    gabapentin, 100 mg, Oral, BID    NIFEdipine, 60 mg, Oral, Daily    pantoprazole, 40 mg, Oral, Daily    insulin lispro, 0-12 Units, SubCUTAneous, TID WC    insulin lispro, 0-6 Units, SubCUTAneous, Nightly   Infusions    dextrose        Antibiotics   Recent Abx Admin      No antibiotic orders with administrations found. LABS   Metabolic Panel Recent Labs     05/16/22  1255 05/17/22  0555   * 135*   K 4.5 6.2*   CL 93* 98*   CO2 21 20*   BUN 37* 48*   CREATININE 7.4* 9.0*   GLUCOSE 216* 282*   CALCIUM 9.0 7.9*      CBC / Coags Recent Labs     05/16/22  1255 05/16/22  1255 05/17/22  0555 05/17/22  0655 05/18/22  0549   WBC 9.5   < > 4.9 5.6 7.4   RBC 3.57*   < > 3.12* 3.02* 3.06*   HGB 11.3*   < > 9.9* 9.4* 9.4*   HCT 34.7*   < > 33.0* 29.3* 29.7*      < > 73* 200 178   INR 0.94  --   --   --   --     < > = values in this interval not displayed. Other Recent Labs     05/16/22  1130   COVID19 Not Detected     No results for input(s): PHENYTOIN, KEPPRA, LACOSA, LAMO, VALPROATE, LACTSEPSIS, LACTA in the last 72 hours. Lipid Panel: In process  Homocysteine: in process  Cardiolipin antibodies IgG and IgM: in process  Cardiolipin antibodies IgA: in process  Beta-2 glucoprotein antibodies: in process    DIAGNOSTICS   IMAGES:  Images personally reviewed and agree w/ radiology interpretation. Echo with Bubble, 5/17/2022:   Normal left ventricle size and systolic function with an estimated ejection   fraction of 60%-65%. Moderate concentric left ventricular hypertrophy. No regional wall motion abnormalities a   Indeterminate diastolic function. Global strain: -15.5%   Mitral annular calcification   Individual aortic valve leaflets are not clearly visualized. Mildly calcified aortic valve. The pulmonic valve is not well visualized. A bubble study was performed and fails to show evidence of shunting   (previous echo reports a positive bubble study suggesting a small pfo). Previous Imaging:    CTA of Head / Neck w/ Contrast:  1. Area of hyperdensity adjacent to the occipital horn of the left lateral ventricle is again noted. This is favored to be extraventricular in location and may be related to hemorrhage or mineralization within a prior stroke. 2. Additional remote infarct in the left parietal lobe. 3. High-grade stenosis/near occlusion of the left posterior M2 division which is new from March 2020. MRI Brain w/o Contrast:  Moderate acute infarct in the left posterior inferior cerebellum. Remote infarcts with areas of remote hemorrhagic staining, mineralization, or cortical laminar necrosis in the left parietal and occipital lobes corresponding to hyperdensity on recent CT. The left occipital lobe infarct is new from 3/13/2020. No intracranial hemorrhage. Mild atrophy and chronic small vessel ischemic change.          PHYSICAL EXAMINATION     PHYSICAL EXAM:  Vitals:    05/18/22 0400 05/18/22 0500 05/18/22 0600 05/18/22 0700   BP: (!) 146/80 (!) 150/85 (!) 152/88 (!) 153/88   Pulse: 79 75 75 73   Resp: 17 15 15 15   Temp: 98 °F (36.7 °C)      TempSrc: Oral      SpO2: 99%   97%   Weight:  155 lb 10.3 oz (70.6 kg)           General: Alert, no distress, well-nourished  Neurologic  Mental status:   orientation to person, place, time, situation   Attention intact as able to attend well to the exam     Language fluent in conversation   Comprehension intact; follows simple commands    Cranial nerves:   CN2: Visual fields full w/o extinction on confrontational testing   CN 3,4,6: Pupils equal and reactive to light, extraocular muscles intact  CN5: Facial sensation symmetric   CN7: Face symmetric bilaterally   CN8: Hearing symmetric to spoken voice  CN9: Palate elevated symmetrically  CN11: Traps full strength on shoulder shrug  CN12: Tongue midline with protrusion    Motor Exam:   R  L    Deltoid 5 5   Biceps 5 5   Triceps 5 5   Interossei 5 5      R  L    Hip flexion  5 5   Knee flexion  5 5   Knee extension  5 5   Ankle dorsiflexion  5 5   Ankle plantar flexion  5 5       Sensory: light touch intact and symmetric in all 4 extremities. OTHER SYSTEMS:  Cardiovascular: Warm, appears well perfused   Respiratory: Easy, non-labored respiratory pattern   Abdominal: Abdomen is without distention   Extremities: Upper and lower extremities are atraumatic in appearance without deformity. No swelling or erythema. ASSESSMENT & RECOMMENDATIONS     Assessment:Ms. Karely Stewart is a 50year old woman with a history of cocaine abuse and recurrent embolic strokes ([id number] M2, interval left PCA (date uncertain), now left PICA). She has a small amount of hyperdensity in chronic left occipital stroke that is cortical laminar necrosis. CT-A shows high grade stenosis of left posterior M2.      She is clinically stable but still complaining of a

## 2022-05-19 LAB
ANION GAP SERPL CALCULATED.3IONS-SCNC: 17 MMOL/L (ref 3–16)
BUN BLDV-MCNC: 62 MG/DL (ref 7–20)
CALCIUM SERPL-MCNC: 8 MG/DL (ref 8.3–10.6)
CHLORIDE BLD-SCNC: 99 MMOL/L (ref 99–110)
CO2: 20 MMOL/L (ref 21–32)
CREAT SERPL-MCNC: 8.6 MG/DL (ref 0.6–1.1)
ESTIMATED AVERAGE GLUCOSE: 191.5 MG/DL
GFR AFRICAN AMERICAN: 6
GFR NON-AFRICAN AMERICAN: 5
GLUCOSE BLD-MCNC: 207 MG/DL (ref 70–99)
GLUCOSE BLD-MCNC: 225 MG/DL (ref 70–99)
GLUCOSE BLD-MCNC: 286 MG/DL (ref 70–99)
GLUCOSE BLD-MCNC: 298 MG/DL (ref 70–99)
GLUCOSE BLD-MCNC: 303 MG/DL (ref 70–99)
GLUCOSE BLD-MCNC: 73 MG/DL (ref 70–99)
HBA1C MFR BLD: 8.3 %
HCT VFR BLD CALC: 27.7 % (ref 36–48)
HEMOGLOBIN: 8.9 G/DL (ref 12–16)
MCH RBC QN AUTO: 31 PG (ref 26–34)
MCHC RBC AUTO-ENTMCNC: 32.2 G/DL (ref 31–36)
MCV RBC AUTO: 96.3 FL (ref 80–100)
PDW BLD-RTO: 13.9 % (ref 12.4–15.4)
PERFORMED ON: ABNORMAL
PERFORMED ON: NORMAL
PLATELET # BLD: 169 K/UL (ref 135–450)
PMV BLD AUTO: 8.5 FL (ref 5–10.5)
POTASSIUM SERPL-SCNC: 5.6 MMOL/L (ref 3.5–5.1)
RBC # BLD: 2.87 M/UL (ref 4–5.2)
SODIUM BLD-SCNC: 136 MMOL/L (ref 136–145)
WBC # BLD: 7 K/UL (ref 4–11)

## 2022-05-19 PROCEDURE — 2500000003 HC RX 250 WO HCPCS: Performed by: INTERNAL MEDICINE

## 2022-05-19 PROCEDURE — 6370000000 HC RX 637 (ALT 250 FOR IP): Performed by: STUDENT IN AN ORGANIZED HEALTH CARE EDUCATION/TRAINING PROGRAM

## 2022-05-19 PROCEDURE — 97166 OT EVAL MOD COMPLEX 45 MIN: CPT

## 2022-05-19 PROCEDURE — 97116 GAIT TRAINING THERAPY: CPT

## 2022-05-19 PROCEDURE — 2500000003 HC RX 250 WO HCPCS: Performed by: STUDENT IN AN ORGANIZED HEALTH CARE EDUCATION/TRAINING PROGRAM

## 2022-05-19 PROCEDURE — 5A1D70Z PERFORMANCE OF URINARY FILTRATION, INTERMITTENT, LESS THAN 6 HOURS PER DAY: ICD-10-PCS | Performed by: INTERNAL MEDICINE

## 2022-05-19 PROCEDURE — 90935 HEMODIALYSIS ONE EVALUATION: CPT

## 2022-05-19 PROCEDURE — 6360000002 HC RX W HCPCS: Performed by: INTERNAL MEDICINE

## 2022-05-19 PROCEDURE — 99233 SBSQ HOSP IP/OBS HIGH 50: CPT | Performed by: INTERNAL MEDICINE

## 2022-05-19 PROCEDURE — 6370000000 HC RX 637 (ALT 250 FOR IP): Performed by: INTERNAL MEDICINE

## 2022-05-19 PROCEDURE — 80048 BASIC METABOLIC PNL TOTAL CA: CPT

## 2022-05-19 PROCEDURE — 1200000000 HC SEMI PRIVATE

## 2022-05-19 PROCEDURE — 85027 COMPLETE CBC AUTOMATED: CPT

## 2022-05-19 PROCEDURE — 97161 PT EVAL LOW COMPLEX 20 MIN: CPT

## 2022-05-19 PROCEDURE — 97535 SELF CARE MNGMENT TRAINING: CPT

## 2022-05-19 PROCEDURE — 97530 THERAPEUTIC ACTIVITIES: CPT

## 2022-05-19 PROCEDURE — 36415 COLL VENOUS BLD VENIPUNCTURE: CPT

## 2022-05-19 RX ORDER — PROCHLORPERAZINE EDISYLATE 5 MG/ML
10 INJECTION INTRAMUSCULAR; INTRAVENOUS ONCE
Status: COMPLETED | OUTPATIENT
Start: 2022-05-19 | End: 2022-05-19

## 2022-05-19 RX ORDER — CALCIUM ACETATE 667 MG/1
2 CAPSULE ORAL
Status: DISCONTINUED | OUTPATIENT
Start: 2022-05-19 | End: 2022-05-20 | Stop reason: HOSPADM

## 2022-05-19 RX ORDER — LOSARTAN POTASSIUM 50 MG/1
50 TABLET ORAL 2 TIMES DAILY
Status: DISCONTINUED | OUTPATIENT
Start: 2022-05-19 | End: 2022-05-20 | Stop reason: HOSPADM

## 2022-05-19 RX ORDER — NIFEDIPINE 60 MG/1
60 TABLET, EXTENDED RELEASE ORAL 2 TIMES DAILY
Status: DISCONTINUED | OUTPATIENT
Start: 2022-05-19 | End: 2022-05-20 | Stop reason: HOSPADM

## 2022-05-19 RX ADMIN — LOSARTAN POTASSIUM 50 MG: 50 TABLET, FILM COATED ORAL at 21:47

## 2022-05-19 RX ADMIN — PROCHLORPERAZINE EDISYLATE 10 MG: 5 INJECTION INTRAMUSCULAR; INTRAVENOUS at 11:32

## 2022-05-19 RX ADMIN — INSULIN LISPRO 3 UNITS: 100 INJECTION, SOLUTION INTRAVENOUS; SUBCUTANEOUS at 21:46

## 2022-05-19 RX ADMIN — NIFEDIPINE 60 MG: 60 TABLET, FILM COATED, EXTENDED RELEASE ORAL at 21:47

## 2022-05-19 RX ADMIN — LEVETIRACETAM 500 MG: 500 TABLET, FILM COATED ORAL at 08:55

## 2022-05-19 RX ADMIN — ACETAMINOPHEN 650 MG: 325 TABLET ORAL at 21:47

## 2022-05-19 RX ADMIN — LABETALOL HYDROCHLORIDE 10 MG: 5 INJECTION, SOLUTION INTRAVENOUS at 06:53

## 2022-05-19 RX ADMIN — Medication 5 ML: at 16:01

## 2022-05-19 RX ADMIN — INSULIN LISPRO 6 UNITS: 100 INJECTION, SOLUTION INTRAVENOUS; SUBCUTANEOUS at 08:57

## 2022-05-19 RX ADMIN — GABAPENTIN 100 MG: 100 CAPSULE ORAL at 21:47

## 2022-05-19 RX ADMIN — NIFEDIPINE 60 MG: 60 TABLET, FILM COATED, EXTENDED RELEASE ORAL at 08:55

## 2022-05-19 RX ADMIN — LOSARTAN POTASSIUM 50 MG: 50 TABLET, FILM COATED ORAL at 08:55

## 2022-05-19 RX ADMIN — PANTOPRAZOLE SODIUM 40 MG: 40 TABLET, DELAYED RELEASE ORAL at 06:00

## 2022-05-19 RX ADMIN — INSULIN LISPRO 8 UNITS: 100 INJECTION, SOLUTION INTRAVENOUS; SUBCUTANEOUS at 12:38

## 2022-05-19 RX ADMIN — GABAPENTIN 100 MG: 100 CAPSULE ORAL at 08:55

## 2022-05-19 RX ADMIN — ASPIRIN 325 MG: 325 TABLET, COATED ORAL at 08:55

## 2022-05-19 RX ADMIN — LEVOTHYROXINE SODIUM 50 MCG: 50 TABLET ORAL at 06:00

## 2022-05-19 RX ADMIN — LEVETIRACETAM 500 MG: 500 TABLET, FILM COATED ORAL at 21:46

## 2022-05-19 RX ADMIN — CALCIUM ACETATE 1334 MG: 667 CAPSULE ORAL at 16:31

## 2022-05-19 RX ADMIN — CALCIUM ACETATE 1334 MG: 667 CAPSULE ORAL at 11:32

## 2022-05-19 ASSESSMENT — PAIN DESCRIPTION - ONSET
ONSET: ON-GOING

## 2022-05-19 ASSESSMENT — PAIN DESCRIPTION - FREQUENCY
FREQUENCY: CONTINUOUS

## 2022-05-19 ASSESSMENT — PAIN DESCRIPTION - LOCATION
LOCATION: HEAD

## 2022-05-19 ASSESSMENT — PAIN SCALES - GENERAL
PAINLEVEL_OUTOF10: 3
PAINLEVEL_OUTOF10: 0
PAINLEVEL_OUTOF10: 7
PAINLEVEL_OUTOF10: 7
PAINLEVEL_OUTOF10: 8
PAINLEVEL_OUTOF10: 0
PAINLEVEL_OUTOF10: 6
PAINLEVEL_OUTOF10: 3

## 2022-05-19 ASSESSMENT — PAIN DESCRIPTION - DESCRIPTORS
DESCRIPTORS: ACHING

## 2022-05-19 ASSESSMENT — PAIN DESCRIPTION - ORIENTATION
ORIENTATION: ANTERIOR

## 2022-05-19 ASSESSMENT — PAIN DESCRIPTION - PAIN TYPE
TYPE: ACUTE PAIN

## 2022-05-19 ASSESSMENT — PAIN - FUNCTIONAL ASSESSMENT
PAIN_FUNCTIONAL_ASSESSMENT: ACTIVITIES ARE NOT PREVENTED

## 2022-05-19 NOTE — PROGRESS NOTES
Physical Therapy  Facility/Department: [place] [address] [facility]  Physical Therapy Initial Assessment/Treatment/Discharge    Name: Johanny Vides  : 1975  MRN: [id number]  Date of Service: 2022    Discharge Recommendations:    Johanny Vides scored a 24/24 on the AM-PAC short mobility form. At this time, no further PT is recommended upon discharge. Recommend patient returns to prior setting with prior services. PT Equipment Recommendations  Equipment Needed: No      Patient Diagnosis(es): There were no encounter diagnoses. Past Medical History:  has a past medical history of Acquired hypothyroidism, Cerebral artery occlusion with cerebral infarction ([address] Cocaine abuse ([address] Crohn's disease ([address] Depression, Diabetes mellitus ([address] ESRD (end stage renal disease) ([address] GERD (gastroesophageal reflux disease), Hemodialysis patient ([address] Hyperlipidemia, Hypertension, Ischemic stroke ([address] MI, old, Mood disorder ([address] Pericarditis, Peripheral vascular disease ([address] Peritonitis ([address] Pneumonia, and Thyroid disease. Past Surgical History:  has a past surgical history that includes Cholecystectomy; Tubal ligation; back surgery; Kidney transplant (); other surgical history; Upper gastrointestinal endoscopy (2017); laparoscopy; Paracentesis; Kidney transplant; Upper gastrointestinal endoscopy (N/A, 3/25/2019); and Colon surgery (Right). Assessment   Assessment: Pt functioning at baseline. Safe to go home upon D/C. No further inpt PT indicated.  D/C PT  Decision Making: Low Complexity  Requires PT Follow-Up: No  Activity Tolerance  Activity Tolerance: Patient tolerated treatment well     Plan   Plan  Plan: Discharge with evaluation only  Safety Devices  Type of Devices: Call light within reach,Chair alarm in place,Nurse notified,Left in chair  Restraints  Restraints Initially in Place: No     Restrictions  Position Activity Restriction  Other position/activity restrictions: activity as tolerated Subjective   General  Chart Reviewed: Yes  Patient assessed for rehabilitation services?: Yes  Additional Pertinent Hx: PMH: HTN, ESRD on HD. Pt admitted with headache. Dx: ICH. Family / Caregiver Present: No  Referring Practitioner: Woody  Referral Date : 05/18/22  Diagnosis: ICH  Follows Commands: Within Functional Limits  Subjective  Subjective: Pt supine in bed & agreeable to PT. Social/Functional History  Social/Functional History  Lives With: Significant other  Type of Home: Condo  Home Layout: One level  Home Access: Stairs to enter with rails  Entrance Stairs - Number of Steps: 8  Bathroom Shower/Tub: Tub/Shower unit  Bathroom Toilet: Handicap height (sink for leverage)  Bathroom Equipment: Grab bars in shower,Shower chair  Home Equipment: Rollator  Has the patient had two or more falls in the past year or any fall with injury in the past year?: No (fell a year ago)  Receives Help From: Family  ADL Assistance: Independent  Homemaking Assistance: Independent  Ambulation Assistance: Independent (with rollator)  Transfer Assistance: Independent  Active : Yes  Occupation: On disability  Vision/Hearing  Hearing: Within functional limits    Cognition   Orientation  Overall Orientation Status: Within Normal Limits     Objective   Pulse: 70  Heart Rate Source: Monitor  BP: (!) 164/84  BP Location: Left upper arm  BP Method: Automatic  Patient Position: Semi fowlers  MAP (Calculated): 110.67  Resp: 17  SpO2: 96 %  O2 Device: None (Room air)              AROM RLE (degrees)  RLE AROM: WFL  AROM LLE (degrees)  LLE AROM : WFL  Strength RLE  Strength RLE: WFL  Strength LLE  Strength LLE: WFL           Bed mobility  Supine to Sit: Modified independent  Scooting: Independent  Transfers  Sit to Stand: Independent (from bed, chair, BSC over toilet)  Stand to sit:  Independent  Ambulation  Surface: level tile  Device: Rolling Walker  Assistance: Independent  Gait Deviations: Slow Indu;Decreased step length;Decreased step height  Distance: 400 ft  Comments: no LOB  Stairs/Curb  Stairs?: Yes  Stairs  # Steps : 8  Stairs Height: 6\"  Rails: Bilateral  Assistance: Independent  Comment: step-to pattern     Balance  Sitting - Static: Good  Sitting - Dynamic: Good  Standing - Static: Good  Standing - Dynamic: Good (with RW)                                                           AM-PAC Score  AM-PAC Inpatient Mobility Raw Score : 24 (05/19/22 1040)  AM-PAC Inpatient T-Scale Score : 61.14 (05/19/22 1040)  Mobility Inpatient CMS 0-100% Score: 0 (05/19/22 1040)  Mobility Inpatient CMS G-Code Modifier : [address] (05/19/22 1040)                Education  Patient Education  Education Given To: Patient  Education Provided: Role of Therapy  Education Method: Verbal  Education Outcome: Verbalized understanding      Therapy Time   Individual Concurrent Group Co-treatment   Time In [id number]         Time Out 0940         Minutes [facility], PT

## 2022-05-19 NOTE — PROGRESS NOTES
NEUROLOGY / NEUROCRITICAL CARE PROGRESS NOTE       Patient Name: Poppy Escoto YOB: 1975   Sex: Female Age: 50 yrs     CC / Reason for Consult: Headache; IVH; stroke    HISTORY   Admission HPI:   Poppy Escoto is a 50 y.o. y/o female with a history of stroke (2020, on Plavix), peripheral vascular disease, ESRD, hypertension, hyperlipidemia, diabetes who presents from home with severe headache, vertigo, nausea, and diplopia which started suddenly yesterday (5/16). Her symptoms have continued to improve but have not subsided. Nothing has made her symptoms worse. She tells me tylenol has taken the edge off of her headache. BP at the time of her symptom onset was 179/110 mmHg. She reports her HTN and DM are well controlled. BP runs around 140/80 mmHg at home and last A1c was 6.5%. She reports she uses cocaine but last use was 1 month ago. She does not smoke. She has never been diagnosed with CAD, CHF, or atrial fibrillation. Family history notable for stroke in both her parents (in their 46s) but no siblings with stroke. She had a left parietal ischemic stroke in 2020 and at the time a 3 mm thrombus was identified in the proximal M2. A PFO was identified on TTE but later SY showed no atrial level shunt. Symptoms at the time included mild aphasia and right upper limb myoclonus. MRI at the time also showed distal T2 changes suggestive of embolic strokes.            PMH Past Medical History:   Diagnosis Date    Acquired hypothyroidism     Cerebral artery occlusion with cerebral infarction ([address] 2010    Cocaine abuse ([address]     Crohn's disease ([address]     Depression     Diabetes mellitus ([address]     ESRD (end stage renal disease) ([address]     HD - any 4 days of the week pt chooses    GERD (gastroesophageal reflux disease)     Hemodialysis patient ([address] 2016    peritoneal     Hyperlipidemia     Hypertension     [facility] 03/13/2020    left parietal lobe, with hemorrhagic conversion    MI, old     [facility]     Pericarditis     [facility]     Peritonitis ([facility]     Pneumonia     Thyroid disease       Allergies Allergies   Allergen Reactions    Ferrous Sulfate Shortness Of Breath    Cephalexin Itching and Swelling    Dicloxacillin Rash    Pcn [Penicillins] Rash    Sulfa Antibiotics Rash and Other (See Comments)     Chest pain. Diet ADULT DIET; Regular; Low Potassium (Less than 3000 mg/day); Low Phosphorus (Less than 1000 mg); 1200 ml   Isolation No active isolations     CURRENT SCHEDULED MEDICATIONS   Inpatient Medications     calcium acetate, 2 capsule, Oral, TID WC    losartan, 50 mg, Oral, BID    NIFEdipine, 60 mg, Oral, BID    aspirin, 325 mg, Oral, Daily    levothyroxine, 50 mcg, Oral, Daily    levETIRAcetam, 500 mg, Oral, BID    gabapentin, 100 mg, Oral, BID    pantoprazole, 40 mg, Oral, Daily    insulin lispro, 0-12 Units, SubCUTAneous, TID WC    insulin lispro, 0-6 Units, SubCUTAneous, Nightly   Infusions    dextrose        Antibiotics   Recent Abx Admin      No antibiotic orders with administrations found. LABS   Metabolic Panel Recent Labs     05/17/22  0555 05/18/22  0549 05/19/22  0617   * 135* 136   K 6.2*  --  5.6*   CL 98* 98* 99   CO2 20* 20* 20*   BUN 48* 38* 62*   CREATININE 9.0* 7.1* 8.6*   GLUCOSE 282* 179* 207*   CALCIUM 7.9* 7.8* 8.0*      CBC / Coags Recent Labs     05/17/22  0655 05/18/22  0549 05/19/22  0617   WBC 5.6 7.4 7.0   RBC 3.02* 3.06* 2.87*   HGB 9.4* 9.4* 8.9*   HCT 29.3* 29.7* 27.7*    178 169      Other Recent Labs     05/18/22  0549   LABA1C 8.3   LDLCALC 101*     No results for input(s): PHENYTOIN, KEPPRA, LACOSA, LAMO, VALPROATE, LACTSEPSIS, LACTA in the last 72 hours. Lipid Panel:  In process  Homocysteine: in process  Cardiolipin antibodies IgG and IgM: in process  Cardiolipin antibodies IgA: in process  Beta-2 glucoprotein antibodies: in process    DIAGNOSTICS   IMAGES:  Images personally reviewed and agree w/ radiology interpretation. Echo with Bubble, 5/17/2022:   Normal left ventricle size and systolic function with an estimated ejection   fraction of 60%-65%. Moderate concentric left ventricular hypertrophy. No regional wall motion abnormalities a   Indeterminate diastolic function. Global strain: -15.5%   Mitral annular calcification   Individual aortic valve leaflets are not clearly visualized. Mildly calcified aortic valve. The pulmonic valve is not well visualized. A bubble study was performed and fails to show evidence of shunting   (previous echo reports a positive bubble study suggesting a small pfo). Previous Imaging:    CTA of Head / Neck w/ Contrast:  1. Area of hyperdensity adjacent to the occipital horn of the left lateral ventricle is again noted. This is favored to be extraventricular in location and may be related to hemorrhage or mineralization within a prior stroke. 2. Additional remote infarct in the left parietal lobe. 3. High-grade stenosis/near occlusion of the left posterior M2 division which is new from March 2020. MRI Brain w/o Contrast:  Moderate acute infarct in the left posterior inferior cerebellum. Remote infarcts with areas of remote hemorrhagic staining, mineralization, or cortical laminar necrosis in the left parietal and occipital lobes corresponding to hyperdensity on recent CT. The left occipital lobe infarct is new from 3/13/2020. No intracranial hemorrhage. Mild atrophy and chronic small vessel ischemic change. ASSESSMENT & RECOMMENDATIONS     Assessment:Ms. Maxine Norton is a 50year old woman with a history of cocaine abuse and recurrent embolic strokes (3810-ZQSB M2, interval left PCA (date uncertain), now left PICA). She has a small amount of hyperdensity in chronic left occipital stroke that is cortical laminar necrosis.  CT-A shows high grade stenosis of left posterior M2. Plan:  -Can consider some IV compazine or benadryl for headache. - She MUST stop using cocaine   - Keep platelet count > 448H, INR goal < 1.4    - Long-term BP goal less than 130/80 mmHg.   - Continue statin for goal LDL < 70 mg/dL  - A1c goal < 7%        TREY VALIENTE - [facility]   Neurology & Neurocritical Care   Neurology Line: [phone]  [employer]: [facility] Neurology & Neuro Critical Care NPs  5/19/2022 2:03 PM

## 2022-05-19 NOTE — CARE COORDINATION
CM continues to follow for DC planning and needs. Patient did well with PT/OT today, continues to have complaints of headache, Neuro following. Nephro following for continued HD. Patient plans for return home with marcy at DC and continues home HD.     Thank you,  Loraine Adair RN,   4th Floor Progressive Care Unit  [phone]

## 2022-05-19 NOTE — PROGRESS NOTES
4 Eyes Skin Assessment     The patient is being assess for  Transfer to New Unit    I agree that 2 RN's have performed a thorough Head to Toe Skin Assessment on the patient. ALL assessment sites listed below have been assessed. Areas assessed by both nurses:   [x]   Head, Face, and Ears   [x]   Shoulders, Back, and Chest  [x]   Arms, Elbows, and Hands   [x]   Coccyx, Sacrum, and Ischum  [x]   Legs, Feet, and Heels        Does the Patient have Skin Breakdown?   No    Generalized scattering on BLE        Devonte Prevention initiated:  Yes   Wound Care Orders initiated:  No      C nurse consulted for Pressure Injury (Stage 3,4, Unstageable, DTI, NWPT, and Complex wounds):  No      Nurse 1 eSignature: Electronically signed by Jose Barthel, RN on 5/19/22 at 6:52 AM EDT    **SHARE this note so that the co-signing nurse is able to place an eSignature**    Nurse 2 eSignature: Electronically signed by Mary Daniels RN on 5/19/22 at 10:30 AM EDT

## 2022-05-19 NOTE — PROGRESS NOTES
Treatment time: 3    Net UF: 3.0 kg     Pre weight: 75.3 kg   Post weight: 72.3 kg   EDW: TBD     Access used: L CVC   Access function: Well     Medications or blood products given: None     Regular outpatient schedule: TTS     Summary of response to treatment: Pt tolerated tx well; cramping at the end of tx     Copy of dialysis treatment record placed in chart, to be scanned into EMR.

## 2022-05-19 NOTE — PLAN OF CARE
Pt remains in ICU with transfer orders. On room air, alert and oriented x4. Ambulates in room with walker. Complains of headache that is controlled with rest, elevation, and PRN Tylenol. Pt communicates with family on cellphone. Appropriately uses call light, bed alarm in place. Problem: Discharge Planning  Goal: Discharge to home or other facility with appropriate resources  Outcome: Progressing     Problem: Pain  Goal: Verbalizes/displays adequate comfort level or baseline comfort level  Outcome: Progressing     Problem: Chronic Conditions and Co-morbidities  Goal: Patient's chronic conditions and co-morbidity symptoms are monitored and maintained or improved  Outcome: Progressing     Problem: Skin/Tissue Integrity  Goal: Absence of new skin breakdown  Description: 1. Monitor for areas of redness and/or skin breakdown  2. Assess vascular access sites hourly  3. Every 4-6 hours minimum:  Change oxygen saturation probe site  4. Every 4-6 hours:  If on nasal continuous positive airway pressure, respiratory therapy assess nares and determine need for appliance change or resting period.   Outcome: Progressing     Problem: ABCDS Injury Assessment  Goal: Absence of physical injury  Outcome: Progressing

## 2022-05-19 NOTE — PROGRESS NOTES
Office : [phone]     Fax :[phone]         Renal Progress Note  Subjective:   Admit Date: 5/16/2022     HPI: Artemio Reyes is a 50 y.o.  female with MHx significant for ESRD on HD, FSGS, s/p renal txp, HTN, PFO (echo 3/13/20), HLD, PVD, prior CVAs (on Keppra) with residual balance issues, T2DM, and hypothyroidism, who presented to [facility] ED on 5/16/2022 with a headache which started during her home HD session. Has never experienced this during dialysis before. Describes it as throbbing, frontal, pressure-like, sudden onset; the worst she's ever experienced; accompanied by lightheadedness and dizziness. At the time of onset, BP was 179/110, and then took home BP meds w/o resolution of the HA. Denies LOC, vision changes, vomiting. On workup in the ED, CT head w/o contrast demonstrated findings concerning for IVH. She was transferred to [facility] and admitted to the ICU on 5/16 for neurosurgical evaluation      Interval History:   No acute overnight events. Seen and examined. - Endorses ongoing frontal, pressure-like HA; and ongoing \"cloudy\" vision in right eye  - Denies lightheadedness, dizziness, nausea, vomiting, diarrhea     - Echo (5/17):  LVEF 55-60%, GLS -09%, diastolic fnc indeterminate, no e/o PFO  - HD:  Planned for today  - BP:  Elevated      Objective:   DIET:  ADULT DIET; Regular; Low Potassium (Less than 3000 mg/day);  Low Phosphorus (Less than 1000 mg); 1200 ml    MEDICATION:  Scheduled:    calcium acetate  2 capsule Oral TID WC    losartan  50 mg Oral BID    NIFEdipine  60 mg Oral BID    aspirin  325 mg Oral Daily    levothyroxine  50 mcg Oral Daily    levETIRAcetam  500 mg Oral BID    gabapentin  100 mg Oral BID    pantoprazole  40 mg Oral Daily    insulin lispro  0-12 Units SubCUTAneous TID WC    insulin lispro  0-6 Units SubCUTAneous Nightly       Continuous Infusions:    dextrose         LABS:  CBC:   Recent Labs     05/17/22  0655 05/18/22  0549 05/19/22  0617   WBC 5.6 7.4 7.0   HGB 9.4* 9.4* 8.9*    178 169     BMP:    Recent Labs     05/17/22  0555 05/18/22  0549 05/19/22  0617   * 135* 136   K 6.2*  --  5.6*   CL 98* 98* 99   CO2 20* 20* 20*   BUN 48* 38* 62*   CREATININE 9.0* 7.1* 8.6*   GLUCOSE 282* 179* 207*     Ca/Mg/Phos:   Recent Labs     05/17/22  0555 05/18/22  0549 05/19/22 0617   CALCIUM 7.9* 7.8* 8.0*     Hepatic: No results for input(s): AST, ALT, ALB, BILITOT, ALKPHOS in the last 72 hours. Troponin:   No results for input(s): TROPONINI in the last 72 hours. BNP: No results for input(s): BNP in the last 72 hours. Lipids:   Recent Labs     05/18/22 0549   HDL 37*   LDLCALC 101*   LABVLDL 50     ABGs: No results for input(s): PHART, PO2ART, RMI1RQM in the last 72 hours. INR:   No results for input(s): INR in the last 72 hours. UA:No results for input(s): Romel Cocker, GLUCOSEU, BILIRUBINUR, Dee Adriane, BLOODU, PHUR, PROTEINU, UROBILINOGEN, NITRU, LEUKOCYTESUR, LABMICR, URINETYPE in the last 72 hours. Urine Microscopic: No results for input(s): LABCAST, BACTERIA, COMU, HYALCAST, WBCUA, RBCUA, EPIU in the last 72 hours. Urine Culture: No results for input(s): LABURIN in the last 72 hours. Urine Chemistry: No results for input(s): Lacretia All, PROTEINUR, NAUR in the last 72 hours.     VITALS:   BP (!) 167/101 Comment: ok to monitor per JAGDISH CANALES today   Pulse 74   Temp 98.6 °F (37 °C) (Oral)   Resp 16   Wt 157 lb 3 oz (71.3 kg)   SpO2 96%   BMI 24.62 kg/m²    Wt Readings from Last 3 Encounters:   05/19/22 157 lb 3 oz (71.3 kg)   05/16/22 147 lb 4.3 oz (66.8 kg) Dry wt: 147 lb     2. HTN     3. Anemia     4. Acid-base/electrolyte imbalance      5. S/p renal txp        Plan  - need good BP control   - HD today  - HD 4 days a week   - No heparin with HD for now   - Low phosphorous, low potassium diet  - Monitor electrolytes  - Daily weight   - Monitor I/Os      Tabatha Cheung, MS4 - Acting as a scribe    HD today   BP meds adjusted   HD 4 days per week     Jonathan Amato MD    [facility] - [phone]  Fax - [phone]

## 2022-05-19 NOTE — PROGRESS NOTES
Occupational Therapy  Facility/Department: [facility] PCU  Occupational Therapy Initial Assessment, Treatment and D/c Note     Name: Siobhan Phillip  : 1975  MRN: [id number]  Date of Service: 2022    Discharge Recommendations:Miryam Dumont scored a 23/24 on the AM-PAC ADL Inpatient form. At this time, no further OT is recommended upon discharge. Recommend patient returns to prior setting with prior services. OT Equipment Recommendations  Equipment Needed: No     Assessment   Assessment: Pt from home with fiance. Pt Mod independent with ADLs/ IADls. Pt appears to be functioning close to baseline level. No DME needs. No Acute OT needs. Pt edu on home safety and sitting for LE ADLs. Will sign off from OT services. Pt plans for home at d/c. Prognosis: Good  Decision Making: Medium Complexity  No Skilled OT: At baseline function; Safe to return home; No OT goals identified; Independent with ADL's  REQUIRES OT FOLLOW-UP: No  Activity Tolerance  Activity Tolerance: Patient Tolerated treatment well        Plan D/c Acute OT services         Restrictions  Position Activity Restriction  Other position/activity restrictions: activity as tolerated    Subjective   General  Patient assessed for rehabilitation services?: Yes  Additional Pertinent Hx: Admit  with ICH from [address] head + hyperdensity in chronic left occipital stroke may be hemorrhage vs. cortical laminar necrosis          MRI brain - mild atrophy, multiple remote infarcts, Moderate acute infarct in the left posterior inferior cerebellum     Neuro consult                                                                   PMHX:  HTN, ESRD on HD, hemorrhagic infarct cocaine abuse, recurrent embolic CVAs 2532,  Family / Caregiver Present: No  Referring Practitioner: TRYE Mckinley - CNP  Diagnosis: ICH  Subjective  Subjective: \"I feel okay\" pt found resting in bed \" pt agreeable for OOB/OT eval and tx     Social/Functional History  Social/Functional History  Lives With: Significant other  Type of Home: Condo  Home Layout: One level  Home Access: Stairs to enter with rails  Entrance Stairs - Number of Steps: 8  Bathroom Shower/Tub: Tub/Shower unit  Bathroom Toilet: Handicap height (sink for leverage)  Bathroom Equipment: Grab bars in shower,Shower chair  Home Equipment: Rollator  Has the patient had two or more falls in the past year or any fall with injury in the past year?: No (fell a year ago)  Receives Help From: Family  ADL Assistance: Independent  Homemaking Assistance: Independent  Ambulation Assistance: Independent (with rollator)  Transfer Assistance: Independent  Active : Yes  Occupation: On disability       Objective Treatment included functional transfer training, ADL's and pt. education. Hearing: Within functional limits          Safety Devices  Type of Devices: Call light within reach; Chair alarm in place;Nurse notified; Left in chair     Toilet Transfers  Toilet - Technique: Ambulating  Equipment Used: Raised toilet seat with rails  Toilet Transfer: Independent; Modified independent  Toilet Transfers Comments: P has similar setup at home  Shower Transfers  Shower Transfers Comments: Pt edu on having someone present in home initially for safety  AROM: Generally decreased, functional (decreased functional AROM RLE 2/2 broken clavicle. WFL)  Strength: Generally decreased, functional  Coordination: Generally decreased, functional  Tone: Normal  Sensation: Intact  ADL  Feeding: Independent  Grooming: Independent;Setup  LE Dressing: Setup  Toileting: Setup (has sink for leverage - simulated)  Additional Comments: pt edu on sitting for LE ADLs.  verb understanding        Bed mobility  Supine to Sit: Supervision  Scooting: Independent  Transfers  Sit to stand: Modified independent  Stand to sit: Modified independent  Transfer Comments: pulls up on walker  Vision - Basic Assessment  Prior Vision: No visual

## 2022-05-19 NOTE — CONSULTS
Clinical Pharmacy Progress Note    All IVs in NS - Management by Pharmacy    Consult Date(s): 5/16/22  Consulting Provider(s): Dr. Shana Baltazar / Plan    ICH - All IVs in Normal Saline   Drips will be adjusted to normal saline as appropriate based on compatibility, in an effort to avoid fluid shifts, as D5W is osmotically active.  The following intermittent IV drips / infusions have been adjusted to saline:  o None at this time   Note: Patient has dextrose ordered as part of hypoglycemia treatment protocol.  As patient is not being treated for pituitary tumor resection or requiring hypertonic saline (defined as >0.9% NaCl), pharmacy will sign off of IV review consult, per protocol. Thank you for allowing us to participate in the care of this patient. Please reach out with any questions.     Mickey Leon PharmD, BCPS  5/19/2022  Wireless: [phone]

## 2022-05-20 VITALS
HEART RATE: 62 BPM | BODY MASS INDEX: 24.56 KG/M2 | WEIGHT: 156.8 LBS | TEMPERATURE: 98.5 F | RESPIRATION RATE: 16 BRPM | OXYGEN SATURATION: 99 % | SYSTOLIC BLOOD PRESSURE: 154 MMHG | DIASTOLIC BLOOD PRESSURE: 89 MMHG

## 2022-05-20 LAB
ANION GAP SERPL CALCULATED.3IONS-SCNC: 17 MMOL/L (ref 3–16)
ANTICARDIOLIPIN IGA ANTIBODY: <10 APL
BETA-2 GLYCOPROTEIN 1 IGG ANTIBODY: <10 SGU
BETA-2 GLYCOPROTEIN 1 IGM ANTIBODY: <10 SMU
BUN BLDV-MCNC: 48 MG/DL (ref 7–20)
CALCIUM SERPL-MCNC: 9 MG/DL (ref 8.3–10.6)
CHLORIDE BLD-SCNC: 92 MMOL/L (ref 99–110)
CO2: 22 MMOL/L (ref 21–32)
CREAT SERPL-MCNC: 7.2 MG/DL (ref 0.6–1.1)
GFR AFRICAN AMERICAN: 7
GFR NON-AFRICAN AMERICAN: 6
GLUCOSE BLD-MCNC: 230 MG/DL (ref 70–99)
GLUCOSE BLD-MCNC: 305 MG/DL (ref 70–99)
GLUCOSE BLD-MCNC: 340 MG/DL (ref 70–99)
HCT VFR BLD CALC: 31.9 % (ref 36–48)
HEMOGLOBIN: 10.3 G/DL (ref 12–16)
MAGNESIUM: 2.2 MG/DL (ref 1.8–2.4)
MCH RBC QN AUTO: 30.9 PG (ref 26–34)
MCHC RBC AUTO-ENTMCNC: 32.4 G/DL (ref 31–36)
MCV RBC AUTO: 95.2 FL (ref 80–100)
PDW BLD-RTO: 14 % (ref 12.4–15.4)
PERFORMED ON: ABNORMAL
PERFORMED ON: ABNORMAL
PLATELET # BLD: 183 K/UL (ref 135–450)
PMV BLD AUTO: 8.8 FL (ref 5–10.5)
POTASSIUM SERPL-SCNC: 5 MMOL/L (ref 3.5–5.1)
RBC # BLD: 3.35 M/UL (ref 4–5.2)
SODIUM BLD-SCNC: 131 MMOL/L (ref 136–145)
WBC # BLD: 10.1 K/UL (ref 4–11)

## 2022-05-20 PROCEDURE — 83735 ASSAY OF MAGNESIUM: CPT

## 2022-05-20 PROCEDURE — 6370000000 HC RX 637 (ALT 250 FOR IP): Performed by: STUDENT IN AN ORGANIZED HEALTH CARE EDUCATION/TRAINING PROGRAM

## 2022-05-20 PROCEDURE — 36415 COLL VENOUS BLD VENIPUNCTURE: CPT

## 2022-05-20 PROCEDURE — 99233 SBSQ HOSP IP/OBS HIGH 50: CPT | Performed by: INTERNAL MEDICINE

## 2022-05-20 PROCEDURE — 6370000000 HC RX 637 (ALT 250 FOR IP): Performed by: INTERNAL MEDICINE

## 2022-05-20 PROCEDURE — 80048 BASIC METABOLIC PNL TOTAL CA: CPT

## 2022-05-20 PROCEDURE — 2500000003 HC RX 250 WO HCPCS: Performed by: STUDENT IN AN ORGANIZED HEALTH CARE EDUCATION/TRAINING PROGRAM

## 2022-05-20 PROCEDURE — 2500000003 HC RX 250 WO HCPCS: Performed by: INTERNAL MEDICINE

## 2022-05-20 PROCEDURE — 85027 COMPLETE CBC AUTOMATED: CPT

## 2022-05-20 RX ORDER — LOSARTAN POTASSIUM 50 MG/1
50 TABLET ORAL 2 TIMES DAILY
Qty: 30 TABLET | Refills: 3 | Status: SHIPPED | OUTPATIENT
Start: 2022-05-20

## 2022-05-20 RX ORDER — NIFEDIPINE 60 MG/1
60 TABLET, EXTENDED RELEASE ORAL 2 TIMES DAILY
Qty: 30 TABLET | Refills: 3 | Status: SHIPPED | OUTPATIENT
Start: 2022-05-20

## 2022-05-20 RX ADMIN — LABETALOL HYDROCHLORIDE 10 MG: 5 INJECTION, SOLUTION INTRAVENOUS at 11:46

## 2022-05-20 RX ADMIN — PANTOPRAZOLE SODIUM 40 MG: 40 TABLET, DELAYED RELEASE ORAL at 08:34

## 2022-05-20 RX ADMIN — INSULIN LISPRO 4 UNITS: 100 INJECTION, SOLUTION INTRAVENOUS; SUBCUTANEOUS at 08:27

## 2022-05-20 RX ADMIN — LOSARTAN POTASSIUM 50 MG: 50 TABLET, FILM COATED ORAL at 08:35

## 2022-05-20 RX ADMIN — LEVETIRACETAM 500 MG: 500 TABLET, FILM COATED ORAL at 08:34

## 2022-05-20 RX ADMIN — CALCIUM ACETATE 1334 MG: 667 CAPSULE ORAL at 08:34

## 2022-05-20 RX ADMIN — ASPIRIN 325 MG: 325 TABLET, COATED ORAL at 08:34

## 2022-05-20 RX ADMIN — GABAPENTIN 100 MG: 100 CAPSULE ORAL at 08:34

## 2022-05-20 RX ADMIN — CALCIUM ACETATE 1334 MG: 667 CAPSULE ORAL at 11:46

## 2022-05-20 RX ADMIN — NIFEDIPINE 60 MG: 60 TABLET, FILM COATED, EXTENDED RELEASE ORAL at 08:34

## 2022-05-20 RX ADMIN — INSULIN LISPRO 8 UNITS: 100 INJECTION, SOLUTION INTRAVENOUS; SUBCUTANEOUS at 14:23

## 2022-05-20 RX ADMIN — LEVOTHYROXINE SODIUM 50 MCG: 50 TABLET ORAL at 08:34

## 2022-05-20 ASSESSMENT — PAIN SCALES - GENERAL
PAINLEVEL_OUTOF10: 0

## 2022-05-20 NOTE — PROGRESS NOTES
Hospitalist Progress Note      PCP: No primary care provider on file. Date of Admission: 5/16/2022    Chief Complaint: Headache    Hospital Course:   Ester Keating 50 y.o. female  - with hx of ESRD on doing home dialysis, GERD, Type 2 DM, mood disorder, hypothyroidism, PVD, prior CVA with residual balance issues  - was undergoing HD when started to have HA, which was described as sudden onset, throbbing, frontal, 10/10 in intensity associated with nausea, lightheadedness and vertigo (room spinning sensation). No reported vision changes. Checked her /110, called her HD RN and recommended to come to ED. Work up in the ED with CT head without contrast revealed increased density in the occipital horn of the left lateral ventricle concerning for ICH. Transferred to [facility] for NS evaluation and management. MRI Brain confirmed hyperdensity is remote hemorrhagic staining, mineralization, or cortical laminar necrosis, but patient does have new infarcts in left occipital lobe and left posterior inferior cerebellum. Images were reviewed by NS and no intervention was recommended. After images were reviewed by neuroimaging  No acute hemorrhage felt to be present. Hyperdensity in remote infarcts are favored to be related to cortical laminar necrosis.    Patient was started on  mg   Echo with no evidence of shunting    Subjective:   Still with HA, no other complains    Medications:  Reviewed    Infusion Medications    dextrose       Scheduled Medications    calcium acetate  2 capsule Oral TID WC    losartan  50 mg Oral BID    NIFEdipine  60 mg Oral BID    aspirin  325 mg Oral Daily    levothyroxine  50 mcg Oral Daily    levETIRAcetam  500 mg Oral BID    gabapentin  100 mg Oral BID    pantoprazole  40 mg Oral Daily    insulin lispro  0-12 Units SubCUTAneous TID WC    insulin lispro  0-6 Units SubCUTAneous Nightly     PRN Meds: labetalol, sodium citrate, perflutren lipid microspheres, levETIRAcetam, acetaminophen, ondansetron **OR** ondansetron, [Held by provider] labetalol, glucose, dextrose bolus **OR** dextrose bolus, glucagon (rDNA), dextrose      Intake/Output Summary (Last 24 hours) at 5/19/2022 2101  Last data filed at 5/19/2022 1642  Gross per 24 hour   Intake 500 ml   Output --   Net 500 ml       Physical Exam Performed:    BP (!) 146/86   Pulse 81   Temp 98.2 °F (36.8 °C) (Oral)   Resp 16   Wt 157 lb 3 oz (71.3 kg)   SpO2 98%   BMI 24.62 kg/m²     General appearance: No apparent distress, appears stated age and cooperative. HEENT: Pupils equal, round, and reactive to light. Conjunctivae/corneas clear. Neck: Supple, with full range of motion. No jugular venous distention. Trachea midline. Respiratory:  Normal respiratory effort. Clear to auscultation, bilaterally without Rales/Wheezes/Rhonchi. Cardiovascular: Regular rate and rhythm with normal S1/S2 without murmurs, rubs or gallops. Abdomen: Soft, non-tender, non-distended with normal bowel sounds. Musculoskeletal: No clubbing, cyanosis or edema bilaterally. Full range of motion without deformity. Skin: Skin color, texture, turgor normal.  No rashes or lesions. Neurologic:  Right sided double vision otherwise no without any focal sensory/motor deficits. Cranial nerves: II-XII intact, grossly non-focal.  Psychiatric: Alert and oriented, thought content appropriate, normal insight  Capillary Refill: Brisk,< 3 seconds   Peripheral Pulses: +2 palpable, equal bilaterally       Labs:   Recent Labs     05/17/22  0655 05/18/22  0549 05/19/22  0617   WBC 5.6 7.4 7.0   HGB 9.4* 9.4* 8.9*   HCT 29.3* 29.7* 27.7*    178 169     Recent Labs     05/17/22  0555 05/18/22  0549 05/19/22  0617   * 135* 136   K 6.2*  --  5.6*   CL 98* 98* 99   CO2 20* 20* 20*   BUN 48* 38* 62*   CREATININE 9.0* 7.1* 8.6*   CALCIUM 7.9* 7.8* 8.0*     No results for input(s): AST, ALT, BILIDIR, BILITOT, ALKPHOS in the last 72 hours.   No results for input(s): INR in the last 72 hours. No results for input(s): Wayna Khat in the last 72 hours. Urinalysis:      Lab Results   Component Value Date    NITRU Negative 07/28/2017    WBCUA 6 07/28/2017    BACTERIA 1+ 07/28/2017    RBCUA 1 07/28/2017    BLOODU LARGE 07/28/2017    SPECGRAV 1.020 07/28/2017    GLUCOSEU Negative 07/28/2017    GLUCOSEU 500 06/03/2012       Radiology:  MRI BRAIN WO CONTRAST   Final Result      Moderate acute infarct in the left posterior inferior cerebellum. Remote infarcts with areas of remote hemorrhagic staining, mineralization, or cortical laminar necrosis in the left parietal and occipital lobes corresponding to hyperdensity on recent CT. The left occipital lobe infarct is new from 3/13/2020. No    intracranial hemorrhage. Mild atrophy and chronic small vessel ischemic change. CTA HEAD W WO CONTRAST   Final Result      1. Area of hyperdensity adjacent to the occipital horn of the left lateral ventricle is again noted. This is favored to be extraventricular in location and may be related to hemorrhage or mineralization within a prior stroke. 2. Additional remote infarct in the left parietal lobe. 3. High-grade stenosis/near occlusion of the left posterior M2 division which is new from March 2020.               Assessment/Plan:    Active Hospital Problems    Diagnosis Date Noted    Cerebrovascular accident (CVA) due to embolism of left posterior cerebral artery ([facility] [address] [I63.432] 05/16/2022     Priority: Medium    Hyperlipidemia [E78.5] 01/03/2019    Cocaine abuse ([facility] [address] [F14.10]     ESRD on hemodialysis ([facility] [address] [N18.6, Z99.2] 01/15/2016    Essential hypertension [I10] 01/15/2016     Left occipital lobe and left posterior inferior cerebellar infarction  Continue neuro checks, ok to downgrade to q 4 hrs  If change in neuro status repeat CT head wo contrast  Continue ASA  Better control of BP  Need to stop cocaine, neuro recs reviewed, hypercoag balbir was sent    Uncontrolled Hypertension  - should aim to bring SBP < 160 during hospitalization  - Medication being adjusted and plan for further fluid removal with HD today  She needs to closely follow up with PCP/Nephrology as outpatient  Should certainly STOP Cocaine    High-grade stenosis/near occlusion of the left posterior M2 division  - no acute NS intervention  - Continue ASA, Statin    ESRD on HD @ home 4x/week, continue HD and fluid removal per Nephrology    Hypothyroidism. Continue levothyroixine    GI Px: protonix  DVT Prophylaxis: SCD  Diet: ADULT DIET; Regular; Low Potassium (Less than 3000 mg/day);  Low Phosphorus (Less than 1000 mg); 1200 ml  Code Status: Limited    PT/OT Eval Status: ordered    Dispo - ok to dc on Friday if BP better controlled    Blake Vela MD  Internal Medicine Hospitalist

## 2022-05-20 NOTE — PLAN OF CARE
Patient seen and examined  Doing well. Ok to dc.  Meds to beds for losartan and nifedipine  Formal dc summary to follow

## 2022-05-20 NOTE — DISCHARGE SUMMARY
Hospital Medicine Discharge Summary    Patient ID: Roberts Hammans      Patient's PCP: No primary care provider on file. Admit Date: 5/16/2022     Discharge Date:   05/20/22    Admitting Physician: Anny Camargo MD     Discharge Physician: Anh Wallis MD     Discharge Diagnoses: Active Hospital Problems    Diagnosis Date Noted    Cerebrovascular accident (CVA) due to embolism of left posterior cerebral artery ([facility] [address] [I63.432] 05/16/2022     Priority: Medium    Hyperlipidemia [E78.5] 01/03/2019    Cocaine abuse ([facility] [address] [F14.10]     ESRD on hemodialysis ([facility] [address] [N18.6, Z99.2] 01/15/2016    Essential hypertension [I10] 01/15/2016       The patient was seen and examined on day of discharge and this discharge summary is in conjunction with any daily progress note from day of discharge. Hospital Course:   Roberts Hammans 50 y.o. female  - with hx of ESRD on doing home dialysis, GERD, Type 2 DM, mood disorder, hypothyroidism, PVD, prior CVA with residual balance issues  - was undergoing HD when started to have HA, which was described as sudden onset, throbbing, frontal, 10/10 in intensity associated with nausea, lightheadedness and vertigo (room spinning sensation). No reported vision changes. Checked her /110, called her HD RN and recommended to come to ED. Work up in the ED with CT head without contrast revealed increased density in the occipital horn of the left lateral ventricle concerning for ICH. Transferred to [facility] for NS evaluation and management.     MRI Brain confirmed hyperdensity is remote hemorrhagic staining, mineralization, or cortical laminar necrosis, but patient does have new infarcts in left occipital lobe and left posterior inferior cerebellum. Images were reviewed by NS and no intervention was recommended.     After images were reviewed by neuroimaging  No acute hemorrhage felt to be present.  Hyperdensity in remote infarcts are favored to be related to cortical laminar necrosis. She was started on aspirin 325 mg  Echo did not show evidence of shunting  Blood pressure medications adjusted    Problem list:  Left occipital lobe and left posterior inferior cerebellar infarction  Continued on aspirin 3/20/2021 hospitalization, continue Plavix 75 mg on discharge. Better control of blood pressure, blood pressure medications were adjusted  Counseled extensively on stopping cocaine use  With better blood pressure control her headache improved, vision in the right eye also is improving    Uncontrolled hypertension/hypertensive urgency, blood pressure medications adjusted, doing well with HD  Continue HD 4 times per week  Losartan increased to 50 twice daily  Nifedipine increased to 60 twice daily    High-grade stenosis/near occlusion of left posterior M2 division  No acute neurosurgical intervention  Continue Plavix and start  High intensity statin    ESRD hemodialysis  Continue 4 times per week dialysis at home  Follow-up with nephrology    Hypothyroidism, continue levothyroxine    Physical Exam Performed:     BP (!) 142/78   Pulse 78   Temp 98.4 °F (36.9 °C) (Oral)   Resp 18   Wt 156 lb 12.8 oz (71.1 kg)   SpO2 97%   BMI 24.56 kg/m²     General appearance: No apparent distress, appears stated age and cooperative. HEENT: Pupils equal, round, and reactive to light. Conjunctivae/corneas clear. Neck: Supple, with full range of motion. No jugular venous distention. Trachea midline. Respiratory:  Normal respiratory effort. Clear to auscultation, bilaterally without Rales/Wheezes/Rhonchi. Cardiovascular: Regular rate and rhythm with normal S1/S2 without murmurs, rubs or gallops. Abdomen: Soft, non-tender, non-distended with normal bowel sounds. Musculoskeletal: No clubbing, cyanosis or edema bilaterally. Full range of motion without deformity. Skin: Skin color, texture, turgor normal.  No rashes or lesions.   Neurologic:  Right sided double vision otherwise no without any focal sensory/motor deficits. Cranial nerves: II-XII intact, grossly non-focal.  Psychiatric: Alert and oriented, thought content appropriate, normal insight  Capillary Refill: Brisk,< 3 seconds   Peripheral Pulses: +2 palpable, equal bilaterally          Labs: For convenience and continuity at follow-up the following most recent labs are provided:      CBC:    Lab Results   Component Value Date    WBC 7.0 05/19/2022    HGB 8.9 05/19/2022    HCT 27.7 05/19/2022     05/19/2022       Renal:    Lab Results   Component Value Date     05/19/2022    K 5.6 05/19/2022    K 4.5 05/16/2022    CL 99 05/19/2022    CO2 20 05/19/2022    BUN 62 05/19/2022    CREATININE 8.6 05/19/2022    CALCIUM 8.0 05/19/2022    PHOS 5.0 01/12/2021         Significant Diagnostic Studies    Radiology:   MRI BRAIN WO CONTRAST   Final Result      Moderate acute infarct in the left posterior inferior cerebellum. Remote infarcts with areas of remote hemorrhagic staining, mineralization, or cortical laminar necrosis in the left parietal and occipital lobes corresponding to hyperdensity on recent CT. The left occipital lobe infarct is new from 3/13/2020. No    intracranial hemorrhage. Mild atrophy and chronic small vessel ischemic change. CTA HEAD W WO CONTRAST   Final Result      1. Area of hyperdensity adjacent to the occipital horn of the left lateral ventricle is again noted. This is favored to be extraventricular in location and may be related to hemorrhage or mineralization within a prior stroke. 2. Additional remote infarct in the left parietal lobe. 3. High-grade stenosis/near occlusion of the left posterior M2 division which is new from March 2020.              Consults:     IP CONSULT TO NEUROCRITICAL CARE  IP CONSULT TO PHARMACY  PHARMACY TO CHANGE BASE FLUIDS  IP CONSULT TO NEUROSURGERY  IP CONSULT TO PHARMACY    Disposition:  Home     Condition at Discharge: Stable    Discharge Instructions/Follow-up:  - HD per schedule, BP control, Cocaine cessation    Code Status:  Limited    Activity: activity as tolerated    Diet: renal diet      Discharge Medications:     Current Discharge Medication List           Details   NIFEdipine (PROCARDIA XL) 60 MG extended release tablet Take 1 tablet by mouth in the morning and at bedtime  Qty: 30 tablet, Refills: 3              Details   losartan (COZAAR) 50 MG tablet Take 1 tablet by mouth in the morning and at bedtime  Qty: 30 tablet, Refills: 3              Details   levETIRAcetam (KEPPRA) 500 MG tablet Take 500 mg by mouth Take 500mg BID and an 500 mg on HD days      mirtazapine (REMERON) 30 MG tablet Take 30 mg by mouth nightly      insulin glargine (LANTUS) 100 UNIT/ML injection vial Inject 7 Units into the skin nightly      insulin aspart (NOVOLOG) 100 UNIT/ML injection vial Inject 7 Units into the skin 3 times daily (before meals)      Ferric Citrate (AURYXIA) 1  MG(Fe) TABS Take 1 tablet by mouth Take with meals and Snacks, Max of 5 tablets per day. clopidogrel (PLAVIX) 75 MG tablet Take 75 mg by mouth daily      diclofenac sodium (VOLTAREN) 1 % GEL Apply topically 4 times daily as needed for Pain      atorvastatin (LIPITOR) 80 MG tablet Take 80 mg by mouth daily      levothyroxine (SYNTHROID) 50 MCG tablet Take 50 mcg by mouth Daily      hydrocortisone 2.5 % cream Apply topically 2 times daily Apply topically 2 times daily. NONFORMULARY 5F-DICLOFENAC3/GABAPENTIN6/LIDOCAINE2/PRILOCA  Apply 1-2 grams 3-4 times a day as needed for foot pain      hydrOXYzine (ATARAX) 25 MG tablet Take 25 mg by mouth every 8 hours as needed for Itching      gabapentin (NEURONTIN) 300 MG capsule Take 100 mg by mouth 2 times daily.  Take after dialysis       pantoprazole (PROTONIX) 40 MG tablet Take 40 mg by mouth daily      Calcium Acetate, Phos Binder, 667 MG CAPS Take 667 mg by mouth 3 times daily (with meals)      darbepoetin hina-polysorbate (ARANESP, ALBUMIN FREE,) 100 MCG/0.5ML SOSY injection Inject 200 mcg into the skin once a week Las t dose was 1 week ago             Time Spent on discharge is more than 30 minutes in the examination, evaluation, counseling and review of medications and discharge plan. Signed:    Refugio Ghotra MD   5/20/2022      Thank you No primary care provider on file. for the opportunity to be involved in this patient's care. If you have any questions or concerns please feel free to contact me at [phone].

## 2022-05-20 NOTE — PROGRESS NOTES
aspirin  325 mg Oral Daily    levothyroxine  50 mcg Oral Daily    levETIRAcetam  500 mg Oral BID    gabapentin  100 mg Oral BID    pantoprazole  40 mg Oral Daily    insulin lispro  0-12 Units SubCUTAneous TID     insulin lispro  0-6 Units SubCUTAneous Nightly       Continuous Infusions:    dextrose         LABS:  CBC:   Recent Labs     05/18/22  0549 05/19/22  0617   WBC 7.4 7.0   HGB 9.4* 8.9*    169     BMP:    Recent Labs     05/18/22  0549 05/19/22  0617   * 136   K  --  5.6*   CL 98* 99   CO2 20* 20*   BUN 38* 62*   CREATININE 7.1* 8.6*   GLUCOSE 179* 207*     Ca/Mg/Phos:   Recent Labs     05/18/22  0549 05/19/22  0617 05/20/22  0505   CALCIUM 7.8* 8.0*  --    MG  --   --  2.20     Hepatic: No results for input(s): AST, ALT, ALB, BILITOT, ALKPHOS in the last 72 hours. Troponin:   No results for input(s): TROPONINI in the last 72 hours. BNP: No results for input(s): BNP in the last 72 hours. Lipids:   Recent Labs     05/18/22  0549   HDL 37*   LDLCALC 101*   LABVLDL 50     ABGs: No results for input(s): PHART, PO2ART, DKP2HXB in the last 72 hours. INR:   No results for input(s): INR in the last 72 hours. UA:No results for input(s): Merrily Jigar, GLUCOSEU, BILIRUBINUR, Rick Plants, BLOODU, PHUR, PROTEINU, UROBILINOGEN, NITRU, LEUKOCYTESUR, LABMICR, URINETYPE in the last 72 hours. Urine Microscopic: No results for input(s): LABCAST, BACTERIA, COMU, HYALCAST, WBCUA, RBCUA, EPIU in the last 72 hours. Urine Culture: No results for input(s): LABURIN in the last 72 hours. Urine Chemistry: No results for input(s): Erasmo Ng, PROTEINUR, NAUR in the last 72 hours.     VITALS:   BP (!) 142/78   Pulse 72   Temp 98.4 °F (36.9 °C) (Oral)   Resp 18   Wt 156 lb 12.8 oz (71.1 kg)   SpO2 97%   BMI 24.56 kg/m²    Wt Readings from Last 3 Encounters:   05/20/22 156 lb 12.8 oz (71.1 kg)   05/16/22 147 lb 4.3 oz (66.8 kg)   01/22/22 125 lb (56.7 kg)        24HR INTAKE/OUTPUT: Intake/Output Summary (Last 24 hours) at 5/20/2022 0910  Last data filed at 5/20/2022 1233  Gross per 24 hour   Intake 480 ml   Output --   Net 480 ml         PHYSICAL EXAM:   Constitutional:  Alert, pleasant female seen sitting upright in bedside chair, in NAD. HEENT:  NCAT. EOMI. Scleral icterus. Neck:  No JVD. Cardiovascular:  RRR. S1, S2 normal. No m/r/g. Radial pulses 2+. Respiratory:  Normal WOB. CTAB. Abdomen:  +bs, soft, nt, nd  Ext:  No LE edema. Skin:  Warm, dry. Normal turgor. Psychiatric:  Cooperative. Normal speech and behavior. IMAGING:  MRI BRAIN WO CONTRAST   Final Result      Moderate acute infarct in the left posterior inferior cerebellum. Remote infarcts with areas of remote hemorrhagic staining, mineralization, or cortical laminar necrosis in the left parietal and occipital lobes corresponding to hyperdensity on recent CT. The left occipital lobe infarct is new from 3/13/2020. No    intracranial hemorrhage. Mild atrophy and chronic small vessel ischemic change. CTA HEAD W WO CONTRAST   Final Result      1. Area of hyperdensity adjacent to the occipital horn of the left lateral ventricle is again noted. This is favored to be extraventricular in location and may be related to hemorrhage or mineralization within a prior stroke. 2. Additional remote infarct in the left parietal lobe. 3. High-grade stenosis/near occlusion of the left posterior M2 division which is new from March 2020. Assessment and Plan:     50 y.o. AA female with MHx of ESRD on HD, FSGS, s/p renal txp, HTN, PFO (echo 3/13/20), HLD, PVD, prior CVAs (on Keppra) with residual balance issues, T2DM, and hypothyroidism who was admitted to the ICU on 5/16/2022 for neurosurgical evaluation and management of a possible IVH in left lateral ventricle. 1. ESRD on HD - anuric  - Home HD 4 days/wk  - Pt reports compliance with home HD and meds  - Dry wt: 147 lb     2.  HTN - BP improved after increasing frequency of CCB and ARB     3. Anemia     4. Acid-base/electrolyte imbalance      5. S/p renal txp        Plan  - Continue CCB and ARB  - HD 4 days a week   - No heparin with HD for now   - Low phosphorous, low potassium diet  - Monitor electrolytes  - Daily weight   - Monitor I/Os      Tabatha Molian, MS4 - Acting as a scribe        [facility] - [phone]  Fax - [phone]

## 2022-05-20 NOTE — PROGRESS NOTES
Reviewed discharge instructions with patient who verbalized understanding and willingness to comply. Original copy of AVS was given to patient. Patient has all of her personal possessions in her possession. Will discharge patient when her ride arrives.

## 2022-05-20 NOTE — PLAN OF CARE
Problem: Discharge Planning  Goal: Discharge to home or other facility with appropriate resources  Outcome: Progressing     Problem: Pain  Goal: Verbalizes/displays adequate comfort level or baseline comfort level  Outcome: Progressing     Problem: Skin/Tissue Integrity  Goal: Absence of new skin breakdown  Description: 1. Monitor for areas of redness and/or skin breakdown  2. Assess vascular access sites hourly  3. Every 4-6 hours minimum:  Change oxygen saturation probe site  4. Every 4-6 hours:  If on nasal continuous positive airway pressure, respiratory therapy assess nares and determine need for appliance change or resting period. Outcome: Progressing   Skin integrity maintained by frequent repositioning. Incontinence care provided as needed. Minimum layers utilized under pt to reduce friction/shearing and pressure injury.

## 2022-05-20 NOTE — PROGRESS NOTES
Patient was escorted off the unit via wheelchair. Discharged home as ordered. allvpersonal items were released with patient at time of discharge.

## 2022-05-21 LAB
ANTICARDIOLIPIN IGG ANTIBODY: <10 GPL
CARDIOLIPIN AB IGM: <10 MPL

## 2022-10-28 ENCOUNTER — HOSPITAL ENCOUNTER (EMERGENCY)
Age: 47
Discharge: HOME | End: 2022-10-28
Attending: EMERGENCY MEDICINE
Payer: MEDICAID

## 2022-10-28 VITALS
RESPIRATION RATE: 19 BRPM | WEIGHT: 150.57 LBS | BODY MASS INDEX: 23.63 KG/M2 | HEIGHT: 67 IN | DIASTOLIC BLOOD PRESSURE: 73 MMHG | SYSTOLIC BLOOD PRESSURE: 164 MMHG | HEART RATE: 85 BPM | OXYGEN SATURATION: 99 % | TEMPERATURE: 98.2 F

## 2022-10-28 DIAGNOSIS — I16.0 HYPERTENSIVE URGENCY: Primary | ICD-10-CM

## 2022-10-28 LAB
ANION GAP SERPL CALCULATED.3IONS-SCNC: 15 MMOL/L (ref 3–16)
BASOPHILS ABSOLUTE: 0.1 K/UL (ref 0–0.2)
BASOPHILS RELATIVE PERCENT: 1.1 %
BUN BLDV-MCNC: 42 MG/DL (ref 7–20)
CALCIUM SERPL-MCNC: 10.1 MG/DL (ref 8.3–10.6)
CHLORIDE BLD-SCNC: 96 MMOL/L (ref 99–110)
CO2: 28 MMOL/L (ref 21–32)
CREAT SERPL-MCNC: 9.8 MG/DL (ref 0.6–1.1)
EOSINOPHILS ABSOLUTE: 0.2 K/UL (ref 0–0.6)
EOSINOPHILS RELATIVE PERCENT: 2.3 %
GFR SERPL CREATININE-BSD FRML MDRD: 4 ML/MIN/{1.73_M2}
GLUCOSE BLD-MCNC: 113 MG/DL (ref 70–99)
HCT VFR BLD CALC: 40.3 % (ref 36–48)
HEMOGLOBIN: 13.4 G/DL (ref 12–16)
LYMPHOCYTES ABSOLUTE: 2.8 K/UL (ref 1–5.1)
LYMPHOCYTES RELATIVE PERCENT: 28.6 %
MCH RBC QN AUTO: 29.3 PG (ref 26–34)
MCHC RBC AUTO-ENTMCNC: 33.3 G/DL (ref 31–36)
MCV RBC AUTO: 87.9 FL (ref 80–100)
MONOCYTES ABSOLUTE: 1 K/UL (ref 0–1.3)
MONOCYTES RELATIVE PERCENT: 10 %
NEUTROPHILS ABSOLUTE: 5.6 K/UL (ref 1.7–7.7)
NEUTROPHILS RELATIVE PERCENT: 58 %
PDW BLD-RTO: 16.3 % (ref 12.4–15.4)
PLATELET # BLD: 213 K/UL (ref 135–450)
PMV BLD AUTO: 8.3 FL (ref 5–10.5)
POTASSIUM SERPL-SCNC: 5 MMOL/L (ref 3.5–5.1)
RBC # BLD: 4.59 M/UL (ref 4–5.2)
SODIUM BLD-SCNC: 139 MMOL/L (ref 136–145)
WBC # BLD: 9.6 K/UL (ref 4–11)

## 2022-10-28 PROCEDURE — 80048 BASIC METABOLIC PNL TOTAL CA: CPT

## 2022-10-28 PROCEDURE — 6360000002 HC RX W HCPCS: Performed by: NURSE PRACTITIONER

## 2022-10-28 PROCEDURE — 2500000003 HC RX 250 WO HCPCS: Performed by: NURSE PRACTITIONER

## 2022-10-28 PROCEDURE — 99284 EMERGENCY DEPT VISIT MOD MDM: CPT

## 2022-10-28 PROCEDURE — 96375 TX/PRO/DX INJ NEW DRUG ADDON: CPT

## 2022-10-28 PROCEDURE — 85025 COMPLETE CBC W/AUTO DIFF WBC: CPT

## 2022-10-28 PROCEDURE — 36415 COLL VENOUS BLD VENIPUNCTURE: CPT

## 2022-10-28 PROCEDURE — 96374 THER/PROPH/DIAG INJ IV PUSH: CPT

## 2022-10-28 RX ORDER — ONDANSETRON 2 MG/ML
4 INJECTION INTRAMUSCULAR; INTRAVENOUS ONCE
Status: COMPLETED | OUTPATIENT
Start: 2022-10-28 | End: 2022-10-28

## 2022-10-28 RX ORDER — LABETALOL HYDROCHLORIDE 5 MG/ML
10 INJECTION, SOLUTION INTRAVENOUS ONCE
Status: COMPLETED | OUTPATIENT
Start: 2022-10-28 | End: 2022-10-28

## 2022-10-28 RX ADMIN — ONDANSETRON 4 MG: 2 INJECTION INTRAMUSCULAR; INTRAVENOUS at 09:10

## 2022-10-28 RX ADMIN — LABETALOL HYDROCHLORIDE 10 MG: 5 INJECTION, SOLUTION INTRAVENOUS at 09:10

## 2022-10-28 ASSESSMENT — LIFESTYLE VARIABLES: HOW OFTEN DO YOU HAVE A DRINK CONTAINING ALCOHOL: NEVER

## 2022-10-28 ASSESSMENT — PAIN DESCRIPTION - DESCRIPTORS: DESCRIPTORS: ACHING

## 2022-10-28 ASSESSMENT — PAIN - FUNCTIONAL ASSESSMENT: PAIN_FUNCTIONAL_ASSESSMENT: 0-10

## 2022-10-28 ASSESSMENT — PAIN SCALES - GENERAL: PAINLEVEL_OUTOF10: 9

## 2022-10-28 ASSESSMENT — PAIN DESCRIPTION - LOCATION: LOCATION: HEAD

## 2022-10-28 NOTE — ED PROVIDER NOTES
[address]  [address] [address]  Dept: [phone]  Loc: [address] [place] [address] COMPLAINT    Chief Complaint   Patient presents with    Hypertension     X3 day 200/100's. Has been compliant with HDU and medications. HPI    Lisset Gonzalez is a 50 y.o. female who presents to the emergency department with complaints that her blood pressures have been running in the 200s over 100s for the last 3 days. She has a history of peritoneal dialysis. She is not skipping her treatments. She is also not skipping her medications. She takes 3 antihypertensives. She has had some feelings of dizziness off and on with a slight frontal headache. She has not had any falls. She denies chest pain, shortness of breath, nosebleeds or visual disturbances. She is complaining of a little nausea. No vomiting. No abdominal pain. REVIEW OF SYSTEMS    Neurologic: mild headache and dizziness, no blurry vision  Cardiac: No chest pain or palpitations  Respiratory: No shortness of breath   General: No fevers  : No hematuria  GI: + nausea, no vomiting or abdominal pain  See HPI for further details. All other systems reviewed and are negative.     PAST MEDICAL OR SURGICAL HISTORY    Past Medical History:   Diagnosis Date    Acquired hypothyroidism     Cerebral artery occlusion with cerebral infarction ([address] 2010    Cocaine abuse ([address]     Crohn's disease ([address]     Depression     Diabetes mellitus ([address]     ESRD (end stage renal disease) ([address]     HD - any 4 days of the week pt chooses    GERD (gastroesophageal reflux disease)     Hemodialysis patient ([address] 2016    peritoneal     Hyperlipidemia     Hypertension     Ischemic stroke ([address] 03/13/2020    left parietal lobe, with hemorrhagic conversion    MI, old     Mood disorder ([address]     Pericarditis     Peripheral vascular disease ([address]     Peritonitis ([address]     Pneumonia     Thyroid disease      Past Surgical History:   Procedure Laterality Date    BACK SURGERY      Tumor removal    CHOLECYSTECTOMY      COLON SURGERY Right     KIDNEY TRANSPLANT  1990    KIDNEY TRANSPLANT      LAPAROSCOPY      right ovary removed and left ovarian cyst removed for endometriosis    OTHER SURGICAL HISTORY      peritoneal dialysis catheter    PARACENTESIS      TUBAL LIGATION      UPPER GASTROINTESTINAL ENDOSCOPY  03/31/2017    UPPER GASTROINTESTINAL ENDOSCOPY N/A 3/25/2019    EGD BIOPSY performed by Jamia Walters MD at [address] [facility]    Current Outpatient Rx   Medication Sig Dispense Refill    losartan (COZAAR) 50 MG tablet Take 1 tablet by mouth in the morning and at bedtime 30 tablet 3    NIFEdipine (PROCARDIA XL) 60 MG extended release tablet Take 1 tablet by mouth in the morning and at bedtime 30 tablet 3    levETIRAcetam (KEPPRA) 500 MG tablet Take 500 mg by mouth Take 500mg BID and an 500 mg on HD days      mirtazapine (REMERON) 30 MG tablet Take 30 mg by mouth nightly      insulin glargine (LANTUS) 100 UNIT/ML injection vial Inject 7 Units into the skin nightly      insulin aspart (NOVOLOG) 100 UNIT/ML injection vial Inject 7 Units into the skin 3 times daily (before meals)      Ferric Citrate (AURYXIA) 1  MG(Fe) TABS Take 1 tablet by mouth Take with meals and Snacks, Max of 5 tablets per day. clopidogrel (PLAVIX) 75 MG tablet Take 75 mg by mouth daily      diclofenac sodium (VOLTAREN) 1 % GEL Apply topically 4 times daily as needed for Pain      atorvastatin (LIPITOR) 80 MG tablet Take 80 mg by mouth daily      levothyroxine (SYNTHROID) 50 MCG tablet Take 50 mcg by mouth Daily      hydrocortisone 2.5 % cream Apply topically 2 times daily Apply topically 2 times daily.       NONFORMULARY 5F-DICLOFENAC3/GABAPENTIN6/LIDOCAINE2/PRILOCA  Apply 1-2 grams 3-4 times a day as needed for foot pain      hydrOXYzine (ATARAX) 25 MG tablet Take 25 mg by mouth every 8 hours as needed for Itching      gabapentin (NEURONTIN) 300 MG capsule Take 100 mg by mouth 2 times daily. Take after dialysis       pantoprazole (PROTONIX) 40 MG tablet Take 40 mg by mouth daily      Calcium Acetate, Phos Binder, 667 MG CAPS Take 667 mg by mouth 3 times daily (with meals)      darbepoetin hina-polysorbate (ARANESP, ALBUMIN FREE,) 100 MCG/0.5ML SOSY injection Inject 200 mcg into the skin once a week Las t dose was 1 week ago         ALLERGIES    Allergies   Allergen Reactions    Ferrous Sulfate Shortness Of Breath    Cephalexin Itching and Swelling    Dicloxacillin Rash    Pcn [Penicillins] Rash    Sulfa Antibiotics Rash and Other (See Comments)     Chest pain. FAMILY OR SOCIAL HISTORY    Family History   Problem Relation Age of Onset    Heart Attack Mother     Diabetes Mother     Hypertension Mother     Stroke Father     Diabetes Father     Hypertension Father      Social History     Socioeconomic History    Marital status:      Spouse name: None    Number of children: 2    Years of education: None    Highest education level: None   Occupational History    Occupation: volunteer   Tobacco Use    Smoking status: Never    Smokeless tobacco: Never   Vaping Use    Vaping Use: Never used   Substance and Sexual Activity    Alcohol use: Not Currently     Alcohol/week: 0.0 standard drinks     Comment: monthly    Drug use: Not Currently    Sexual activity: Yes     Partners: Male       PHYSICAL EXAM    VITAL SIGNS: BP (!) 156/78   Pulse 70   Temp 98.6 °F (37 °C) (Oral)   Resp 18   Ht 5' 7\" (1.702 m)   Wt 150 lb 9.2 oz (68.3 kg)   SpO2 100%   BMI 23.58 kg/m²   Constitutional:  Well developed, well nourished, no acute distress  Eyes:  Pupils equally round and reactive to light accommodation, sclera nonicteric. EOMs are intact. No nystagmus. Negative test of skew.   HENT:  Atraumatic  NECK: Normal range of motion, no JVD  Respiratory: Lungs clear to auscultation bilaterally, no wheezing, no respiratory distress  Cardiovascular:  regular rate, normal rhythm, no murmurs   GI:  Soft, nondistended, normal bowel sounds, nontender, no pulsatile masses  Musculoskeletal:  No edema, no acute deformities   Integument:  Skin is warm and dry, no obvious rash    Vascular: Radial pulses 2+ equal bilaterally  Neurologic:  Awake, alert, oriented, no aphasia, no slurred speech, CN II-XII intact, normal finger to nose test bilaterally, 5/5 strength in all 4 extremities, sensation to light touch intact bilaterally, patellar reflexes 2+ and equal bilaterally no truncal ataxia. Ambulating with a steady gait. NIH stroke scale score is 0    RADIOLOGY/PROCEDURES    No orders to display     Labs Reviewed   CBC WITH AUTO DIFFERENTIAL - Abnormal; Notable for the following components:       Result Value    RDW 16.3 (*)     All other components within normal limits   BASIC METABOLIC PANEL - Abnormal; Notable for the following components:    Chloride 96 (*)     Glucose 113 (*)     BUN 42 (*)     Creatinine 9.8 (*)     Est, Glom Filt Rate 4 (*)     All other components within normal limits    Narrative:     [employer] tel. [phone],                  Chemistry results called to and read back by Chris Hinton RN, 10/28/2022 09:44,                  by [facility], [place]       ED COURSE & MEDICAL DECISION MAKING    Pertinent studies reviewed and interpreted. (See chart for details)  See chart for details of medications prescribed.     Vitals:    10/28/22 0908 10/28/22 0910 10/28/22 0915 10/28/22 0927   BP: (!) 175/99  (!) 166/102 (!) 156/78   Pulse:  73 73 70   Resp:  10 18 18   Temp:       TempSrc:       SpO2:   100% 100%   Weight:       Height:         Medications   ondansetron (ZOFRAN) injection 4 mg (4 mg IntraVENous Given 10/28/22 0910)   labetalol (NORMODYNE;TRANDATE) injection 10 mg (10 mg IntraVENous Given 10/28/22 4045)     This patient was seen evaluated by myself my attending physician  Waters. Differential diagnosis: Asymptomatic hypertension, hypertensive urgency, hypertensive emergency, hypertension encephalopathy, acute coronary syndrome, acute renal failure, Thrombotic Stroke, Embolic Stroke, Hemorrhagic Stroke, pain or vertigo or other medical problems elevating the blood pressure secondarily which is a normal physiologic response, other    Patient is afebrile and nontoxic in appearance. Blood pressure is elevated at 182/96. Patient has mild frontal headache and intermittent episodes of feeling dizziness at times. No chest pain, nosebleeds, visual disturbances. No falls. She was given 10 mg of IV labetalol. She was given Zofran. She is eating and drinking. CBC unremarkable. Sodium 139, potassium 5, BUN 42, creatinine 9.8. Dialysis patient. Patient stated that she was feeling better. She was like to go. Her blood pressure is down to 156/78. She was instructed to return to the emergency department for worsening symptoms. In the meantime she should contact her nephrologist to discuss elevated blood pressures. The patient verbalized understanding. CRITICAL CARE NOTE:  There was a high probability of clinically significant life-threatening deterioration of the patient's condition requiring my urgent intervention. I personally saw the patient and independently provided 35 minutes of non-concurrent critical care out of the total shared critical care time provided. This includes multiple reevaluations, vital sign monitoring, pulse oximetry monitoring, telemetry monitoring, clinical response to the IV medications, reviewing the nursing notes, consultation time, dictation/documentation time, and interpretation of the labwork. (This time excludes time spent performing procedures). FINAL IMPRESSION    1.  Hypertensive urgency        PLAN  Discharge instructions and specific outpatient followup      (Please note that this note was completed with a voice recognition program.  Every attempt was made to edit the dictations, but inevitably there remain words that are mis-transcribed.)          Yajaira Kendall, TREY - CNP  10/28/22 Sharona Sherman, TREY - KWABENA  10/28/22 1010

## 2022-10-28 NOTE — ED NOTES
D/C: Order noted for d/c. Pt confirmed d/c paperwork  have correct name. Discharge and education instructions reviewed with patient. Teach-back successful. Pt verbalized understanding and signed d/c papers. Pt denied questions at this time. No acute distress noted. Patient instructed to follow-up as noted - return to emergency department if symptoms worsen. Patient verbalized understanding. Discharged per EDMD with discharge instructions. Pt discharged to private vehicle. Patient stable upon departure. Thanked patient for choosing [facility]) for care. Provider aware of patient pain at time of discharge.        Colton Curry RN  10/28/22 3312

## 2022-10-28 NOTE — ED NOTES
Charge RN said not to draw labs on PT. RN taking care of PT is aware.      Michaela Conner  10/28/22 7701

## 2022-10-28 NOTE — ED PROVIDER NOTES
I independently evaluated and obtained a history and physical on [facility]. All diagnostic, treatment, and disposition assistants were made to myself in conjunction the advanced practice provider. For further details of this patient's emergency department encounter, please see the advanced practice provider's documentation. History: Patient presents emergency department with elevated blood pressure for the last 3 days. She does dialysis at home. She states she has been compliant with her dialysis. She also reports that she has been compliant with her medications and has not missed any doses. She states from time to time she feels slightly dizzy. Physician Exam: Pleasant female in no acute distress. She is afebrile. She has a normal test of skew. No truncal ataxia. NIH stroke score is 0. I personally saw the patient and performed a substantive portion of the visit including all aspects of the medical decision making. MDM:     DDX: Patient has end-stage renal disease with a normal potassium here. Her BUN is 42 and her creatinine is 9.8. Her CBC is unremarkable. Her NIH stroke score is 0. She has no truncal ataxia and a normal test of skew. I completed a structured, evidence-based clinical evaluation to screen for acute stroke and neurologic deficits in this patient. The patient has a normal detailed neurologic exam, which is highly sensitive for dangerous causes of dizziness, vertigo, or loss of balance. The evidence indicates that the patient is very low risk for an acute neurologic emergency, and this is consistent with my clinical intuition. The risk of further workup or hospitalization is likely higher than the risk of the patient having a stroke or other dangerous neurologic condition. It is, therefore, in the patients best interest not to do additional emergent testing or to be hospitalized at this time.      Jakob Medina MD  10/28/22 5635

## 2022-10-28 NOTE — ED NOTES
Attempted x2 to get IV blood work and labs without success.        González Winchester RN  10/28/22 3174

## 2023-04-21 ENCOUNTER — HOSPITAL ENCOUNTER (EMERGENCY)
Age: 48
Discharge: HOME | End: 2023-04-21
Payer: MEDICAID

## 2023-04-21 VITALS
HEART RATE: 65 BPM | SYSTOLIC BLOOD PRESSURE: 247 MMHG | OXYGEN SATURATION: 97 % | DIASTOLIC BLOOD PRESSURE: 112 MMHG | TEMPERATURE: 97.6 F | HEIGHT: 67 IN | BODY MASS INDEX: 23.54 KG/M2 | RESPIRATION RATE: 18 BRPM | WEIGHT: 150 LBS

## 2023-04-21 DIAGNOSIS — M54.32 LEFT SCIATIC NERVE PAIN: Primary | ICD-10-CM

## 2023-04-21 PROCEDURE — 99284 EMERGENCY DEPT VISIT MOD MDM: CPT

## 2023-04-21 PROCEDURE — 96372 THER/PROPH/DIAG INJ SC/IM: CPT

## 2023-04-21 PROCEDURE — 6360000002 HC RX W HCPCS: Performed by: PHYSICIAN ASSISTANT

## 2023-04-21 PROCEDURE — 6370000000 HC RX 637 (ALT 250 FOR IP): Performed by: PHYSICIAN ASSISTANT

## 2023-04-21 RX ORDER — GABAPENTIN 100 MG/1
200 CAPSULE ORAL
Status: COMPLETED | OUTPATIENT
Start: 2023-04-21 | End: 2023-04-21

## 2023-04-21 RX ORDER — METOPROLOL TARTRATE 50 MG/1
50 TABLET, FILM COATED ORAL ONCE
Status: COMPLETED | OUTPATIENT
Start: 2023-04-21 | End: 2023-04-21

## 2023-04-21 RX ORDER — OXYCODONE HYDROCHLORIDE AND ACETAMINOPHEN 5; 325 MG/1; MG/1
1 TABLET ORAL EVERY 6 HOURS PRN
Qty: 15 TABLET | Refills: 0 | Status: SHIPPED | OUTPATIENT
Start: 2023-04-21 | End: 2023-04-25

## 2023-04-21 RX ORDER — KETOROLAC TROMETHAMINE 30 MG/ML
30 INJECTION, SOLUTION INTRAMUSCULAR; INTRAVENOUS ONCE
Status: COMPLETED | OUTPATIENT
Start: 2023-04-21 | End: 2023-04-21

## 2023-04-21 RX ORDER — GABAPENTIN 100 MG/1
200 CAPSULE ORAL EVERY 8 HOURS
Qty: 60 CAPSULE | Refills: 0 | Status: SHIPPED | OUTPATIENT
Start: 2023-04-21 | End: 2023-05-01

## 2023-04-21 RX ORDER — OXYCODONE HYDROCHLORIDE AND ACETAMINOPHEN 5; 325 MG/1; MG/1
1 TABLET ORAL ONCE
Status: COMPLETED | OUTPATIENT
Start: 2023-04-21 | End: 2023-04-21

## 2023-04-21 RX ADMIN — GABAPENTIN 200 MG: 100 CAPSULE ORAL at 22:23

## 2023-04-21 RX ADMIN — METOPROLOL TARTRATE 50 MG: 50 TABLET ORAL at 23:04

## 2023-04-21 RX ADMIN — KETOROLAC TROMETHAMINE 30 MG: 30 INJECTION, SOLUTION INTRAMUSCULAR at 22:24

## 2023-04-21 RX ADMIN — OXYCODONE AND ACETAMINOPHEN 1 TABLET: 5; 325 TABLET ORAL at 22:24

## 2023-04-21 ASSESSMENT — PAIN DESCRIPTION - ORIENTATION: ORIENTATION: LEFT

## 2023-04-21 ASSESSMENT — PAIN SCALES - GENERAL
PAINLEVEL_OUTOF10: 7
PAINLEVEL_OUTOF10: 10

## 2023-04-21 ASSESSMENT — PAIN DESCRIPTION - LOCATION: LOCATION: HIP

## 2023-04-22 NOTE — DISCHARGE INSTRUCTIONS
Home condition to use medications as written, ice the affected area 15 to 10 minutes every few hours if needed for the next 4 days, stay well-hydrated and minimize sodium intake, and monitor for gradual improvement. Call your family doctor and [facility] Monday to arrange further outpatient care and treatment.   Return for any emergency worsening or concern

## 2023-04-22 NOTE — ED NOTES
Went to d/c pt notified EDPA of elevated BP; pt stated she hasn't taken her BP medication tonight. New orders provided.         Francisca Rubio RN  04/21/23 5737

## 2023-04-22 NOTE — ED NOTES
Discharge and education instructions reviewed. Patient verbalized understanding, teach-back successful. Patient denied questions at this time. No acute distress noted. Patient instructed to follow-up as noted - return to emergency department if symptoms worsen. Patient verbalized understanding. Discharged per EDMD with discharged instructions.        Esther Moses RN  04/21/23 3569

## 2023-04-22 NOTE — ED TRIAGE NOTES
Ivana Link is a 46 y.o. female brought herself to the ER for eval of left hip pain that is radiating to her lower back. 10/10. The patient states that her sciatica is acting up, NKI. The patient states that the pain pushed her blood pressure up. The patient is alert and oriented with an open and patent airway.

## 2023-04-22 NOTE — ED PROVIDER NOTES
**ADVANCED PRACTICE PROVIDER, I HAVE EVALUATED THIS PATIENT**        [address] [facility] ENCOUNTER      Pt Name: Carson Sarah  LDU:[id number]  [place] 1975  Date of evaluation: 4/21/2023  Provider: Arnold Amaya PA-C  Note Started: 10:47 PM EDT 4/21/2023        Chief Complaint:    Chief Complaint   Patient presents with    Hip Pain     Left sided hip pain x 2 weeks, pt states its her sciatic and the pain pushes her blood pressure up, NKI         Nursing Notes, Past Medical Hx, Past Surgical Hx, Social Hx, Allergies, and Family Hx were all reviewed and agreed with or any disagreements were addressed in the HPI.    HPI: (Location, Duration, Timing, Severity, Quality, Assoc Sx, Context, Modifying factors)    History From: Patient          Chief Complaint of left hip to lower extremity pain and tenderness    This is a  46 y.o. female who presents indicating that over the course of the last couple of weeks, when she has had pain medication she feels better. When she does not have pain medication she feels worse. Her pain is in the left posterior buttock to hip area and radiates down the left leg. States that suturing follows her family after the last week but cannot see him until Monday. Meantime she is out of the Percocet pain tablets written by the ER last week. She also has ongoing neuropathy in her bilateral legs from diabetes. Patient also a end-stage renal disease patient on dialysis. Patient states she is out of her gabapentin as well. She denies fevers or chills, bowel or bladder incontinence, inability to urinate, numbness or tingling to the perineal area, nausea or vomiting.  Her pain is worse with range of motion and movement better with rest.    PastMedical/Surgical History:      Diagnosis Date    Acquired hypothyroidism     Cerebral artery occlusion with cerebral infarction ([facility] 2010    Cocaine abuse ([facility]     Crohn's disease ([facility]

## 2023-06-12 ENCOUNTER — APPOINTMENT (OUTPATIENT)
Dept: CT IMAGING | Age: 48
DRG: 425 | End: 2023-06-12
Payer: MEDICAID

## 2023-06-12 ENCOUNTER — APPOINTMENT (OUTPATIENT)
Dept: GENERAL RADIOLOGY | Age: 48
DRG: 425 | End: 2023-06-12
Payer: MEDICAID

## 2023-06-12 ENCOUNTER — HOSPITAL ENCOUNTER (INPATIENT)
Age: 48
LOS: 2 days | Discharge: HOME | DRG: 425 | End: 2023-06-14
Attending: EMERGENCY MEDICINE | Admitting: INTERNAL MEDICINE
Payer: MEDICAID

## 2023-06-12 DIAGNOSIS — R10.84 GENERALIZED ABDOMINAL PAIN: Primary | ICD-10-CM

## 2023-06-12 DIAGNOSIS — R63.4 UNEXPLAINED WEIGHT LOSS: ICD-10-CM

## 2023-06-12 DIAGNOSIS — R93.89 ABNORMAL FINDING ON CT SCAN: ICD-10-CM

## 2023-06-12 DIAGNOSIS — E11.649 TYPE 2 DIABETES MELLITUS WITH HYPOGLYCEMIA WITHOUT COMA, WITH LONG-TERM CURRENT USE OF INSULIN (HCC): ICD-10-CM

## 2023-06-12 DIAGNOSIS — R74.8 ELEVATED ALKALINE PHOSPHATASE LEVEL: ICD-10-CM

## 2023-06-12 DIAGNOSIS — Z71.89 GOALS OF CARE, COUNSELING/DISCUSSION: ICD-10-CM

## 2023-06-12 DIAGNOSIS — Z79.4 TYPE 2 DIABETES MELLITUS WITH HYPOGLYCEMIA WITHOUT COMA, WITH LONG-TERM CURRENT USE OF INSULIN (HCC): ICD-10-CM

## 2023-06-12 DIAGNOSIS — R63.0 DECREASED APPETITE: ICD-10-CM

## 2023-06-12 DIAGNOSIS — Z87.448 HISTORY OF END STAGE RENAL DISEASE: ICD-10-CM

## 2023-06-12 PROBLEM — R18.8 ASCITES OF LIVER: Status: ACTIVE | Noted: 2023-06-12

## 2023-06-12 LAB
ALBUMIN SERPL-MCNC: 3.2 G/DL (ref 3.4–5)
ALP SERPL-CCNC: 233 U/L (ref 40–129)
ALT SERPL-CCNC: 10 U/L (ref 10–40)
ANION GAP SERPL CALCULATED.3IONS-SCNC: 18 MMOL/L (ref 3–16)
ANISOCYTOSIS BLD QL SMEAR: ABNORMAL
AST SERPL-CCNC: 24 U/L (ref 15–37)
BASOPHILS # BLD: 0.1 K/UL (ref 0–0.2)
BASOPHILS NFR BLD: 1 %
BILIRUB DIRECT SERPL-MCNC: <0.2 MG/DL (ref 0–0.3)
BILIRUB INDIRECT SERPL-MCNC: ABNORMAL MG/DL (ref 0–1)
BILIRUB SERPL-MCNC: 0.4 MG/DL (ref 0–1)
BUN SERPL-MCNC: 47 MG/DL (ref 7–20)
CALCIUM SERPL-MCNC: 8.8 MG/DL (ref 8.3–10.6)
CHLORIDE SERPL-SCNC: 92 MMOL/L (ref 99–110)
CO2 SERPL-SCNC: 25 MMOL/L (ref 21–32)
CREAT SERPL-MCNC: 10.9 MG/DL (ref 0.6–1.1)
DEPRECATED RDW RBC AUTO: 18 % (ref 12.4–15.4)
EKG ATRIAL RATE: 72 BPM
EKG DIAGNOSIS: NORMAL
EKG P AXIS: 30 DEGREES
EKG P-R INTERVAL: 172 MS
EKG Q-T INTERVAL: 424 MS
EKG QRS DURATION: 72 MS
EKG QTC CALCULATION (BAZETT): 464 MS
EKG R AXIS: 12 DEGREES
EKG T AXIS: 50 DEGREES
EKG VENTRICULAR RATE: 72 BPM
EOSINOPHIL # BLD: 0.3 K/UL (ref 0–0.6)
EOSINOPHIL NFR BLD: 3 %
GFR SERPLBLD CREATININE-BSD FMLA CKD-EPI: 4 ML/MIN/{1.73_M2}
GLUCOSE BLD-MCNC: 100 MG/DL (ref 70–99)
GLUCOSE BLD-MCNC: 111 MG/DL (ref 70–99)
GLUCOSE SERPL-MCNC: 91 MG/DL (ref 70–99)
HCT VFR BLD AUTO: 35.5 % (ref 36–48)
HGB BLD-MCNC: 12.1 G/DL (ref 12–16)
LIPASE SERPL-CCNC: 18 U/L (ref 13–60)
LYMPHOCYTES # BLD: 1 K/UL (ref 1–5.1)
LYMPHOCYTES NFR BLD: 10 %
MCH RBC QN AUTO: 31.4 PG (ref 26–34)
MCHC RBC AUTO-ENTMCNC: 34.2 G/DL (ref 31–36)
MCV RBC AUTO: 91.9 FL (ref 80–100)
MONOCYTES # BLD: 0.6 K/UL (ref 0–1.3)
MONOCYTES NFR BLD: 6 %
NEUTROPHILS # BLD: 8.3 K/UL (ref 1.7–7.7)
NEUTROPHILS NFR BLD: 80 %
PERFORMED ON: ABNORMAL
PERFORMED ON: ABNORMAL
PLATELET # BLD AUTO: 353 K/UL (ref 135–450)
PMV BLD AUTO: 8.9 FL (ref 5–10.5)
POTASSIUM SERPL-SCNC: 3.6 MMOL/L (ref 3.5–5.1)
PROT SERPL-MCNC: 7.7 G/DL (ref 6.4–8.2)
RBC # BLD AUTO: 3.86 M/UL (ref 4–5.2)
SODIUM SERPL-SCNC: 135 MMOL/L (ref 136–145)
TROPONIN, HIGH SENSITIVITY: 347 NG/L (ref 0–14)
TROPONIN, HIGH SENSITIVITY: 352 NG/L (ref 0–14)
WBC # BLD AUTO: 10.4 K/UL (ref 4–11)

## 2023-06-12 PROCEDURE — 99285 EMERGENCY DEPT VISIT HI MDM: CPT

## 2023-06-12 PROCEDURE — 74176 CT ABD & PELVIS W/O CONTRAST: CPT

## 2023-06-12 PROCEDURE — 88112 CYTOPATH CELL ENHANCE TECH: CPT

## 2023-06-12 PROCEDURE — 2580000003 HC RX 258: Performed by: INTERNAL MEDICINE

## 2023-06-12 PROCEDURE — 80076 HEPATIC FUNCTION PANEL: CPT

## 2023-06-12 PROCEDURE — 88305 TISSUE EXAM BY PATHOLOGIST: CPT

## 2023-06-12 PROCEDURE — 2500000003 HC RX 250 WO HCPCS: Performed by: INTERNAL MEDICINE

## 2023-06-12 PROCEDURE — 84484 ASSAY OF TROPONIN QUANT: CPT

## 2023-06-12 PROCEDURE — 83690 ASSAY OF LIPASE: CPT

## 2023-06-12 PROCEDURE — 6370000000 HC RX 637 (ALT 250 FOR IP): Performed by: INTERNAL MEDICINE

## 2023-06-12 PROCEDURE — 2060000000 HC ICU INTERMEDIATE R&B

## 2023-06-12 PROCEDURE — 93005 ELECTROCARDIOGRAM TRACING: CPT | Performed by: NURSE PRACTITIONER

## 2023-06-12 PROCEDURE — 6360000002 HC RX W HCPCS: Performed by: NURSE PRACTITIONER

## 2023-06-12 PROCEDURE — 85025 COMPLETE CBC W/AUTO DIFF WBC: CPT

## 2023-06-12 PROCEDURE — 80048 BASIC METABOLIC PNL TOTAL CA: CPT

## 2023-06-12 PROCEDURE — 93010 ELECTROCARDIOGRAM REPORT: CPT | Performed by: INTERNAL MEDICINE

## 2023-06-12 PROCEDURE — 6360000002 HC RX W HCPCS: Performed by: INTERNAL MEDICINE

## 2023-06-12 PROCEDURE — 6370000000 HC RX 637 (ALT 250 FOR IP): Performed by: NURSE PRACTITIONER

## 2023-06-12 PROCEDURE — 89051 BODY FLUID CELL COUNT: CPT

## 2023-06-12 PROCEDURE — 71046 X-RAY EXAM CHEST 2 VIEWS: CPT

## 2023-06-12 RX ORDER — GABAPENTIN 100 MG/1
100 CAPSULE ORAL 2 TIMES DAILY PRN
COMMUNITY

## 2023-06-12 RX ORDER — LEVETIRACETAM 500 MG/1
500 TABLET ORAL
COMMUNITY

## 2023-06-12 RX ORDER — INSULIN GLARGINE 100 [IU]/ML
5 INJECTION, SOLUTION SUBCUTANEOUS NIGHTLY
Status: DISCONTINUED | OUTPATIENT
Start: 2023-06-12 | End: 2023-06-14 | Stop reason: HOSPADM

## 2023-06-12 RX ORDER — NIFEDIPINE 60 MG/1
60 TABLET, EXTENDED RELEASE ORAL 2 TIMES DAILY
Status: DISCONTINUED | OUTPATIENT
Start: 2023-06-12 | End: 2023-06-14 | Stop reason: HOSPADM

## 2023-06-12 RX ORDER — CALCIUM ACETATE 667 MG/1
2001 CAPSULE ORAL
Status: DISCONTINUED | OUTPATIENT
Start: 2023-06-12 | End: 2023-06-14

## 2023-06-12 RX ORDER — LEVOTHYROXINE SODIUM 0.05 MG/1
50 TABLET ORAL DAILY
Status: DISCONTINUED | OUTPATIENT
Start: 2023-06-12 | End: 2023-06-14 | Stop reason: HOSPADM

## 2023-06-12 RX ORDER — HEPARIN SODIUM 5000 [USP'U]/ML
5000 INJECTION, SOLUTION INTRAVENOUS; SUBCUTANEOUS EVERY 8 HOURS SCHEDULED
Status: DISCONTINUED | OUTPATIENT
Start: 2023-06-12 | End: 2023-06-14 | Stop reason: HOSPADM

## 2023-06-12 RX ORDER — HYDROXYZINE HYDROCHLORIDE 25 MG/1
25 TABLET, FILM COATED ORAL EVERY 8 HOURS PRN
Status: DISCONTINUED | OUTPATIENT
Start: 2023-06-12 | End: 2023-06-14 | Stop reason: HOSPADM

## 2023-06-12 RX ORDER — MORPHINE SULFATE 2 MG/ML
2 INJECTION, SOLUTION INTRAMUSCULAR; INTRAVENOUS EVERY 6 HOURS PRN
Status: DISCONTINUED | OUTPATIENT
Start: 2023-06-12 | End: 2023-06-14 | Stop reason: HOSPADM

## 2023-06-12 RX ORDER — DICYCLOMINE HYDROCHLORIDE 10 MG/1
10 CAPSULE ORAL ONCE
Status: COMPLETED | OUTPATIENT
Start: 2023-06-12 | End: 2023-06-12

## 2023-06-12 RX ORDER — ATORVASTATIN CALCIUM 80 MG/1
80 TABLET, FILM COATED ORAL
Status: DISCONTINUED | OUTPATIENT
Start: 2023-06-12 | End: 2023-06-14 | Stop reason: HOSPADM

## 2023-06-12 RX ORDER — MIRTAZAPINE 30 MG/1
30 TABLET, FILM COATED ORAL NIGHTLY
COMMUNITY

## 2023-06-12 RX ORDER — LEVETIRACETAM 500 MG/1
500 TABLET ORAL 2 TIMES DAILY
Status: DISCONTINUED | OUTPATIENT
Start: 2023-06-12 | End: 2023-06-14 | Stop reason: HOSPADM

## 2023-06-12 RX ORDER — CALCIUM ACETATE 667 MG/1
3 TABLET ORAL
COMMUNITY

## 2023-06-12 RX ORDER — ONDANSETRON 2 MG/ML
4 INJECTION INTRAMUSCULAR; INTRAVENOUS EVERY 6 HOURS PRN
Status: DISCONTINUED | OUTPATIENT
Start: 2023-06-12 | End: 2023-06-14 | Stop reason: HOSPADM

## 2023-06-12 RX ORDER — SODIUM CHLORIDE 0.9 % (FLUSH) 0.9 %
5-40 SYRINGE (ML) INJECTION PRN
Status: DISCONTINUED | OUTPATIENT
Start: 2023-06-12 | End: 2023-06-14 | Stop reason: HOSPADM

## 2023-06-12 RX ORDER — SODIUM CHLORIDE 0.9 % (FLUSH) 0.9 %
5-40 SYRINGE (ML) INJECTION EVERY 12 HOURS SCHEDULED
Status: DISCONTINUED | OUTPATIENT
Start: 2023-06-12 | End: 2023-06-14 | Stop reason: HOSPADM

## 2023-06-12 RX ORDER — INSULIN LISPRO 100 [IU]/ML
0-4 INJECTION, SOLUTION INTRAVENOUS; SUBCUTANEOUS NIGHTLY
Status: DISCONTINUED | OUTPATIENT
Start: 2023-06-12 | End: 2023-06-14 | Stop reason: HOSPADM

## 2023-06-12 RX ORDER — LOSARTAN POTASSIUM 50 MG/1
50 TABLET ORAL 2 TIMES DAILY
Status: DISCONTINUED | OUTPATIENT
Start: 2023-06-12 | End: 2023-06-14 | Stop reason: HOSPADM

## 2023-06-12 RX ORDER — ACETAMINOPHEN 325 MG/1
650 TABLET ORAL EVERY 6 HOURS PRN
Status: DISCONTINUED | OUTPATIENT
Start: 2023-06-12 | End: 2023-06-14 | Stop reason: HOSPADM

## 2023-06-12 RX ORDER — ACETAMINOPHEN 650 MG/1
650 SUPPOSITORY RECTAL EVERY 6 HOURS PRN
Status: DISCONTINUED | OUTPATIENT
Start: 2023-06-12 | End: 2023-06-14 | Stop reason: HOSPADM

## 2023-06-12 RX ORDER — GABAPENTIN 100 MG/1
200 CAPSULE ORAL EVERY 8 HOURS SCHEDULED
Status: DISCONTINUED | OUTPATIENT
Start: 2023-06-12 | End: 2023-06-14 | Stop reason: HOSPADM

## 2023-06-12 RX ORDER — MIRTAZAPINE 30 MG/1
30 TABLET, FILM COATED ORAL NIGHTLY
Status: DISCONTINUED | OUTPATIENT
Start: 2023-06-12 | End: 2023-06-14 | Stop reason: HOSPADM

## 2023-06-12 RX ORDER — SODIUM CHLORIDE 9 MG/ML
INJECTION, SOLUTION INTRAVENOUS PRN
Status: DISCONTINUED | OUTPATIENT
Start: 2023-06-12 | End: 2023-06-14 | Stop reason: HOSPADM

## 2023-06-12 RX ORDER — METOPROLOL TARTRATE 50 MG/1
50 TABLET, FILM COATED ORAL 2 TIMES DAILY
Status: DISCONTINUED | OUTPATIENT
Start: 2023-06-12 | End: 2023-06-14 | Stop reason: HOSPADM

## 2023-06-12 RX ORDER — MINOXIDIL 2.5 MG/1
5 TABLET ORAL 2 TIMES DAILY
COMMUNITY

## 2023-06-12 RX ORDER — METOPROLOL TARTRATE 50 MG/1
50 TABLET, FILM COATED ORAL 2 TIMES DAILY
COMMUNITY

## 2023-06-12 RX ORDER — MINOXIDIL 2.5 MG/1
5 TABLET ORAL 2 TIMES DAILY
Status: DISCONTINUED | OUTPATIENT
Start: 2023-06-12 | End: 2023-06-14 | Stop reason: HOSPADM

## 2023-06-12 RX ORDER — POLYETHYLENE GLYCOL 3350 17 G/17G
17 POWDER, FOR SOLUTION ORAL DAILY PRN
Status: DISCONTINUED | OUTPATIENT
Start: 2023-06-12 | End: 2023-06-14 | Stop reason: HOSPADM

## 2023-06-12 RX ORDER — INSULIN LISPRO 100 [IU]/ML
0-4 INJECTION, SOLUTION INTRAVENOUS; SUBCUTANEOUS
Status: DISCONTINUED | OUTPATIENT
Start: 2023-06-12 | End: 2023-06-14 | Stop reason: HOSPADM

## 2023-06-12 RX ORDER — FENTANYL CITRATE 50 UG/ML
50 INJECTION, SOLUTION INTRAMUSCULAR; INTRAVENOUS ONCE
Status: COMPLETED | OUTPATIENT
Start: 2023-06-12 | End: 2023-06-12

## 2023-06-12 RX ORDER — PANTOPRAZOLE SODIUM 40 MG/1
40 TABLET, DELAYED RELEASE ORAL
Status: DISCONTINUED | OUTPATIENT
Start: 2023-06-12 | End: 2023-06-14 | Stop reason: HOSPADM

## 2023-06-12 RX ORDER — ONDANSETRON 4 MG/1
4 TABLET, ORALLY DISINTEGRATING ORAL EVERY 8 HOURS PRN
Status: DISCONTINUED | OUTPATIENT
Start: 2023-06-12 | End: 2023-06-14 | Stop reason: HOSPADM

## 2023-06-12 RX ORDER — ONDANSETRON 4 MG/1
4 TABLET, ORALLY DISINTEGRATING ORAL ONCE
Status: COMPLETED | OUTPATIENT
Start: 2023-06-12 | End: 2023-06-12

## 2023-06-12 RX ADMIN — LEVOTHYROXINE SODIUM 50 MCG: 0.05 TABLET ORAL at 16:57

## 2023-06-12 RX ADMIN — MORPHINE SULFATE 2 MG: 2 INJECTION, SOLUTION INTRAMUSCULAR; INTRAVENOUS at 21:09

## 2023-06-12 RX ADMIN — LEVETIRACETAM 500 MG: 500 TABLET, FILM COATED ORAL at 21:15

## 2023-06-12 RX ADMIN — NIFEDIPINE 60 MG: 60 TABLET, EXTENDED RELEASE ORAL at 21:16

## 2023-06-12 RX ADMIN — MINOXIDIL 5 MG: 2.5 TABLET ORAL at 22:53

## 2023-06-12 RX ADMIN — MIRTAZAPINE 30 MG: 30 TABLET, FILM COATED ORAL at 21:16

## 2023-06-12 RX ADMIN — INSULIN GLARGINE 5 UNITS: 100 INJECTION, SOLUTION SUBCUTANEOUS at 21:16

## 2023-06-12 RX ADMIN — CALCIUM ACETATE 2001 MG: 667 CAPSULE ORAL at 16:57

## 2023-06-12 RX ADMIN — DICYCLOMINE HYDROCHLORIDE 10 MG: 10 CAPSULE ORAL at 10:01

## 2023-06-12 RX ADMIN — GABAPENTIN 200 MG: 100 CAPSULE ORAL at 22:52

## 2023-06-12 RX ADMIN — SODIUM CHLORIDE, PRESERVATIVE FREE 10 ML: 5 INJECTION INTRAVENOUS at 21:17

## 2023-06-12 RX ADMIN — FENTANYL CITRATE 50 MCG: 50 INJECTION, SOLUTION INTRAMUSCULAR; INTRAVENOUS at 14:45

## 2023-06-12 RX ADMIN — GABAPENTIN 200 MG: 100 CAPSULE ORAL at 16:57

## 2023-06-12 RX ADMIN — ONDANSETRON 4 MG: 4 TABLET, ORALLY DISINTEGRATING ORAL at 19:27

## 2023-06-12 RX ADMIN — HEPARIN SODIUM 5000 UNITS: 5000 INJECTION INTRAVENOUS; SUBCUTANEOUS at 22:53

## 2023-06-12 RX ADMIN — LOSARTAN POTASSIUM 50 MG: 50 TABLET, FILM COATED ORAL at 21:15

## 2023-06-12 RX ADMIN — PANTOPRAZOLE SODIUM 40 MG: 40 TABLET, DELAYED RELEASE ORAL at 16:57

## 2023-06-12 RX ADMIN — METOPROLOL TARTRATE 50 MG: 50 TABLET ORAL at 21:16

## 2023-06-12 RX ADMIN — ONDANSETRON 4 MG: 4 TABLET, ORALLY DISINTEGRATING ORAL at 09:50

## 2023-06-12 ASSESSMENT — PAIN - FUNCTIONAL ASSESSMENT
PAIN_FUNCTIONAL_ASSESSMENT: 0-10
PAIN_FUNCTIONAL_ASSESSMENT: PREVENTS OR INTERFERES SOME ACTIVE ACTIVITIES AND ADLS
PAIN_FUNCTIONAL_ASSESSMENT: PREVENTS OR INTERFERES SOME ACTIVE ACTIVITIES AND ADLS

## 2023-06-12 ASSESSMENT — PAIN SCALES - GENERAL
PAINLEVEL_OUTOF10: 9
PAINLEVEL_OUTOF10: 9
PAINLEVEL_OUTOF10: 8
PAINLEVEL_OUTOF10: 8
PAINLEVEL_OUTOF10: 9

## 2023-06-12 ASSESSMENT — PAIN DESCRIPTION - LOCATION
LOCATION: ABDOMEN

## 2023-06-12 ASSESSMENT — PAIN DESCRIPTION - DESCRIPTORS
DESCRIPTORS: SHARP;CRAMPING
DESCRIPTORS: CRAMPING;ACHING;DULL
DESCRIPTORS: DISCOMFORT;PRESSURE
DESCRIPTORS: ACHING

## 2023-06-12 ASSESSMENT — ENCOUNTER SYMPTOMS
VOMITING: 1
COUGH: 0
DIARRHEA: 0
ANAL BLEEDING: 0
ABDOMINAL PAIN: 1
BACK PAIN: 0
NAUSEA: 1
CONSTIPATION: 1
EYE PAIN: 0
SHORTNESS OF BREATH: 0
SORE THROAT: 0

## 2023-06-12 ASSESSMENT — PAIN DESCRIPTION - PAIN TYPE: TYPE: ACUTE PAIN

## 2023-06-12 ASSESSMENT — LIFESTYLE VARIABLES
HOW MANY STANDARD DRINKS CONTAINING ALCOHOL DO YOU HAVE ON A TYPICAL DAY: PATIENT DOES NOT DRINK
HOW OFTEN DO YOU HAVE A DRINK CONTAINING ALCOHOL: NEVER

## 2023-06-12 ASSESSMENT — PAIN DESCRIPTION - ORIENTATION: ORIENTATION: MID

## 2023-06-12 ASSESSMENT — PAIN DESCRIPTION - ONSET: ONSET: ON-GOING

## 2023-06-12 ASSESSMENT — PAIN DESCRIPTION - FREQUENCY: FREQUENCY: CONTINUOUS

## 2023-06-12 NOTE — H&P
- fairly controlled, cont lantus/SSI    HTN - uncontrolled - states she didn't take meds this am. Resume home meds      DVT Prophylaxis: heparin  Diet: ADULT DIET; Regular; 4 carb choices (60 gm/meal)  Diet NPO  Code Status: Full Code  PT/OT Eval Status: ordered    Steve Witt MD    Thank you Toney Layne MD for the opportunity to be involved in this patient's care. If you have any questions or concerns please feel free to contact me at [phone].

## 2023-06-12 NOTE — ED NOTES
Admitting MD at bedside discussing w/ patient plan of care; states patient will be NPO @ 0005 on 06/13/2023     Singh Sanders RN  06/12/23 7282

## 2023-06-12 NOTE — ED PROVIDER NOTES
Attending Supervisory Note/Shared Visit   I have personally performed a face to face diagnostic evaluation on this patient. I have reviewed the mid-levels findings and agree. History and Exam by me shows alert black female no acute distress. Chronic kidney disease. On home hemodialysis. Presents complaining of a 3-week history of generalized abdominal pain, abdominal distention, decreased appetite, nausea, vomiting. She states she had a 5 pound weight loss over the last 3 weeks. General: Alert black female no acute distress. Heart: Regular rate and rhythm. No murmurs gallops noted. Lungs: Breath sounds equal bilaterally and clear. Abdomen: Soft, moderately distended, some moderate diffuse tenderness without guarding or rebound. No masses organomegaly.   Bowel sounds are normal.    Labs Reviewed   CBC WITH AUTO DIFFERENTIAL - Abnormal; Notable for the following components:       Result Value    RBC 3.86 (*)     Hematocrit 35.5 (*)     RDW 18.0 (*)     Neutrophils Absolute 8.3 (*)     Anisocytosis 1+ (*)     All other components within normal limits   BASIC METABOLIC PANEL - Abnormal; Notable for the following components:    Sodium 135 (*)     Chloride 92 (*)     Anion Gap 18 (*)     BUN 47 (*)     Creatinine 10.9 (*)     Est, Glom Filt Rate 4 (*)     All other components within normal limits    Narrative:     Mikayla Nieves tel. [phone],  Chemistry results called to and read back by [facility] KATINA SARGENT RN,  06/12/2023 10:37, by [address] - Abnormal; Notable for the following components:    Albumin 3.2 (*)     Alkaline Phosphatase 233 (*)     All other components within normal limits    Narrative:     Mikayla Nieves tel. [phone],  Chemistry results called to and read back by [facility] KATINA SARGENT RN,  06/12/2023 10:37, by JAKE   TROPONIN - Abnormal; Notable for the following components:    Troponin, High Sensitivity 347 (*)     All other components within normal limits
98.5 °F (36.9 °C)   TempSrc: Oral   SpO2: 100%   Weight: 141 lb 15.6 oz (64.4 kg)   Alternate diagnoses were considered but less likely based on history and physical. Considered kidney stone, pyelonephritis, UTI, appendicitis, bowel obstruction, diverticulitis, hernia, gastritis/gastroenteritis, pancreatitis, cholecystitis, hepatitis, constipation, IBS, IBD, other. Luis Garcia is a 46 y.o. nontoxic, chronically unwell appearing, female suspicion who presents to the emergency department for evaluation of a 3-week history of \"dull and sometimes sharp\" 8/10 mid abdominal pain. Accompanying symptoms include nausea, vomiting x4 today, decreased output with a 5 pound weight loss over the past 3 weeks, and resolved diarrhea after taking Pepto-Bismol and now endorses constipation for the past few days. Denies chest pain, lightheadedness, dizziness, presyncope, shortness of breath, diaphoresis, fever, chills, cough, change in ability to smell/taste, diarrhea, or other symptoms/concerns. The patient does not make urine. She is status post hysterectomy. She does dialysis at home and can select any 4 days of the week in order to do the dialysis. We will obtain troponin, delta troponin, CBC, BMP, lipase, CXR, EKG, and CT abdomen/pelvis. Work-up pending. Patient medicated as below.       Patient was given the following medications:  Fentanyl 50 mcg IV  Bentyl 10 mg by mouth  Zofran ODT 4 mg by mouth    Work up reveals:    Troponin: 347  Delta troponin: 352  CBC: No leukocytosis with anemia, RBC 3.86, hematocrit 35.1, RDW elevated in, neutrophils absolute elevated 8.3, anisocytosis 1+, otherwise unremarkable  Metabolic panel: Mild hyponatremia 135, hypochloremic at 92, anion gap of 18, BUN 47, creatinine 10.9, GFR 4, otherwise unremarkable in an ESRD patient  Lipase: [email or web address]  Hepatic function panel: Reduced at 3.2, alkaline phosphatase elevated 233, unremarkable  CXR: As noted above identify no acute process  CT

## 2023-06-12 NOTE — ED NOTES
Meal tray ordered for pt. Pt also requested more Erik Dupont APRN aware.       Cary Rutherford, RN  06/12/23 9740

## 2023-06-12 NOTE — ED NOTES
Report given to PRISCILLA Macdonald all questions and concerns answered; receiving RN acknowledge report and had no further questions at this time;      Compa Joseph RN  06/12/23 Julia Benavidez RN  06/12/23 1500

## 2023-06-12 NOTE — ED NOTES
Ultrasound line was placed by DALILA WREN after an unsuccessful attempt via US by this RN     Ant Gandhi, RN  06/12/23 0647 [address], RN  06/12/23 8373

## 2023-06-13 ENCOUNTER — APPOINTMENT (OUTPATIENT)
Dept: ULTRASOUND IMAGING | Age: 48
DRG: 425 | End: 2023-06-13
Attending: INTERNAL MEDICINE
Payer: MEDICAID

## 2023-06-13 ENCOUNTER — APPOINTMENT (OUTPATIENT)
Dept: ULTRASOUND IMAGING | Age: 48
DRG: 425 | End: 2023-06-13
Payer: MEDICAID

## 2023-06-13 LAB
ALBUMIN SERPL-MCNC: 3 G/DL (ref 3.4–5)
ALP SERPL-CCNC: 219 U/L (ref 40–129)
ALT SERPL-CCNC: 8 U/L (ref 10–40)
ANION GAP SERPL CALCULATED.3IONS-SCNC: 17 MMOL/L (ref 3–16)
APPEARANCE FLUID: CLEAR
AST SERPL-CCNC: 17 U/L (ref 15–37)
BASOPHILS # BLD: 0.1 K/UL (ref 0–0.2)
BASOPHILS NFR BLD: 0.7 %
BASOPHILS NFR FLD MANUAL: 1 %
BDY FLUID QUALITY: NORMAL
BILIRUB DIRECT SERPL-MCNC: <0.2 MG/DL (ref 0–0.3)
BILIRUB INDIRECT SERPL-MCNC: ABNORMAL MG/DL (ref 0–1)
BILIRUB SERPL-MCNC: 0.3 MG/DL (ref 0–1)
BUN SERPL-MCNC: 53 MG/DL (ref 7–20)
CALCIUM SERPL-MCNC: 9.1 MG/DL (ref 8.3–10.6)
CELL COUNT FLUID TYPE: NORMAL
CHLORIDE SERPL-SCNC: 95 MMOL/L (ref 99–110)
CO2 SERPL-SCNC: 27 MMOL/L (ref 21–32)
COLOR FLUID: YELLOW
CREAT SERPL-MCNC: 12.2 MG/DL (ref 0.6–1.1)
DEPRECATED RDW RBC AUTO: 18.1 % (ref 12.4–15.4)
EOSINOPHIL # BLD: 0.1 K/UL (ref 0–0.6)
EOSINOPHIL NFR BLD: 1.6 %
GFR SERPLBLD CREATININE-BSD FMLA CKD-EPI: 3 ML/MIN/{1.73_M2}
GLUCOSE BLD-MCNC: 108 MG/DL (ref 70–99)
GLUCOSE BLD-MCNC: 115 MG/DL (ref 70–99)
GLUCOSE BLD-MCNC: 117 MG/DL (ref 70–99)
GLUCOSE BLD-MCNC: 148 MG/DL (ref 70–99)
GLUCOSE BLD-MCNC: 36 MG/DL (ref 70–99)
GLUCOSE BLD-MCNC: 38 MG/DL (ref 70–99)
GLUCOSE BLD-MCNC: 45 MG/DL (ref 70–99)
GLUCOSE BLD-MCNC: 61 MG/DL (ref 70–99)
GLUCOSE BLD-MCNC: 79 MG/DL (ref 70–99)
GLUCOSE BLD-MCNC: 94 MG/DL (ref 70–99)
GLUCOSE FLD-MCNC: 71 MG/DL
GLUCOSE SERPL-MCNC: 33 MG/DL (ref 70–99)
HCT VFR BLD AUTO: 34.9 % (ref 36–48)
HGB BLD-MCNC: 11.6 G/DL (ref 12–16)
INR PPP: 1.05 (ref 0.84–1.16)
LDH FLD L TO P-CCNC: 170 U/L
LYMPHOCYTES # BLD: 2.4 K/UL (ref 1–5.1)
LYMPHOCYTES NFR BLD: 29.5 %
LYMPHOCYTES NFR FLD: 48 %
MACROPHAGES # FLD: 51 %
MAGNESIUM SERPL-MCNC: 2.6 MG/DL (ref 1.8–2.4)
MCH RBC QN AUTO: 30.8 PG (ref 26–34)
MCHC RBC AUTO-ENTMCNC: 33.4 G/DL (ref 31–36)
MCV RBC AUTO: 92.1 FL (ref 80–100)
MONOCYTES # BLD: 1 K/UL (ref 0–1.3)
MONOCYTES NFR BLD: 12.2 %
NEUTROPHILS # BLD: 4.5 K/UL (ref 1.7–7.7)
NEUTROPHILS NFR BLD: 56 %
NUC CELL # FLD: 163 /CUMM
PERFORMED ON: ABNORMAL
PERFORMED ON: NORMAL
PERFORMED ON: NORMAL
PLATELET # BLD AUTO: 376 K/UL (ref 135–450)
PMV BLD AUTO: 8.5 FL (ref 5–10.5)
POTASSIUM SERPL-SCNC: 3.5 MMOL/L (ref 3.5–5.1)
PROT FLD-MCNC: 4.2 G/DL
PROT SERPL-MCNC: 7.1 G/DL (ref 6.4–8.2)
PROTHROMBIN TIME: 13.7 SEC (ref 11.5–14.8)
RBC # BLD AUTO: 3.78 M/UL (ref 4–5.2)
RBC FLUID: <2000 /CUMM
SODIUM SERPL-SCNC: 139 MMOL/L (ref 136–145)
SPECIMEN SOURCE FLD: NORMAL
TOTAL CELLS COUNTED FLD: 100
WBC # BLD AUTO: 8 K/UL (ref 4–11)

## 2023-06-13 PROCEDURE — 94760 N-INVAS EAR/PLS OXIMETRY 1: CPT

## 2023-06-13 PROCEDURE — 2060000000 HC ICU INTERMEDIATE R&B

## 2023-06-13 PROCEDURE — 6370000000 HC RX 637 (ALT 250 FOR IP): Performed by: INTERNAL MEDICINE

## 2023-06-13 PROCEDURE — 83615 LACTATE (LD) (LDH) ENZYME: CPT

## 2023-06-13 PROCEDURE — 2500000003 HC RX 250 WO HCPCS: Performed by: INTERNAL MEDICINE

## 2023-06-13 PROCEDURE — 6360000002 HC RX W HCPCS: Performed by: NURSE PRACTITIONER

## 2023-06-13 PROCEDURE — 87205 SMEAR GRAM STAIN: CPT

## 2023-06-13 PROCEDURE — 87070 CULTURE OTHR SPECIMN AEROBIC: CPT

## 2023-06-13 PROCEDURE — 90935 HEMODIALYSIS ONE EVALUATION: CPT

## 2023-06-13 PROCEDURE — 5A1D70Z PERFORMANCE OF URINARY FILTRATION, INTERMITTENT, LESS THAN 6 HOURS PER DAY: ICD-10-PCS | Performed by: INTERNAL MEDICINE

## 2023-06-13 PROCEDURE — 0W9G3ZZ DRAINAGE OF PERITONEAL CAVITY, PERCUTANEOUS APPROACH: ICD-10-PCS | Performed by: STUDENT IN AN ORGANIZED HEALTH CARE EDUCATION/TRAINING PROGRAM

## 2023-06-13 PROCEDURE — 80076 HEPATIC FUNCTION PANEL: CPT

## 2023-06-13 PROCEDURE — 2709999900 US GUIDED PARACENTESIS

## 2023-06-13 PROCEDURE — 2580000003 HC RX 258: Performed by: INTERNAL MEDICINE

## 2023-06-13 PROCEDURE — 85025 COMPLETE CBC W/AUTO DIFF WBC: CPT

## 2023-06-13 PROCEDURE — 82945 GLUCOSE OTHER FLUID: CPT

## 2023-06-13 PROCEDURE — 36415 COLL VENOUS BLD VENIPUNCTURE: CPT

## 2023-06-13 PROCEDURE — 84157 ASSAY OF PROTEIN OTHER: CPT

## 2023-06-13 PROCEDURE — 85610 PROTHROMBIN TIME: CPT

## 2023-06-13 PROCEDURE — 9990000010 HC NO CHARGE VISIT: Performed by: PHYSICAL THERAPIST

## 2023-06-13 PROCEDURE — 87015 SPECIMEN INFECT AGNT CONCNTJ: CPT

## 2023-06-13 PROCEDURE — 6360000002 HC RX W HCPCS: Performed by: INTERNAL MEDICINE

## 2023-06-13 PROCEDURE — 83735 ASSAY OF MAGNESIUM: CPT

## 2023-06-13 PROCEDURE — 80048 BASIC METABOLIC PNL TOTAL CA: CPT

## 2023-06-13 RX ORDER — DEXTROSE MONOHYDRATE 100 MG/ML
INJECTION, SOLUTION INTRAVENOUS CONTINUOUS PRN
Status: DISCONTINUED | OUTPATIENT
Start: 2023-06-13 | End: 2023-06-14 | Stop reason: HOSPADM

## 2023-06-13 RX ORDER — DEXTROSE MONOHYDRATE 100 MG/ML
INJECTION, SOLUTION INTRAVENOUS CONTINUOUS PRN
Status: DISCONTINUED | OUTPATIENT
Start: 2023-06-13 | End: 2023-06-13 | Stop reason: SDUPTHER

## 2023-06-13 RX ORDER — PROMETHAZINE HYDROCHLORIDE 25 MG/ML
12.5 INJECTION, SOLUTION INTRAMUSCULAR; INTRAVENOUS EVERY 6 HOURS PRN
Status: DISCONTINUED | OUTPATIENT
Start: 2023-06-13 | End: 2023-06-14 | Stop reason: HOSPADM

## 2023-06-13 RX ORDER — DEXTROSE MONOHYDRATE 100 MG/ML
INJECTION, SOLUTION INTRAVENOUS CONTINUOUS
Status: DISCONTINUED | OUTPATIENT
Start: 2023-06-13 | End: 2023-06-14

## 2023-06-13 RX ORDER — HEPARIN SODIUM 1000 [USP'U]/ML
3200 INJECTION, SOLUTION INTRAVENOUS; SUBCUTANEOUS
Status: DISCONTINUED | OUTPATIENT
Start: 2023-06-13 | End: 2023-06-14 | Stop reason: HOSPADM

## 2023-06-13 RX ADMIN — HEPARIN SODIUM 5000 UNITS: 5000 INJECTION INTRAVENOUS; SUBCUTANEOUS at 16:01

## 2023-06-13 RX ADMIN — MIRTAZAPINE 30 MG: 30 TABLET, FILM COATED ORAL at 21:00

## 2023-06-13 RX ADMIN — NIFEDIPINE 60 MG: 60 TABLET, EXTENDED RELEASE ORAL at 09:34

## 2023-06-13 RX ADMIN — LOSARTAN POTASSIUM 50 MG: 50 TABLET, FILM COATED ORAL at 21:00

## 2023-06-13 RX ADMIN — NIFEDIPINE 60 MG: 60 TABLET, EXTENDED RELEASE ORAL at 21:00

## 2023-06-13 RX ADMIN — HEPARIN SODIUM 3200 UNITS: 1000 INJECTION INTRAVENOUS; SUBCUTANEOUS at 14:47

## 2023-06-13 RX ADMIN — LEVETIRACETAM 500 MG: 500 TABLET, FILM COATED ORAL at 21:00

## 2023-06-13 RX ADMIN — CALCIUM ACETATE 2001 MG: 667 CAPSULE ORAL at 17:04

## 2023-06-13 RX ADMIN — PANTOPRAZOLE SODIUM 40 MG: 40 TABLET, DELAYED RELEASE ORAL at 06:09

## 2023-06-13 RX ADMIN — HEPARIN SODIUM 5000 UNITS: 5000 INJECTION INTRAVENOUS; SUBCUTANEOUS at 06:07

## 2023-06-13 RX ADMIN — GABAPENTIN 200 MG: 100 CAPSULE ORAL at 22:29

## 2023-06-13 RX ADMIN — LEVOTHYROXINE SODIUM 50 MCG: 0.05 TABLET ORAL at 06:09

## 2023-06-13 RX ADMIN — SODIUM CHLORIDE, PRESERVATIVE FREE 10 ML: 5 INJECTION INTRAVENOUS at 21:24

## 2023-06-13 RX ADMIN — MINOXIDIL 5 MG: 2.5 TABLET ORAL at 09:34

## 2023-06-13 RX ADMIN — METOPROLOL TARTRATE 50 MG: 50 TABLET ORAL at 09:34

## 2023-06-13 RX ADMIN — SODIUM CHLORIDE, PRESERVATIVE FREE 10 ML: 5 INJECTION INTRAVENOUS at 09:37

## 2023-06-13 RX ADMIN — GABAPENTIN 200 MG: 100 CAPSULE ORAL at 06:08

## 2023-06-13 RX ADMIN — CALCIUM ACETATE 2001 MG: 667 CAPSULE ORAL at 09:34

## 2023-06-13 RX ADMIN — LEVETIRACETAM 500 MG: 500 TABLET, FILM COATED ORAL at 09:34

## 2023-06-13 RX ADMIN — LOSARTAN POTASSIUM 50 MG: 50 TABLET, FILM COATED ORAL at 09:34

## 2023-06-13 RX ADMIN — DEXTROSE MONOHYDRATE: 100 INJECTION, SOLUTION INTRAVENOUS at 11:19

## 2023-06-13 RX ADMIN — DEXTROSE MONOHYDRATE 125 ML: 100 INJECTION, SOLUTION INTRAVENOUS at 07:24

## 2023-06-13 RX ADMIN — MORPHINE SULFATE 2 MG: 2 INJECTION, SOLUTION INTRAMUSCULAR; INTRAVENOUS at 17:00

## 2023-06-13 RX ADMIN — MORPHINE SULFATE 2 MG: 2 INJECTION, SOLUTION INTRAMUSCULAR; INTRAVENOUS at 10:34

## 2023-06-13 RX ADMIN — PROMETHAZINE HYDROCHLORIDE 12.5 MG: 25 INJECTION INTRAMUSCULAR; INTRAVENOUS at 21:01

## 2023-06-13 RX ADMIN — DEXTROSE MONOHYDRATE: 100 INJECTION, SOLUTION INTRAVENOUS at 12:41

## 2023-06-13 RX ADMIN — GABAPENTIN 200 MG: 100 CAPSULE ORAL at 16:02

## 2023-06-13 RX ADMIN — HEPARIN SODIUM 5000 UNITS: 5000 INJECTION INTRAVENOUS; SUBCUTANEOUS at 21:01

## 2023-06-13 RX ADMIN — METOPROLOL TARTRATE 50 MG: 50 TABLET ORAL at 21:00

## 2023-06-13 RX ADMIN — ATORVASTATIN CALCIUM 80 MG: 80 TABLET, FILM COATED ORAL at 22:29

## 2023-06-13 ASSESSMENT — PAIN DESCRIPTION - ORIENTATION
ORIENTATION: UPPER
ORIENTATION: UPPER

## 2023-06-13 ASSESSMENT — PAIN DESCRIPTION - DESCRIPTORS
DESCRIPTORS: ACHING
DESCRIPTORS: ACHING

## 2023-06-13 ASSESSMENT — PAIN DESCRIPTION - LOCATION
LOCATION: ABDOMEN
LOCATION: ABDOMEN

## 2023-06-13 ASSESSMENT — PAIN SCALES - GENERAL
PAINLEVEL_OUTOF10: 9
PAINLEVEL_OUTOF10: 4
PAINLEVEL_OUTOF10: 9

## 2023-06-13 NOTE — PLAN OF CARE
Problem: Discharge Planning  Goal: Discharge to home or other facility with appropriate resources  6/12/2023 2158 by Silke Boothe RN  Outcome: Progressing  Flowsheets (Taken 6/12/2023 1956)  Discharge to home or other facility with appropriate resources: Identify barriers to discharge with patient and caregiver     Problem: Pain  Goal: Verbalizes/displays adequate comfort level or baseline comfort level  6/12/2023 2158 by Silke Boothe RN  Outcome: Progressing  Flowsheets (Taken 6/12/2023 1956)  Verbalizes/displays adequate comfort level or baseline comfort level: Assess pain using appropriate pain scale   Pain/discomfort being managed with PRN analgesics per MD orders. Pt able to express presence and absence of pain and rate pain appropriately using numerical scale. Problem: Safety - Adult  Goal: Free from fall injury  6/12/2023 2158 by Silke Boothe RN  Outcome: Progressing   Patient assessed for fall risk; fall precautions initiated. Patient and family instructed about safety devices. Environment kept free of clutter and adequate lighting provided. Bed locked and in lowest position. Call light within reach. Will continue to monitor.

## 2023-06-13 NOTE — BRIEF OP NOTE
Brief Postoperative Note    Mary Jo Osman  YOB: 1975  [id number]    Pre-operative Diagnosis: Ascites    Post-operative Diagnosis: Same    Procedure: US Guided Paracentesis    Anesthesia: Local    Surgeons/Assistants: Helio Nicholson MD    Estimated Blood Loss: less than 5 mL    Complications: None    Specimens: Was Obtained: serous Ascitic Fluid    Findings: Technically successful US guided paracentesis.     Electronically signed by Helio Nicholson MD on 6/13/2023 at 11:41 AM

## 2023-06-13 NOTE — PLAN OF CARE
Problem: Discharge Planning  Goal: Discharge to home or other facility with appropriate resources  Outcome: Progressing     Problem: Pain  Goal: Verbalizes/displays adequate comfort level or baseline comfort level  Outcome: Progressing  Flowsheets (Taken 6/13/2023 0306 by Virginia Brandt RN)  Verbalizes/displays adequate comfort level or baseline comfort level: Assess pain using appropriate pain scale     Problem: Safety - Adult  Goal: Free from fall injury  Outcome: Progressing

## 2023-06-13 NOTE — CONSULTS
Financial Resource Strain: Not on file   Food Insecurity: Not on file   Transportation Needs: Not on file   Physical Activity: Not on file   Stress: Not on file   Social Connections: Not on file   Intimate Partner Violence: Not on file   Housing Stability: Not on file       ROS: no diarrhea. Some nasuea. She has been losing weight. No SOB. No focal weakness. Rest of 10 point ROS otherwise unremarkable. Medications     atorvastatin  80 mg Oral QHS    calcium acetate  2,001 mg Oral TID WC    gabapentin  200 mg Oral 3 times per day    insulin glargine  5 Units SubCUTAneous Nightly    levETIRAcetam  500 mg Oral BID    levothyroxine  50 mcg Oral Daily    losartan  50 mg Oral BID    metoprolol tartrate  50 mg Oral BID    minoxidil  5 mg Oral BID    mirtazapine  30 mg Oral Nightly    NIFEdipine  60 mg Oral BID    pantoprazole  40 mg Oral QAM AC    sodium chloride flush  5-40 mL IntraVENous 2 times per day    heparin (porcine)  5,000 Units SubCUTAneous 3 times per day    insulin lispro  0-4 Units SubCUTAneous TID WC    insulin lispro  0-4 Units SubCUTAneous Nightly         OBJECTIVE      Physical    TEMPERATURE:  Current - Temp: 99.3 °F (37.4 °C); Max - Temp  Av.8 °F (37.1 °C)  Min: 98.3 °F (36.8 °C)  Max: 99.3 °F (37.4 °C)  RESPIRATIONS RANGE: Resp  Av.3  Min: 10  Max: 30  PULSE RANGE: Pulse  Av.9  Min: 71  Max: 91  BLOOD PRESSURE RANGE:  Systolic (23FAK), :318 , Min:132 , ENF:738   ; Diastolic (04AWN), RKZ:91, Min:68, Max:95    PULSE OXIMETRY RANGE: SpO2  Av %  Min: 90 %  Max: 100 %  24HR INTAKE/OUTPUT:    Intake/Output Summary (Last 24 hours) at 2023 0742  Last data filed at 2023 2870  Gross per 24 hour   Intake 360 ml   Output --   Net 360 ml       GEN: no distress  HEENT: no icterus  NECK: Trachea midline. [facility] TDC  CV: RRR  LUNGS: diminished bilaterally, unlabored  ABD: + bowel sounds. + distension. + tenderness  EXT: no edema.    SKIN: no rash  NEURO: no tremor    Data      Lab

## 2023-06-14 ENCOUNTER — APPOINTMENT (OUTPATIENT)
Dept: GENERAL RADIOLOGY | Age: 48
DRG: 425 | End: 2023-06-14
Payer: MEDICAID

## 2023-06-14 VITALS
HEIGHT: 67 IN | WEIGHT: 134.26 LBS | RESPIRATION RATE: 17 BRPM | OXYGEN SATURATION: 96 % | DIASTOLIC BLOOD PRESSURE: 60 MMHG | TEMPERATURE: 98.4 F | HEART RATE: 68 BPM | SYSTOLIC BLOOD PRESSURE: 135 MMHG | BODY MASS INDEX: 21.07 KG/M2

## 2023-06-14 LAB
GLUCOSE BLD-MCNC: 100 MG/DL (ref 70–99)
GLUCOSE BLD-MCNC: 100 MG/DL (ref 70–99)
GLUCOSE BLD-MCNC: 106 MG/DL (ref 70–99)
GLUCOSE BLD-MCNC: 117 MG/DL (ref 70–99)
GLUCOSE BLD-MCNC: 170 MG/DL (ref 70–99)
PERFORMED ON: ABNORMAL
PHOSPHATE SERPL-MCNC: 2.3 MG/DL (ref 2.5–4.9)

## 2023-06-14 PROCEDURE — 6360000002 HC RX W HCPCS: Performed by: INTERNAL MEDICINE

## 2023-06-14 PROCEDURE — 97530 THERAPEUTIC ACTIVITIES: CPT | Performed by: PHYSICAL THERAPIST

## 2023-06-14 PROCEDURE — 94760 N-INVAS EAR/PLS OXIMETRY 1: CPT

## 2023-06-14 PROCEDURE — 2500000003 HC RX 250 WO HCPCS: Performed by: INTERNAL MEDICINE

## 2023-06-14 PROCEDURE — 36415 COLL VENOUS BLD VENIPUNCTURE: CPT

## 2023-06-14 PROCEDURE — 97535 SELF CARE MNGMENT TRAINING: CPT

## 2023-06-14 PROCEDURE — 84100 ASSAY OF PHOSPHORUS: CPT

## 2023-06-14 PROCEDURE — 74018 RADEX ABDOMEN 1 VIEW: CPT

## 2023-06-14 PROCEDURE — 2580000003 HC RX 258: Performed by: INTERNAL MEDICINE

## 2023-06-14 PROCEDURE — 97161 PT EVAL LOW COMPLEX 20 MIN: CPT | Performed by: PHYSICAL THERAPIST

## 2023-06-14 PROCEDURE — 97116 GAIT TRAINING THERAPY: CPT | Performed by: PHYSICAL THERAPIST

## 2023-06-14 PROCEDURE — 97166 OT EVAL MOD COMPLEX 45 MIN: CPT

## 2023-06-14 PROCEDURE — 97530 THERAPEUTIC ACTIVITIES: CPT

## 2023-06-14 PROCEDURE — 6370000000 HC RX 637 (ALT 250 FOR IP): Performed by: INTERNAL MEDICINE

## 2023-06-14 RX ORDER — CALCIUM ACETATE 667 MG/1
3 CAPSULE ORAL
Status: DISCONTINUED | OUTPATIENT
Start: 2023-06-14 | End: 2023-06-14 | Stop reason: HOSPADM

## 2023-06-14 RX ORDER — CALCIUM ACETATE 667 MG/1
1 CAPSULE ORAL
Status: DISCONTINUED | OUTPATIENT
Start: 2023-06-14 | End: 2023-06-14

## 2023-06-14 RX ADMIN — Medication 10 ML: at 02:13

## 2023-06-14 RX ADMIN — MORPHINE SULFATE 2 MG: 2 INJECTION, SOLUTION INTRAMUSCULAR; INTRAVENOUS at 02:14

## 2023-06-14 RX ADMIN — PANTOPRAZOLE SODIUM 40 MG: 40 TABLET, DELAYED RELEASE ORAL at 06:54

## 2023-06-14 RX ADMIN — ONDANSETRON 4 MG: 2 INJECTION INTRAMUSCULAR; INTRAVENOUS at 13:11

## 2023-06-14 RX ADMIN — MINOXIDIL 5 MG: 2.5 TABLET ORAL at 09:40

## 2023-06-14 RX ADMIN — HYDROXYZINE HYDROCHLORIDE 25 MG: 25 TABLET, FILM COATED ORAL at 11:52

## 2023-06-14 RX ADMIN — LOSARTAN POTASSIUM 50 MG: 50 TABLET, FILM COATED ORAL at 09:40

## 2023-06-14 RX ADMIN — LEVETIRACETAM 500 MG: 500 TABLET, FILM COATED ORAL at 09:40

## 2023-06-14 RX ADMIN — GABAPENTIN 200 MG: 100 CAPSULE ORAL at 14:42

## 2023-06-14 RX ADMIN — LEVOTHYROXINE SODIUM 50 MCG: 0.05 TABLET ORAL at 06:54

## 2023-06-14 RX ADMIN — GABAPENTIN 200 MG: 100 CAPSULE ORAL at 06:54

## 2023-06-14 RX ADMIN — MORPHINE SULFATE 2 MG: 2 INJECTION, SOLUTION INTRAMUSCULAR; INTRAVENOUS at 09:42

## 2023-06-14 RX ADMIN — CALCIUM ACETATE 667 MG: 667 CAPSULE ORAL at 11:50

## 2023-06-14 RX ADMIN — HEPARIN SODIUM 5000 UNITS: 5000 INJECTION INTRAVENOUS; SUBCUTANEOUS at 06:54

## 2023-06-14 RX ADMIN — SODIUM CHLORIDE, PRESERVATIVE FREE 10 ML: 5 INJECTION INTRAVENOUS at 09:45

## 2023-06-14 RX ADMIN — METOPROLOL TARTRATE 50 MG: 50 TABLET ORAL at 09:40

## 2023-06-14 RX ADMIN — NIFEDIPINE 60 MG: 60 TABLET, EXTENDED RELEASE ORAL at 09:40

## 2023-06-14 ASSESSMENT — PAIN DESCRIPTION - DESCRIPTORS: DESCRIPTORS: ACHING

## 2023-06-14 ASSESSMENT — PAIN DESCRIPTION - LOCATION
LOCATION: ABDOMEN;HEAD
LOCATION: ABDOMEN;BACK

## 2023-06-14 ASSESSMENT — PAIN SCALES - GENERAL: PAINLEVEL_OUTOF10: 8

## 2023-06-14 ASSESSMENT — PAIN DESCRIPTION - ORIENTATION: ORIENTATION: LEFT

## 2023-06-14 NOTE — ACP (ADVANCE CARE PLANNING)
Advance Care Planning     Advance Care Planning Activator (Inpatient)  Conversation Note      Date of ACP Conversation: 6/14/2023     Conversation Conducted with: Patient with Decision Making Capacity    ACP Activator: Vinny Alvarez RN    Health Care Decision Maker:     Current Designated Health Care Decision Maker:     Primary Decision Maker: Luis Eduardo Zuluaga Brother/Sister - [phone]    Care Preferences    Ventilation: \"If you were in your present state of health and suddenly became very ill and were unable to breathe on your own, what would your preference be about the use of a ventilator (breathing machine) if it were available to you? \"      Would the patient desire the use of ventilator (breathing machine)?: yes    \"If your health worsens and it becomes clear that your chance of recovery is unlikely, what would your preference be about the use of a ventilator (breathing machine) if it were available to you? \"     Would the patient desire the use of ventilator (breathing machine)?: n/a      Resuscitation  \"CPR works best to restart the heart when there is a sudden event, like a heart attack, in someone who is otherwise healthy. Unfortunately, CPR does not typically restart the heart for people who have serious health conditions or who are very sick. \"    \"In the event your heart stopped as a result of an underlying serious health condition, would you want attempts to be made to restart your heart (answer \"yes\" for attempt to resuscitate) or would you prefer a natural death (answer \"no\" for do not attempt to resuscitate)? \" yes       [] Yes   [x] No   Educated Patient / Iftikhar Leavitt regarding differences between Advance Directives and portable DNR orders.     Length of ACP Conversation in minutes:  5 minutes    Conversation Outcomes:  ACP discussion completed    Follow-up plan:    [] Schedule follow-up conversation to continue planning  [] Referred individual to Provider for additional questions/concerns

## 2023-06-14 NOTE — PLAN OF CARE
Problem: Discharge Planning  Goal: Discharge to home or other facility with appropriate resources  6/14/2023 1731 by Jose Marques RN  Outcome: Completed     Problem: Pain  Goal: Verbalizes/displays adequate comfort level or baseline comfort level  6/14/2023 1731 by Jose Marques RN  Outcome: Completed     Problem: Safety - Adult  Goal: Free from fall injury  6/14/2023 1731 by Jose Marques RN  Outcome: Completed     Problem: Chronic Conditions and Co-morbidities  Goal: Patient's chronic conditions and co-morbidity symptoms are monitored and maintained or improved  6/14/2023 1731 by Jose Marques RN  Outcome: Completed

## 2023-06-14 NOTE — PLAN OF CARE
Problem: Discharge Planning  Goal: Discharge to home or other facility with appropriate resources  6/14/2023 1238 by Haley Larson RN  Outcome: Progressing     Problem: Pain  Goal: Verbalizes/displays adequate comfort level or baseline comfort level  6/14/2023 1238 by Haley Larson RN  Outcome: Progressing     Problem: Safety - Adult  Goal: Free from fall injury  6/14/2023 1238 by Haley Larson RN  Outcome: Progressing     Problem: Chronic Conditions and Co-morbidities  Goal: Patient's chronic conditions and co-morbidity symptoms are monitored and maintained or improved  Outcome: Progressing

## 2023-06-14 NOTE — CARE COORDINATION
Case Management Assessment  Initial Evaluation    Date/Time of Evaluation: 6/14/2023 3:09 PM  Assessment Completed by: Maegan Carrillo RN    If patient is discharged prior to next notation, then this note serves as note for discharge by case management. Patient Name: Micaela Duvall                   YOB: 1975  Diagnosis: Generalized abdominal pain [R10.84]  Decreased appetite [R63.0]  Unexplained weight loss [R63.4]  Elevated alkaline phosphatase level [R74.8]  Abnormal finding on CT scan [R93.89]  History of end stage renal disease [Z87.448]  Ascites of liver [R18.8]                   Date / Time: 6/12/2023  9:19 AM    Patient Admission Status: Inpatient   Readmission Risk (Low < 19, Mod (19-27), High > 27): Readmission Risk Score: 19.6    Current PCP: Britta Mcgraw MD  PCP verified by CM? Yes    Chart Reviewed: Yes      History Provided by: Patient  Patient Orientation: Alert and Oriented, Person, Place, Situation    Patient Cognition: Alert    Hospitalization in the last 30 days (Readmission):  No    If yes, Readmission Assessment in CM Navigator will be completed. Advance Directives:      Code Status: Full Code   Patient's Primary Decision Maker is: Legal Next of Kin    Primary Decision Maker: Yoel Saldaña - Brother/Sister - [phone]    Discharge Planning:    Patient lives with: Spouse/Significant Other Type of Home: Apartment (8 steps up to home)  Primary Care Giver: Self  Patient Support Systems include: Spouse/Significant Other, Family Members   Current Financial resources: Medicaid  Current community resources: None  Current services prior to admission: [address], Other (Comment) (does home dialysis 4 days per week)            Current DME: [facility], Other (Comment) (raised toilet seat & grab bars; hemodialysis supplies)            Type of Home Care services:  None    ADLS  Prior functional level:  Independent in ADLs/IADLs  Current functional level:

## 2023-06-16 LAB
BACTERIA FLD AEROBE CULT: NORMAL
GRAM STN SPEC: NORMAL

## 2023-06-16 NOTE — PROGRESS NOTES
nephrology pt  Notified Dr Kathryn Neri from [facility] for dialysis  Did not see the pt    Samantha Hansen MD
4 Eyes Skin Assessment     NAME:  Luis Garcia  YOB: 1975  MEDICAL RECORD NUMBER:  [id number]    The patient is being assessed for  Admission    I agree that at least one RN has performed a thorough Head to Toe Skin Assessment on the patient. ALL assessment sites listed below have been assessed. Areas assessed by both nurses:    Head, Face, Ears, Shoulders, Back, Chest, Arms, Elbows, Hands, Sacrum. Buttock, Coccyx, Ischium, and Legs. Feet and Heels        Does the Patient have a Wound?  No noted wound(s)       Devonte Prevention initiated by RN: Yes  Wound Care Orders initiated by RN: No    Pressure Injury (Stage 3,4, Unstageable, DTI, NWPT, and Complex wounds) if present, place Wound referral order by RN under : No    New Ostomies, if present place, Ostomy referral order under : No     Nurse 1 eSignature: Electronically signed by Handy Palmer RN on 6/12/23 at 4:44 PM EDT    **SHARE this note so that the co-signing nurse can place an eSignature**    Nurse 2 eSignature: Electronically signed by Martita Foy RN on 6/12/23 at 5:13 PM EDT
Discharge orders acknowledged by RN . Discharge teaching completed with pt and family. AVS reviewed and all questions answered. Medication regimen reviewed and pt understands schedule. Follow up appointments also reviewed with pt and resources given for discharge. Pt was sent electronic to be filled and understands schedule. IV removed. Bedside monitor removed from pt. 60+ minutes of education completed. Required core measures completed. Pt vitals WDL. Pt discharged with all belongings to home with significant other. Pt transported off of unit via wheelchair. No complications.        Electronically signed by Jose Magaña RN on 6/14/2023 at 6:27 PM
EDSBAR report has been reviewed. Spoke with Marva Gloria RN. Room currently being cleaned.
Hospitalist Progress Note      PCP: Kitty Paredes MD    Date of Admission: 6/12/2023    Subjective: getting paracentesis today, was hypoglycemic this am, needed dextrose    Medications:  Reviewed    Infusion Medications    dextrose Stopped (06/13/23 1238)    dextrose 50 mL/hr at 06/13/23 1241    sodium chloride       Scheduled Medications    heparin (porcine)  3,200 Units IntraCATHeter Once per day on Tue Thu Sat    atorvastatin  80 mg Oral QHS    calcium acetate  2,001 mg Oral TID WC    gabapentin  200 mg Oral 3 times per day    insulin glargine  5 Units SubCUTAneous Nightly    levETIRAcetam  500 mg Oral BID    levothyroxine  50 mcg Oral Daily    losartan  50 mg Oral BID    metoprolol tartrate  50 mg Oral BID    minoxidil  5 mg Oral BID    mirtazapine  30 mg Oral Nightly    NIFEdipine  60 mg Oral BID    pantoprazole  40 mg Oral QAM AC    sodium chloride flush  5-40 mL IntraVENous 2 times per day    heparin (porcine)  5,000 Units SubCUTAneous 3 times per day    insulin lispro  0-4 Units SubCUTAneous TID WC    insulin lispro  0-4 Units SubCUTAneous Nightly     PRN Meds: glucose, glucagon (rDNA), dextrose, dextrose bolus **OR** dextrose bolus, hydrOXYzine HCl, sodium chloride flush, sodium chloride, ondansetron **OR** ondansetron, polyethylene glycol, acetaminophen **OR** acetaminophen, morphine      Intake/Output Summary (Last 24 hours) at 6/13/2023 1829  Last data filed at 6/13/2023 1634  Gross per 24 hour   Intake 720 ml   Output 4800 ml   Net -4080 ml       Physical Exam Performed:    /74   Pulse 70   Temp 97 °F (36.1 °C) (Oral)   Resp 12   Ht 5' 7\" (1.702 m)   Wt 133 lb 9.6 oz (60.6 kg)   SpO2 100%   BMI 20.92 kg/m²     General appearance: No apparent distress appears stated age and cooperative. HEENT Normal cephalic, atraumatic without obvious deformity. Pupils equal, round, and reactive to light. Extra ocular muscles intact. Conjunctivae/corneas clear.   Neck: Supple, No jugular venous
Hospitalist Progress Note      PCP: Octavio Keys MD    Date of Admission: 6/12/2023    Subjective: was feeling better, but developed nausea/vomiting this noon    Medications:  Reviewed    Infusion Medications    dextrose Stopped (06/13/23 1238)    dextrose 50 mL/hr at 06/13/23 1241    sodium chloride       Scheduled Medications    calcium acetate  3 capsule Oral TID WC    heparin (porcine)  3,200 Units IntraCATHeter Once per day on Tue Thu Sat    atorvastatin  80 mg Oral QHS    gabapentin  200 mg Oral 3 times per day    [Held by provider] insulin glargine  5 Units SubCUTAneous Nightly    levETIRAcetam  500 mg Oral BID    levothyroxine  50 mcg Oral Daily    losartan  50 mg Oral BID    metoprolol tartrate  50 mg Oral BID    minoxidil  5 mg Oral BID    mirtazapine  30 mg Oral Nightly    NIFEdipine  60 mg Oral BID    pantoprazole  40 mg Oral QAM AC    sodium chloride flush  5-40 mL IntraVENous 2 times per day    heparin (porcine)  5,000 Units SubCUTAneous 3 times per day    insulin lispro  0-4 Units SubCUTAneous TID WC    insulin lispro  0-4 Units SubCUTAneous Nightly     PRN Meds: glucose, glucagon (rDNA), dextrose, dextrose bolus **OR** dextrose bolus, promethazine, hydrOXYzine HCl, sodium chloride flush, sodium chloride, ondansetron **OR** ondansetron, polyethylene glycol, acetaminophen **OR** acetaminophen, morphine      Intake/Output Summary (Last 24 hours) at 6/14/2023 1743  Last data filed at 6/14/2023 1410  Gross per 24 hour   Intake --   Output 500 ml   Net -500 ml       Physical Exam Performed:    /60   Pulse 68   Temp 98.4 °F (36.9 °C) (Oral)   Resp 17   Ht 5' 7\" (1.702 m)   Wt 134 lb 4.2 oz (60.9 kg)   SpO2 96%   BMI 21.03 kg/m²     General appearance: No apparent distress appears stated age and cooperative. HEENT Normal cephalic, atraumatic without obvious deformity. Pupils equal, round, and reactive to light. Extra ocular muscles intact. Conjunctivae/corneas clear.   Neck: Supple, No
[facility] Inpatient Nephrology Note        SUBJECTIVE:    Reason: ESRD Management  HPI: BP adequate. Removed 3 kg with HD  Soc Hx: no family present  ROS: removed 4kg with paracentesis. No SOB      Medications     heparin (porcine)  3,200 Units IntraCATHeter Once per day on  Sat    atorvastatin  80 mg Oral QHS    calcium acetate  2,001 mg Oral TID WC    gabapentin  200 mg Oral 3 times per day    [Held by provider] insulin glargine  5 Units SubCUTAneous Nightly    levETIRAcetam  500 mg Oral BID    levothyroxine  50 mcg Oral Daily    losartan  50 mg Oral BID    metoprolol tartrate  50 mg Oral BID    minoxidil  5 mg Oral BID    mirtazapine  30 mg Oral Nightly    NIFEdipine  60 mg Oral BID    pantoprazole  40 mg Oral QAM AC    sodium chloride flush  5-40 mL IntraVENous 2 times per day    heparin (porcine)  5,000 Units SubCUTAneous 3 times per day    insulin lispro  0-4 Units SubCUTAneous TID WC    insulin lispro  0-4 Units SubCUTAneous Nightly         OBJECTIVE      Physical    TEMPERATURE:  Current - Temp: 98.7 °F (37.1 °C); Max - Temp  Av.1 °F (36.7 °C)  Min: 97 °F (36.1 °C)  Max: 99.2 °F (37.3 °C)  RESPIRATIONS RANGE: Resp  Avg: 15.7  Min: 12  Max: 18  PULSE RANGE: Pulse  Av.4  Min: 62  Max: 76  BLOOD PRESSURE RANGE:  Systolic (73JHY), QXP:635 , Min:111 , AFN:297   ; Diastolic (07DIN), LJI:85, Min:49, Max:87    PULSE OXIMETRY RANGE: SpO2  Av.9 %  Min: 91 %  Max: 100 %  24HR INTAKE/OUTPUT:    Intake/Output Summary (Last 24 hours) at 2023 0757  Last data filed at 2023 1634  Gross per 24 hour   Intake 600 ml   Output 4800 ml   Net -4200 ml         GEN: no distress  HEENT: no icterus  NECK: Trachea midline. LIJ TDC  CV: RRR  LUNGS: diminished bilaterally, unlabored  ABD: + bowel sounds. No distension. + tenderness  EXT: no edema.    SKIN: no rash  NEURO: no tremor    Data      Lab Results   Component Value Date    CREATININE 12.2 ([facility]) 2023
Medication Reconciliation    List of medications patient is currently taking is complete. Source of information: 1. Conversation with patient at bedside                                      2. EPIC records      Allergies  Ferrous sulfate, Cephalexin, Dicloxacillin, Pcn [penicillins], and Sulfa antibiotics     Notes regarding home medications:   1. Patient did not receive any of her home medications prior to arrival to the ER today. 2. Patient takes both PhosLo and Xenia. 3. Patient is excellent historian  4. Patient takes Keppra 500 mg po BID (takes additional 500 mg dose after HD 4 times/weekly) - Patient does HD at home 4 nights/week.     Marcelo Fitzpatrick, [address], PharmD, BCPS  6/12/2023 2:57 PM
Occupational Therapy      OT order received and chart reviewed. Attempted to see pt for OT evaluation. Pt sleeping in bed and awakes to therapist with cuing. Pt reports she needs therapy but does not want to participate at this time d/t being tired. Educated pt has HD and a paracentesis scheduled today. Pt continued to decline. RN aware. Will follow up as schedule and pt condition permit.     Electronically signed by Kenia Reid OT on 6/13/2023 at 8:42 AM
Physical Therapy    Carson Sarah  [id number]  [id number]    PT orders received and chart reviewed; attempted to see for PT eval but pt refused; pt reported she needed therapy but was not willing to work with therapy; states come back later, but pt has dialysis and possible paracentesis; will follow and attempt tomorrow as schedule permits  Electronically signed by HORACE ORDAZ, PT on 6/13/2023 at 8:42 AM
Physical Therapy  Facility/Department: [facility]  Physical Therapy Initial Assessment    Name: Nga Art  : 1975  MRN: [id number]  Date of Service: 2023    Discharge Recommendations:  Home with assist PRN   PT Equipment Recommendations  Other: pt reports the brakes on her rollator do not work and wanted a new rollator however pt thinks its only been 4 years and most insurance will only provide a rollator every 5-7 yrs therefore unsure if pt is eligible for a replacement rollator      Wally Dumont scored a 20/24 on the AM-PAC short mobility form. At this time, no further PT is recommended upon discharge. Recommend patient returns to prior setting with prior services. Patient Diagnosis(es): The primary encounter diagnosis was Generalized abdominal pain. Diagnoses of Unexplained weight loss, Decreased appetite, Abnormal finding on CT scan, Elevated alkaline phosphatase level, History of end stage renal disease, and Goals of care, counseling/discussion were also pertinent to this visit. Past Medical History:  has a past medical history of Acquired hypothyroidism, Cerebral artery occlusion with cerebral infarction ([address] Cocaine abuse ([address] Crohn's disease ([address] Depression, Diabetes mellitus ([address] ESRD (end stage renal disease) ([address] GERD (gastroesophageal reflux disease), Hemodialysis patient ([address] Hyperlipidemia, Hypertension, Ischemic stroke ([address] MI, old, Mood disorder ([address] Pericarditis, Peripheral vascular disease ([address] Peritonitis ([address] Pneumonia, and Thyroid disease. Past Surgical History:  has a past surgical history that includes Cholecystectomy; Tubal ligation; back surgery; Kidney transplant (); other surgical history; Upper gastrointestinal endoscopy (2017); laparoscopy; Paracentesis; Kidney transplant; Upper gastrointestinal endoscopy (N/A, 3/25/2019); and Colon surgery (Right).     Assessment   Assessment: pt is a 47 yo female who was adm to
Physician Progress Note      PATIENT:               Al Ohara  CSN #:                  [id number]  :                       1975  ADMIT DATE:       2023 9:19 AM  DISCH DATE:        2023 6:41 PM  RESPONDING  PROVIDER #:        Carla Magaña MD          QUERY TEXT:    Patient admitted with fluid overload, ESRD and ascites. Noted documentation of   failed renal transplant. If possible, please document in progress notes and   discharge summary if you are treating/evaluating any of the following: The medical record reflects the following:?  ?? Risk Factors: ESRD fluid Overload ascites failed renal transplant  ? ? Clinical Indicators: ? per H&P\" Large volume ascitis - suspect 2/2 volume   overload from ? ESRD. \" per Nephrology consult \"ESRD  - on HHD with Dr. Mansi Melton of  Neph - s/p failed renal transplant\"  ? ? Treatment: Paracentesis, HD and monitoring  Options provided:  -- ESRD is due to failed renal transplant and is a complication  -- ESRD is not due to failed renal transplant  and is not a complication  -- Other - I will add my own diagnosis  -- Disagree - Not applicable / Not valid  -- Disagree - Clinically unable to determine / Unknown  -- Refer to Clinical Documentation Reviewer    PROVIDER RESPONSE TEXT:    ESRD is not due to failed renal transplant and is not a complication.     Query created by: Meli Menjivar on 2023 8:54 AM      Electronically signed by:  Carla Magaña MD 2023 9:23 AM
Pt admitted to room 5108. Pt oriented to room with call light in reach. VSS. Pt c/o 9/10 abdominal pain. MD notified.
Pt alert and oriented x4. Pt complaining of 9/10 abd pain and nausea in beginning of night, pt treated with PRN morphine and zofran (refer to eMAR). No further complaints of pain throughout shift. Call light within reach.     Electronically signed by Elinor Dubin, RN on 6/13/2023 at 6:19 AM
Pt called this RN stating she felt shaking and that her sugar could be low, BS 38. Pt had no hypoglycemia orders at that time, Secure message sent to Gustavo Mora MD, no response. Pt given 4oz juice. BS rechecked at 117.      Electronically signed by Susy Latham RN on 6/13/2023 at 7:04 AM
Treatment time: 3 hrs    Net UF: 3000 ml    Pre weight: 63.6 kg  Post weight: 60.6 kg  EDW: 56 kg    Access used: Lt CW TDC  Access function: Well tolerated, 350 BFR    Medications or blood products given: Heparin dwells    Regular outpatient schedule: [facility], pt does home hemo    Summary of response to treatment: Pt tolerated well. Pt remained stable throughout entire treatment. Copy of dialysis treatment record placed in chart, to be scanned into EMR.
HISTORY    Social History     Tobacco Use    Smoking status: Never    Smokeless tobacco: Never   Vaping Use    Vaping Use: Never used   Substance Use Topics    Alcohol use: Not Currently     Alcohol/week: 0.0 standard drinks     Comment: monthly    Drug use: Not Currently       ALLERGIES    Allergies   Allergen Reactions    Ferrous Sulfate Shortness Of Breath    Cephalexin Itching and Swelling    Dicloxacillin Rash    Pcn [Penicillins] Rash    Sulfa Antibiotics Rash and Other (See Comments)     Chest pain. MEDICATIONS     calcium acetate  1 capsule Oral TID WC    heparin (porcine)  3,200 Units IntraCATHeter Once per day on Tue Thu Sat    atorvastatin  80 mg Oral QHS    gabapentin  200 mg Oral 3 times per day    [Held by provider] insulin glargine  5 Units SubCUTAneous Nightly    levETIRAcetam  500 mg Oral BID    levothyroxine  50 mcg Oral Daily    losartan  50 mg Oral BID    metoprolol tartrate  50 mg Oral BID    minoxidil  5 mg Oral BID    mirtazapine  30 mg Oral Nightly    NIFEdipine  60 mg Oral BID    pantoprazole  40 mg Oral QAM AC    sodium chloride flush  5-40 mL IntraVENous 2 times per day    heparin (porcine)  5,000 Units SubCUTAneous 3 times per day    insulin lispro  0-4 Units SubCUTAneous TID WC    insulin lispro  0-4 Units SubCUTAneous Nightly       Objective        Patient Active Problem List   Diagnosis Code    Depression with suicidal ideation F32. A, R45.851    ESRD on hemodialysis ([facility]) N18.6, Z99.2    Hyperkalemia D98.8    Metabolic acidosis C00.04    Essential hypertension I10    DMII (diabetes mellitus, type 2) (formerly [facility]) E11.9    Cellulitis L03.90    Cellulitis of lower extremity L03.119    Erythema nodosum L52    Chest pain R07.9    Failure to thrive in adult R62.7    Abnormal stress test R94.39    ESRD (end stage renal disease) on dialysis ([facility] N18.6, Z99.2    Suicidal ideation R45.851    NSTEMI (non-ST elevated myocardial infarction) (formerly [facility]) I21.4    Vaginal candidiasis B37.31    Vaginal
assistance  Grooming Skilled Clinical Factors: Completed hand hygiene and hair care without assist  Toileting: Stand by assistance  Additional Comments: Pt without deficits per her report. RN providing supervision in hospital due to HEart monitor        Bed mobility  Supine to Sit: Modified independent  Sit to Supine: Unable to assess  Bed Mobility Comments: up in chair at end of session  Transfers  Sit to stand: Stand by assistance  Stand to sit: Stand by assistance  Transfer Comments: Amb with RW wtih SBA, no LOB, no Assist provided. Pt likely at baseline  Vision  Vision: Impaired (glasses for driving only)  Vision Exceptions:  (glasses for driving only)  Hearing  Hearing: Within functional limits  Cognition  Overall Cognitive Status: WFL  Orientation  Overall Orientation Status: Within Functional Limits                  Education Given To: Patient  Education Provided: Role of Therapy;Plan of Care  Education Method: Demonstration;Verbal  Barriers to Learning: None  Education Outcome: Verbalized understanding;Demonstrated understanding  LUE AROM (degrees)  LUE AROM : WFL  RUE AROM (degrees)  RUE AROM : WFL                        OutComes Score   Miryam Dumont scored a 22/24 on the [facility] ADL Inpatient form. At this time, no further OT is recommended upon discharge due to independence and assist available. Recommend patient returns to prior setting with prior services. AM-PAC Score        AM-PAC Inpatient Daily Activity Raw Score: 22 (06/14/23 0931)  AM-PAC Inpatient ADL T-Scale Score : 47.1 (06/14/23 0931)  ADL Inpatient CMS 0-100% Score: 25.8 (06/14/23 0931)  ADL Inpatient CMS G-Code Modifier : CJ (06/14/23 0931)           Goals  Short Term Goals  Time Frame for Short Term Goals: NA, no acute OT goals identified  Patient Goals   Patient goals :  To return home       Therapy Time   Individual Concurrent Group Co-treatment   Time In 0840         Time Out 0920

## 2024-04-26 ENCOUNTER — APPOINTMENT (OUTPATIENT)
Dept: GENERAL RADIOLOGY | Age: 49
DRG: 425 | End: 2024-04-26
Payer: MEDICAID

## 2024-04-26 ENCOUNTER — HOSPITAL ENCOUNTER (INPATIENT)
Age: 49
LOS: 4 days | Discharge: HOME | DRG: 425 | End: 2024-04-30
Attending: EMERGENCY MEDICINE | Admitting: INTERNAL MEDICINE
Payer: MEDICAID

## 2024-04-26 DIAGNOSIS — J81.0 ACUTE PULMONARY EDEMA (HCC): Primary | ICD-10-CM

## 2024-04-26 DIAGNOSIS — N18.6 END STAGE RENAL DISEASE (HCC): ICD-10-CM

## 2024-04-26 DIAGNOSIS — Z71.89 GOALS OF CARE, COUNSELING/DISCUSSION: ICD-10-CM

## 2024-04-26 DIAGNOSIS — I16.1 HYPERTENSIVE EMERGENCY: ICD-10-CM

## 2024-04-26 DIAGNOSIS — R79.89 ELEVATED TROPONIN: ICD-10-CM

## 2024-04-26 DIAGNOSIS — Z91.158 NONCOMPLIANCE WITH RENAL DIALYSIS (HCC): ICD-10-CM

## 2024-04-26 PROBLEM — I10 UNCONTROLLED HYPERTENSION: Status: ACTIVE | Noted: 2024-04-26

## 2024-04-26 PROBLEM — E83.51 HYPOCALCEMIA: Status: ACTIVE | Noted: 2024-04-26

## 2024-04-26 LAB
ALBUMIN SERPL-MCNC: 3.5 G/DL (ref 3.4–5)
ALBUMIN/GLOB SERPL: 0.9 {RATIO} (ref 1.1–2.2)
ALP SERPL-CCNC: 108 U/L (ref 40–129)
ALT SERPL-CCNC: <5 U/L (ref 10–40)
ANION GAP SERPL CALCULATED.3IONS-SCNC: 20 MMOL/L (ref 3–16)
AST SERPL-CCNC: 13 U/L (ref 15–37)
BASOPHILS # BLD: 0.1 K/UL (ref 0–0.2)
BASOPHILS NFR BLD: 1.5 %
BILIRUB SERPL-MCNC: 0.3 MG/DL (ref 0–1)
BUN SERPL-MCNC: 51 MG/DL (ref 7–20)
CALCIUM SERPL-MCNC: 5.7 MG/DL (ref 8.3–10.6)
CHLORIDE SERPL-SCNC: 90 MMOL/L (ref 99–110)
CO2 SERPL-SCNC: 25 MMOL/L (ref 21–32)
CREAT SERPL-MCNC: 13.1 MG/DL (ref 0.6–1.1)
DEPRECATED RDW RBC AUTO: 15.2 % (ref 12.4–15.4)
EOSINOPHIL # BLD: 0.3 K/UL (ref 0–0.6)
EOSINOPHIL NFR BLD: 3.8 %
GFR SERPLBLD CREATININE-BSD FMLA CKD-EPI: 3 ML/MIN/{1.73_M2}
GLUCOSE SERPL-MCNC: 130 MG/DL (ref 70–99)
HCT VFR BLD AUTO: 24.1 % (ref 36–48)
HGB BLD-MCNC: 8.1 G/DL (ref 12–16)
LYMPHOCYTES # BLD: 2.2 K/UL (ref 1–5.1)
LYMPHOCYTES NFR BLD: 27 %
MCH RBC QN AUTO: 30.8 PG (ref 26–34)
MCHC RBC AUTO-ENTMCNC: 33.7 G/DL (ref 31–36)
MCV RBC AUTO: 91.5 FL (ref 80–100)
MONOCYTES # BLD: 0.6 K/UL (ref 0–1.3)
MONOCYTES NFR BLD: 7.9 %
NEUTROPHILS # BLD: 4.9 K/UL (ref 1.7–7.7)
NEUTROPHILS NFR BLD: 59.8 %
NT-PROBNP SERPL-MCNC: ABNORMAL PG/ML (ref 0–124)
PLATELET # BLD AUTO: 276 K/UL (ref 135–450)
PMV BLD AUTO: 8.2 FL (ref 5–10.5)
POTASSIUM SERPL-SCNC: 4 MMOL/L (ref 3.5–5.1)
PROT SERPL-MCNC: 7.6 G/DL (ref 6.4–8.2)
RBC # BLD AUTO: 2.64 M/UL (ref 4–5.2)
SODIUM SERPL-SCNC: 135 MMOL/L (ref 136–145)
TROPONIN, HIGH SENSITIVITY: 592 NG/L (ref 0–14)
WBC # BLD AUTO: 8.1 K/UL (ref 4–11)

## 2024-04-26 PROCEDURE — 96375 TX/PRO/DX INJ NEW DRUG ADDON: CPT

## 2024-04-26 PROCEDURE — 83880 ASSAY OF NATRIURETIC PEPTIDE: CPT

## 2024-04-26 PROCEDURE — 96365 THER/PROPH/DIAG IV INF INIT: CPT

## 2024-04-26 PROCEDURE — 80053 COMPREHEN METABOLIC PANEL: CPT

## 2024-04-26 PROCEDURE — 93005 ELECTROCARDIOGRAM TRACING: CPT | Performed by: EMERGENCY MEDICINE

## 2024-04-26 PROCEDURE — 2060000000 HC ICU INTERMEDIATE R&B

## 2024-04-26 PROCEDURE — 84484 ASSAY OF TROPONIN QUANT: CPT

## 2024-04-26 PROCEDURE — 71045 X-RAY EXAM CHEST 1 VIEW: CPT

## 2024-04-26 PROCEDURE — 85025 COMPLETE CBC W/AUTO DIFF WBC: CPT

## 2024-04-26 PROCEDURE — 6360000002 HC RX W HCPCS

## 2024-04-26 PROCEDURE — 99285 EMERGENCY DEPT VISIT HI MDM: CPT

## 2024-04-26 RX ORDER — GABAPENTIN 100 MG/1
100 CAPSULE ORAL 2 TIMES DAILY PRN
Status: DISCONTINUED | OUTPATIENT
Start: 2024-04-26 | End: 2024-04-30 | Stop reason: HOSPADM

## 2024-04-26 RX ORDER — ONDANSETRON 2 MG/ML
4 INJECTION INTRAMUSCULAR; INTRAVENOUS ONCE
Status: COMPLETED | OUTPATIENT
Start: 2024-04-26 | End: 2024-04-27

## 2024-04-26 RX ORDER — LEVETIRACETAM 500 MG/1
500 TABLET ORAL
Status: DISCONTINUED | OUTPATIENT
Start: 2024-04-27 | End: 2024-04-27 | Stop reason: SDUPTHER

## 2024-04-26 RX ORDER — MAGNESIUM SULFATE 1 G/100ML
1000 INJECTION INTRAVENOUS ONCE
Status: COMPLETED | OUTPATIENT
Start: 2024-04-26 | End: 2024-04-26

## 2024-04-26 RX ORDER — HYDRALAZINE HYDROCHLORIDE 20 MG/ML
10 INJECTION INTRAMUSCULAR; INTRAVENOUS ONCE
Status: COMPLETED | OUTPATIENT
Start: 2024-04-26 | End: 2024-04-26

## 2024-04-26 RX ORDER — LABETALOL HYDROCHLORIDE 5 MG/ML
10 INJECTION, SOLUTION INTRAVENOUS ONCE
Status: DISCONTINUED | OUTPATIENT
Start: 2024-04-26 | End: 2024-04-26

## 2024-04-26 RX ORDER — CLOPIDOGREL BISULFATE 75 MG/1
75 TABLET ORAL DAILY
Status: DISCONTINUED | OUTPATIENT
Start: 2024-04-27 | End: 2024-04-28

## 2024-04-26 RX ORDER — MIRTAZAPINE 30 MG/1
30 TABLET, FILM COATED ORAL NIGHTLY
Status: DISCONTINUED | OUTPATIENT
Start: 2024-04-26 | End: 2024-04-30 | Stop reason: HOSPADM

## 2024-04-26 RX ORDER — PANTOPRAZOLE SODIUM 40 MG/1
40 TABLET, DELAYED RELEASE ORAL DAILY
Status: DISCONTINUED | OUTPATIENT
Start: 2024-04-27 | End: 2024-04-28

## 2024-04-26 RX ORDER — CLONIDINE HYDROCHLORIDE 0.1 MG/1
0.2 TABLET ORAL 2 TIMES DAILY
Status: DISCONTINUED | OUTPATIENT
Start: 2024-04-26 | End: 2024-04-30

## 2024-04-26 RX ORDER — LEVOTHYROXINE SODIUM 0.05 MG/1
50 TABLET ORAL DAILY
Status: DISCONTINUED | OUTPATIENT
Start: 2024-04-27 | End: 2024-04-30 | Stop reason: HOSPADM

## 2024-04-26 RX ORDER — CLONIDINE HYDROCHLORIDE 0.2 MG/1
0.2 TABLET ORAL 2 TIMES DAILY
Status: ON HOLD | COMMUNITY
End: 2024-04-30 | Stop reason: HOSPADM

## 2024-04-26 RX ORDER — CARVEDILOL 12.5 MG/1
12.5 TABLET ORAL 2 TIMES DAILY WITH MEALS
COMMUNITY

## 2024-04-26 RX ORDER — LOSARTAN POTASSIUM 50 MG/1
50 TABLET ORAL 2 TIMES DAILY
Status: DISCONTINUED | OUTPATIENT
Start: 2024-04-26 | End: 2024-04-30 | Stop reason: HOSPADM

## 2024-04-26 RX ORDER — NIFEDIPINE 60 MG/1
60 TABLET, EXTENDED RELEASE ORAL 2 TIMES DAILY
Status: DISCONTINUED | OUTPATIENT
Start: 2024-04-26 | End: 2024-04-30 | Stop reason: HOSPADM

## 2024-04-26 RX ORDER — LEVETIRACETAM 500 MG/1
500 TABLET ORAL 2 TIMES DAILY
Status: DISCONTINUED | OUTPATIENT
Start: 2024-04-26 | End: 2024-04-30 | Stop reason: HOSPADM

## 2024-04-26 RX ORDER — CARVEDILOL 12.5 MG/1
12.5 TABLET ORAL 2 TIMES DAILY WITH MEALS
Status: DISCONTINUED | OUTPATIENT
Start: 2024-04-26 | End: 2024-04-30 | Stop reason: HOSPADM

## 2024-04-26 RX ADMIN — HYDRALAZINE HYDROCHLORIDE 10 MG: 20 INJECTION INTRAMUSCULAR; INTRAVENOUS at 20:24

## 2024-04-26 RX ADMIN — MAGNESIUM SULFATE HEPTAHYDRATE 1000 MG: 1 INJECTION, SOLUTION INTRAVENOUS at 20:21

## 2024-04-26 ASSESSMENT — PAIN DESCRIPTION - DESCRIPTORS: DESCRIPTORS: ACHING

## 2024-04-26 ASSESSMENT — PAIN DESCRIPTION - PAIN TYPE: TYPE: CHRONIC PAIN

## 2024-04-26 ASSESSMENT — PAIN - FUNCTIONAL ASSESSMENT
PAIN_FUNCTIONAL_ASSESSMENT: PREVENTS OR INTERFERES SOME ACTIVE ACTIVITIES AND ADLS
PAIN_FUNCTIONAL_ASSESSMENT: 0-10

## 2024-04-26 ASSESSMENT — PAIN DESCRIPTION - ONSET: ONSET: ON-GOING

## 2024-04-26 ASSESSMENT — PAIN SCALES - GENERAL
PAINLEVEL_OUTOF10: 7
PAINLEVEL_OUTOF10: 7

## 2024-04-26 ASSESSMENT — PAIN DESCRIPTION - FREQUENCY: FREQUENCY: CONTINUOUS

## 2024-04-26 ASSESSMENT — PAIN DESCRIPTION - ORIENTATION: ORIENTATION: MID

## 2024-04-26 ASSESSMENT — PAIN DESCRIPTION - LOCATION: LOCATION: ABDOMEN

## 2024-04-27 LAB
ANION GAP SERPL CALCULATED.3IONS-SCNC: 18 MMOL/L (ref 3–16)
BASOPHILS # BLD: 0.1 K/UL (ref 0–0.2)
BASOPHILS NFR BLD: 0.9 %
BUN SERPL-MCNC: 34 MG/DL (ref 7–20)
CALCIUM SERPL-MCNC: 6.2 MG/DL (ref 8.3–10.6)
CHLORIDE SERPL-SCNC: 98 MMOL/L (ref 99–110)
CO2 SERPL-SCNC: 25 MMOL/L (ref 21–32)
CREAT SERPL-MCNC: 9.6 MG/DL (ref 0.6–1.1)
DEPRECATED RDW RBC AUTO: 14.9 % (ref 12.4–15.4)
EOSINOPHIL # BLD: 0.2 K/UL (ref 0–0.6)
EOSINOPHIL NFR BLD: 2.8 %
EST. AVERAGE GLUCOSE BLD GHB EST-MCNC: 128.4 MG/DL
GFR SERPLBLD CREATININE-BSD FMLA CKD-EPI: 5 ML/MIN/{1.73_M2}
GLUCOSE BLD-MCNC: 132 MG/DL (ref 70–99)
GLUCOSE SERPL-MCNC: 173 MG/DL (ref 70–99)
HBA1C MFR BLD: 6.1 %
HCT VFR BLD AUTO: 21.4 % (ref 36–48)
HGB BLD-MCNC: 7.5 G/DL (ref 12–16)
LYMPHOCYTES # BLD: 2 K/UL (ref 1–5.1)
LYMPHOCYTES NFR BLD: 23.9 %
MCH RBC QN AUTO: 31.6 PG (ref 26–34)
MCHC RBC AUTO-ENTMCNC: 34.8 G/DL (ref 31–36)
MCV RBC AUTO: 90.7 FL (ref 80–100)
MONOCYTES # BLD: 0.8 K/UL (ref 0–1.3)
MONOCYTES NFR BLD: 9.3 %
NEUTROPHILS # BLD: 5.3 K/UL (ref 1.7–7.7)
NEUTROPHILS NFR BLD: 63.1 %
NT-PROBNP SERPL-MCNC: ABNORMAL PG/ML (ref 0–124)
PERFORMED ON: ABNORMAL
PHOSPHATE SERPL-MCNC: 3.8 MG/DL (ref 2.5–4.9)
PLATELET # BLD AUTO: 205 K/UL (ref 135–450)
PMV BLD AUTO: 8.4 FL (ref 5–10.5)
POTASSIUM SERPL-SCNC: 3.8 MMOL/L (ref 3.5–5.1)
RBC # BLD AUTO: 2.37 M/UL (ref 4–5.2)
SODIUM SERPL-SCNC: 141 MMOL/L (ref 136–145)
TROPONIN, HIGH SENSITIVITY: 577 NG/L (ref 0–14)
WBC # BLD AUTO: 8.5 K/UL (ref 4–11)

## 2024-04-27 PROCEDURE — 97535 SELF CARE MNGMENT TRAINING: CPT

## 2024-04-27 PROCEDURE — 97530 THERAPEUTIC ACTIVITIES: CPT | Performed by: PHYSICAL THERAPIST

## 2024-04-27 PROCEDURE — 85025 COMPLETE CBC W/AUTO DIFF WBC: CPT

## 2024-04-27 PROCEDURE — 84100 ASSAY OF PHOSPHORUS: CPT

## 2024-04-27 PROCEDURE — 6360000002 HC RX W HCPCS: Performed by: INTERNAL MEDICINE

## 2024-04-27 PROCEDURE — 6360000002 HC RX W HCPCS: Performed by: NURSE PRACTITIONER

## 2024-04-27 PROCEDURE — 6370000000 HC RX 637 (ALT 250 FOR IP): Performed by: NURSE PRACTITIONER

## 2024-04-27 PROCEDURE — 94760 N-INVAS EAR/PLS OXIMETRY 1: CPT

## 2024-04-27 PROCEDURE — 97116 GAIT TRAINING THERAPY: CPT | Performed by: PHYSICAL THERAPIST

## 2024-04-27 PROCEDURE — 6370000000 HC RX 637 (ALT 250 FOR IP): Performed by: INTERNAL MEDICINE

## 2024-04-27 PROCEDURE — 80048 BASIC METABOLIC PNL TOTAL CA: CPT

## 2024-04-27 PROCEDURE — 5A1D70Z PERFORMANCE OF URINARY FILTRATION, INTERMITTENT, LESS THAN 6 HOURS PER DAY: ICD-10-PCS | Performed by: INTERNAL MEDICINE

## 2024-04-27 PROCEDURE — 36415 COLL VENOUS BLD VENIPUNCTURE: CPT

## 2024-04-27 PROCEDURE — 97165 OT EVAL LOW COMPLEX 30 MIN: CPT

## 2024-04-27 PROCEDURE — 90935 HEMODIALYSIS ONE EVALUATION: CPT

## 2024-04-27 PROCEDURE — 83880 ASSAY OF NATRIURETIC PEPTIDE: CPT

## 2024-04-27 PROCEDURE — 2580000003 HC RX 258: Performed by: NURSE PRACTITIONER

## 2024-04-27 PROCEDURE — 97162 PT EVAL MOD COMPLEX 30 MIN: CPT | Performed by: PHYSICAL THERAPIST

## 2024-04-27 PROCEDURE — 83036 HEMOGLOBIN GLYCOSYLATED A1C: CPT

## 2024-04-27 PROCEDURE — 84484 ASSAY OF TROPONIN QUANT: CPT

## 2024-04-27 PROCEDURE — 2060000000 HC ICU INTERMEDIATE R&B

## 2024-04-27 RX ORDER — DIPHENHYDRAMINE HYDROCHLORIDE 50 MG/ML
25 INJECTION INTRAMUSCULAR; INTRAVENOUS ONCE
Status: COMPLETED | OUTPATIENT
Start: 2024-04-27 | End: 2024-04-27

## 2024-04-27 RX ORDER — POLYETHYLENE GLYCOL 3350 17 G/17G
17 POWDER, FOR SOLUTION ORAL DAILY PRN
Status: DISCONTINUED | OUTPATIENT
Start: 2024-04-27 | End: 2024-04-30 | Stop reason: HOSPADM

## 2024-04-27 RX ORDER — GLUCAGON 1 MG/ML
1 KIT INJECTION PRN
Status: DISCONTINUED | OUTPATIENT
Start: 2024-04-27 | End: 2024-04-30 | Stop reason: HOSPADM

## 2024-04-27 RX ORDER — DEXTROSE MONOHYDRATE 100 MG/ML
INJECTION, SOLUTION INTRAVENOUS CONTINUOUS PRN
Status: DISCONTINUED | OUTPATIENT
Start: 2024-04-27 | End: 2024-04-30 | Stop reason: HOSPADM

## 2024-04-27 RX ORDER — ACETAMINOPHEN 650 MG/1
650 SUPPOSITORY RECTAL EVERY 6 HOURS PRN
Status: DISCONTINUED | OUTPATIENT
Start: 2024-04-27 | End: 2024-04-30 | Stop reason: HOSPADM

## 2024-04-27 RX ORDER — ACETAMINOPHEN 325 MG/1
650 TABLET ORAL EVERY 6 HOURS PRN
Status: DISCONTINUED | OUTPATIENT
Start: 2024-04-27 | End: 2024-04-30 | Stop reason: HOSPADM

## 2024-04-27 RX ORDER — SODIUM CHLORIDE 9 MG/ML
INJECTION, SOLUTION INTRAVENOUS PRN
Status: DISCONTINUED | OUTPATIENT
Start: 2024-04-27 | End: 2024-04-30 | Stop reason: HOSPADM

## 2024-04-27 RX ORDER — ONDANSETRON 4 MG/1
4 TABLET, ORALLY DISINTEGRATING ORAL EVERY 8 HOURS PRN
Status: DISCONTINUED | OUTPATIENT
Start: 2024-04-27 | End: 2024-04-30 | Stop reason: HOSPADM

## 2024-04-27 RX ORDER — SODIUM CHLORIDE 0.9 % (FLUSH) 0.9 %
5-40 SYRINGE (ML) INJECTION EVERY 12 HOURS SCHEDULED
Status: DISCONTINUED | OUTPATIENT
Start: 2024-04-27 | End: 2024-04-30 | Stop reason: HOSPADM

## 2024-04-27 RX ORDER — HEPARIN SODIUM 5000 [USP'U]/ML
5000 INJECTION, SOLUTION INTRAVENOUS; SUBCUTANEOUS EVERY 8 HOURS SCHEDULED
Status: DISCONTINUED | OUTPATIENT
Start: 2024-04-27 | End: 2024-04-30 | Stop reason: HOSPADM

## 2024-04-27 RX ORDER — SODIUM CHLORIDE 0.9 % (FLUSH) 0.9 %
5-40 SYRINGE (ML) INJECTION PRN
Status: DISCONTINUED | OUTPATIENT
Start: 2024-04-27 | End: 2024-04-30 | Stop reason: HOSPADM

## 2024-04-27 RX ORDER — ONDANSETRON 2 MG/ML
4 INJECTION INTRAMUSCULAR; INTRAVENOUS EVERY 6 HOURS PRN
Status: DISCONTINUED | OUTPATIENT
Start: 2024-04-27 | End: 2024-04-30 | Stop reason: HOSPADM

## 2024-04-27 RX ORDER — CALCIUM CARBONATE 500 MG/1
1000 TABLET, CHEWABLE ORAL 3 TIMES DAILY PRN
Status: DISCONTINUED | OUTPATIENT
Start: 2024-04-27 | End: 2024-04-30 | Stop reason: HOSPADM

## 2024-04-27 RX ORDER — MORPHINE SULFATE 2 MG/ML
2 INJECTION, SOLUTION INTRAMUSCULAR; INTRAVENOUS ONCE
Status: COMPLETED | OUTPATIENT
Start: 2024-04-27 | End: 2024-04-27

## 2024-04-27 RX ORDER — HYDROXYZINE HYDROCHLORIDE 25 MG/1
25 TABLET, FILM COATED ORAL EVERY 8 HOURS PRN
Status: DISCONTINUED | OUTPATIENT
Start: 2024-04-27 | End: 2024-04-29

## 2024-04-27 RX ADMIN — CARVEDILOL 12.5 MG: 12.5 TABLET, FILM COATED ORAL at 03:00

## 2024-04-27 RX ADMIN — Medication 10 ML: at 02:59

## 2024-04-27 RX ADMIN — Medication 10 ML: at 22:08

## 2024-04-27 RX ADMIN — LEVOTHYROXINE SODIUM 50 MCG: 0.05 TABLET ORAL at 05:58

## 2024-04-27 RX ADMIN — MIRTAZAPINE 30 MG: 30 TABLET, FILM COATED ORAL at 22:02

## 2024-04-27 RX ADMIN — ANTACID TABLETS 1000 MG: 500 TABLET, CHEWABLE ORAL at 11:16

## 2024-04-27 RX ADMIN — NIFEDIPINE 60 MG: 60 TABLET, EXTENDED RELEASE ORAL at 22:02

## 2024-04-27 RX ADMIN — DIPHENHYDRAMINE HYDROCHLORIDE 25 MG: 50 INJECTION INTRAMUSCULAR; INTRAVENOUS at 05:58

## 2024-04-27 RX ADMIN — MORPHINE SULFATE 2 MG: 2 INJECTION, SOLUTION INTRAMUSCULAR; INTRAVENOUS at 05:57

## 2024-04-27 RX ADMIN — NIFEDIPINE 60 MG: 60 TABLET, EXTENDED RELEASE ORAL at 02:59

## 2024-04-27 RX ADMIN — CLONIDINE HYDROCHLORIDE 0.2 MG: 0.1 TABLET ORAL at 22:02

## 2024-04-27 RX ADMIN — PANTOPRAZOLE SODIUM 40 MG: 40 TABLET, DELAYED RELEASE ORAL at 09:24

## 2024-04-27 RX ADMIN — Medication 10 ML: at 09:32

## 2024-04-27 RX ADMIN — HEPARIN SODIUM 5000 UNITS: 5000 INJECTION INTRAVENOUS; SUBCUTANEOUS at 22:05

## 2024-04-27 RX ADMIN — HEPARIN SODIUM 5000 UNITS: 5000 INJECTION INTRAVENOUS; SUBCUTANEOUS at 14:18

## 2024-04-27 RX ADMIN — CARVEDILOL 12.5 MG: 12.5 TABLET, FILM COATED ORAL at 17:38

## 2024-04-27 RX ADMIN — ONDANSETRON 4 MG: 2 INJECTION INTRAMUSCULAR; INTRAVENOUS at 03:00

## 2024-04-27 RX ADMIN — LOSARTAN POTASSIUM 50 MG: 50 TABLET, FILM COATED ORAL at 00:01

## 2024-04-27 RX ADMIN — LOSARTAN POTASSIUM 50 MG: 50 TABLET, FILM COATED ORAL at 22:02

## 2024-04-27 RX ADMIN — LEVETIRACETAM 500 MG: 500 TABLET, FILM COATED ORAL at 03:00

## 2024-04-27 RX ADMIN — ONDANSETRON 4 MG: 2 INJECTION INTRAMUSCULAR; INTRAVENOUS at 22:37

## 2024-04-27 RX ADMIN — CLOPIDOGREL BISULFATE 75 MG: 75 TABLET ORAL at 09:25

## 2024-04-27 RX ADMIN — GABAPENTIN 100 MG: 100 CAPSULE ORAL at 14:18

## 2024-04-27 RX ADMIN — MIRTAZAPINE 30 MG: 30 TABLET, FILM COATED ORAL at 03:00

## 2024-04-27 RX ADMIN — Medication 10 ML: at 05:57

## 2024-04-27 RX ADMIN — LEVETIRACETAM 500 MG: 500 TABLET, FILM COATED ORAL at 22:03

## 2024-04-27 RX ADMIN — HEPARIN SODIUM 5000 UNITS: 5000 INJECTION INTRAVENOUS; SUBCUTANEOUS at 03:00

## 2024-04-27 RX ADMIN — HYDROXYZINE HYDROCHLORIDE 25 MG: 25 TABLET, FILM COATED ORAL at 14:18

## 2024-04-27 RX ADMIN — ACETAMINOPHEN 650 MG: 325 TABLET ORAL at 02:59

## 2024-04-27 RX ADMIN — LEVETIRACETAM 500 MG: 500 TABLET, FILM COATED ORAL at 09:30

## 2024-04-27 RX ADMIN — CLONIDINE HYDROCHLORIDE 0.2 MG: 0.1 TABLET ORAL at 00:01

## 2024-04-27 ASSESSMENT — PAIN SCALES - GENERAL
PAINLEVEL_OUTOF10: 8
PAINLEVEL_OUTOF10: 4

## 2024-04-27 ASSESSMENT — PAIN DESCRIPTION - LOCATION: LOCATION: GENERALIZED

## 2024-04-27 ASSESSMENT — PAIN - FUNCTIONAL ASSESSMENT: PAIN_FUNCTIONAL_ASSESSMENT: PREVENTS OR INTERFERES SOME ACTIVE ACTIVITIES AND ADLS

## 2024-04-27 ASSESSMENT — PAIN DESCRIPTION - DESCRIPTORS: DESCRIPTORS: ACHING;SHARP

## 2024-04-27 NOTE — CONSULTS
04/27/2024 05:24 AM    PHOS 3.8 04/27/2024 05:24 AM    MG 2.60 (H) 06/13/2023 07:16 AM     Lab Results   Component Value Date/Time    WBC 8.5 04/27/2024 05:24 AM    HGB 7.5 (L) 04/27/2024 05:24 AM    HCT 21.4 (L) 04/27/2024 05:24 AM     04/27/2024 05:24 AM       Assessment/Plan:  Reviewed old records and labs.    1)  ESRD   - will need to discuss with home HD nurses, but I doubt they would (and hope that they wouldn't) have allowed her to go 1.5 weeks without dialysis incenter    - give non-compliance with treatment, I think she should be switched from home HD to inpatient HDU, and while it sounds like some of it may have been out of her control, it is ultimately her responsibility to get the dialysis done or discuss with home HD nurses to ensure that she gets her treatments   - she received some dialysis early this AM, so next HD on Monday    2) HTN   - BP better and adequately controlled    3) non-compliance   - [see above]    4) anemia   - not sure what the current management plan is as she belongs to  nephrology
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
## 2024-04-27 NOTE — PLAN OF CARE
Problem: Discharge Planning  Goal: Discharge to home or other facility with appropriate resources  4/27/2024 1709 by Nicki Carlos RN  Outcome: Progressing  Flowsheets (Taken 4/27/2024 0449 by Brynn Ross, RN)  Discharge to home or other facility with appropriate resources:   Identify barriers to discharge with patient and caregiver   Arrange for needed discharge resources and transportation as appropriate   Identify discharge learning needs (meds, wound care, etc)   Refer to discharge planning if patient needs post-hospital services based on physician order or complex needs related to functional status, cognitive ability or social support system     Problem: Pain  Goal: Verbalizes/displays adequate comfort level or baseline comfort level  4/27/2024 1709 by Nicki Carlos RN  Outcome: Progressing     Problem: Safety - Adult  Goal: Free from fall injury  4/27/2024 1709 by Nicki Carlos RN  Outcome: Progressing     Problem: ABCDS Injury Assessment  Goal: Absence of physical injury  4/27/2024 1709 by Nicki Carlos, RN  Outcome: Progressing

## 2024-04-27 NOTE — ED PROVIDER NOTES
DIFFERENTIAL - Abnormal; Notable for the following components:    RBC 2.64 (*)     Hemoglobin 8.1 (*)     Hematocrit 24.1 (*)     All other components within normal limits   TROPONIN - Abnormal; Notable for the following components:    Troponin, High Sensitivity 592 (*)     All other components within normal limits    Narrative:     CALL  [facility] tel. [phone],  Chemistry results called to and read back by Pedro Hernandez RN, 04/26/2024  20:42, by DAREN  Chemistry results called to and read back by Pedro Hernandez RN, 04/26/2024  20:42, by DAREN   BRAIN NATRIURETIC PEPTIDE - Abnormal; Notable for the following components:    Pro-BNP >70,000 (*)     All other components within normal limits    Narrative:     CALL  [facility] tel. [phone],  Chemistry results called to and read back by Pedro Hernandez RN, 04/26/2024  20:42, by DAREN   TROPONIN       All other labs were within normal range or not returned as of this dictation.    EMERGENCY DEPARTMENT COURSE and DIFFERENTIAL DIAGNOSIS/MDM:   Vitals:    Vitals:    04/26/24 1914 04/26/24 2015 04/26/24 2030 04/26/24 2045   BP: (!) 196/105 (!) 203/105 (!) 202/109 (!) 202/103   Pulse: 76 85 89 84   Resp: 18 14 21 17   Temp: 98 °F (36.7 °C)      SpO2: 100% 100% 100% 100%   Weight: 59.8 kg (131 lb 13.4 oz)      Height: 1.702 m (5' 7\")          The patient was given the following medications:  Medications   labetalol (NORMODYNE;TRANDATE) injection 10 mg (has no administration in time range)   magnesium sulfate 1000 mg in dextrose 5% 100 mL IVPB (1,000 mg IntraVENous New Bag 4/26/24 2021)   hydrALAZINE (APRESOLINE) injection 10 mg (10 mg IntraVENous Given 4/26/24 2024)       MDM        The patient presents to the emergency department with a complaint of swelling and increased weight gain.  She also reports some shortness of breath at rest and with exertion.  She has missed dialysis now for 1-1/2 weeks.    She is awake and alert.  She is neurologically intact.  She does 
infarction (HCC) (2010), Cocaine abuse (HCC), Crohn's disease (HCC), Depression, Diabetes mellitus (HCC), ESRD (end stage renal disease) (HCC), GERD (gastroesophageal reflux disease), Hemodialysis patient ([facility]) (2016), Hyperlipidemia, Hypertension, Ischemic stroke (HCC) (03/13/2020), MI, old, Mood disorder (HCC), Pericarditis, Peripheral vascular disease (HCC), Peritonitis (HCC), Pneumonia, and Thyroid disease.    CONSULTS: (Who and What was discussed)  IP CONSULT TO NEPHROLOGY  IP CONSULT TO SOCIAL WORK  IP CONSULT TO NEPHROLOGY    Case dc with neph as above    Records Reviewed (External and Source)   HD notes in EMR from   Prior admission notes in EMR from     CC/HPI Summary, DDx, ED Course, and Reassessment:   Female presents to ED as above.  IV access obtained.  Concern for missed dialysis electrolyte derangement.  EKG obtained which does not show significant T wave abnormalities.    Weight today consistent with her dry weight.    Further workup also initiated.  Blood pressure medications as above.    Labs:  CMP consider the ESRD.  Mildly open gap.  CBC with anemia.  No recent labs to compare but this is a change from prior labs from June 2023.  Initial troponin 592, repeat pending.  Baseline troponins appear to be in the mid 300s.  BNP greater than 70,000.    Chest x-ray as above.    Given concerning findings above Case discussed with nephrologist on-call-above.  Plan is for emergent dialysis tonight.    Findings, plan, goals of care discussed with patient.  She is agreeable to admission prefers full code at this time.    Case also discussed with medicine service for admit      Disposition Considerations (tests considered but not done, Admit vs D/C, Shared Decision Making, Pt Expectation of Test or Tx.): above     The patient tolerated their visit well.  The patient and / or the family were informed of the results of any tests, a time was given to answer questions, a plan was proposed and they agreed with

## 2024-04-27 NOTE — PLAN OF CARE
Problem: Discharge Planning  Goal: Discharge to home or other facility with appropriate resources  Outcome: Progressing  Flowsheets (Taken 4/27/2024 0332)  Discharge to home or other facility with appropriate resources:   Identify barriers to discharge with patient and caregiver   Arrange for needed discharge resources and transportation as appropriate   Identify discharge learning needs (meds, wound care, etc)   Refer to discharge planning if patient needs post-hospital services based on physician order or complex needs related to functional status, cognitive ability or social support system     Problem: Pain  Goal: Verbalizes/displays adequate comfort level or baseline comfort level  Outcome: Progressing  Flowsheets (Taken 4/27/2024 0243)  Verbalizes/displays adequate comfort level or baseline comfort level:   Encourage patient to monitor pain and request assistance   Assess pain using appropriate pain scale   Administer analgesics based on type and severity of pain and evaluate response   Implement non-pharmacological measures as appropriate and evaluate response   Notify Licensed Independent Practitioner if interventions unsuccessful or patient reports new pain     Problem: Safety - Adult  Goal: Free from fall injury  Outcome: Progressing     Problem: ABCDS Injury Assessment  Goal: Absence of physical injury  Outcome: Progressing

## 2024-04-28 LAB
ANION GAP SERPL CALCULATED.3IONS-SCNC: 18 MMOL/L (ref 3–16)
BASOPHILS # BLD: 0 K/UL (ref 0–0.2)
BASOPHILS NFR BLD: 0.2 %
BUN SERPL-MCNC: 39 MG/DL (ref 7–20)
CALCIUM SERPL-MCNC: 6.1 MG/DL (ref 8.3–10.6)
CHLORIDE SERPL-SCNC: 97 MMOL/L (ref 99–110)
CO2 SERPL-SCNC: 24 MMOL/L (ref 21–32)
CREAT SERPL-MCNC: 10.7 MG/DL (ref 0.6–1.1)
DEPRECATED RDW RBC AUTO: 14.9 % (ref 12.4–15.4)
EKG ATRIAL RATE: 75 BPM
EKG DIAGNOSIS: NORMAL
EKG P AXIS: 47 DEGREES
EKG P-R INTERVAL: 168 MS
EKG Q-T INTERVAL: 472 MS
EKG QRS DURATION: 84 MS
EKG QTC CALCULATION (BAZETT): 527 MS
EKG R AXIS: 57 DEGREES
EKG T AXIS: 130 DEGREES
EKG VENTRICULAR RATE: 75 BPM
EOSINOPHIL # BLD: 0.3 K/UL (ref 0–0.6)
EOSINOPHIL NFR BLD: 3.5 %
GFR SERPLBLD CREATININE-BSD FMLA CKD-EPI: 4 ML/MIN/{1.73_M2}
GLUCOSE BLD-MCNC: 186 MG/DL (ref 70–99)
GLUCOSE SERPL-MCNC: 157 MG/DL (ref 70–99)
HCT VFR BLD AUTO: 21.2 % (ref 36–48)
HGB BLD-MCNC: 7.2 G/DL (ref 12–16)
LYMPHOCYTES # BLD: 2.1 K/UL (ref 1–5.1)
LYMPHOCYTES NFR BLD: 28.6 %
MCH RBC QN AUTO: 31.1 PG (ref 26–34)
MCHC RBC AUTO-ENTMCNC: 34.1 G/DL (ref 31–36)
MCV RBC AUTO: 91.2 FL (ref 80–100)
MONOCYTES # BLD: 0.7 K/UL (ref 0–1.3)
MONOCYTES NFR BLD: 9.7 %
NEUTROPHILS # BLD: 4.2 K/UL (ref 1.7–7.7)
NEUTROPHILS NFR BLD: 58 %
PERFORMED ON: ABNORMAL
PLATELET # BLD AUTO: 210 K/UL (ref 135–450)
PMV BLD AUTO: 8.7 FL (ref 5–10.5)
POTASSIUM SERPL-SCNC: 4.4 MMOL/L (ref 3.5–5.1)
RBC # BLD AUTO: 2.32 M/UL (ref 4–5.2)
SODIUM SERPL-SCNC: 139 MMOL/L (ref 136–145)
WBC # BLD AUTO: 7.2 K/UL (ref 4–11)

## 2024-04-28 PROCEDURE — 6360000002 HC RX W HCPCS: Performed by: NURSE PRACTITIONER

## 2024-04-28 PROCEDURE — 80048 BASIC METABOLIC PNL TOTAL CA: CPT

## 2024-04-28 PROCEDURE — 36415 COLL VENOUS BLD VENIPUNCTURE: CPT

## 2024-04-28 PROCEDURE — 93010 ELECTROCARDIOGRAM REPORT: CPT | Performed by: INTERNAL MEDICINE

## 2024-04-28 PROCEDURE — 2580000003 HC RX 258: Performed by: NURSE PRACTITIONER

## 2024-04-28 PROCEDURE — 6370000000 HC RX 637 (ALT 250 FOR IP): Performed by: NURSE PRACTITIONER

## 2024-04-28 PROCEDURE — 85025 COMPLETE CBC W/AUTO DIFF WBC: CPT

## 2024-04-28 PROCEDURE — 2060000000 HC ICU INTERMEDIATE R&B

## 2024-04-28 PROCEDURE — 6370000000 HC RX 637 (ALT 250 FOR IP): Performed by: INTERNAL MEDICINE

## 2024-04-28 RX ORDER — LOSARTAN POTASSIUM 100 MG/1
100 TABLET ORAL DAILY
COMMUNITY

## 2024-04-28 RX ORDER — PANTOPRAZOLE SODIUM 40 MG/1
40 TABLET, DELAYED RELEASE ORAL 2 TIMES DAILY
Status: DISCONTINUED | OUTPATIENT
Start: 2024-04-28 | End: 2024-04-30 | Stop reason: HOSPADM

## 2024-04-28 RX ORDER — LABETALOL HYDROCHLORIDE 5 MG/ML
10 INJECTION, SOLUTION INTRAVENOUS ONCE
Status: COMPLETED | OUTPATIENT
Start: 2024-04-28 | End: 2024-04-28

## 2024-04-28 RX ADMIN — LEVETIRACETAM 500 MG: 500 TABLET, FILM COATED ORAL at 20:10

## 2024-04-28 RX ADMIN — PANTOPRAZOLE SODIUM 40 MG: 40 TABLET, DELAYED RELEASE ORAL at 20:10

## 2024-04-28 RX ADMIN — LABETALOL HYDROCHLORIDE 10 MG: 5 INJECTION INTRAVENOUS at 05:18

## 2024-04-28 RX ADMIN — NIFEDIPINE 60 MG: 60 TABLET, EXTENDED RELEASE ORAL at 20:10

## 2024-04-28 RX ADMIN — LEVOTHYROXINE SODIUM 50 MCG: 0.05 TABLET ORAL at 05:18

## 2024-04-28 RX ADMIN — LOSARTAN POTASSIUM 50 MG: 50 TABLET, FILM COATED ORAL at 20:10

## 2024-04-28 RX ADMIN — HEPARIN SODIUM 5000 UNITS: 5000 INJECTION INTRAVENOUS; SUBCUTANEOUS at 13:14

## 2024-04-28 RX ADMIN — CLONIDINE HYDROCHLORIDE 0.2 MG: 0.1 TABLET ORAL at 08:31

## 2024-04-28 RX ADMIN — Medication 10 ML: at 08:32

## 2024-04-28 RX ADMIN — CARVEDILOL 12.5 MG: 12.5 TABLET, FILM COATED ORAL at 16:22

## 2024-04-28 RX ADMIN — NIFEDIPINE 60 MG: 60 TABLET, EXTENDED RELEASE ORAL at 08:31

## 2024-04-28 RX ADMIN — CARVEDILOL 12.5 MG: 12.5 TABLET, FILM COATED ORAL at 08:31

## 2024-04-28 RX ADMIN — LOSARTAN POTASSIUM 50 MG: 50 TABLET, FILM COATED ORAL at 08:31

## 2024-04-28 RX ADMIN — HYDROXYZINE HYDROCHLORIDE 25 MG: 25 TABLET, FILM COATED ORAL at 16:22

## 2024-04-28 RX ADMIN — HYDROXYZINE HYDROCHLORIDE 25 MG: 25 TABLET, FILM COATED ORAL at 00:38

## 2024-04-28 RX ADMIN — PANTOPRAZOLE SODIUM 40 MG: 40 TABLET, DELAYED RELEASE ORAL at 08:32

## 2024-04-28 RX ADMIN — CLOPIDOGREL BISULFATE 75 MG: 75 TABLET ORAL at 08:31

## 2024-04-28 RX ADMIN — HEPARIN SODIUM 5000 UNITS: 5000 INJECTION INTRAVENOUS; SUBCUTANEOUS at 05:18

## 2024-04-28 RX ADMIN — MIRTAZAPINE 30 MG: 30 TABLET, FILM COATED ORAL at 20:10

## 2024-04-28 RX ADMIN — LEVETIRACETAM 500 MG: 500 TABLET, FILM COATED ORAL at 08:31

## 2024-04-28 RX ADMIN — CLONIDINE HYDROCHLORIDE 0.2 MG: 0.1 TABLET ORAL at 20:10

## 2024-04-28 NOTE — FLOWSHEET NOTE
Pt's BP has remained elevated this shift. Nifedipine, Clonidine, and Losartan administered at 22:02 PM yesterday. Pt is asymptomatic. Provider on-call notified and order placed for one-time dose of Labetalol 10mg. Care ongoing. Electronically signed by Nery Vicente RN on 4/28/2024 at 5:21 AM     04/27/24 2345 04/28/24 0045 04/28/24 0337   Vital Signs   BP (!) 176/101 (!) 184/97 (!) 184/100   MAP (Calculated) 126 126 128   MAP (mmHg) 124 122 128   BP Location Right upper arm Left upper arm Left Arm   BP Method Automatic Automatic  --

## 2024-04-29 LAB
ABO + RH BLD: NORMAL
ANION GAP SERPL CALCULATED.3IONS-SCNC: 20 MMOL/L (ref 3–16)
BASOPHILS # BLD: 0.1 K/UL (ref 0–0.2)
BASOPHILS NFR BLD: 0.9 %
BLD GP AB SCN SERPL QL: NORMAL
BUN SERPL-MCNC: 46 MG/DL (ref 7–20)
CALCIUM SERPL-MCNC: 6 MG/DL (ref 8.3–10.6)
CHLORIDE SERPL-SCNC: 95 MMOL/L (ref 99–110)
CO2 SERPL-SCNC: 23 MMOL/L (ref 21–32)
CREAT SERPL-MCNC: 11.6 MG/DL (ref 0.6–1.1)
DEPRECATED RDW RBC AUTO: 14.8 % (ref 12.4–15.4)
EOSINOPHIL # BLD: 0.2 K/UL (ref 0–0.6)
EOSINOPHIL NFR BLD: 3.5 %
GFR SERPLBLD CREATININE-BSD FMLA CKD-EPI: 4 ML/MIN/{1.73_M2}
GLUCOSE BLD-MCNC: 110 MG/DL (ref 70–99)
GLUCOSE BLD-MCNC: 120 MG/DL (ref 70–99)
GLUCOSE BLD-MCNC: 143 MG/DL (ref 70–99)
GLUCOSE BLD-MCNC: 145 MG/DL (ref 70–99)
GLUCOSE SERPL-MCNC: 118 MG/DL (ref 70–99)
HCT VFR BLD AUTO: 20.7 % (ref 36–48)
HGB BLD-MCNC: 7 G/DL (ref 12–16)
LYMPHOCYTES # BLD: 1.9 K/UL (ref 1–5.1)
LYMPHOCYTES NFR BLD: 27.1 %
MCH RBC QN AUTO: 30.7 PG (ref 26–34)
MCHC RBC AUTO-ENTMCNC: 33.8 G/DL (ref 31–36)
MCV RBC AUTO: 91 FL (ref 80–100)
MONOCYTES # BLD: 0.7 K/UL (ref 0–1.3)
MONOCYTES NFR BLD: 9.6 %
NEUTROPHILS # BLD: 4.1 K/UL (ref 1.7–7.7)
NEUTROPHILS NFR BLD: 58.9 %
PERFORMED ON: ABNORMAL
PLATELET # BLD AUTO: 213 K/UL (ref 135–450)
PMV BLD AUTO: 8.9 FL (ref 5–10.5)
POTASSIUM SERPL-SCNC: 4.5 MMOL/L (ref 3.5–5.1)
RBC # BLD AUTO: 2.27 M/UL (ref 4–5.2)
SODIUM SERPL-SCNC: 138 MMOL/L (ref 136–145)
WBC # BLD AUTO: 7 K/UL (ref 4–11)

## 2024-04-29 PROCEDURE — 6360000002 HC RX W HCPCS: Performed by: INTERNAL MEDICINE

## 2024-04-29 PROCEDURE — 30233N1 TRANSFUSION OF NONAUTOLOGOUS RED BLOOD CELLS INTO PERIPHERAL VEIN, PERCUTANEOUS APPROACH: ICD-10-PCS | Performed by: INTERNAL MEDICINE

## 2024-04-29 PROCEDURE — 6370000000 HC RX 637 (ALT 250 FOR IP): Performed by: INTERNAL MEDICINE

## 2024-04-29 PROCEDURE — 97116 GAIT TRAINING THERAPY: CPT

## 2024-04-29 PROCEDURE — 86902 BLOOD TYPE ANTIGEN DONOR EA: CPT

## 2024-04-29 PROCEDURE — 80048 BASIC METABOLIC PNL TOTAL CA: CPT

## 2024-04-29 PROCEDURE — P9016 RBC LEUKOCYTES REDUCED: HCPCS

## 2024-04-29 PROCEDURE — 6370000000 HC RX 637 (ALT 250 FOR IP): Performed by: NURSE PRACTITIONER

## 2024-04-29 PROCEDURE — 2580000003 HC RX 258: Performed by: NURSE PRACTITIONER

## 2024-04-29 PROCEDURE — 94760 N-INVAS EAR/PLS OXIMETRY 1: CPT

## 2024-04-29 PROCEDURE — 36430 TRANSFUSION BLD/BLD COMPNT: CPT

## 2024-04-29 PROCEDURE — 86901 BLOOD TYPING SEROLOGIC RH(D): CPT

## 2024-04-29 PROCEDURE — 86922 COMPATIBILITY TEST ANTIGLOB: CPT

## 2024-04-29 PROCEDURE — 6360000002 HC RX W HCPCS: Performed by: NURSE PRACTITIONER

## 2024-04-29 PROCEDURE — 86850 RBC ANTIBODY SCREEN: CPT

## 2024-04-29 PROCEDURE — 36415 COLL VENOUS BLD VENIPUNCTURE: CPT

## 2024-04-29 PROCEDURE — 2060000000 HC ICU INTERMEDIATE R&B

## 2024-04-29 PROCEDURE — 97530 THERAPEUTIC ACTIVITIES: CPT

## 2024-04-29 PROCEDURE — 90935 HEMODIALYSIS ONE EVALUATION: CPT

## 2024-04-29 PROCEDURE — 85025 COMPLETE CBC W/AUTO DIFF WBC: CPT

## 2024-04-29 PROCEDURE — 86900 BLOOD TYPING SEROLOGIC ABO: CPT

## 2024-04-29 RX ORDER — SODIUM CHLORIDE 9 MG/ML
INJECTION, SOLUTION INTRAVENOUS PRN
Status: DISCONTINUED | OUTPATIENT
Start: 2024-04-29 | End: 2024-04-30 | Stop reason: HOSPADM

## 2024-04-29 RX ORDER — PROCHLORPERAZINE EDISYLATE 5 MG/ML
10 INJECTION INTRAMUSCULAR; INTRAVENOUS EVERY 6 HOURS PRN
Status: DISCONTINUED | OUTPATIENT
Start: 2024-04-29 | End: 2024-04-30 | Stop reason: HOSPADM

## 2024-04-29 RX ORDER — HEPARIN SODIUM 1000 [USP'U]/ML
4500 INJECTION, SOLUTION INTRAVENOUS; SUBCUTANEOUS ONCE AS NEEDED
Status: COMPLETED | OUTPATIENT
Start: 2024-04-29 | End: 2024-04-29

## 2024-04-29 RX ORDER — DIPHENHYDRAMINE HCL 25 MG
25 TABLET ORAL EVERY 6 HOURS PRN
Status: DISCONTINUED | OUTPATIENT
Start: 2024-04-29 | End: 2024-04-30 | Stop reason: HOSPADM

## 2024-04-29 RX ORDER — HYDRALAZINE HYDROCHLORIDE 20 MG/ML
5 INJECTION INTRAMUSCULAR; INTRAVENOUS EVERY 4 HOURS PRN
Status: DISCONTINUED | OUTPATIENT
Start: 2024-04-29 | End: 2024-04-30 | Stop reason: HOSPADM

## 2024-04-29 RX ADMIN — DIPHENHYDRAMINE HCL 25 MG: 25 TABLET ORAL at 05:10

## 2024-04-29 RX ADMIN — EPOETIN ALFA-EPBX 10000 UNITS: 10000 INJECTION, SOLUTION INTRAVENOUS; SUBCUTANEOUS at 17:13

## 2024-04-29 RX ADMIN — LOSARTAN POTASSIUM 50 MG: 50 TABLET, FILM COATED ORAL at 09:37

## 2024-04-29 RX ADMIN — LEVETIRACETAM 500 MG: 500 TABLET, FILM COATED ORAL at 20:00

## 2024-04-29 RX ADMIN — NIFEDIPINE 60 MG: 60 TABLET, EXTENDED RELEASE ORAL at 19:59

## 2024-04-29 RX ADMIN — CARVEDILOL 12.5 MG: 12.5 TABLET, FILM COATED ORAL at 09:37

## 2024-04-29 RX ADMIN — PANTOPRAZOLE SODIUM 40 MG: 40 TABLET, DELAYED RELEASE ORAL at 09:37

## 2024-04-29 RX ADMIN — MIRTAZAPINE 30 MG: 30 TABLET, FILM COATED ORAL at 20:00

## 2024-04-29 RX ADMIN — GABAPENTIN 100 MG: 100 CAPSULE ORAL at 01:26

## 2024-04-29 RX ADMIN — HEPARIN SODIUM 4500 UNITS: 1000 INJECTION INTRAVENOUS; SUBCUTANEOUS at 18:16

## 2024-04-29 RX ADMIN — HEPARIN SODIUM 5000 UNITS: 5000 INJECTION INTRAVENOUS; SUBCUTANEOUS at 05:11

## 2024-04-29 RX ADMIN — PROCHLORPERAZINE EDISYLATE 10 MG: 5 INJECTION INTRAMUSCULAR; INTRAVENOUS at 05:11

## 2024-04-29 RX ADMIN — CLONIDINE HYDROCHLORIDE 0.2 MG: 0.1 TABLET ORAL at 19:59

## 2024-04-29 RX ADMIN — DIPHENHYDRAMINE HCL 25 MG: 25 TABLET ORAL at 13:53

## 2024-04-29 RX ADMIN — PANTOPRAZOLE SODIUM 40 MG: 40 TABLET, DELAYED RELEASE ORAL at 19:59

## 2024-04-29 RX ADMIN — Medication 10 ML: at 09:44

## 2024-04-29 RX ADMIN — CARVEDILOL 12.5 MG: 12.5 TABLET, FILM COATED ORAL at 18:49

## 2024-04-29 RX ADMIN — LEVOTHYROXINE SODIUM 50 MCG: 0.05 TABLET ORAL at 05:11

## 2024-04-29 RX ADMIN — NIFEDIPINE 60 MG: 60 TABLET, EXTENDED RELEASE ORAL at 09:37

## 2024-04-29 RX ADMIN — CLONIDINE HYDROCHLORIDE 0.2 MG: 0.1 TABLET ORAL at 09:37

## 2024-04-29 RX ADMIN — PROCHLORPERAZINE EDISYLATE 10 MG: 5 INJECTION INTRAMUSCULAR; INTRAVENOUS at 22:16

## 2024-04-29 RX ADMIN — LOSARTAN POTASSIUM 50 MG: 50 TABLET, FILM COATED ORAL at 19:59

## 2024-04-29 RX ADMIN — LEVETIRACETAM 500 MG: 500 TABLET, FILM COATED ORAL at 09:37

## 2024-04-29 RX ADMIN — ANTACID TABLETS 1000 MG: 500 TABLET, CHEWABLE ORAL at 03:41

## 2024-04-29 RX ADMIN — HYDROXYZINE HYDROCHLORIDE 25 MG: 25 TABLET, FILM COATED ORAL at 01:26

## 2024-04-29 NOTE — PLAN OF CARE
Problem: Discharge Planning  Goal: Discharge to home or other facility with appropriate resources  4/29/2024 1126 by Quan Valles RN  Outcome: Progressing     Problem: Pain  Goal: Verbalizes/displays adequate comfort level or baseline comfort level  4/29/2024 1126 by Quan Valles RN  Outcome: Progressing     Problem: Safety - Adult  Goal: Free from fall injury  4/29/2024 1126 by Quan Valles RN  Outcome: Progressing     Problem: ABCDS Injury Assessment  Goal: Absence of physical injury  4/29/2024 1126 by Quan Valles, RN  Outcome: Progressing

## 2024-04-29 NOTE — CARE COORDINATION
Case Management Assessment  Initial Evaluation    Date/Time of Evaluation: 4/29/2024 2:09 PM  Assessment Completed by: Aemlie Gant RN    If patient is discharged prior to next notation, then this note serves as note for discharge by case management.    Patient Name: Miryam Dumont                   YOB: 1975  Diagnosis: End stage renal disease (HCC) [N18.6]  Acute pulmonary edema (HCC) [J81.0]  Elevated troponin [R79.89]  Noncompliance with renal dialysis (HCC) [Z91.158]  Goals of care, counseling/discussion [Z71.89]  Uncontrolled hypertension [I10]  Hypertensive emergency [I16.1]                   Date / Time: 4/26/2024  7:09 PM    Patient Admission Status: Inpatient   Readmission Risk (Low < 19, Mod (19-27), High > 27): Readmission Risk Score: 20.6    Current PCP: Jessica Rivera, DO  PCP verified by CM? Yes    Chart Reviewed: Yes      History Provided by: Patient  Patient Orientation: Alert and Oriented    Patient Cognition: Alert    Hospitalization in the last 30 days (Readmission):  No    If yes, Readmission Assessment in CM Navigator will be completed.    Advance Directives:      Code Status: Full Code   Patient's Primary Decision Maker is: Legal Next of Kin    Primary Decision Maker: JoelJena - Brother/Sister - [phone]    Discharge Planning:    Patient lives with: Spouse/Significant Other Type of Home: Apartment  Primary Care Giver: Self  Patient Support Systems include: Spouse/Significant Other   Current Financial resources: Medicaid  Current community resources: Other (Comment) (HD at home followed by Eloisa Cunningham)  Current services prior to admission: Durable Medical Equipment, Other (Comment) (HD at home)            Current DME: Walker            Type of Home Care services:  None    ADLS  Prior functional level: Assistance with the following:, Mobility  Current functional level: Assistance with the following:, Mobility    PT AM-PAC: 19 /24  OT AM-PAC: 19 /24    Family can

## 2024-04-29 NOTE — DISCHARGE INSTR - COC
Therapy:        Elimination:  Continence:   Bowel: {YES / NO:}  Bladder: {YES / NO:}  Urinary Catheter: {Urinary Catheter:626861077}   Colostomy/Ileostomy/Ileal Conduit: {YES / NO:}       Date of Last BM: ***    Intake/Output Summary (Last 24 hours) at 2024 1550  Last data filed at 2024 1000  Gross per 24 hour   Intake 240 ml   Output --   Net 240 ml     I/O last 3 completed shifts:  In: 170 [P.O.:170]  Out: -     Safety Concerns:     { LY Safety Concerns:330259619}    Impairments/Disabilities:      {[facility] Impairments/Disabilities:754981209}    Nutrition Therapy:  Current Nutrition Therapy:   { LY Diet List:837898941}    Routes of Feeding: {[facility] DME Other Feedings:313661333}  Liquids: {Slp liquid thickness:64444}  Daily Fluid Restriction: {[facility] DME Yes amt example:552192267}  Last Modified Barium Swallow with Video (Video Swallowing Test): {Done Not Done Date:}    Treatments at the Time of Hospital Discharge:   Respiratory Treatments: ***  Oxygen Therapy:  {Therapy; copd oxygen:08789}  Ventilator:    {[facility] Vent List:917346118}    Rehab Therapies: {THERAPEUTIC INTERVENTION:3297308098}  Weight Bearing Status/Restrictions: {[facility] Weight Bearin}  Other Medical Equipment (for information only, NOT a DME order):  {EQUIPMENT:292351050}  Other Treatments: ***    Patient's personal belongings (please select all that are sent with patient):  {[facility] DME Belongings:483087932}    RN SIGNATURE:  {Esignature:521350118}    CASE MANAGEMENT/SOCIAL WORK SECTION    Inpatient Status Date: 24    Readmission Risk Assessment Score:  Readmission Risk              Risk of Unplanned Readmission:  40         Discharging to Facility/ Agency   Name:  [facility]    Address: [address] [place] [address]  Phone: [phone]  Fax: [phone]      / signature: Electronically signed by Amelie Gant RN on 24 at 3:51 PM EDT    PHYSICIAN

## 2024-04-29 NOTE — PLAN OF CARE
Problem: Discharge Planning  Goal: Discharge to home or other facility with appropriate resources  Outcome: Progressing  Flowsheets (Taken 4/28/2024 2011)  Discharge to home or other facility with appropriate resources:   Identify barriers to discharge with patient and caregiver   Arrange for needed discharge resources and transportation as appropriate   Identify discharge learning needs (meds, wound care, etc)   Refer to discharge planning if patient needs post-hospital services based on physician order or complex needs related to functional status, cognitive ability or social support system     Problem: Pain  Goal: Verbalizes/displays adequate comfort level or baseline comfort level  Outcome: Progressing  Flowsheets (Taken 4/28/2024 1930)  Verbalizes/displays adequate comfort level or baseline comfort level:   Encourage patient to monitor pain and request assistance   Assess pain using appropriate pain scale   Administer analgesics based on type and severity of pain and evaluate response   Implement non-pharmacological measures as appropriate and evaluate response   Consider cultural and social influences on pain and pain management   Notify Licensed Independent Practitioner if interventions unsuccessful or patient reports new pain     Problem: Safety - Adult  Goal: Free from fall injury  Outcome: Progressing  Flowsheets (Taken 4/29/2024 0053)  Free From Fall Injury: Instruct family/caregiver on patient safety     Problem: ABCDS Injury Assessment  Goal: Absence of physical injury  Outcome: Progressing  Flowsheets (Taken 4/29/2024 0053)  Absence of Physical Injury: Implement safety measures based on patient assessment

## 2024-04-29 NOTE — ACP (ADVANCE CARE PLANNING)
Advance Care Planning     Advance Care Planning Activator (Inpatient)  Conversation Note      Date of ACP Conversation: 4/29/2024     Conversation Conducted with: Patient with Decision Making Capacity    ACP Activator: Amelie Gant RN    Health Care Decision Maker:     Current Designated Health Care Decision Maker:     Primary Decision Maker: Jena Maldonado - Brother/Sister - [phone]    Today we documented Decision Maker(s) consistent with Legal Next of Kin hierarchy.    Care Preferences    Ventilation:  \"If you were in your present state of health and suddenly became very ill and were unable to breathe on your own, what would your preference be about the use of a ventilator (breathing machine) if it were available to you?\"      Would the patient desire the use of ventilator (breathing machine)?: yes    \"If your health worsens and it becomes clear that your chance of recovery is unlikely, what would your preference be about the use of a ventilator (breathing machine) if it were available to you?\"     Would the patient desire the use of ventilator (breathing machine)?: Yes      Resuscitation  \"CPR works best to restart the heart when there is a sudden event, like a heart attack, in someone who is otherwise healthy. Unfortunately, CPR does not typically restart the heart for people who have serious health conditions or who are very sick.\"    \"In the event your heart stopped as a result of an underlying serious health condition, would you want attempts to be made to restart your heart (answer \"yes\" for attempt to resuscitate) or would you prefer a natural death (answer \"no\" for do not attempt to resuscitate)?\" yes       [] Yes   [] No   Educated Patient / Decision Maker regarding differences between Advance Directives and portable DNR orders.    Length of ACP Conversation in minutes:      Conversation Outcomes:  ACP discussion completed    Follow-up plan:    [] Schedule follow-up conversation to continue

## 2024-04-29 NOTE — CARE COORDINATION
04/29/24 1337   Safety   How often does anyone, including family and friends, physically hurt you? Never   How often does anyone, including family and friends, insult or talk down to you? Never   How often does anyone, including family and friends, threaten you with harm? Never   How often does anyone, including family and friends, scream or curse at you? Never   Safety Score 4     Patient denied any safety issues at home.  Patient stated that her and her significant other are fine now, they just had a disagreement.    #[phone]  Electronically signed by Amelie Gant RN on 4/29/2024 at 1:39 PM

## 2024-04-29 NOTE — DIALYSIS
Hemodialysis Treatment Note:     HD treatment well tolerated. 2L net fluid removed over 3.5 hours. BP stable through treatment. All blood returned w/ rinse back. BP high following blood return. Pt stable w/ no complaints following HD session. Report given to primary RN.

## 2024-04-30 VITALS
TEMPERATURE: 98.9 F | BODY MASS INDEX: 21.07 KG/M2 | HEART RATE: 81 BPM | HEIGHT: 67 IN | RESPIRATION RATE: 20 BRPM | WEIGHT: 134.26 LBS | DIASTOLIC BLOOD PRESSURE: 81 MMHG | OXYGEN SATURATION: 95 % | SYSTOLIC BLOOD PRESSURE: 173 MMHG

## 2024-04-30 LAB
ALBUMIN SERPL-MCNC: 3.2 G/DL (ref 3.4–5)
ALP SERPL-CCNC: 103 U/L (ref 40–129)
ALT SERPL-CCNC: <5 U/L (ref 10–40)
ANION GAP SERPL CALCULATED.3IONS-SCNC: 15 MMOL/L (ref 3–16)
AST SERPL-CCNC: 15 U/L (ref 15–37)
BILIRUB DIRECT SERPL-MCNC: <0.2 MG/DL (ref 0–0.3)
BILIRUB INDIRECT SERPL-MCNC: ABNORMAL MG/DL (ref 0–1)
BILIRUB SERPL-MCNC: 0.4 MG/DL (ref 0–1)
BUN SERPL-MCNC: 19 MG/DL (ref 7–20)
CALCIUM SERPL-MCNC: 7.6 MG/DL (ref 8.3–10.6)
CHLORIDE SERPL-SCNC: 99 MMOL/L (ref 99–110)
CO2 SERPL-SCNC: 24 MMOL/L (ref 21–32)
CREAT SERPL-MCNC: 6.1 MG/DL (ref 0.6–1.1)
DEPRECATED RDW RBC AUTO: 14.7 % (ref 12.4–15.4)
GFR SERPLBLD CREATININE-BSD FMLA CKD-EPI: 8 ML/MIN/{1.73_M2}
GLUCOSE BLD-MCNC: 104 MG/DL (ref 70–99)
GLUCOSE BLD-MCNC: 122 MG/DL (ref 70–99)
GLUCOSE SERPL-MCNC: 106 MG/DL (ref 70–99)
HCT VFR BLD AUTO: 25.4 % (ref 36–48)
HCT VFR BLD AUTO: 25.6 % (ref 36–48)
HGB BLD-MCNC: 8.7 G/DL (ref 12–16)
HGB BLD-MCNC: 8.9 G/DL (ref 12–16)
MCH RBC QN AUTO: 31.8 PG (ref 26–34)
MCHC RBC AUTO-ENTMCNC: 34.9 G/DL (ref 31–36)
MCV RBC AUTO: 91.1 FL (ref 80–100)
PERFORMED ON: ABNORMAL
PERFORMED ON: ABNORMAL
PHOSPHATE SERPL-MCNC: 3.1 MG/DL (ref 2.5–4.9)
PLATELET # BLD AUTO: 202 K/UL (ref 135–450)
PMV BLD AUTO: 8.8 FL (ref 5–10.5)
POTASSIUM SERPL-SCNC: 4.3 MMOL/L (ref 3.5–5.1)
PROT SERPL-MCNC: 7.2 G/DL (ref 6.4–8.2)
RBC # BLD AUTO: 2.82 M/UL (ref 4–5.2)
SODIUM SERPL-SCNC: 138 MMOL/L (ref 136–145)
WBC # BLD AUTO: 9.2 K/UL (ref 4–11)

## 2024-04-30 PROCEDURE — 85027 COMPLETE CBC AUTOMATED: CPT

## 2024-04-30 PROCEDURE — 85018 HEMOGLOBIN: CPT

## 2024-04-30 PROCEDURE — 6370000000 HC RX 637 (ALT 250 FOR IP): Performed by: NURSE PRACTITIONER

## 2024-04-30 PROCEDURE — 80069 RENAL FUNCTION PANEL: CPT

## 2024-04-30 PROCEDURE — 6360000002 HC RX W HCPCS: Performed by: INTERNAL MEDICINE

## 2024-04-30 PROCEDURE — 80076 HEPATIC FUNCTION PANEL: CPT

## 2024-04-30 PROCEDURE — 6360000002 HC RX W HCPCS: Performed by: REGISTERED NURSE

## 2024-04-30 PROCEDURE — 6370000000 HC RX 637 (ALT 250 FOR IP): Performed by: INTERNAL MEDICINE

## 2024-04-30 PROCEDURE — 2580000003 HC RX 258: Performed by: NURSE PRACTITIONER

## 2024-04-30 PROCEDURE — 36415 COLL VENOUS BLD VENIPUNCTURE: CPT

## 2024-04-30 PROCEDURE — 94760 N-INVAS EAR/PLS OXIMETRY 1: CPT

## 2024-04-30 PROCEDURE — 85014 HEMATOCRIT: CPT

## 2024-04-30 RX ORDER — LABETALOL HYDROCHLORIDE 5 MG/ML
10 INJECTION, SOLUTION INTRAVENOUS ONCE
Status: COMPLETED | OUTPATIENT
Start: 2024-04-30 | End: 2024-04-30

## 2024-04-30 RX ORDER — HYDRALAZINE HYDROCHLORIDE 25 MG/1
25 TABLET, FILM COATED ORAL EVERY 8 HOURS SCHEDULED
Qty: 90 TABLET | Refills: 0 | Status: SHIPPED | OUTPATIENT
Start: 2024-04-30

## 2024-04-30 RX ORDER — CLONIDINE HYDROCHLORIDE 0.2 MG/1
0.2 TABLET ORAL 3 TIMES DAILY
Qty: 60 TABLET | Refills: 0 | Status: SHIPPED | OUTPATIENT
Start: 2024-04-30

## 2024-04-30 RX ORDER — HYDRALAZINE HYDROCHLORIDE 25 MG/1
25 TABLET, FILM COATED ORAL EVERY 8 HOURS SCHEDULED
Status: DISCONTINUED | OUTPATIENT
Start: 2024-04-30 | End: 2024-04-30 | Stop reason: HOSPADM

## 2024-04-30 RX ORDER — CLONIDINE HYDROCHLORIDE 0.1 MG/1
0.2 TABLET ORAL 3 TIMES DAILY
Status: DISCONTINUED | OUTPATIENT
Start: 2024-04-30 | End: 2024-04-30 | Stop reason: HOSPADM

## 2024-04-30 RX ADMIN — LEVOTHYROXINE SODIUM 50 MCG: 0.05 TABLET ORAL at 05:03

## 2024-04-30 RX ADMIN — HYDRALAZINE HYDROCHLORIDE 5 MG: 20 INJECTION INTRAMUSCULAR; INTRAVENOUS at 04:46

## 2024-04-30 RX ADMIN — HYDRALAZINE HYDROCHLORIDE 5 MG: 20 INJECTION INTRAMUSCULAR; INTRAVENOUS at 00:02

## 2024-04-30 RX ADMIN — LABETALOL HYDROCHLORIDE 10 MG: 5 INJECTION INTRAVENOUS at 01:36

## 2024-04-30 RX ADMIN — PANTOPRAZOLE SODIUM 40 MG: 40 TABLET, DELAYED RELEASE ORAL at 08:37

## 2024-04-30 RX ADMIN — HYDRALAZINE HYDROCHLORIDE 25 MG: 25 TABLET ORAL at 13:56

## 2024-04-30 RX ADMIN — ACETAMINOPHEN 650 MG: 325 TABLET ORAL at 00:01

## 2024-04-30 RX ADMIN — LOSARTAN POTASSIUM 50 MG: 50 TABLET, FILM COATED ORAL at 08:38

## 2024-04-30 RX ADMIN — HYDRALAZINE HYDROCHLORIDE 5 MG: 20 INJECTION INTRAMUSCULAR; INTRAVENOUS at 08:39

## 2024-04-30 RX ADMIN — Medication 10 ML: at 08:39

## 2024-04-30 RX ADMIN — CLONIDINE HYDROCHLORIDE 0.2 MG: 0.1 TABLET ORAL at 08:37

## 2024-04-30 RX ADMIN — CLONIDINE HYDROCHLORIDE 0.2 MG: 0.1 TABLET ORAL at 13:56

## 2024-04-30 RX ADMIN — NIFEDIPINE 60 MG: 60 TABLET, EXTENDED RELEASE ORAL at 08:37

## 2024-04-30 RX ADMIN — LEVETIRACETAM 500 MG: 500 TABLET, FILM COATED ORAL at 08:37

## 2024-04-30 RX ADMIN — CARVEDILOL 12.5 MG: 12.5 TABLET, FILM COATED ORAL at 08:38

## 2024-04-30 NOTE — CARE COORDINATION
Case Management Discharge Note          Date / Time of Note: 4/30/2024 3:13 PM                  Patient Name: Miryam Dumont   YOB: 1975  Diagnosis: End stage renal disease (HCC) [N18.6]  Acute pulmonary edema (HCC) [J81.0]  Elevated troponin [R79.89]  Noncompliance with renal dialysis (HCC) [Z91.158]  Goals of care, counseling/discussion [Z71.89]  Uncontrolled hypertension [I10]  Hypertensive emergency [I16.1]   Date / Time: 4/26/2024  7:09 PM    Financial:  Payor: "[employer] [place] MEDICAID / Plan: "[employer]" [place] MEDICA / Product Type: *No Product type* /      Pharmacy:    [facility] - [facility] [address] - P [phone] - F [phone]  [address] [facility] [address]  Phone: [phone] Fax: [phone]    [facility] [id number] [address] - P [phone] - F [phone]  [address]  [facility] [address]  Phone: [phone] Fax: [phone]      Assistance purchasing medications?: Potential Assistance Purchasing Medications: No  Assistance provided by Case Management: None at this time    DISCHARGE Disposition: Home with Home Health Care    Home Care:  Home Care ordered at discharge: Yes  Home Care Agency: [facility]  Phone: [phone]  Fax: [phone]  Orders faxed: Yes    Transportation:  Transportation PLAN for discharge:  private car    Mode of Transport: Lyft    Additional CM Notes:   DC order noted.  Lyft to be scheduled when patient is ready.  [facility] to follow for home care.  Denied any other home needs.      The Plan for Transition of Care is related to the following treatment goals of End stage renal disease (HCC) [N18.6]  Acute pulmonary edema (HCC) [J81.0]  Elevated troponin [R79.89]  Noncompliance with renal dialysis (HCC) [Z91.158]  Goals of care, counseling/discussion [Z71.89]  Uncontrolled hypertension [I10]  Hypertensive emergency [I16.1]    The Patient

## 2024-04-30 NOTE — FLOWSHEET NOTE
Pt's BP gradually decreasing. PRN for HTN given at 04:46 per order. Pt has remained afebrile since receiving Tylenol at midnight. She is awake and alert and denies body aches or any other sx. Electronically signed by Nery Vicente RN on 4/30/2024 at 5:28 AM     04/30/24 0500   Vital Signs   Temp 98.5 °F (36.9 °C)   Temp Source Oral   Pulse 88   Heart Rate Source Monitor   Respirations 17   BP (!) 187/85

## 2024-04-30 NOTE — FLOWSHEET NOTE
Pt's BP elevated at start of shift. Scheduled HTN medications administered as ordered. S/P transfusion of PRBCs, pt's BP remains high. IV PRN for HTN administered per order. Pt c/o slight HA that has been present since start of shift, but she denies any CP or SOB. Care ongoing. Electronically signed by Nery Vicente RN on 4/30/2024 at 12:28 AM     04/29/24 1922 04/29/24 2358   Vital Signs   BP (!) 212/104 (!) 208/102   MAP (Calculated) 140 137   MAP (mmHg) 140 133   BP Location Right Arm Right upper arm   BP Method Automatic Automatic

## 2024-04-30 NOTE — PLAN OF CARE
Problem: Discharge Planning  Goal: Discharge to home or other facility with appropriate resources  Outcome: Progressing  Flowsheets (Taken 4/29/2024 2015)  Discharge to home or other facility with appropriate resources:   Identify barriers to discharge with patient and caregiver   Arrange for needed discharge resources and transportation as appropriate   Identify discharge learning needs (meds, wound care, etc)   Refer to discharge planning if patient needs post-hospital services based on physician order or complex needs related to functional status, cognitive ability or social support system     Problem: Pain  Goal: Verbalizes/displays adequate comfort level or baseline comfort level  Outcome: Progressing  Flowsheets (Taken 4/28/2024 1930)  Verbalizes/displays adequate comfort level or baseline comfort level:   Encourage patient to monitor pain and request assistance   Assess pain using appropriate pain scale   Administer analgesics based on type and severity of pain and evaluate response   Implement non-pharmacological measures as appropriate and evaluate response   Consider cultural and social influences on pain and pain management   Notify Licensed Independent Practitioner if interventions unsuccessful or patient reports new pain     Problem: Safety - Adult  Goal: Free from fall injury  Outcome: Progressing  Flowsheets (Taken 4/30/2024 0229)  Free From Fall Injury: Instruct family/caregiver on patient safety     Problem: ABCDS Injury Assessment  Goal: Absence of physical injury  Outcome: Progressing  Flowsheets (Taken 4/30/2024 0229)  Absence of Physical Injury: Implement safety measures based on patient assessment     Problem: Chronic Conditions and Co-morbidities  Goal: Patient's chronic conditions and co-morbidity symptoms are monitored and maintained or improved  Outcome: Progressing  Flowsheets (Taken 4/29/2024 2015)  Care Plan - Patient's Chronic Conditions and Co-Morbidity Symptoms are Monitored and

## 2024-04-30 NOTE — PROGRESS NOTES
Physician Progress Note      PATIENT:               AL PÉREZ  CSN #:                  [id number]  :                       1975  ADMIT DATE:       2024 7:09 PM  DISCH DATE:  RESPONDING  PROVIDER #:        Adama MOTA MD          QUERY TEXT:    Pt with ESRD admitted with uncontrolled hypertension after missing 1.5 weeks   worth of HD at home due to her significant other refusing to perform the   treatment or  antihypertensive meds.  Noted to have elevated troponin.    If possible, please document in the progress notes and discharge summary if   you are evaluating and/or treating any of the following:    The medical record reflects the following:  Risk Factors: ESRD  Clinical Indicators: troponin 592, 577; \"Troponin elevated: 592-> this is most   likely secondary to the ER SD and uncontrolled hypertension\" documented in   H&P and progress notes  Treatment: trending troponin, HD  Options provided:  -- Non-ischemic myocardial injury due to ESRD  -- Non-ischemic myocardial injury due to other, Please specify cause.  -- Non-ischemic myocardial injury, unspecified cause  -- Other - I will add my own diagnosis  -- Disagree - Not applicable / Not valid  -- Disagree - Clinically unable to determine / Unknown  -- Refer to Clinical Documentation Reviewer    PROVIDER RESPONSE TEXT:    This patient has non-ischemic myocardial injury due to ESRD.    Query created by: Kenia Calles on 2024 10:31 AM      QUERY TEXT:    Pt with ESRD admitted with uncontrolled hypertension after missing 1.5 weeks   worth of HD at home due to her significant other refusing to perform the   treatment or  antihypertensive meds. \"Chronic anemia: H&H 8:->   2/2-> ER SD.  No indication that warrants transfusion\" documented in H&P and   attending progress notes.  If possible, please document in progress notes and   discharge summary further specificity regarding the acuity and type of anemia:    The 
 IV and telemetry monitor removed. Discharge paperwork completed and discussed with the patient. All questions answered. Patient has been made aware of follow up appointments need to be made and patient verbalized understanding. Patient has been given all paper prescriptions that need to be filled or the medications have been filled with [facility] retail pharmacy and patient participated in Meds to Beds. All belongings have been packed up and sent with the patient. Patient will be driven home by an Uber.    
2315-Pt arrived to floor via stretcher from ED and transferred to bed. Bedside monitor activated. Patient oriented to room and use of call light. Call light and personal items within reach. Admission and assessment initiated. Education initiated and reviewed with patient. Denied further needs or questions at this time.    
4 Eyes Skin Assessment     NAME:  Miryam Dumont  YOB: 1975  MEDICAL RECORD NUMBER:  [id number]    The patient is being assessed for  Admission    I agree that at least one RN has performed a thorough Head to Toe Skin Assessment on the patient. ALL assessment sites listed below have been assessed.      Areas assessed by both nurses:    Head, Face, Ears, Shoulders, Back, Chest, Arms, Elbows, Hands, Sacrum. Buttock, Coccyx, Ischium, Legs. Feet and Heels, and Under Medical Devices         Does the Patient have a Wound? Yes wound(s) were present on assessment. LDA wound assessment was Initiated and completed by RN       Devonte Prevention initiated by RN: Yes  Wound Care Orders initiated by RN: No    Pressure Injury (Stage 3,4, Unstageable, DTI, NWPT, and Complex wounds) if present, place Wound referral order by RN under : No    New Ostomies, if present place, Ostomy referral order under : No     Nurse 1 eSignature: Electronically signed by Brynn Ross RN on 4/27/24 at 5:23 AM EDT    **SHARE this note so that the co-signing nurse can place an eSignature**    Nurse 2 eSignature: Electronically signed by Geetha Hutchins RN on 4/27/24 at 5:59 AM EDT   
Blood transfusion paused at 20:30 due to infiltration of IV. Pt with episode of emesis but she has been nauseated and has required antiemetics throughout this admission. Pt with minimal access and requiring US guided IV.     US IV placed and PRBCs immediately restarted at 22:17. Pt felt warm to the touch. Oral temp assessed and 100.0. Pt had a slight HA and felt achy prior to start of transfusion as she had just arrived back to the unit from HD. Provider on-call notified and this RN advised to continue transfusion and monitor for any worsening signs or symptoms. Care ongoing. Electronically signed by Nery Vicente RN on 4/29/2024 at 10:49 PM\  
CMU alerted this RN that pt had 7 beats of V Tach at 00:04  
Due to patient blood transfusion not completed until 11 PM, being hypertensive-205/105 which required PRN hydralazine and patient needing LYFT for transportation home will hold off on discharge until tomorrow a.m. for further evaluation and assessment.  
Hospitalist   Progress Note    Patient Name: Miryam Dumont  PCP: Jessica Rivera DO  Date of Admission: 4/26/2024    Chief Complaint on Admission: Missed hemodialysis x 1 week  Chief diagnosis after evaluation: Uncontrolled hypertension, noncompliance with hemodialysis    Brief Synopsis: Patient is a 49 y.o. woman who has a past medical history of Acquired hypothyroidism, Cerebral artery occlusion with cerebral infarction (HCC), Cocaine abuse (HCC), Crohn's disease (HCC), Depression, Diabetes mellitus (HCC), ESRD (end stage renal disease) (HCC), GERD (gastroesophageal reflux disease), Hemodialysis patient (HCC), Hyperlipidemia, Hypertension, Ischemic stroke (HCC), MI, old, Mood disorder (HCC), Pericarditis, Peripheral vascular disease (HCC), Peritonitis (HCC), Pneumonia, and Thyroid disease. who was admitted on 4/26/2024 for evaluation and treatment of uncontrolled hypertension, noncompliance with hemodialysis missing hemodialysis for 1.5 weeks    Pt Seen/Examined and Chart Reviewed.     Subjective: Pt has no new complaints    Objective:  Allergies  Ferrous sulfate, Cephalexin, Dicloxacillin, Pcn [penicillins], and Sulfa antibiotics    Medications    Scheduled Meds:   sodium chloride flush  5-40 mL IntraVENous 2 times per day    heparin (porcine)  5,000 Units SubCUTAneous 3 times per day    clopidogrel  75 mg Oral Daily    levETIRAcetam  500 mg Oral BID    levothyroxine  50 mcg Oral Daily    losartan  50 mg Oral BID    mirtazapine  30 mg Oral Nightly    NIFEdipine  60 mg Oral BID    pantoprazole  40 mg Oral Daily    carvedilol  12.5 mg Oral BID WC    cloNIDine  0.2 mg Oral BID     Infusions:   sodium chloride      dextrose       PRN Meds:  sodium chloride flush, sodium chloride, ondansetron **OR** ondansetron, polyethylene glycol, acetaminophen **OR** acetaminophen, glucose, dextrose bolus **OR** dextrose bolus, glucagon (rDNA), dextrose, calcium carbonate, hydrOXYzine HCl, gabapentin    Physical    VITALS:  BP (!) 
Hospitalist   Progress Note    Patient Name: Miryam Dumont  PCP: Jessica Rivera DO  Date of Admission: 4/26/2024    Chief Complaint on Admission: Missed hemodialysis x 1 week  Chief diagnosis after evaluation: Uncontrolled hypertension, noncompliance with hemodialysis    Brief Synopsis: Patient is a 49 y.o. woman who has a past medical history of Acquired hypothyroidism, Cerebral artery occlusion with cerebral infarction (HCC), Cocaine abuse (HCC), Crohn's disease (HCC), Depression, Diabetes mellitus (HCC), ESRD (end stage renal disease) (HCC), GERD (gastroesophageal reflux disease), Hemodialysis patient (HCC), Hyperlipidemia, Hypertension, Ischemic stroke (HCC), MI, old, Mood disorder (HCC), Pericarditis, Peripheral vascular disease (HCC), Peritonitis (HCC), Pneumonia, and Thyroid disease. who was admitted on 4/26/2024 for evaluation and treatment of uncontrolled hypertension, noncompliance with hemodialysis missing hemodialysis for 1.5 weeks    Pt Seen/Examined and Chart Reviewed.     Subjective: Pt has no new complaints. She was again very tired when I saw her this morning    Objective:  Allergies  Ferrous sulfate, Cephalexin, Dicloxacillin, Pcn [penicillins], and Sulfa antibiotics    Medications    Scheduled Meds:   pantoprazole  40 mg Oral BID    sodium chloride flush  5-40 mL IntraVENous 2 times per day    heparin (porcine)  5,000 Units SubCUTAneous 3 times per day    levETIRAcetam  500 mg Oral BID    levothyroxine  50 mcg Oral Daily    losartan  50 mg Oral BID    mirtazapine  30 mg Oral Nightly    NIFEdipine  60 mg Oral BID    carvedilol  12.5 mg Oral BID WC    cloNIDine  0.2 mg Oral BID     Infusions:   sodium chloride      dextrose       PRN Meds:  prochlorperazine, diphenhydrAMINE, hydrALAZINE, sodium chloride flush, sodium chloride, ondansetron **OR** ondansetron, polyethylene glycol, acetaminophen **OR** acetaminophen, glucose, dextrose bolus **OR** dextrose bolus, glucagon (rDNA), dextrose, calcium 
Nephrology ([facility]) Progress Note    CC: ESRD management    Subjective:    HPI:  I spoke with outpatient dialysis unit.  The true story is that the patient is very compliant with dialysis at home and does her treatment ~1x every 7 days.  There has been ongoing discussions about her being dismissed by  nephrology, but they have allowed the situation to continue.  She has not been seen nor have done her outpatient labs since at least 01/24.    ROS:  In bed.  No fever.  PMFSH:  medications reviewed.    Objective:  Blood pressure (!) 156/69, pulse 77, temperature 98.8 °F (37.1 °C), temperature source Oral, resp. rate 18, height 1.702 m (5' 7.01\"), weight 61.4 kg (135 lb 5.8 oz), SpO2 91 %, not currently breastfeeding.    Intake/Output Summary (Last 24 hours) at 4/28/2024 1436  Last data filed at 4/28/2024 1343  Gross per 24 hour   Intake 170 ml   Output --   Net 170 ml     General:  NAD, A+Ox3  Chest:  CTAB  CVS:  RRR  Abdominal:  NTND, soft, +BS  Extremities:  no edema  Skin:  no rash    Labs:  Renal panel:  Lab Results   Component Value Date/Time     04/28/2024 05:22 AM    K 4.4 04/28/2024 05:22 AM    CO2 24 04/28/2024 05:22 AM    BUN 39 (H) 04/28/2024 05:22 AM    CREATININE 10.7 (HH) 04/28/2024 05:22 AM    CALCIUM 6.1 (L) 04/28/2024 05:22 AM    PHOS 3.8 04/27/2024 05:24 AM    MG 2.60 (H) 06/13/2023 07:16 AM     CBC:  Lab Results   Component Value Date/Time    WBC 7.2 04/28/2024 05:22 AM    HGB 7.2 (L) 04/28/2024 05:22 AM    HCT 21.2 (L) 04/28/2024 05:22 AM     04/28/2024 05:22 AM       Assessment/Plan:  Reviewed old records and labs.    1)  ESRD              - this is the plan as coordinated with outpatient dialysis unit    - they will speak with Dr. Reyes's dialysis unit tomorrow to see if he will accept her; if they do, she will be switched to inpatient dialysis at [facility] ([facility])               - if the patient is not accepted by Dr. Reyes, the 
Nephrology ([facility]) Progress Note    CC: ESRD management    Subjective:    HPI:  I spoke with outpatient dialysis unit.  The true story is that the patient is very compliant with dialysis at home and does her treatment ~1x every 7 days.  There has been ongoing discussions about her being dismissed by  nephrology.     ROS sleeping .   PMFSH:  medications reviewed.    Objective:  Blood pressure (!) 188/100, pulse 80, temperature 99.1 °F (37.3 °C), temperature source Oral, resp. rate 19, height 1.702 m (5' 7.01\"), weight 61.3 kg (135 lb 2.3 oz), SpO2 98 %, not currently breastfeeding.    Intake/Output Summary (Last 24 hours) at 4/29/2024 1129  Last data filed at 4/28/2024 1343  Gross per 24 hour   Intake 120 ml   Output --   Net 120 ml       General:  Looks comfortable   No PE .    Last PE     ( Chest:  CTAB  CVS:  RRR  Abdominal:  NTND, soft, +BS  Extremities:  +  edema  Skin:  no rash )     Labs:  Renal panel:  Lab Results   Component Value Date/Time     04/29/2024 04:38 AM    K 4.5 04/29/2024 04:38 AM    CO2 23 04/29/2024 04:38 AM    BUN 46 (H) 04/29/2024 04:38 AM    CREATININE 11.6 (HH) 04/29/2024 04:38 AM    CALCIUM 6.0 (L) 04/29/2024 04:38 AM    PHOS 3.8 04/27/2024 05:24 AM    MG 2.60 (H) 06/13/2023 07:16 AM     CBC:  Lab Results   Component Value Date/Time    WBC 7.0 04/29/2024 04:38 AM    HGB 7.0 (L) 04/29/2024 04:38 AM    HCT 20.7 (LL) 04/29/2024 04:38 AM     04/29/2024 04:38 AM       Assessment/Plan:  Reviewed old records and labs.    1)  ESRD              - this is the plan as coordinated with outpatient dialysis unit    - Awaiting Dr. Reyes's / his  dialysis unit  to see if he will accept her; if they do, she will be switched to inpatient dialysis at his unit .                - if the patient is not accepted by Dr. Reyes, the dialysis unit will deliver a 30-day termination notice from  nephrology, and she will be terminated from home HD without an outpatient dialysis 
Occupational Therapy    Pt chair alarm going off as therapist approaching room. Pt had removed tele-monitor and was ambulating to door with walker to adjust thermostat. Pt stating she is waiting to be discharged home and has no concerns for therapy at this time. Pt educated to use call light for safety- pt did verbalize understanding. Pt in chair with warm blanket, alarm activated, call light in reach and lunch at bedside. RN aware pt inquiring about discharge. Will follow up if not discharged from hospital.    Electronically signed by Ivy Layne OT on 4/30/2024 at 2:54 PM    
Occupational Therapy  Facility/Department: [address] PROGRESSIVE CARE  Occupational Therapy Initial Assessment    Name: Miryam Dumont  : 1975  MRN: [id number]  Date of Service: 2024    Discharge Recommendations:  Home with assist PRN  OT Equipment Recommendations  Equipment Needed: No     Miryam Dumont scored a 19/24 on the AM-PAC ADL Inpatient form.  At this time, no further OT is recommended upon discharge. Recommend patient returns to prior setting with prior services.       Patient Diagnosis(es): The primary encounter diagnosis was Acute pulmonary edema (HCC). Diagnoses of Noncompliance with renal dialysis (HCC), Hypertensive emergency, Goals of care, counseling/discussion, Elevated troponin, and End stage renal disease (HCC) were also pertinent to this visit.  Past Medical History:  has a past medical history of Acquired hypothyroidism, Cerebral artery occlusion with cerebral infarction (HCC), Cocaine abuse (HCC), Crohn's disease (HCC), Depression, Diabetes mellitus (HCC), ESRD (end stage renal disease) (HCC), GERD (gastroesophageal reflux disease), Hemodialysis patient (HCC), Hyperlipidemia, Hypertension, Ischemic stroke (HCC), MI, old, Mood disorder (HCC), Pericarditis, Peripheral vascular disease (HCC), Peritonitis (HCC), Pneumonia, and Thyroid disease.  Past Surgical History:  has a past surgical history that includes Cholecystectomy; Tubal ligation; back surgery; Kidney transplant (); other surgical history; Upper gastrointestinal endoscopy (2017); laparoscopy; Paracentesis; Kidney transplant; Upper gastrointestinal endoscopy (N/A, 3/25/2019); and Colon surgery (Right).           Assessment   Performance deficits / Impairments: Decreased functional mobility ;Decreased safe awareness;Decreased balance;Decreased ADL status;Decreased high-level IADLs;Decreased endurance  Assessment: 48 y/o female admitted 2024 with uncontrolled hypertension and missing HD for a week. PTA pt lived 
Pharmacy Medication Reconciliation Note     List of medications patient is currently taking is complete.    Source of information:   1. Rx disp history  2. Patient interview    Notes regarding home medications:   1. Patient states no longer on Plavix (stopped by MD) no recent dispense  2. Changed Losartan to 100mg daily  3. Changed Pantoprazole to 40mg bid  4. Removed Calcium Acetate  5. Patient confirms Keppra dose is 500mg bid (does not supplement after dialysis)    Denies taking any other OTC or herbal medications    Quinn Bazzi RPH, [place] 4/28/2024 11:20 AM      
Physical Therapy  Facility/Department: [address] PROGRESSIVE CARE  Physical Therapy Initial Assessment    Name: Miryam Dumont  : 1975  MRN: [id number]  Date of Service: 2024    Discharge Recommendations:  Home with assist PRN   PT Equipment Recommendations  Equipment Needed: No      Miryam Dumont scored a 19/24 on the AM-PAC short mobility form. Current research shows that an AM-PAC score of 18 or greater is typically associated with a discharge to the patient's home setting. At this time anticipate pt will be safe to discharge home without further therapy. Please see assessment section for further patient specific details.    If patient discharges prior to next session this note will serve as a discharge summary.  Please see below for the latest assessment towards goals.       Patient Diagnosis(es): The primary encounter diagnosis was Acute pulmonary edema (HCC). Diagnoses of Noncompliance with renal dialysis (HCC), Hypertensive emergency, Goals of care, counseling/discussion, Elevated troponin, and End stage renal disease (HCC) were also pertinent to this visit.  Past Medical History:  has a past medical history of Acquired hypothyroidism, Cerebral artery occlusion with cerebral infarction (HCC), Cocaine abuse (HCC), Crohn's disease (HCC), Depression, Diabetes mellitus (HCC), ESRD (end stage renal disease) (HCC), GERD (gastroesophageal reflux disease), Hemodialysis patient (HCC), Hyperlipidemia, Hypertension, Ischemic stroke (HCC), MI, old, Mood disorder (HCC), Pericarditis, Peripheral vascular disease (HCC), Peritonitis (HCC), Pneumonia, and Thyroid disease.  Past Surgical History:  has a past surgical history that includes Cholecystectomy; Tubal ligation; back surgery; Kidney transplant (); other surgical history; Upper gastrointestinal endoscopy (2017); laparoscopy; Paracentesis; Kidney transplant; Upper gastrointestinal endoscopy (N/A, 3/25/2019); and Colon surgery 
Physical Therapy  Facility/Department: [address] PROGRESSIVE CARE  Physical Therapy Treatment    Name: Miryam Dumont  : 1975  MRN: [id number]  Date of Service: 2024    Discharge Recommendations:  Home with assist PRN, Home with Home health PT   PT Equipment Recommendations  Equipment Needed: No  Other: owns rollator    Miryam Dumont scored a 19/24 on the AM-PAC short mobility form. Current research shows that an AM-PAC score of 18 or greater is typically associated with a discharge to the patient's home setting. Based on the patient's AM-PAC score and their current functional mobility deficits, it is recommended that the patient have 2-3 sessions per week of Physical Therapy at d/c to increase the patient's independence.  At this time, this patient demonstrates the endurance and safety to discharge home with home PT and a follow up treatment frequency of 2-3x/wk.  Please see assessment section for further patient specific details.    If patient discharges prior to next session this note will serve as a discharge summary.  Please see below for the latest assessment towards goals.          Patient Diagnosis(es): The primary encounter diagnosis was Acute pulmonary edema (HCC). Diagnoses of Noncompliance with renal dialysis (HCC), Hypertensive emergency, Goals of care, counseling/discussion, Elevated troponin, and End stage renal disease (HCC) were also pertinent to this visit.  Past Medical History:  has a past medical history of Acquired hypothyroidism, Cerebral artery occlusion with cerebral infarction (HCC), Cocaine abuse (HCC), Crohn's disease (HCC), Depression, Diabetes mellitus (HCC), ESRD (end stage renal disease) (HCC), GERD (gastroesophageal reflux disease), Hemodialysis patient (HCC), Hyperlipidemia, Hypertension, Ischemic stroke (HCC), MI, old, Mood disorder (HCC), Pericarditis, Peripheral vascular disease (HCC), Peritonitis (HCC), Pneumonia, and Thyroid disease.  Past Surgical History:  has a 
Pt complained of itching all over the body and generalized body pain 8/10.Rochelle Mercer MD notified.Please see MAR.  
Pt is in HD unit.Patient's meds Clondine and losartan po administered at this time as per Marie Woodson NP.Her BP at the time of administration is 189/107.  
Transfusion of 1 unit PRBC's initiated at 19:58. Pt observed for initial 15 minutes with no s/s of adverse reaction. Her BP is elevated, evening BP meds administered at this time. Pt reports feeling weak and achy and has a slight HA.    On-call provider notified of transfusion start time and active d/c order. Will await further orders. Electronically signed by Nery Vicente RN on 4/29/2024 at 8:15 PM    
Treatment time: 2 hours  Net UF: 1500 ml     Pre weight: 59.8 kg  Post weight:58.2 kg      Access used: Right chest Vasc Cath    Access function: well with  ml/min     Medications or blood products given: None  Hemoglobin 8.1 will need aries.     Regular outpatient schedule: Home hemo.       Summary of response to treatment: Patient tolerated treatment well and without any complications. Patient remained stable throughout entire treatment and upon the exiting the dialysis suite via transport.     Report given to PRISCILLA Swain and copy of dialysis treatment record placed in chart, to be scanned into EMR.  
nurse and she will contact  neph . [place] [facility] [place] .     [facility] Dr Castellanos [facility] to [facility] issues) .    - plan HD later today  .     2) HTN              - Un controlled.   Meds adjusted .      3) non-compliance     awaiting placement .   See above for detail.     4) anemia              - S/p Tx   Epo with HD .     I had a long dw pts nusre and admitting team.   I doubt that she will follow her home HD prescription .   Prognosis poor  ....         Dorita Vivas MD,FACP   
Code      PT/OT Eval Status:   PT score on the AM-PAC short mobility form (if applicable) - 19/24  OT score on the AM-PAC short mobility form (if applicable) - 19/24    Anticipate that Pt will discharge to: Home with home health care, PT and OT    Dispo - Anticipated discharge date 1-2 days    Adama Batres MD

## 2024-04-30 NOTE — FLOWSHEET NOTE
Pt febrile s/p transfusion of 1 unit PRBCs. She denies any sx other than body aches that were present prior to start of transfusion. Tylenol administered at 00:01. On-call provider updated on pt's status, no new orders at this time. Care ongoing. Electronically signed by Nery Vicente RN on 4/30/2024 at 1:00 AM     04/29/24 2358   Vital Signs   Temp 100.4 °F (38 °C)   Temp Source Oral

## 2024-04-30 NOTE — DISCHARGE SUMMARY
Hospitalist Discharge Summary     Miryam Dumont  : 1975  MRN: [id number]    Admit date: 2024  Discharge date: 2024    Admitting Physician: Michael Escalante MD    Discharge Diagnoses:   Patient Active Problem List   Diagnosis    Depression with suicidal ideation    ESRD on hemodialysis (HCC)    Hyperkalemia    Metabolic acidosis    Essential hypertension    DMII (diabetes mellitus, type 2) (HCC)    Cellulitis    Cellulitis of lower extremity    Erythema nodosum    Chest pain    Failure to thrive in adult    Abnormal stress test    ESRD (end stage renal disease) on dialysis (HCC)    Suicidal ideation    NSTEMI (non-ST elevated myocardial infarction) (HCC)    Vaginal candidiasis    Vaginal bleeding    Vaginal discharge    Atypical chest pain    Severe episode of recurrent major depressive disorder, without psychotic features (HCC)    Anxiety disorder    Cocaine abuse (HCC)    Anemia    Illicit drug use    Hyperkalemia    Peritoneal dialysis status (HCC)    Hyperlipidemia    Chronic focal neurological deficit    Nausea and vomiting    Moderate malnutrition (HCC)    GI bleed    Acute ischemic stroke (HCC)    ESRD (end stage renal disease) (HCC)    Cerebrovascular accident (CVA) due to embolism of left posterior cerebral artery (HCC)    Intracranial hemorrhage (HCC)    Ascites of liver    Uncontrolled hypertension    Hypocalcemia       Admission Condition: poor    Discharged Condition: stable    Discharge Exam:  VITALS:  BP (!) 173/81   Pulse 81   Temp 98.9 °F (37.2 °C) (Oral)   Resp 20   Ht 1.702 m (5' 7.01\")   Wt 60.9 kg (134 lb 4.2 oz)   SpO2 95%   BMI 21.02 kg/m²   CONSTITUTIONAL:  awake, alert, cooperative, no apparent distress, and appears stated age  EYES:  Lids and lashes normal, PERRL, EOMI, sclera clear, conjunctiva normal  ENT:  NC/AT, MMM    NECK:  Supple, symmetrical, trachea midline, no adenopathy  HEMATOLOGIC/LYMPHATICS:  no cervical, supraclavicular or axillary

## 2024-04-30 NOTE — FLOWSHEET NOTE
Pt's BP remains elevated after Hydralazine 5mg. Provider on-call notified and order placed for one-time dose of labetalol 10mg. Electronically signed by Nery Vicente RN on 4/30/2024 at 1:30 AM     04/30/24 0115   Vital Signs   BP (!) 207/100   MAP (Calculated) 136   MAP (mmHg) 132   BP Location Right upper arm   BP Method Automatic

## 2024-05-01 LAB
BLOOD BANK DISPENSE STATUS: NORMAL
BLOOD BANK DISPENSE STATUS: NORMAL
BLOOD BANK PRODUCT CODE: NORMAL
BLOOD BANK PRODUCT CODE: NORMAL
BPU ID: NORMAL
BPU ID: NORMAL
DESCRIPTION BLOOD BANK: NORMAL
DESCRIPTION BLOOD BANK: NORMAL

## (undated) DEVICE — ENDOSCOPY KIT: Brand: MEDLINE INDUSTRIES, INC.

## (undated) DEVICE — MOUTHPIECE ENDOSCP L CTRL OPN AND SIDE PORTS DISP

## (undated) DEVICE — FORCEPS BX 240CM 2.4MM L NDL RAD JAW 4 M00513334

## (undated) DEVICE — CONTAINER SPEC 480ML CLR POLYSTYR 10% NEUT BUFF FRMLN ZN